# Patient Record
Sex: MALE | Race: WHITE | NOT HISPANIC OR LATINO | Employment: UNEMPLOYED | URBAN - NONMETROPOLITAN AREA
[De-identification: names, ages, dates, MRNs, and addresses within clinical notes are randomized per-mention and may not be internally consistent; named-entity substitution may affect disease eponyms.]

---

## 2017-06-22 ENCOUNTER — OFFICE VISIT (OUTPATIENT)
Dept: NEUROSURGERY | Facility: CLINIC | Age: 57
End: 2017-06-22

## 2017-06-22 VITALS
DIASTOLIC BLOOD PRESSURE: 118 MMHG | BODY MASS INDEX: 27.47 KG/M2 | WEIGHT: 175 LBS | SYSTOLIC BLOOD PRESSURE: 160 MMHG | HEIGHT: 67 IN

## 2017-06-22 DIAGNOSIS — M50.30 DEGENERATION OF CERVICAL INTERVERTEBRAL DISC: Primary | ICD-10-CM

## 2017-06-22 DIAGNOSIS — F17.200 SMOKER: ICD-10-CM

## 2017-06-22 DIAGNOSIS — M51.37 DEGENERATION OF LUMBAR OR LUMBOSACRAL INTERVERTEBRAL DISC: ICD-10-CM

## 2017-06-22 PROBLEM — S32.009A CLOSED FRACTURE OF LUMBAR VERTEBRA (HCC): Status: ACTIVE | Noted: 2017-06-22

## 2017-06-22 PROCEDURE — 99204 OFFICE O/P NEW MOD 45 MIN: CPT | Performed by: NURSE PRACTITIONER

## 2017-06-22 RX ORDER — LISINOPRIL 10 MG/1
TABLET ORAL
Status: ON HOLD | COMMUNITY
Start: 2017-05-10 | End: 2019-09-27 | Stop reason: SDDI

## 2017-06-22 RX ORDER — MELOXICAM 15 MG/1
TABLET ORAL
Status: ON HOLD | COMMUNITY
Start: 2017-05-10 | End: 2019-09-27 | Stop reason: SDDI

## 2017-06-22 RX ORDER — CYCLOBENZAPRINE HCL 10 MG
10 TABLET ORAL 3 TIMES DAILY PRN
Qty: 90 TABLET | Refills: 2 | Status: ON HOLD | OUTPATIENT
Start: 2017-06-22 | End: 2019-09-27

## 2017-06-22 RX ORDER — METHYLPREDNISOLONE 4 MG/1
TABLET ORAL
Qty: 21 EACH | Refills: 0 | Status: ON HOLD | OUTPATIENT
Start: 2017-06-22 | End: 2019-09-27

## 2017-06-22 RX ORDER — TRAMADOL HYDROCHLORIDE 50 MG/1
TABLET ORAL
Status: ON HOLD | COMMUNITY
Start: 2017-05-30 | End: 2019-09-27

## 2017-06-22 NOTE — PROGRESS NOTES
Chief complaint:   Chief Complaint   Patient presents with   • Neck Pain     Patient complains of pain between his shoulder blades that goes down into his left arm with numbness.    • Back Pain     His main pain feels like it is in his right hip and goes down his leg causing him trouble with walking.  Patient states he has not done any physical therapy for any of his problem.        Subjective     HPI: This is a 57-year-old male gentleman who is referred to us by Dr. Karimi for neck and back pain.  He is here to be evaluated today.  He states that the pain in his neck is been going on for several years.  There is no accident or injury associated with this.  The pain in his neck is constant.  He states nothing will make it better.  It is worse whenever he is working overhead.  He says that he will have some numbness and tingling in his left hand along with some sharp sensations in the left hand but no true radicular pain.  He states the pain in his back is been going on for several years without any accident or injury associated with it.  The pain in his back is constant.  Nothing makes it better.  Its worse when he is climbing.  He will have numbness and tingling in his right foot.  No radicular symptoms associated with this.  The pain in his neck is back is a dull aching pain.  Denies any bowel or bladder issues.  Rates the pain a scale 0-10 at an 8.  Says it is interfering with his activities daily living.  He is right-hand dominant.  He is single.  He works as a dempsey.  He has not done any recent physical therapy, chiropractic care, or pain management injections.    Review of Systems   Eyes:        Glasses   Respiratory: Positive for wheezing.    Musculoskeletal: Positive for back pain, joint swelling and neck pain.   All other systems reviewed and are negative.       Past Medical History:   Diagnosis Date   • Hypertension      Past Surgical History:   Procedure Laterality Date   • CATARACT EXTRACTION  "Left    • WRIST FRACTURE SURGERY Left      Family History   Problem Relation Age of Onset   • No Known Problems Mother    • Diabetes Father    • Cancer Sister    • No Known Problems Brother      Social History   Substance Use Topics   • Smoking status: Current Every Day Smoker     Types: Cigarettes   • Smokeless tobacco: None      Comment: 1.5 packs daily   • Alcohol use Yes       (Not in a hospital admission)  Allergies:  Review of patient's allergies indicates no known allergies.    Objective      Vital Signs  BP (!) 160/118 (BP Location: Right arm, Patient Position: Sitting)  Ht 67\" (170.2 cm)  Wt 175 lb (79.4 kg)  BMI 27.41 kg/m2    Physical Exam   Constitutional: He is oriented to person, place, and time. He appears well-developed and well-nourished.   HENT:   Head: Normocephalic.   Eyes: Conjunctivae, EOM and lids are normal. Pupils are equal, round, and reactive to light.   Neck: Normal range of motion.   Cardiovascular: Normal rate, regular rhythm and normal heart sounds.    Pulmonary/Chest: Effort normal and breath sounds normal.   Abdominal: Normal appearance.   Musculoskeletal: Normal range of motion.   Neurological: He is alert and oriented to person, place, and time. He has normal strength and normal reflexes. He displays normal reflexes. No cranial nerve deficit or sensory deficit. GCS eye subscore is 4. GCS verbal subscore is 5. GCS motor subscore is 6.   Skin: Skin is warm.   Psychiatric: He has a normal mood and affect. His speech is normal and behavior is normal. Thought content normal. Cognition and memory are normal.       Results Review: X-rays of cervical spine shows diffuse spondylosis throughout with loss of cervical curvature.  X-rays of lumbar spine shows the patient does have compression fractures along with degeneration in the lumbar spine most prominent at L5-S1.  X-rays of his hips were normal.          Assessment/Plan:   At this point we are going to start the patient into a " dedicated course of physical therapy consisting of 2-3 times a week for 4-6 weeks.  I will give him a prescription for Flexeril and a Medrol Dosepak.  She not have any improvement from the therapy we will consider getting an MRI of his cervical and lumbar spine.  I will follow-up again with him in 6 weeks.  BMI shows the patient's overweight.  BMI chart was given the patient.  He is a smoker.  Smoking cessation classes were given to the patient.        Jarod was seen today for neck pain and back pain.    Diagnoses and all orders for this visit:    Degeneration of cervical intervertebral disc  -     Ambulatory Referral to Physical Therapy Evaluate and treat    Degeneration of lumbar or lumbosacral intervertebral disc  -     Ambulatory Referral to Physical Therapy Evaluate and treat    Smoker    BMI 27.0-27.9,adult    Other orders  -     cyclobenzaprine (FLEXERIL) 10 MG tablet; Take 1 tablet by mouth 3 (Three) Times a Day As Needed for Muscle Spasms.  -     MethylPREDNISolone (MEDROL, ALBA,) 4 MG tablet; Take as directed on package instructions.        I discussed the patients findings and my recommendations with patient    Galo Aguilera, YARELI  06/22/17  11:41 AM

## 2019-09-26 ENCOUNTER — HOSPITAL ENCOUNTER (INPATIENT)
Facility: HOSPITAL | Age: 59
LOS: 14 days | Discharge: HOME | End: 2019-10-10
Attending: FAMILY MEDICINE | Admitting: FAMILY MEDICINE

## 2019-09-26 DIAGNOSIS — R13.10 DYSPHAGIA, UNSPECIFIED TYPE: Primary | ICD-10-CM

## 2019-09-26 DIAGNOSIS — Z78.9 IMPAIRED MOBILITY AND ADLS: ICD-10-CM

## 2019-09-26 DIAGNOSIS — R47.01 APHASIA: ICD-10-CM

## 2019-09-26 DIAGNOSIS — Z74.09 IMPAIRED MOBILITY AND ADLS: ICD-10-CM

## 2019-09-26 DIAGNOSIS — Z74.09 IMPAIRED MOBILITY: ICD-10-CM

## 2019-09-26 PROBLEM — I63.9 CVA (CEREBRAL VASCULAR ACCIDENT) (HCC): Status: ACTIVE | Noted: 2019-09-26

## 2019-09-26 PROBLEM — I63.9 ACUTE CEREBROVASCULAR ACCIDENT (CVA) OF CEREBELLUM (HCC): Status: ACTIVE | Noted: 2019-09-26

## 2019-09-26 PROBLEM — IMO0001 ALCOHOLISM /ALCOHOL ABUSE: Status: ACTIVE | Noted: 2019-09-26

## 2019-09-26 PROCEDURE — 25010000002 LORAZEPAM PER 2 MG: Performed by: FAMILY MEDICINE

## 2019-09-26 RX ORDER — LORAZEPAM 1 MG/1
2 TABLET ORAL
Status: DISCONTINUED | OUTPATIENT
Start: 2019-09-26 | End: 2019-10-04

## 2019-09-26 RX ORDER — LORAZEPAM 2 MG/ML
2 INJECTION INTRAMUSCULAR
Status: DISCONTINUED | OUTPATIENT
Start: 2019-09-26 | End: 2019-10-04

## 2019-09-26 RX ORDER — ASPIRIN 300 MG/1
300 SUPPOSITORY RECTAL DAILY
Status: DISCONTINUED | OUTPATIENT
Start: 2019-09-27 | End: 2019-10-10 | Stop reason: HOSPADM

## 2019-09-26 RX ORDER — LORAZEPAM 2 MG/ML
1 INJECTION INTRAMUSCULAR
Status: DISCONTINUED | OUTPATIENT
Start: 2019-09-26 | End: 2019-10-04

## 2019-09-26 RX ORDER — ASPIRIN 81 MG/1
81 TABLET, CHEWABLE ORAL DAILY
Status: DISCONTINUED | OUTPATIENT
Start: 2019-09-27 | End: 2019-10-10 | Stop reason: HOSPADM

## 2019-09-26 RX ORDER — LORAZEPAM 1 MG/1
1 TABLET ORAL
Status: DISCONTINUED | OUTPATIENT
Start: 2019-09-26 | End: 2019-10-04

## 2019-09-26 RX ORDER — SODIUM CHLORIDE 0.9 % (FLUSH) 0.9 %
10 SYRINGE (ML) INJECTION AS NEEDED
Status: DISCONTINUED | OUTPATIENT
Start: 2019-09-26 | End: 2019-10-10 | Stop reason: HOSPADM

## 2019-09-26 RX ORDER — SODIUM CHLORIDE, SODIUM LACTATE, POTASSIUM CHLORIDE, CALCIUM CHLORIDE 600; 310; 30; 20 MG/100ML; MG/100ML; MG/100ML; MG/100ML
50 INJECTION, SOLUTION INTRAVENOUS CONTINUOUS
Status: DISCONTINUED | OUTPATIENT
Start: 2019-09-26 | End: 2019-10-07

## 2019-09-26 RX ORDER — ATORVASTATIN CALCIUM 40 MG/1
80 TABLET, FILM COATED ORAL NIGHTLY
Status: DISCONTINUED | OUTPATIENT
Start: 2019-09-26 | End: 2019-10-10 | Stop reason: HOSPADM

## 2019-09-26 RX ORDER — SODIUM CHLORIDE 0.9 % (FLUSH) 0.9 %
10 SYRINGE (ML) INJECTION EVERY 12 HOURS SCHEDULED
Status: DISCONTINUED | OUTPATIENT
Start: 2019-09-26 | End: 2019-10-10 | Stop reason: HOSPADM

## 2019-09-26 RX ADMIN — LORAZEPAM 2 MG: 2 INJECTION, SOLUTION INTRAMUSCULAR; INTRAVENOUS at 22:43

## 2019-09-26 RX ADMIN — LORAZEPAM 2 MG: 2 INJECTION INTRAMUSCULAR; INTRAVENOUS at 21:34

## 2019-09-27 ENCOUNTER — APPOINTMENT (OUTPATIENT)
Dept: CARDIOLOGY | Facility: HOSPITAL | Age: 59
End: 2019-09-27

## 2019-09-27 ENCOUNTER — APPOINTMENT (OUTPATIENT)
Dept: GENERAL RADIOLOGY | Facility: HOSPITAL | Age: 59
End: 2019-09-27

## 2019-09-27 ENCOUNTER — APPOINTMENT (OUTPATIENT)
Dept: ULTRASOUND IMAGING | Facility: HOSPITAL | Age: 59
End: 2019-09-27

## 2019-09-27 PROBLEM — E78.5 HYPERLIPIDEMIA: Status: ACTIVE | Noted: 2019-09-27

## 2019-09-27 LAB
ALBUMIN SERPL-MCNC: 4 G/DL (ref 3.5–5.2)
ALBUMIN/GLOB SERPL: 1.3 G/DL
ALP SERPL-CCNC: 70 U/L (ref 39–117)
ALT SERPL W P-5'-P-CCNC: 21 U/L (ref 1–41)
ANION GAP SERPL CALCULATED.3IONS-SCNC: 16 MMOL/L (ref 5–15)
ARTERIAL PATENCY WRIST A: POSITIVE
AST SERPL-CCNC: 37 U/L (ref 1–40)
ATMOSPHERIC PRESS: 746 MMHG
BASE EXCESS BLDA CALC-SCNC: -0.2 MMOL/L (ref 0–2)
BDY SITE: ABNORMAL
BH CV ECHO MEAS - AO MAX PG (FULL): 2.5 MMHG
BH CV ECHO MEAS - AO MAX PG: 7.3 MMHG
BH CV ECHO MEAS - AO MEAN PG (FULL): 2 MMHG
BH CV ECHO MEAS - AO MEAN PG: 4 MMHG
BH CV ECHO MEAS - AO ROOT AREA (BSA CORRECTED): 2.2
BH CV ECHO MEAS - AO ROOT AREA: 13.2 CM^2
BH CV ECHO MEAS - AO ROOT DIAM: 4.1 CM
BH CV ECHO MEAS - AO V2 MAX: 135 CM/SEC
BH CV ECHO MEAS - AO V2 MEAN: 95.2 CM/SEC
BH CV ECHO MEAS - AO V2 VTI: 28 CM
BH CV ECHO MEAS - AVA(I,A): 2.5 CM^2
BH CV ECHO MEAS - AVA(I,D): 2.5 CM^2
BH CV ECHO MEAS - AVA(V,A): 2.6 CM^2
BH CV ECHO MEAS - AVA(V,D): 2.6 CM^2
BH CV ECHO MEAS - BSA(HAYCOCK): 1.9 M^2
BH CV ECHO MEAS - BSA: 1.9 M^2
BH CV ECHO MEAS - BZI_BMI: 30.2 KILOGRAMS/M^2
BH CV ECHO MEAS - BZI_METRIC_HEIGHT: 162.6 CM
BH CV ECHO MEAS - BZI_METRIC_WEIGHT: 79.8 KG
BH CV ECHO MEAS - EDV(CUBED): 105.2 ML
BH CV ECHO MEAS - EDV(MOD-SP4): 54 ML
BH CV ECHO MEAS - EDV(TEICH): 103.4 ML
BH CV ECHO MEAS - EF(CUBED): 70.6 %
BH CV ECHO MEAS - EF(MOD-SP4): 60.4 %
BH CV ECHO MEAS - EF(TEICH): 62.2 %
BH CV ECHO MEAS - ESV(CUBED): 31 ML
BH CV ECHO MEAS - ESV(MOD-SP4): 21.4 ML
BH CV ECHO MEAS - ESV(TEICH): 39.1 ML
BH CV ECHO MEAS - FS: 33.5 %
BH CV ECHO MEAS - IVS/LVPW: 1.1
BH CV ECHO MEAS - IVSD: 1.2 CM
BH CV ECHO MEAS - LA DIMENSION: 3.8 CM
BH CV ECHO MEAS - LA/AO: 0.93
BH CV ECHO MEAS - LAT PEAK E' VEL: 11.8 CM/SEC
BH CV ECHO MEAS - LV DIASTOLIC VOL/BSA (35-75): 29.1 ML/M^2
BH CV ECHO MEAS - LV MASS(C)D: 199.7 GRAMS
BH CV ECHO MEAS - LV MASS(C)DI: 107.8 GRAMS/M^2
BH CV ECHO MEAS - LV MAX PG: 4.8 MMHG
BH CV ECHO MEAS - LV MEAN PG: 2 MMHG
BH CV ECHO MEAS - LV SYSTOLIC VOL/BSA (12-30): 11.6 ML/M^2
BH CV ECHO MEAS - LV V1 MAX: 110 CM/SEC
BH CV ECHO MEAS - LV V1 MEAN: 63 CM/SEC
BH CV ECHO MEAS - LV V1 VTI: 22.5 CM
BH CV ECHO MEAS - LVIDD: 4.7 CM
BH CV ECHO MEAS - LVIDS: 3.1 CM
BH CV ECHO MEAS - LVLD AP4: 7.7 CM
BH CV ECHO MEAS - LVLS AP4: 6.1 CM
BH CV ECHO MEAS - LVOT AREA (M): 3.1 CM^2
BH CV ECHO MEAS - LVOT AREA: 3.1 CM^2
BH CV ECHO MEAS - LVOT DIAM: 2 CM
BH CV ECHO MEAS - LVPWD: 1.1 CM
BH CV ECHO MEAS - MED PEAK E' VEL: 6.4 CM/SEC
BH CV ECHO MEAS - MV A MAX VEL: 102 CM/SEC
BH CV ECHO MEAS - MV DEC SLOPE: 219 CM/SEC^2
BH CV ECHO MEAS - MV DEC TIME: 0.32 SEC
BH CV ECHO MEAS - MV E MAX VEL: 56.3 CM/SEC
BH CV ECHO MEAS - MV E/A: 0.55
BH CV ECHO MEAS - MV P1/2T MAX VEL: 77.4 CM/SEC
BH CV ECHO MEAS - MV P1/2T: 103.5 MSEC
BH CV ECHO MEAS - MVA P1/2T LCG: 2.8 CM^2
BH CV ECHO MEAS - MVA(P1/2T): 2.1 CM^2
BH CV ECHO MEAS - SI(AO): 199.5 ML/M^2
BH CV ECHO MEAS - SI(CUBED): 40 ML/M^2
BH CV ECHO MEAS - SI(LVOT): 38.2 ML/M^2
BH CV ECHO MEAS - SI(MOD-SP4): 17.6 ML/M^2
BH CV ECHO MEAS - SI(TEICH): 34.7 ML/M^2
BH CV ECHO MEAS - SV(AO): 369.7 ML
BH CV ECHO MEAS - SV(CUBED): 74.2 ML
BH CV ECHO MEAS - SV(LVOT): 70.7 ML
BH CV ECHO MEAS - SV(MOD-SP4): 32.6 ML
BH CV ECHO MEAS - SV(TEICH): 64.3 ML
BH CV ECHO MEASUREMENTS AVERAGE E/E' RATIO: 6.19
BILIRUB SERPL-MCNC: 0.8 MG/DL (ref 0.2–1.2)
BODY TEMPERATURE: 37 C
BUN BLD-MCNC: 7 MG/DL (ref 6–20)
BUN/CREAT SERPL: 12.1 (ref 7–25)
CALCIUM SPEC-SCNC: 8.8 MG/DL (ref 8.6–10.5)
CHLORIDE SERPL-SCNC: 96 MMOL/L (ref 98–107)
CHOLEST SERPL-MCNC: 195 MG/DL (ref 0–200)
CO2 SERPL-SCNC: 25 MMOL/L (ref 22–29)
CREAT BLD-MCNC: 0.58 MG/DL (ref 0.76–1.27)
GFR SERPL CREATININE-BSD FRML MDRD: 144 ML/MIN/1.73
GLOBULIN UR ELPH-MCNC: 3.1 GM/DL
GLUCOSE BLD-MCNC: 94 MG/DL (ref 65–99)
GLUCOSE BLDC GLUCOMTR-MCNC: 105 MG/DL (ref 70–130)
GLUCOSE BLDC GLUCOMTR-MCNC: 105 MG/DL (ref 70–130)
HBA1C MFR BLD: 4.7 % (ref 4.8–5.6)
HCO3 BLDA-SCNC: 25.2 MMOL/L (ref 20–26)
HDLC SERPL-MCNC: 51 MG/DL (ref 40–60)
HOROWITZ INDEX BLD+IHG-RTO: 40 %
LDLC SERPL CALC-MCNC: 102 MG/DL (ref 0–100)
LDLC/HDLC SERPL: 2.01 {RATIO}
LEFT ATRIUM VOLUME INDEX: 33 ML/M2
Lab: ABNORMAL
MAXIMAL PREDICTED HEART RATE: 163 BPM
MODALITY: ABNORMAL
PCO2 BLDA: 42.6 MM HG (ref 35–45)
PEEP RESPIRATORY: 5 CM[H2O]
PH BLDA: 7.38 PH UNITS (ref 7.35–7.45)
PO2 BLDA: 123 MM HG (ref 83–108)
POTASSIUM BLD-SCNC: 3.5 MMOL/L (ref 3.5–5.2)
PROT SERPL-MCNC: 7.1 G/DL (ref 6–8.5)
SAO2 % BLDCOA: 98.8 % (ref 94–99)
SET MECH RESP RATE: 10
SODIUM BLD-SCNC: 137 MMOL/L (ref 136–145)
STRESS TARGET HR: 139 BPM
TRIGL SERPL-MCNC: 208 MG/DL (ref 0–150)
TSH SERPL DL<=0.05 MIU/L-ACNC: 1.76 UIU/ML (ref 0.27–4.2)
VENTILATOR MODE: AC
VLDLC SERPL-MCNC: 41.6 MG/DL
VT ON VENT VENT: 620 ML

## 2019-09-27 PROCEDURE — 5A1945Z RESPIRATORY VENTILATION, 24-96 CONSECUTIVE HOURS: ICD-10-PCS | Performed by: FAMILY MEDICINE

## 2019-09-27 PROCEDURE — 80053 COMPREHEN METABOLIC PANEL: CPT | Performed by: FAMILY MEDICINE

## 2019-09-27 PROCEDURE — 94770: CPT

## 2019-09-27 PROCEDURE — 94002 VENT MGMT INPAT INIT DAY: CPT

## 2019-09-27 PROCEDURE — 92610 EVALUATE SWALLOWING FUNCTION: CPT | Performed by: SPEECH-LANGUAGE PATHOLOGIST

## 2019-09-27 PROCEDURE — 93306 TTE W/DOPPLER COMPLETE: CPT | Performed by: INTERNAL MEDICINE

## 2019-09-27 PROCEDURE — 94799 UNLISTED PULMONARY SVC/PX: CPT

## 2019-09-27 PROCEDURE — 99255 IP/OBS CONSLTJ NEW/EST HI 80: CPT | Performed by: PSYCHIATRY & NEUROLOGY

## 2019-09-27 PROCEDURE — 25010000002 HALOPERIDOL LACTATE PER 5 MG: Performed by: NURSE PRACTITIONER

## 2019-09-27 PROCEDURE — 82962 GLUCOSE BLOOD TEST: CPT

## 2019-09-27 PROCEDURE — 25010000002 LORAZEPAM PER 2 MG: Performed by: FAMILY MEDICINE

## 2019-09-27 PROCEDURE — 82803 BLOOD GASES ANY COMBINATION: CPT

## 2019-09-27 PROCEDURE — 25010000002 MIDAZOLAM 50 MG/10ML SOLUTION 10 ML VIAL: Performed by: FAMILY MEDICINE

## 2019-09-27 PROCEDURE — 25010000002 HALOPERIDOL LACTATE PER 5 MG: Performed by: FAMILY MEDICINE

## 2019-09-27 PROCEDURE — 25010000002 THIAMINE PER 100 MG: Performed by: FAMILY MEDICINE

## 2019-09-27 PROCEDURE — 94640 AIRWAY INHALATION TREATMENT: CPT

## 2019-09-27 PROCEDURE — 25010000002 POTASSIUM CHLORIDE PER 2 MEQ OF POTASSIUM: Performed by: FAMILY MEDICINE

## 2019-09-27 PROCEDURE — 94760 N-INVAS EAR/PLS OXIMETRY 1: CPT

## 2019-09-27 PROCEDURE — 84443 ASSAY THYROID STIM HORMONE: CPT | Performed by: FAMILY MEDICINE

## 2019-09-27 PROCEDURE — 83036 HEMOGLOBIN GLYCOSYLATED A1C: CPT | Performed by: FAMILY MEDICINE

## 2019-09-27 PROCEDURE — 87070 CULTURE OTHR SPECIMN AEROBIC: CPT | Performed by: FAMILY MEDICINE

## 2019-09-27 PROCEDURE — 80061 LIPID PANEL: CPT | Performed by: FAMILY MEDICINE

## 2019-09-27 PROCEDURE — 93306 TTE W/DOPPLER COMPLETE: CPT

## 2019-09-27 PROCEDURE — 87205 SMEAR GRAM STAIN: CPT | Performed by: FAMILY MEDICINE

## 2019-09-27 PROCEDURE — 71045 X-RAY EXAM CHEST 1 VIEW: CPT

## 2019-09-27 PROCEDURE — 25010000002 PROPOFOL 1000 MG/ML EMULSION

## 2019-09-27 PROCEDURE — 36600 WITHDRAWAL OF ARTERIAL BLOOD: CPT

## 2019-09-27 RX ORDER — ALBUTEROL SULFATE 2.5 MG/3ML
2.5 SOLUTION RESPIRATORY (INHALATION) ONCE AS NEEDED
Status: DISCONTINUED | OUTPATIENT
Start: 2019-09-27 | End: 2019-10-10 | Stop reason: HOSPADM

## 2019-09-27 RX ORDER — HALOPERIDOL 2 MG/1
2 TABLET ORAL ONCE
Status: DISCONTINUED | OUTPATIENT
Start: 2019-09-27 | End: 2019-09-27

## 2019-09-27 RX ORDER — HALOPERIDOL 5 MG/ML
2 INJECTION INTRAMUSCULAR ONCE
Status: COMPLETED | OUTPATIENT
Start: 2019-09-27 | End: 2019-09-27

## 2019-09-27 RX ORDER — CHLORHEXIDINE GLUCONATE 0.12 MG/ML
15 RINSE ORAL EVERY 12 HOURS SCHEDULED
Status: DISCONTINUED | OUTPATIENT
Start: 2019-09-27 | End: 2019-10-03

## 2019-09-27 RX ORDER — LABETALOL HYDROCHLORIDE 5 MG/ML
20 INJECTION, SOLUTION INTRAVENOUS EVERY 4 HOURS PRN
Status: DISCONTINUED | OUTPATIENT
Start: 2019-09-27 | End: 2019-10-10 | Stop reason: HOSPADM

## 2019-09-27 RX ORDER — IPRATROPIUM BROMIDE AND ALBUTEROL SULFATE 2.5; .5 MG/3ML; MG/3ML
3 SOLUTION RESPIRATORY (INHALATION)
Status: DISCONTINUED | OUTPATIENT
Start: 2019-09-27 | End: 2019-10-10 | Stop reason: HOSPADM

## 2019-09-27 RX ADMIN — LORAZEPAM 2 MG: 2 INJECTION INTRAMUSCULAR; INTRAVENOUS at 19:05

## 2019-09-27 RX ADMIN — PROPOFOL 10 MCG/KG/MIN: 10 INJECTION, EMULSION INTRAVENOUS at 20:09

## 2019-09-27 RX ADMIN — IPRATROPIUM BROMIDE AND ALBUTEROL SULFATE 3 ML: 2.5; .5 SOLUTION RESPIRATORY (INHALATION) at 21:56

## 2019-09-27 RX ADMIN — LORAZEPAM 2 MG: 2 INJECTION, SOLUTION INTRAMUSCULAR; INTRAVENOUS at 15:37

## 2019-09-27 RX ADMIN — HALOPERIDOL LACTATE 2 MG: 5 INJECTION, SOLUTION INTRAMUSCULAR at 16:20

## 2019-09-27 RX ADMIN — LORAZEPAM 2 MG: 2 INJECTION, SOLUTION INTRAMUSCULAR; INTRAVENOUS at 13:15

## 2019-09-27 RX ADMIN — LORAZEPAM 2 MG: 2 INJECTION, SOLUTION INTRAMUSCULAR; INTRAVENOUS at 03:51

## 2019-09-27 RX ADMIN — MIDAZOLAM 1 MG/HR: 5 INJECTION INTRAMUSCULAR; INTRAVENOUS at 20:08

## 2019-09-27 RX ADMIN — LORAZEPAM 2 MG: 2 INJECTION, SOLUTION INTRAMUSCULAR; INTRAVENOUS at 12:08

## 2019-09-27 RX ADMIN — LORAZEPAM 2 MG: 2 INJECTION INTRAMUSCULAR; INTRAVENOUS at 17:08

## 2019-09-27 RX ADMIN — LORAZEPAM 2 MG: 2 INJECTION INTRAMUSCULAR; INTRAVENOUS at 00:12

## 2019-09-27 RX ADMIN — LORAZEPAM 2 MG: 2 INJECTION INTRAMUSCULAR; INTRAVENOUS at 16:08

## 2019-09-27 RX ADMIN — SODIUM CHLORIDE, PRESERVATIVE FREE 10 ML: 5 INJECTION INTRAVENOUS at 09:45

## 2019-09-27 RX ADMIN — HALOPERIDOL LACTATE 2 MG: 5 INJECTION, SOLUTION INTRAMUSCULAR at 01:31

## 2019-09-27 RX ADMIN — LORAZEPAM 2 MG: 2 INJECTION INTRAMUSCULAR; INTRAVENOUS at 16:34

## 2019-09-27 RX ADMIN — IPRATROPIUM BROMIDE AND ALBUTEROL SULFATE 3 ML: 2.5; .5 SOLUTION RESPIRATORY (INHALATION) at 14:30

## 2019-09-27 RX ADMIN — THIAMINE HYDROCHLORIDE 100 ML/HR: 100 INJECTION, SOLUTION INTRAMUSCULAR; INTRAVENOUS at 09:43

## 2019-09-27 RX ADMIN — HALOPERIDOL LACTATE 2 MG: 5 INJECTION, SOLUTION INTRAMUSCULAR at 03:17

## 2019-09-27 RX ADMIN — ASPIRIN 300 MG: 300 SUPPOSITORY RECTAL at 09:43

## 2019-09-27 RX ADMIN — LORAZEPAM 2 MG: 2 INJECTION INTRAMUSCULAR; INTRAVENOUS at 20:07

## 2019-09-28 ENCOUNTER — APPOINTMENT (OUTPATIENT)
Dept: MRI IMAGING | Facility: HOSPITAL | Age: 59
End: 2019-09-28

## 2019-09-28 ENCOUNTER — APPOINTMENT (OUTPATIENT)
Dept: GENERAL RADIOLOGY | Facility: HOSPITAL | Age: 59
End: 2019-09-28

## 2019-09-28 ENCOUNTER — APPOINTMENT (OUTPATIENT)
Dept: ULTRASOUND IMAGING | Facility: HOSPITAL | Age: 59
End: 2019-09-28

## 2019-09-28 LAB
ALBUMIN SERPL-MCNC: 3.6 G/DL (ref 3.5–5.2)
ALBUMIN/GLOB SERPL: 1.3 G/DL
ALP SERPL-CCNC: 63 U/L (ref 39–117)
ALT SERPL W P-5'-P-CCNC: 17 U/L (ref 1–41)
ANION GAP SERPL CALCULATED.3IONS-SCNC: 13 MMOL/L (ref 5–15)
ARTERIAL PATENCY WRIST A: POSITIVE
AST SERPL-CCNC: 37 U/L (ref 1–40)
ATMOSPHERIC PRESS: 748 MMHG
BASE EXCESS BLDA CALC-SCNC: -0.8 MMOL/L (ref 0–2)
BASOPHILS # BLD AUTO: 0.1 10*3/MM3 (ref 0–0.2)
BASOPHILS NFR BLD AUTO: 0.8 % (ref 0–1.5)
BDY SITE: ABNORMAL
BILIRUB SERPL-MCNC: 0.9 MG/DL (ref 0.2–1.2)
BODY TEMPERATURE: 37 C
BUN BLD-MCNC: 7 MG/DL (ref 6–20)
BUN/CREAT SERPL: 9.2 (ref 7–25)
CALCIUM SPEC-SCNC: 8.5 MG/DL (ref 8.6–10.5)
CHLORIDE SERPL-SCNC: 99 MMOL/L (ref 98–107)
CO2 SERPL-SCNC: 26 MMOL/L (ref 22–29)
CREAT BLD-MCNC: 0.76 MG/DL (ref 0.76–1.27)
DEPRECATED RDW RBC AUTO: 47.7 FL (ref 37–54)
EOSINOPHIL # BLD AUTO: 0.21 10*3/MM3 (ref 0–0.4)
EOSINOPHIL NFR BLD AUTO: 1.6 % (ref 0.3–6.2)
ERYTHROCYTE [DISTWIDTH] IN BLOOD BY AUTOMATED COUNT: 13.8 % (ref 12.3–15.4)
GFR SERPL CREATININE-BSD FRML MDRD: 106 ML/MIN/1.73
GLOBULIN UR ELPH-MCNC: 2.8 GM/DL
GLUCOSE BLD-MCNC: 110 MG/DL (ref 65–99)
GLUCOSE BLDC GLUCOMTR-MCNC: 89 MG/DL (ref 70–130)
HCO3 BLDA-SCNC: 23.4 MMOL/L (ref 20–26)
HCT VFR BLD AUTO: 39.7 % (ref 37.5–51)
HGB BLD-MCNC: 13.7 G/DL (ref 13–17.7)
HOROWITZ INDEX BLD+IHG-RTO: 30 %
IMM GRANULOCYTES # BLD AUTO: 0.06 10*3/MM3 (ref 0–0.05)
IMM GRANULOCYTES NFR BLD AUTO: 0.5 % (ref 0–0.5)
LYMPHOCYTES # BLD AUTO: 2.47 10*3/MM3 (ref 0.7–3.1)
LYMPHOCYTES NFR BLD AUTO: 19.4 % (ref 19.6–45.3)
Lab: ABNORMAL
MCH RBC QN AUTO: 32.5 PG (ref 26.6–33)
MCHC RBC AUTO-ENTMCNC: 34.5 G/DL (ref 31.5–35.7)
MCV RBC AUTO: 94.1 FL (ref 79–97)
MODALITY: ABNORMAL
MONOCYTES # BLD AUTO: 0.72 10*3/MM3 (ref 0.1–0.9)
MONOCYTES NFR BLD AUTO: 5.6 % (ref 5–12)
NEUTROPHILS # BLD AUTO: 9.19 10*3/MM3 (ref 1.7–7)
NEUTROPHILS NFR BLD AUTO: 72.1 % (ref 42.7–76)
NRBC BLD AUTO-RTO: 0 /100 WBC (ref 0–0.2)
PCO2 BLDA: 36.8 MM HG (ref 35–45)
PEEP RESPIRATORY: 5 CM[H2O]
PH BLDA: 7.41 PH UNITS (ref 7.35–7.45)
PLATELET # BLD AUTO: 335 10*3/MM3 (ref 140–450)
PMV BLD AUTO: 9.2 FL (ref 6–12)
PO2 BLDA: 79.9 MM HG (ref 83–108)
POTASSIUM BLD-SCNC: 3.6 MMOL/L (ref 3.5–5.2)
PROT SERPL-MCNC: 6.4 G/DL (ref 6–8.5)
RBC # BLD AUTO: 4.22 10*6/MM3 (ref 4.14–5.8)
SAO2 % BLDCOA: 96.5 % (ref 94–99)
SET MECH RESP RATE: 10
SODIUM BLD-SCNC: 138 MMOL/L (ref 136–145)
VENTILATOR MODE: AC
VT ON VENT VENT: 620 ML
WBC NRBC COR # BLD: 12.75 10*3/MM3 (ref 3.4–10.8)

## 2019-09-28 PROCEDURE — 93880 EXTRACRANIAL BILAT STUDY: CPT | Performed by: SURGERY

## 2019-09-28 PROCEDURE — 36600 WITHDRAWAL OF ARTERIAL BLOOD: CPT

## 2019-09-28 PROCEDURE — 25010000002 THIAMINE PER 100 MG: Performed by: FAMILY MEDICINE

## 2019-09-28 PROCEDURE — 94799 UNLISTED PULMONARY SVC/PX: CPT

## 2019-09-28 PROCEDURE — 71045 X-RAY EXAM CHEST 1 VIEW: CPT

## 2019-09-28 PROCEDURE — 82803 BLOOD GASES ANY COMBINATION: CPT

## 2019-09-28 PROCEDURE — 25010000002 POTASSIUM CHLORIDE PER 2 MEQ OF POTASSIUM: Performed by: FAMILY MEDICINE

## 2019-09-28 PROCEDURE — 25010000002 SUCCINYLCHOLINE PER 20 MG

## 2019-09-28 PROCEDURE — 25010000002 PROPOFOL 10 MG/ML EMULSION: Performed by: FAMILY MEDICINE

## 2019-09-28 PROCEDURE — 93880 EXTRACRANIAL BILAT STUDY: CPT

## 2019-09-28 PROCEDURE — 85025 COMPLETE CBC W/AUTO DIFF WBC: CPT | Performed by: NURSE PRACTITIONER

## 2019-09-28 PROCEDURE — 99233 SBSQ HOSP IP/OBS HIGH 50: CPT | Performed by: PSYCHIATRY & NEUROLOGY

## 2019-09-28 PROCEDURE — 74018 RADEX ABDOMEN 1 VIEW: CPT

## 2019-09-28 PROCEDURE — 94760 N-INVAS EAR/PLS OXIMETRY 1: CPT

## 2019-09-28 PROCEDURE — 94770: CPT

## 2019-09-28 PROCEDURE — 25010000002 MIDAZOLAM 50 MG/10ML SOLUTION 10 ML VIAL: Performed by: FAMILY MEDICINE

## 2019-09-28 PROCEDURE — 80053 COMPREHEN METABOLIC PANEL: CPT | Performed by: NURSE PRACTITIONER

## 2019-09-28 PROCEDURE — 94003 VENT MGMT INPAT SUBQ DAY: CPT

## 2019-09-28 PROCEDURE — 70551 MRI BRAIN STEM W/O DYE: CPT

## 2019-09-28 RX ADMIN — CHLORHEXIDINE GLUCONATE 15 ML: 1.2 RINSE ORAL at 20:53

## 2019-09-28 RX ADMIN — PROPOFOL 20 MCG/KG/MIN: 10 INJECTION, EMULSION INTRAVENOUS at 05:49

## 2019-09-28 RX ADMIN — PROPOFOL 35 MCG/KG/MIN: 10 INJECTION, EMULSION INTRAVENOUS at 22:02

## 2019-09-28 RX ADMIN — ASPIRIN 300 MG: 300 SUPPOSITORY RECTAL at 08:25

## 2019-09-28 RX ADMIN — IPRATROPIUM BROMIDE AND ALBUTEROL SULFATE 3 ML: 2.5; .5 SOLUTION RESPIRATORY (INHALATION) at 06:43

## 2019-09-28 RX ADMIN — MIDAZOLAM 7 MG/HR: 5 INJECTION INTRAMUSCULAR; INTRAVENOUS at 21:20

## 2019-09-28 RX ADMIN — PROPOFOL 20 MCG/KG/MIN: 10 INJECTION, EMULSION INTRAVENOUS at 11:18

## 2019-09-28 RX ADMIN — PROPOFOL 25 MCG/KG/MIN: 10 INJECTION, EMULSION INTRAVENOUS at 17:27

## 2019-09-28 RX ADMIN — IPRATROPIUM BROMIDE AND ALBUTEROL SULFATE 3 ML: 2.5; .5 SOLUTION RESPIRATORY (INHALATION) at 19:20

## 2019-09-28 RX ADMIN — SODIUM CHLORIDE, POTASSIUM CHLORIDE, SODIUM LACTATE AND CALCIUM CHLORIDE 50 ML/HR: 600; 310; 30; 20 INJECTION, SOLUTION INTRAVENOUS at 08:36

## 2019-09-28 RX ADMIN — IPRATROPIUM BROMIDE AND ALBUTEROL SULFATE 3 ML: 2.5; .5 SOLUTION RESPIRATORY (INHALATION) at 15:22

## 2019-09-28 RX ADMIN — THIAMINE HYDROCHLORIDE 100 ML/HR: 100 INJECTION, SOLUTION INTRAMUSCULAR; INTRAVENOUS at 10:00

## 2019-09-28 RX ADMIN — IPRATROPIUM BROMIDE AND ALBUTEROL SULFATE 3 ML: 2.5; .5 SOLUTION RESPIRATORY (INHALATION) at 10:40

## 2019-09-28 RX ADMIN — SODIUM CHLORIDE 1000 ML: 9 INJECTION, SOLUTION INTRAVENOUS at 02:55

## 2019-09-28 RX ADMIN — SODIUM CHLORIDE, PRESERVATIVE FREE 10 ML: 5 INJECTION INTRAVENOUS at 08:19

## 2019-09-28 RX ADMIN — CHLORHEXIDINE GLUCONATE 15 ML: 1.2 RINSE ORAL at 08:19

## 2019-09-29 ENCOUNTER — APPOINTMENT (OUTPATIENT)
Dept: GENERAL RADIOLOGY | Facility: HOSPITAL | Age: 59
End: 2019-09-29

## 2019-09-29 ENCOUNTER — APPOINTMENT (OUTPATIENT)
Dept: CT IMAGING | Facility: HOSPITAL | Age: 59
End: 2019-09-29

## 2019-09-29 LAB
ARTERIAL PATENCY WRIST A: POSITIVE
ATMOSPHERIC PRESS: 752 MMHG
BACTERIA SPEC RESP CULT: NORMAL
BASE EXCESS BLDA CALC-SCNC: 0.5 MMOL/L (ref 0–2)
BDY SITE: NORMAL
BODY TEMPERATURE: 37 C
GRAM STN SPEC: NORMAL
HCO3 BLDA-SCNC: 24.8 MMOL/L (ref 20–26)
HOROWITZ INDEX BLD+IHG-RTO: 30 %
Lab: NORMAL
MODALITY: NORMAL
PCO2 BLDA: 38.2 MM HG (ref 35–45)
PEEP RESPIRATORY: 5 CM[H2O]
PH BLDA: 7.42 PH UNITS (ref 7.35–7.45)
PO2 BLDA: 86.1 MM HG (ref 83–108)
SAO2 % BLDCOA: 97.4 % (ref 94–99)
SET MECH RESP RATE: 10
VENTILATOR MODE: AC
VT ON VENT VENT: 620 ML

## 2019-09-29 PROCEDURE — 0 IOPAMIDOL PER 1 ML: Performed by: FAMILY MEDICINE

## 2019-09-29 PROCEDURE — 82803 BLOOD GASES ANY COMBINATION: CPT

## 2019-09-29 PROCEDURE — 71045 X-RAY EXAM CHEST 1 VIEW: CPT

## 2019-09-29 PROCEDURE — 25010000002 LORAZEPAM PER 2 MG: Performed by: FAMILY MEDICINE

## 2019-09-29 PROCEDURE — 94799 UNLISTED PULMONARY SVC/PX: CPT

## 2019-09-29 PROCEDURE — 25010000002 PROPOFOL 10 MG/ML EMULSION: Performed by: FAMILY MEDICINE

## 2019-09-29 PROCEDURE — 74018 RADEX ABDOMEN 1 VIEW: CPT

## 2019-09-29 PROCEDURE — 25010000002 MIDAZOLAM 50 MG/10ML SOLUTION 10 ML VIAL: Performed by: FAMILY MEDICINE

## 2019-09-29 PROCEDURE — 94770: CPT

## 2019-09-29 PROCEDURE — 70496 CT ANGIOGRAPHY HEAD: CPT

## 2019-09-29 PROCEDURE — 99233 SBSQ HOSP IP/OBS HIGH 50: CPT | Performed by: PSYCHIATRY & NEUROLOGY

## 2019-09-29 PROCEDURE — 25010000002 POTASSIUM CHLORIDE PER 2 MEQ OF POTASSIUM: Performed by: FAMILY MEDICINE

## 2019-09-29 PROCEDURE — 94003 VENT MGMT INPAT SUBQ DAY: CPT

## 2019-09-29 PROCEDURE — 36600 WITHDRAWAL OF ARTERIAL BLOOD: CPT

## 2019-09-29 PROCEDURE — 70498 CT ANGIOGRAPHY NECK: CPT

## 2019-09-29 PROCEDURE — 25010000002 THIAMINE PER 100 MG: Performed by: FAMILY MEDICINE

## 2019-09-29 RX ADMIN — PROPOFOL 50 MCG/KG/MIN: 10 INJECTION, EMULSION INTRAVENOUS at 14:49

## 2019-09-29 RX ADMIN — IPRATROPIUM BROMIDE AND ALBUTEROL SULFATE 3 ML: 2.5; .5 SOLUTION RESPIRATORY (INHALATION) at 20:05

## 2019-09-29 RX ADMIN — ASPIRIN 300 MG: 300 SUPPOSITORY RECTAL at 09:20

## 2019-09-29 RX ADMIN — IPRATROPIUM BROMIDE AND ALBUTEROL SULFATE 3 ML: 2.5; .5 SOLUTION RESPIRATORY (INHALATION) at 06:36

## 2019-09-29 RX ADMIN — LORAZEPAM 2 MG: 2 INJECTION, SOLUTION INTRAMUSCULAR; INTRAVENOUS at 13:11

## 2019-09-29 RX ADMIN — PROPOFOL 50 MCG/KG/MIN: 10 INJECTION, EMULSION INTRAVENOUS at 19:44

## 2019-09-29 RX ADMIN — MIDAZOLAM 8 MG/HR: 5 INJECTION INTRAMUSCULAR; INTRAVENOUS at 16:43

## 2019-09-29 RX ADMIN — IOPAMIDOL 125 ML: 755 INJECTION, SOLUTION INTRAVENOUS at 13:55

## 2019-09-29 RX ADMIN — SODIUM CHLORIDE, POTASSIUM CHLORIDE, SODIUM LACTATE AND CALCIUM CHLORIDE 50 ML/HR: 600; 310; 30; 20 INJECTION, SOLUTION INTRAVENOUS at 05:07

## 2019-09-29 RX ADMIN — PROPOFOL 40 MCG/KG/MIN: 10 INJECTION, EMULSION INTRAVENOUS at 09:20

## 2019-09-29 RX ADMIN — CHLORHEXIDINE GLUCONATE 15 ML: 1.2 RINSE ORAL at 09:20

## 2019-09-29 RX ADMIN — SODIUM CHLORIDE, POTASSIUM CHLORIDE, SODIUM LACTATE AND CALCIUM CHLORIDE 50 ML/HR: 600; 310; 30; 20 INJECTION, SOLUTION INTRAVENOUS at 16:41

## 2019-09-29 RX ADMIN — IPRATROPIUM BROMIDE AND ALBUTEROL SULFATE 3 ML: 2.5; .5 SOLUTION RESPIRATORY (INHALATION) at 15:11

## 2019-09-29 RX ADMIN — ATORVASTATIN CALCIUM 80 MG: 40 TABLET, FILM COATED ORAL at 22:20

## 2019-09-29 RX ADMIN — PROPOFOL 45 MCG/KG/MIN: 10 INJECTION, EMULSION INTRAVENOUS at 23:30

## 2019-09-29 RX ADMIN — IPRATROPIUM BROMIDE AND ALBUTEROL SULFATE 3 ML: 2.5; .5 SOLUTION RESPIRATORY (INHALATION) at 11:27

## 2019-09-29 RX ADMIN — PROPOFOL 25 MCG/KG/MIN: 10 INJECTION, EMULSION INTRAVENOUS at 05:07

## 2019-09-29 RX ADMIN — CHLORHEXIDINE GLUCONATE 15 ML: 1.2 RINSE ORAL at 21:39

## 2019-09-29 RX ADMIN — SODIUM CHLORIDE, PRESERVATIVE FREE 10 ML: 5 INJECTION INTRAVENOUS at 09:20

## 2019-09-29 RX ADMIN — THIAMINE HYDROCHLORIDE 100 ML/HR: 100 INJECTION, SOLUTION INTRAMUSCULAR; INTRAVENOUS at 09:55

## 2019-09-30 ENCOUNTER — APPOINTMENT (OUTPATIENT)
Dept: GENERAL RADIOLOGY | Facility: HOSPITAL | Age: 59
End: 2019-09-30

## 2019-09-30 LAB
ARTERIAL PATENCY WRIST A: POSITIVE
ATMOSPHERIC PRESS: 752 MMHG
BASE EXCESS BLDA CALC-SCNC: 2.4 MMOL/L (ref 0–2)
BDY SITE: ABNORMAL
BODY TEMPERATURE: 37 C
HCO3 BLDA-SCNC: 26.7 MMOL/L (ref 20–26)
HOROWITZ INDEX BLD+IHG-RTO: 30 %
Lab: ABNORMAL
MODALITY: ABNORMAL
PCO2 BLDA: 39.2 MM HG (ref 35–45)
PEEP RESPIRATORY: 5 CM[H2O]
PH BLDA: 7.44 PH UNITS (ref 7.35–7.45)
PO2 BLDA: 88.7 MM HG (ref 83–108)
SAO2 % BLDCOA: 97.6 % (ref 94–99)
SET MECH RESP RATE: 10
VENTILATOR MODE: AC
VT ON VENT VENT: 620 ML

## 2019-09-30 PROCEDURE — 25010000002 THIAMINE PER 100 MG: Performed by: FAMILY MEDICINE

## 2019-09-30 PROCEDURE — 94799 UNLISTED PULMONARY SVC/PX: CPT

## 2019-09-30 PROCEDURE — 99291 CRITICAL CARE FIRST HOUR: CPT | Performed by: PSYCHIATRY & NEUROLOGY

## 2019-09-30 PROCEDURE — 25010000002 POTASSIUM CHLORIDE PER 2 MEQ OF POTASSIUM: Performed by: FAMILY MEDICINE

## 2019-09-30 PROCEDURE — 94003 VENT MGMT INPAT SUBQ DAY: CPT

## 2019-09-30 PROCEDURE — 71045 X-RAY EXAM CHEST 1 VIEW: CPT

## 2019-09-30 PROCEDURE — 82803 BLOOD GASES ANY COMBINATION: CPT

## 2019-09-30 PROCEDURE — 94770: CPT

## 2019-09-30 PROCEDURE — 36600 WITHDRAWAL OF ARTERIAL BLOOD: CPT

## 2019-09-30 PROCEDURE — 92610 EVALUATE SWALLOWING FUNCTION: CPT | Performed by: SPEECH-LANGUAGE PATHOLOGIST

## 2019-09-30 PROCEDURE — 25010000002 PROPOFOL 10 MG/ML EMULSION: Performed by: FAMILY MEDICINE

## 2019-09-30 RX ORDER — FAMOTIDINE 10 MG/ML
20 INJECTION, SOLUTION INTRAVENOUS EVERY 12 HOURS SCHEDULED
Status: DISCONTINUED | OUTPATIENT
Start: 2019-09-30 | End: 2019-10-02

## 2019-09-30 RX ADMIN — FAMOTIDINE 20 MG: 10 INJECTION, SOLUTION INTRAVENOUS at 20:19

## 2019-09-30 RX ADMIN — PROPOFOL 45 MCG/KG/MIN: 10 INJECTION, EMULSION INTRAVENOUS at 04:14

## 2019-09-30 RX ADMIN — CHLORHEXIDINE GLUCONATE 15 ML: 1.2 RINSE ORAL at 09:29

## 2019-09-30 RX ADMIN — IPRATROPIUM BROMIDE AND ALBUTEROL SULFATE 3 ML: 2.5; .5 SOLUTION RESPIRATORY (INHALATION) at 11:08

## 2019-09-30 RX ADMIN — DEXMEDETOMIDINE 0.2 MCG/KG/HR: 100 INJECTION, SOLUTION, CONCENTRATE INTRAVENOUS at 11:21

## 2019-09-30 RX ADMIN — FAMOTIDINE 20 MG: 10 INJECTION, SOLUTION INTRAVENOUS at 13:55

## 2019-09-30 RX ADMIN — IPRATROPIUM BROMIDE AND ALBUTEROL SULFATE 3 ML: 2.5; .5 SOLUTION RESPIRATORY (INHALATION) at 21:11

## 2019-09-30 RX ADMIN — ASPIRIN 300 MG: 300 SUPPOSITORY RECTAL at 09:27

## 2019-09-30 RX ADMIN — CHLORHEXIDINE GLUCONATE 15 ML: 1.2 RINSE ORAL at 20:19

## 2019-09-30 RX ADMIN — IPRATROPIUM BROMIDE AND ALBUTEROL SULFATE 3 ML: 2.5; .5 SOLUTION RESPIRATORY (INHALATION) at 06:37

## 2019-09-30 RX ADMIN — PROPOFOL 35 MCG/KG/MIN: 10 INJECTION, EMULSION INTRAVENOUS at 08:45

## 2019-09-30 RX ADMIN — SODIUM CHLORIDE, PRESERVATIVE FREE 10 ML: 5 INJECTION INTRAVENOUS at 09:24

## 2019-09-30 RX ADMIN — IPRATROPIUM BROMIDE AND ALBUTEROL SULFATE 3 ML: 2.5; .5 SOLUTION RESPIRATORY (INHALATION) at 14:07

## 2019-09-30 RX ADMIN — THIAMINE HYDROCHLORIDE 75 ML/HR: 100 INJECTION, SOLUTION INTRAMUSCULAR; INTRAVENOUS at 09:41

## 2019-09-30 RX ADMIN — HYPROMELLOSE 2910 2 DROP: 5 SOLUTION OPHTHALMIC at 21:52

## 2019-09-30 RX ADMIN — SODIUM CHLORIDE, PRESERVATIVE FREE 10 ML: 5 INJECTION INTRAVENOUS at 20:19

## 2019-10-01 ENCOUNTER — APPOINTMENT (OUTPATIENT)
Dept: GENERAL RADIOLOGY | Facility: HOSPITAL | Age: 59
End: 2019-10-01

## 2019-10-01 LAB
ALBUMIN SERPL-MCNC: 3.5 G/DL (ref 3.5–5.2)
ALBUMIN/GLOB SERPL: 1.1 G/DL
ALP SERPL-CCNC: 76 U/L (ref 39–117)
ALT SERPL W P-5'-P-CCNC: 27 U/L (ref 1–41)
ANION GAP SERPL CALCULATED.3IONS-SCNC: 13 MMOL/L (ref 5–15)
AST SERPL-CCNC: 32 U/L (ref 1–40)
BASOPHILS # BLD AUTO: 0.06 10*3/MM3 (ref 0–0.2)
BASOPHILS NFR BLD AUTO: 0.5 % (ref 0–1.5)
BILIRUB SERPL-MCNC: 0.8 MG/DL (ref 0.2–1.2)
BUN BLD-MCNC: 3 MG/DL (ref 6–20)
BUN/CREAT SERPL: 6.1 (ref 7–25)
CALCIUM SPEC-SCNC: 8.8 MG/DL (ref 8.6–10.5)
CHLORIDE SERPL-SCNC: 101 MMOL/L (ref 98–107)
CO2 SERPL-SCNC: 27 MMOL/L (ref 22–29)
CREAT BLD-MCNC: 0.49 MG/DL (ref 0.76–1.27)
DEPRECATED RDW RBC AUTO: 45.4 FL (ref 37–54)
EOSINOPHIL # BLD AUTO: 0.21 10*3/MM3 (ref 0–0.4)
EOSINOPHIL NFR BLD AUTO: 1.8 % (ref 0.3–6.2)
ERYTHROCYTE [DISTWIDTH] IN BLOOD BY AUTOMATED COUNT: 13.2 % (ref 12.3–15.4)
GFR SERPL CREATININE-BSD FRML MDRD: >150 ML/MIN/1.73
GLOBULIN UR ELPH-MCNC: 3.3 GM/DL
GLUCOSE BLD-MCNC: 95 MG/DL (ref 65–99)
HCT VFR BLD AUTO: 42.3 % (ref 37.5–51)
HGB BLD-MCNC: 14.7 G/DL (ref 13–17.7)
IMM GRANULOCYTES # BLD AUTO: 0.05 10*3/MM3 (ref 0–0.05)
IMM GRANULOCYTES NFR BLD AUTO: 0.4 % (ref 0–0.5)
LYMPHOCYTES # BLD AUTO: 2.01 10*3/MM3 (ref 0.7–3.1)
LYMPHOCYTES NFR BLD AUTO: 16.8 % (ref 19.6–45.3)
MCH RBC QN AUTO: 32.3 PG (ref 26.6–33)
MCHC RBC AUTO-ENTMCNC: 34.8 G/DL (ref 31.5–35.7)
MCV RBC AUTO: 93 FL (ref 79–97)
MONOCYTES # BLD AUTO: 0.9 10*3/MM3 (ref 0.1–0.9)
MONOCYTES NFR BLD AUTO: 7.5 % (ref 5–12)
NEUTROPHILS # BLD AUTO: 8.74 10*3/MM3 (ref 1.7–7)
NEUTROPHILS NFR BLD AUTO: 73 % (ref 42.7–76)
NRBC BLD AUTO-RTO: 0 /100 WBC (ref 0–0.2)
PLATELET # BLD AUTO: 311 10*3/MM3 (ref 140–450)
PMV BLD AUTO: 9.7 FL (ref 6–12)
POTASSIUM BLD-SCNC: 3.4 MMOL/L (ref 3.5–5.2)
PROT SERPL-MCNC: 6.8 G/DL (ref 6–8.5)
RBC # BLD AUTO: 4.55 10*6/MM3 (ref 4.14–5.8)
SODIUM BLD-SCNC: 141 MMOL/L (ref 136–145)
WBC NRBC COR # BLD: 11.97 10*3/MM3 (ref 3.4–10.8)

## 2019-10-01 PROCEDURE — 92523 SPEECH SOUND LANG COMPREHEN: CPT

## 2019-10-01 PROCEDURE — 94760 N-INVAS EAR/PLS OXIMETRY 1: CPT

## 2019-10-01 PROCEDURE — 94799 UNLISTED PULMONARY SVC/PX: CPT

## 2019-10-01 PROCEDURE — 97535 SELF CARE MNGMENT TRAINING: CPT | Performed by: OCCUPATIONAL THERAPIST

## 2019-10-01 PROCEDURE — 74018 RADEX ABDOMEN 1 VIEW: CPT

## 2019-10-01 PROCEDURE — 92612 ENDOSCOPY SWALLOW (FEES) VID: CPT

## 2019-10-01 PROCEDURE — 85025 COMPLETE CBC W/AUTO DIFF WBC: CPT | Performed by: FAMILY MEDICINE

## 2019-10-01 PROCEDURE — 25010000002 THIAMINE PER 100 MG: Performed by: FAMILY MEDICINE

## 2019-10-01 PROCEDURE — 99232 SBSQ HOSP IP/OBS MODERATE 35: CPT | Performed by: PSYCHIATRY & NEUROLOGY

## 2019-10-01 PROCEDURE — 97116 GAIT TRAINING THERAPY: CPT

## 2019-10-01 PROCEDURE — 99231 SBSQ HOSP IP/OBS SF/LOW 25: CPT | Performed by: INTERNAL MEDICINE

## 2019-10-01 PROCEDURE — 25010000002 POTASSIUM CHLORIDE PER 2 MEQ OF POTASSIUM: Performed by: FAMILY MEDICINE

## 2019-10-01 PROCEDURE — 80053 COMPREHEN METABOLIC PANEL: CPT | Performed by: FAMILY MEDICINE

## 2019-10-01 PROCEDURE — 97166 OT EVAL MOD COMPLEX 45 MIN: CPT | Performed by: OCCUPATIONAL THERAPIST

## 2019-10-01 PROCEDURE — 97162 PT EVAL MOD COMPLEX 30 MIN: CPT

## 2019-10-01 PROCEDURE — 92526 ORAL FUNCTION THERAPY: CPT

## 2019-10-01 RX ORDER — POTASSIUM CHLORIDE 14.9 MG/ML
10 INJECTION INTRAVENOUS
Status: COMPLETED | OUTPATIENT
Start: 2019-10-01 | End: 2019-10-01

## 2019-10-01 RX ORDER — POTASSIUM CHLORIDE 20MEQ/15ML
40 LIQUID (ML) ORAL ONCE
Status: DISCONTINUED | OUTPATIENT
Start: 2019-10-01 | End: 2019-10-04

## 2019-10-01 RX ADMIN — ASPIRIN 300 MG: 300 SUPPOSITORY RECTAL at 10:32

## 2019-10-01 RX ADMIN — IPRATROPIUM BROMIDE AND ALBUTEROL SULFATE 3 ML: 2.5; .5 SOLUTION RESPIRATORY (INHALATION) at 06:23

## 2019-10-01 RX ADMIN — THIAMINE HYDROCHLORIDE 75 ML/HR: 100 INJECTION, SOLUTION INTRAMUSCULAR; INTRAVENOUS at 10:32

## 2019-10-01 RX ADMIN — POTASSIUM CHLORIDE 10 MEQ: 200 INJECTION, SOLUTION INTRAVENOUS at 20:46

## 2019-10-01 RX ADMIN — IPRATROPIUM BROMIDE AND ALBUTEROL SULFATE 3 ML: 2.5; .5 SOLUTION RESPIRATORY (INHALATION) at 10:46

## 2019-10-01 RX ADMIN — POTASSIUM CHLORIDE 10 MEQ: 200 INJECTION, SOLUTION INTRAVENOUS at 16:52

## 2019-10-01 RX ADMIN — FAMOTIDINE 20 MG: 10 INJECTION, SOLUTION INTRAVENOUS at 20:45

## 2019-10-01 RX ADMIN — SODIUM CHLORIDE, PRESERVATIVE FREE 10 ML: 5 INJECTION INTRAVENOUS at 20:48

## 2019-10-01 RX ADMIN — IPRATROPIUM BROMIDE AND ALBUTEROL SULFATE 3 ML: 2.5; .5 SOLUTION RESPIRATORY (INHALATION) at 15:34

## 2019-10-01 RX ADMIN — FAMOTIDINE 20 MG: 10 INJECTION, SOLUTION INTRAVENOUS at 10:34

## 2019-10-01 RX ADMIN — SODIUM CHLORIDE, PRESERVATIVE FREE 10 ML: 5 INJECTION INTRAVENOUS at 10:32

## 2019-10-01 RX ADMIN — POTASSIUM CHLORIDE 10 MEQ: 200 INJECTION, SOLUTION INTRAVENOUS at 18:39

## 2019-10-01 RX ADMIN — IPRATROPIUM BROMIDE AND ALBUTEROL SULFATE 3 ML: 2.5; .5 SOLUTION RESPIRATORY (INHALATION) at 18:31

## 2019-10-01 RX ADMIN — POTASSIUM CHLORIDE 10 MEQ: 200 INJECTION, SOLUTION INTRAVENOUS at 22:02

## 2019-10-01 NOTE — PAYOR COMM NOTE
"FROM: RUBY CARREON  PHONE: [phone]  FAX: [phone]    ID: [id number]      Morgan Sanchez Jr. (59 y.o. Male)     Date of Birth Social Security Number Address Home Phone MRN    1960  [address] [place] [address] [phone] [id number]    Druze Marital Status          Other Single       Admission Date Admission Type Admitting Provider Attending Provider Department, Room/Bed    9/26/19 Urgent Candido Alas MD Truong, Khai C, MD [facility] CARDIAC CARE, C007/1    Discharge Date Discharge Disposition Discharge Destination                       Attending Provider:  Candido Alas MD    Allergies:  No Known Allergies    Isolation:  None   Infection:  None   Code Status:  CPR    Ht:  162.6 cm (64\")   Wt:  78.2 kg (172 lb 4.8 oz)    Admission Cmt:  None   Principal Problem:  None                Active Insurance as of 9/26/2019     Primary Coverage     Payor Plan Insurance Group Employer/Plan Group    [employer] [place] [employer]      Payor Plan Address Payor Plan Phone Number Payor Plan Fax Number Effective Dates    [address] [address] [phone]  6/22/2017 - None Entered    [facility] [id number]       Subscriber Name Subscriber Birth Date Member ID       MORGAN SANCHEZ 1960 [id number]                 Emergency Contacts      (Rel.) Home Phone Work Phone Mobile Phone    Lázaro Sanchez (Brother) [phone] -- --        Inpatient Services Prior Authorization   Fax [phone]  Phone [phone] To: [employer]   CHECK ONE OF THE FOLLOWING      £Skilled Nursing Facility     £ Rehab     £ LTACH     £ Hospice      X   INPATIENT    CHOOSE THE APPROPRIATE REQUEST TYPE        Standard request         Requests for prior authorization (with supporting clinical information and documentation) should be sent to the Health Plan fourteen (14) days prior to the date the requested services will be performed. If a response has not been received within two (2) business days, call " [phone] to confirm your request has been received.   ? Expedited Request  By signing below I certify that applying the standard review time frame may seriously jeopardize the life or health of the member or the member’s ability to regain maximum function.   Physician Signature Validating Expedited Request:                                                                     Date:   MEMBER INFORMATION   Member  Last Name: DANIEL First: MORGAN :  1960   Member ID#  [id number]  Other Insurance:    REQUESTING PHYSICIAN INFORMATION   Provider Last Name DANITZA First Name  CANDIDO Provider NPI : [id number]   Type: Specialist Specialty:  HOSPITALIST Participating:     Phone Number:[phone] Fax Number: [phone] Contact:     Address: [address] [place] [address]  Zip Code:[address]   FACILITY INFORMATION   Type: £  Planned Admission       INPATIENT ADMISSION x Medical Record Number: [id number]   Facility Name: [facility] Facility ID [id number] Tax ID [id number]   Phone Number: [phone] Fax Number: [phone] Hospital Contact: RUBY  [phone]   Address: [address] [place] [address]  City: [place] State: [place] Zip: [address]   REQUESTED SERVICE(S)   Planned Date(s) of Service From 2019 To: Or Requested length of stay___days   CPT/HCPCS Code(s):  Procedure(s):    Primary ICD-10 Code(s)  I63.9 CVA    Please attach documentation to support med     If this request is for out-of-network services, please provide an explanation below.                History & Physical      Candido Alas MD at [address] [place] [address] Medicine Services  HISTORY AND PHYSICAL    Date of Admission: 2019  Primary Care Physician: Jose Karimi MD    Subjective     Chief Complaint: CVA/alcohol abuse    History of Present Illness  Patient is a 59-year-old  male transferred from [facility] for evaluation altered mental status/confusion.  Patient has  "been drinking alcohol at work.  Patient came to ER confused disoriented with decreased responsiveness.  No history of trauma.  Unable to get the answer for the patient this time.  Most information is from the chart.    Review of Systems   Unable to obtain due to altered mental status.  Otherwise complete ROS reviewed and negative except as mentioned in the HPI.      Past Medical History:   Past Medical History:   Diagnosis Date   • Hypertension        Past Surgical History:  Past Surgical History:   Procedure Laterality Date   • CATARACT EXTRACTION Left    • WRIST FRACTURE SURGERY Left        Family History: family history includes Cancer in his sister; Diabetes in his father; No Known Problems in his brother and mother.    Social History:  reports that he has been smoking cigarettes.  He has never used smokeless tobacco. He reports that he drinks alcohol. He reports that he does not use drugs.    Medications:  Prior to Admission medications    Medication Sig Start Date End Date Taking? Authorizing Provider   cyclobenzaprine (FLEXERIL) 10 MG tablet Take 1 tablet by mouth 3 (Three) Times a Day As Needed for Muscle Spasms. 6/22/17   Galo Aguilera APRN   lisinopril (PRINIVIL,ZESTRIL) 10 MG tablet  5/10/17   Dahiana Hall MD   meloxicam (MOBIC) 15 MG tablet  5/10/17   Dahiana Hall MD   MethylPREDNISolone (MEDROL, ALBA,) 4 MG tablet Take as directed on package instructions. 6/22/17   Galo Aguilera APRN   traMADol (ULTRAM) 50 MG tablet  5/30/17   Dahiana Hall MD     Allergies:  No Known Allergies    Objective     Vital Signs: /86 (BP Location: Right arm, Patient Position: Sitting)   Pulse 74   Temp 98 °F (36.7 °C) (Oral)   Resp 16   Ht 163.8 cm (64.5\")   Wt 79.8 kg (176 lb)   SpO2 99%   BMI 29.74 kg/m²    Physical Exam   Constitutional: He appears well-developed.   HENT:   Head: Normocephalic and atraumatic.   Eyes: Conjunctivae are normal. Pupils are equal, round, and " reactive to light.   Neck: Neck supple. No JVD present. No thyromegaly present.   Cardiovascular: Normal rate, regular rhythm, normal heart sounds and intact distal pulses. Exam reveals no gallop and no friction rub.   No murmur heard.  Pulmonary/Chest: Effort normal and breath sounds normal. No respiratory distress. He has no wheezes. He has no rales. He exhibits no tenderness.   Decrease in breath sound bilateral, clear.   Abdominal: Soft. Bowel sounds are normal. He exhibits no distension. There is no tenderness. There is no rebound and no guarding.   Musculoskeletal: He exhibits no edema, tenderness or deformity.   Lymphadenopathy:     He has no cervical adenopathy.   Neurological: He is alert. He displays normal reflexes. No cranial nerve deficit. He exhibits abnormal muscle tone. Coordination abnormal.   Unable exam patient is this patient's unable to answer any question follow commands.  Patient is awake alert though appears   Skin: Skin is warm and dry. Capillary refill takes 2 to 3 seconds. No rash noted.   Nursing note and vitals reviewed.          Results Reviewed:  Laboratory- white blood cells 10, hemoglobin 15, hematocrit 46, platelets 400, ammonia level less than 10, sodium 137, potassium 3.6, chloride is 97, CO2 is 26, BUN 11, creatinine 0.82, total protein 8.1, albumin 3.8, bilirubin 0.69, alkaline phosphatase 80, AST 35, calcium 8.8, glucose 87, ALT is 29, GFR is greater than 60, alcohol level less than 3, acetaminophen level is 0, salicylate level is 5.3, urine drug screen is negative.    urinalysis shows moderate bilirubin, negative blood, nitrite negative, leukocyte neck negative.    Radiology Data:    Imaging Results (last 24 hours)     ** No results found for the last 24 hours. **          I have personally reviewed and interpreted the radiology studies and ECG obtained at time of admission.     Assessment / Plan      Assessment & Plan  Active Hospital Problems    Diagnosis   • Acute  cerebrovascular accident (CVA) of cerebellum (CMS/HCC)   • Alcoholism /alcohol abuse (CMS/HCC)   • Smoker     Plans    CVA.  Stroke protocol.  Consult neurology.  MRI of the head.  Carotid duplex.  Echocardiogram.  CT scan of the head at [facility]- low attenuation within the left posterior temporal and right lower lobe concerning for evolving infarct, chronic ischemic deep white matter changes.    Alcohol abuse.  Ciwa.  Banana bag.    Smoker.  Consult patient once patient more alert.    Code Status: full code     I discussed the patient's findings and my recommendations with: patient    Estimated length of stay:2-5 days.     Candido Alas MD   09/26/19   9:58 PM              Electronically signed by Candido Alas MD at 09/26/19 1153          Physician Progress Notes (last 7 days) (Notes from 09/24/19 1208 through 10/01/19 1208)      Gab White MD at 10/01/19 1048            Neurology Progress Note      Chief Complaint:  stroke    Subjective     Subjective:    The patient was extubated.  He is sitting at the side of the bed currently.  There seems to be some difficulties from a social standpoint and that his wife has came to visit the patient.  She is exhibiting symptoms of annalee and our patient care relations staff is currently assisting her.  She does not seem competent to make decisions for the patient.  His brother remains at the bedside as well.  He does seem to be a good historian and can make reasonable decisions.  Otherwise the patient is doing well.  He is showing no evidence of withdrawal symptoms.  He continues to have difficulties with spatial dysfunction along with aphasia.  The therapy staff have walked him approximately 50 feet and he does require assistance.  He has somewhat of a foot drop in addition of this has spatial dysfunction of his leg causing difficulties with ambulation.    Medications:  Current Facility-Administered Medications   Medication Dose Route Frequency Provider  Last Rate Last Dose   • albuterol (PROVENTIL) nebulizer solution 0.083% 2.5 mg/3mL  2.5 mg Nebulization Once PRN Galo Bethea MD       • aspirin chewable tablet 81 mg  81 mg Oral Daily Candido Alas MD        Or   • aspirin suppository 300 mg  300 mg Rectal Daily Candido Alas MD   300 mg at 10/01/19 1032   • atorvastatin (LIPITOR) tablet 80 mg  80 mg Oral Nightly Candido Alas MD   80 mg at 09/29/19 2220   • chlorhexidine (PERIDEX) 0.12 % solution 15 mL  15 mL Mouth/Throat Q12H Galo Bethea MD   15 mL at 09/30/19 2019   • famotidine (PEPCID) injection 20 mg  20 mg Intravenous Q12H Candido Alas MD   20 mg at 10/01/19 1034   • hypromellose (ISOPTO TEARS) 0.5 % ophthalmic solution 2 drop  2 drop Both Eyes TID PRN Candido Alas MD   2 drop at 09/30/19 2152   • ipratropium-albuterol (DUO-NEB) nebulizer solution 3 mL  3 mL Nebulization 4x Daily - RT Christina Sharif APRN   3 mL at 10/01/19 1046   • labetalol (NORMODYNE,TRANDATE) injection 20 mg  20 mg Intravenous Q4H PRN Christina Sharif APRN       • lactated ringers infusion  50 mL/hr Intravenous Continuous Christina Sharif APRN 50 mL/hr at 10/01/19 0045 50 mL/hr at 10/01/19 0045   • LORazepam (ATIVAN) tablet 1 mg  1 mg Oral Q2H PRN Candido Alas MD        Or   • LORazepam (ATIVAN) injection 1 mg  1 mg Intravenous Q2H PRN Candido Alas MD        Or   • LORazepam (ATIVAN) tablet 2 mg  2 mg Oral Q1H PRN Candido Alas MD        Or   • LORazepam (ATIVAN) injection 2 mg  2 mg Intravenous Q1H PRN Candido Alas MD   2 mg at 09/29/19 1311    Or   • LORazepam (ATIVAN) injection 2 mg  2 mg Intravenous Q15 Min PRN Candido Alas MD   2 mg at 09/27/19 2007    Or   • LORazepam (ATIVAN) injection 2 mg  2 mg Intramuscular Q15 Min PRN Candido Alas MD       • multiple vitamin (M.V.I. Adult) 10 mL, thiamine (B-1) 100 mg, folic acid 1 mg, potassium chloride 20 mEq in dextrose 5 % and sodium chloride 0.9 % 1,000 mL infusion   75 mL/hr Intravenous Daily Candido Alas MD 75 mL/hr at 10/01/19 1032 75 mL/hr at 10/01/19 1032   • potassium chloride (KAYCIEL) 20 MEQ/15ML (10%) solution 40 mEq  40 mEq Oral Once aCndido Alas MD       • sodium chloride 0.9 % flush 10 mL  10 mL Intravenous Q12H Candido Alas MD   10 mL at 10/01/19 1032   • sodium chloride 0.9 % flush 10 mL  10 mL Intravenous PRN Candido Alas MD           Review of Systems:   -A 14 point review of systems is completed and is negative except for agitation      Objective      Vital Signs  Temp:  [98.3 °F (36.8 °C)-99 °F (37.2 °C)] 98.7 °F (37.1 °C)  Heart Rate:  [47-95] 81  Resp:  [10-27] 21  BP: (117-205)/() 156/87  FiO2 (%):  [30 %] 30 %    Physical Exam:    CVS:  RR  Lungs: CTA  Abd:  NT/ND    MS: He is awake.  Sitting at the side of the bed.  He can tell me his name.  Otherwise he has perseveration.  He has difficulties with naming.  He does not know the year.  He follows commands intermittently.  CN:  II - XII intact  MTR:  Moving all extremities -right arm and leg have drift down to the bed.  DTR:  1+ throughout  Coord/Gait: Spatial dysfunction.  Difficulties drawing a clock.  When you ask him to set the time on a clock he gives the digital interpretation.     Results Review:    I reviewed the patient's new clinical results.    Results from last 7 days   Lab Units 10/01/19  0300 09/28/19  0232   WBC 10*3/mm3 11.97* 12.75*   HEMOGLOBIN g/dL 14.7 13.7   HEMATOCRIT % 42.3 39.7   PLATELETS 10*3/mm3 311 335        Results from last 7 days   Lab Units 10/01/19  0300 09/28/19  0232 09/27/19  0433   SODIUM mmol/L 141 138 137   POTASSIUM mmol/L 3.4* 3.6 3.5   CHLORIDE mmol/L 101 99 96*   CO2 mmol/L 27.0 26.0 25.0   BUN mg/dL 3* 7 7   CREATININE mg/dL 0.49* 0.76 0.58*   CALCIUM mg/dL 8.8 8.5* 8.8   BILIRUBIN mg/dL 0.8 0.9 0.8   ALK PHOS U/L 76 63 70   ALT (SGPT) U/L 27 17 21   AST (SGOT) U/L 32 37 37   GLUCOSE mg/dL 95 110* 94        No results found for: PHOS, MG,  PROTIME, INR, APTT  No components found for: POCGLUC  No components found for: A1C  Lab Results   Component Value Date    HDL 51 09/27/2019     (H) 09/27/2019     No components found for: B12  Lab Results   Component Value Date    TSH 1.760 09/27/2019     Labs reviewed:  Liver function normal  CT Head reviewed:        MRI brain reviewed by me.  Left MCA acute infarction mainly in the posterior distribution.  No evidence of hemorrhagic conversion nor edema.    Cardiac echo:  Preserved EF  Carotid ultrasound shows evidence of a left ICA occlusion  Labs reviewed:  WBC is 12K  CT angiography of the head and neck reviewed by me.  On the left the internal carotid arteries completely occluded.  The right has 30% stenosis.  Assessment/Plan     Hospital Problem List      Degeneration of lumbar or lumbosacral intervertebral disc    Smoker    Acute cerebrovascular accident (CVA) of cerebellum (CMS/HCC)    Alcoholism /alcohol abuse (CMS/HCC)    CVA (cerebral vascular accident) (CMS/HCC)    Hyperlipidemia    Impression:     1.  Alcohol withdrawal - drinks a pint of hard liquor per day per brother who is in the room  2.  Left MCA stroke  3.  Hyperlipidemia with an LDL above goal of 70  4.  History of hypertension  5.  Left ICA occlusion -confirmed with CT angiography.    Plan:  · ETOH withdrawal protocols - appears to be improved  · Lipitor 80  · ASA 81mg  · SCD's  · Systolic blood pressure goal less than 160  · OK to floor per neuro  · Will consult acute rehab        Gab White MD  10/01/19  10:48 AM    35 minutes of critical care time was performed with titration of sedating agents in addition to neurologic examination and interpretation of imaging including CT angiography of head and neck.    Electronically signed by Gab White MD at 10/01/19 0780     Eleuterio Cervantes MD at 10/01/19 3735              PULMONARY AND CRITICAL CARE PROGRESS NOTE - [facility]    Patient: Jarod Ramírez    1960   MR# [id number]   [facility]# [id number]  10/01/19   8:39 AM  Referring Provider: Candido Alas MD    Chief Complaint: altered mental status   Interval history: The patient extubated yesterday.  He is currently resting in bed on room air.  O2 sat 92%, heart rate 90.  He has no new complaints.  No other aggravating or alleviating factors.     Meds:    aspirin 81 mg Oral Daily   Or      aspirin 300 mg Rectal Daily   atorvastatin 80 mg Oral Nightly   chlorhexidine 15 mL Mouth/Throat Q12H   famotidine 20 mg Intravenous Q12H   ipratropium-albuterol 3 mL Nebulization 4x Daily - RT   IV Fluids 1000 mL + additives 75 mL/hr Intravenous Daily   potassium chloride 40 mEq Oral Once   sodium chloride 10 mL Intravenous Q12H       lactated ringers 50 mL/hr Last Rate: 50 mL/hr (10/01/19 0045)     Review of Systems:   Review of Systems   Constitutional: Negative for chills and fever.   Respiratory: Negative for cough and shortness of breath.    Cardiovascular: Negative for chest pain.   Gastrointestinal: Negative for diarrhea, nausea and vomiting.     Physical Exam:  SpO2 Percentage    10/01/19 0500 10/01/19 0623 10/01/19 0629   SpO2: 92% 91% 92%     Temp:  [98.3 °F (36.8 °C)-99 °F (37.2 °C)] 98.3 °F (36.8 °C)  Heart Rate:  [47-87] 87  Resp:  [10-27] 21  BP: (112-205)/() 148/73  FiO2 (%):  [30 %] 30 %    Intake/Output Summary (Last 24 hours) at 10/1/2019 0839  Last data filed at 10/1/2019 0600  Gross per 24 hour   Intake 1524.49 ml   Output 2450 ml   Net -925.51 ml     Physical Exam   Constitutional: He is oriented to person, place, and time. He appears well-developed and well-nourished. No distress.   HENT:   Head: Normocephalic and atraumatic.   Eyes: Conjunctivae and EOM are normal. Pupils are equal, round, and reactive to light. No scleral icterus.   Neck: Normal range of motion. Neck supple.   Cardiovascular: Normal rate, regular rhythm and normal heart sounds. Exam reveals no friction rub.   No murmur  heard.  Pulmonary/Chest: Effort normal and breath sounds normal. No respiratory distress. He has no wheezes. He has no rales.   Abdominal: Soft. Bowel sounds are normal. He exhibits no distension. There is no tenderness.   Musculoskeletal: Normal range of motion. He exhibits no edema.   Neurological: He is alert and oriented to person, place, and time.   Skin: Skin is warm and dry.   Psychiatric: He has a normal mood and affect. His behavior is normal. Judgment and thought content normal.   Nursing note and vitals reviewed.    Laboratory Data:  Results from last 7 days   Lab Units 10/01/19  0300 09/28/19  0232   WBC 10*3/mm3 11.97* 12.75*   HEMOGLOBIN g/dL 14.7 13.7   PLATELETS 10*3/mm3 311 335     Results from last 7 days   Lab Units 10/01/19  0300 09/28/19  0232 09/27/19  0433   SODIUM mmol/L 141 138 137   POTASSIUM mmol/L 3.4* 3.6 3.5   BUN mg/dL 3* 7 7   CREATININE mg/dL 0.49* 0.76 0.58*     Results from last 7 days   Lab Units 09/30/19  0220 09/29/19  0220 09/28/19  0405   PH, ARTERIAL pH units 7.441 7.421 7.412   PCO2, ARTERIAL mm Hg 39.2 38.2 36.8   PO2 ART mm Hg 88.7 86.1 79.9*   FIO2 % 30 30 30     Respiratory Culture   Date Value Ref Range Status   09/27/2019 Light growth (2+) Normal Respiratory Esther  Final     Recent films:  Ct Angiogram Head With & Without Contrast    Result Date: 9/29/2019  CT Angiography Of The Neck With Intravenous Contrast 1. 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  2. Approximately 30% stenosis in the right carotid bulb. 3. Mild to moderate stenosis at the ostium of the right vertebral artery and mild stenosis at the ostium of the left vertebral artery.  CT Angiography Of The Head With Intravenous Contrast 1.   Continued occlusion of the left intracranial ICA with retrograde flow into the petrous segment from the contralateral ICA and the left P-comm. This report was finalized on 09/29/2019 14:28 by Dr. Jo Garcia MD.    Ct Angiogram Neck With & Without  Contrast    Result Date: 9/29/2019  CT Angiography Of The Neck With Intravenous Contrast 1. 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  2. Approximately 30% stenosis in the right carotid bulb. 3. Mild to moderate stenosis at the ostium of the right vertebral artery and mild stenosis at the ostium of the left vertebral artery.  CT Angiography Of The Head With Intravenous Contrast 1.   Continued occlusion of the left intracranial ICA with retrograde flow into the petrous segment from the contralateral ICA and the left P-comm. This report was finalized on 09/29/2019 14:28 by Dr. Jo Garcia MD.    Xr Chest 1 View    Result Date: 9/30/2019  Impression: 1.   No significant interval change since previous exam.   This report was finalized on 09/30/2019 07:19 by Dr. Jo Garcia MD.    Xr Abdomen Kub    Result Date: 9/29/2019  FINDINGS AND IMPRESSION: Enteric tube is in place with tip and side-port projecting over the left upper quadrant of the abdomen, presumably in the stomach. Visualized bowel gas pattern is nonobstructive. This report was finalized on 09/29/2019 22:01 by Dr. Mario Feldman MD.    Films reviewed personally by me.  My interpretation: None to review today  Pulmonary Assessment:  1. Agitation, suspect alcohol withdrawal  2. Left MCA stroke  3. Management of mechanical ventilation for airway protection  4. Tobacco abuse    Recommend:   · The patient extubated yesterday and is currently doing well on room air.  · Continue pulmonary hygiene measures.  · Pulmonary will sign off, call as needed    Electronically signed by YARELI Dubose, 10/01/19, 8:39 AM     Physician substantive contribution:  Pertinent symptoms/interval history include: off vent, not short of breath  Respiratory exam shows pertinent findings of:diminished breath sounds.  Plan includes: appears stable from pulm standpoint.  Mobilize. Signing off.  Follow up prn.  I have seen and examined patient personally,  performing a face-to-face diagnostic evaluation with plan of care reviewed and developed with APRN and nursing staff. I have addended and/or modified the above history of present illness, physical examination, and assessment and plan to reflect my findings and impressions. Essential elements of the care plan were discussed with APRN above.  Agree with findings and assessment/plan as documented above.    Electronically signed by Eleuterio Cervantes MD, on 10/1/2019, 9:16 AM           Electronically signed by Eleuterio Cervantes MD at 10/01/19 0916     Candido Alas MD at 10/01/19 0756              [facility] Medicine Services  INPATIENT PROGRESS NOTE    Length of Stay: 5  Date of Admission: 9/26/2019  Primary Care Physician: Jose Karimi MD    Subjective   Chief Complaint: Respiratory failure/CVA/alcohol withdrawal?    HPI   Patient is extubated yesterday.  Patient still very aphasic.  Patient denies any chest pain.  Patient is currently not requiring oxygen.    Review of Systems   Constitutional: Positive for activity change, appetite change and fatigue. Negative for chills and fever.   HENT: Negative for hearing loss, nosebleeds, tinnitus and trouble swallowing.    Eyes: Negative for visual disturbance.   Respiratory: Positive for shortness of breath. Negative for cough, chest tightness and wheezing.    Cardiovascular: Negative for chest pain, palpitations and leg swelling.   Gastrointestinal: Negative for abdominal distention, abdominal pain, blood in stool, constipation, diarrhea, nausea and vomiting.   Endocrine: Negative for cold intolerance, heat intolerance, polydipsia, polyphagia and polyuria.   Genitourinary: Negative for decreased urine volume, difficulty urinating, dysuria, flank pain, frequency and hematuria.   Musculoskeletal: Positive for arthralgias, gait problem and myalgias. Negative for joint swelling.   Skin: Negative for rash.   Allergic/Immunologic:  Negative for immunocompromised state.   Neurological: Positive for weakness. Negative for dizziness, syncope, light-headedness and headaches.   Hematological: Negative for adenopathy. Does not bruise/bleed easily.   Psychiatric/Behavioral: Positive for confusion. Negative for sleep disturbance. The patient is not nervous/anxious.           All pertinent negatives and positives are as above. All other systems have been reviewed and are negative unless otherwise stated.     Objective    Temp:  [98.3 °F (36.8 °C)-99 °F (37.2 °C)] 98.3 °F (36.8 °C)  Heart Rate:  [47-87] 87  Resp:  [10-27] 21  BP: (112-205)/() 148/73  FiO2 (%):  [30 %] 30 %    Intake/Output Summary (Last 24 hours) at 10/1/2019 0756  Last data filed at 10/1/2019 0600  Gross per 24 hour   Intake 1524.49 ml   Output 2850 ml   Net -1325.51 ml     Physical Exam   Constitutional: He appears well-developed.   HENT:   Head: Normocephalic.   Eyes: Conjunctivae are normal. Pupils are equal, round, and reactive to light.   Neck: Neck supple. No JVD present. No thyromegaly present.   Cardiovascular: Normal rate, regular rhythm, normal heart sounds and intact distal pulses. Exam reveals no gallop and no friction rub.   No murmur heard.  Pulmonary/Chest: Effort normal. No respiratory distress. He has wheezes. He has no rales. He exhibits no tenderness.   Rhonchi bilateral.  Good air movement.   Abdominal: Soft. Bowel sounds are normal. He exhibits no distension. There is no tenderness. There is no rebound and no guarding.   Musculoskeletal: He exhibits no edema, tenderness or deformity.   Lymphadenopathy:     He has no cervical adenopathy.   Neurological: He is alert. He displays normal reflexes. No cranial nerve deficit. He exhibits abnormal muscle tone. Coordination abnormal.   Skin: Skin is warm and dry. Capillary refill takes 2 to 3 seconds. No rash noted.   Psychiatric: He has a normal mood and affect. His behavior is normal.   Nursing note and vitals  reviewed.      Results Review:  Lab Results (last 24 hours)     Procedure Component Value Units Date/Time    Comprehensive Metabolic Panel [[id number]]  (Abnormal) Collected:  10/01/19 0300    Specimen:  Blood Updated:  10/01/19 0341     Glucose 95 mg/dL      BUN 3 mg/dL      Creatinine 0.49 mg/dL      Sodium 141 mmol/L      Potassium 3.4 mmol/L      Chloride 101 mmol/L      CO2 27.0 mmol/L      Calcium 8.8 mg/dL      Total Protein 6.8 g/dL      Albumin 3.50 g/dL      ALT (SGPT) 27 U/L      AST (SGOT) 32 U/L      Alkaline Phosphatase 76 U/L      Total Bilirubin 0.8 mg/dL      eGFR Non African Amer >150 mL/min/1.73      Globulin 3.3 gm/dL      A/G Ratio 1.1 g/dL      BUN/Creatinine Ratio 6.1     Anion Gap 13.0 mmol/L     Narrative:       GFR Normal >60  Chronic Kidney Disease <60  Kidney Failure <15    CBC & Differential [[id number]] Collected:  10/01/19 0300    Specimen:  Blood Updated:  10/01/19 0322    Narrative:       The following orders were created for panel order CBC & Differential.  Procedure                               Abnormality         Status                     ---------                               -----------         ------                     CBC Auto Differential[[id number]]        Abnormal            Final result                 Please view results for these tests on the individual orders.    CBC Auto Differential [[id number]]  (Abnormal) Collected:  10/01/19 0300    Specimen:  Blood Updated:  10/01/19 0322     WBC 11.97 10*3/mm3      RBC 4.55 10*6/mm3      Hemoglobin 14.7 g/dL      Hematocrit 42.3 %      MCV 93.0 fL      MCH 32.3 pg      MCHC 34.8 g/dL      RDW 13.2 %      RDW-SD 45.4 fl      MPV 9.7 fL      Platelets 311 10*3/mm3      Neutrophil % 73.0 %      Lymphocyte % 16.8 %      Monocyte % 7.5 %      Eosinophil % 1.8 %      Basophil % 0.5 %      Immature Grans % 0.4 %      Neutrophils, Absolute 8.74 10*3/mm3      Lymphocytes, Absolute 2.01 10*3/mm3      Monocytes, Absolute 0.90 10*3/mm3       Eosinophils, Absolute 0.21 10*3/mm3      Basophils, Absolute 0.06 10*3/mm3      Immature Grans, Absolute 0.05 10*3/mm3      nRBC 0.0 /100 WBC            Cultures:  Respiratory Culture   Date Value Ref Range Status   09/27/2019 Light growth (2+) Normal Respiratory Esther  Final       Radiology Data:    Imaging Results (last 24 hours)     ** No results found for the last 24 hours. **          No Known Allergies    Scheduled meds:     aspirin 81 mg Oral Daily   Or      aspirin 300 mg Rectal Daily   atorvastatin 80 mg Oral Nightly   chlorhexidine 15 mL Mouth/Throat Q12H   famotidine 20 mg Intravenous Q12H   ipratropium-albuterol 3 mL Nebulization 4x Daily - RT   IV Fluids 1000 mL + additives 75 mL/hr Intravenous Daily   sodium chloride 10 mL Intravenous Q12H       PRN meds:  •  albuterol  •  hypromellose  •  labetalol  •  LORazepam **OR** LORazepam **OR** LORazepam **OR** LORazepam **OR** LORazepam **OR** LORazepam  •  sodium chloride    Assessment/Plan       Degeneration of lumbar or lumbosacral intervertebral disc    Smoker    Acute cerebrovascular accident (CVA) of cerebellum (CMS/[facility])    Alcoholism /alcohol abuse (CMS/[facility])    CVA (cerebral vascular accident) (CMS/[facility])    Hyperlipidemia      Plan:  Respiratory failure.  Resolved.  Extubated 9/30/19.     CVA. Left MCA stroke. Continue aspirin.  Continue Lipitor. Neurology consult.   CTA of the head and neck- complete occlusion of the entire cervical left ICA, 30% stenosis of the right carotid bulb, mild stenosis at the ostium of the left vertebral artery.  MRI of the head- acute infarct in the left MCA and watershed territory- no hemorrhagic conversion, abnormal left ICA flow void.  Echocardiogram- ejection fraction 61%, diastolic dysfunction grade 1.    Hypokalemia- Po potassium once speech clear.     Reflux.  Pepcid and Zofran as needed     COPD.  Continue duo nebs.     Alcohol withdrawal?.  Ciwa protocol.  Patient stated last has drink about 2 weeks  ago.     Hyperlipidemia.  Continue Lipitor.     Tobacco abuse.  Tobacco counseling.     Urinary incontinence.     DC Villalobos cath 2019.     SCD.     Nutrition.  Consult nutritionist for tube feeding.  Continue banana bag.     Deconditioning.  PT and OT consult.     Discharge [facility] to 3 days. Transfer from [facility].  Plan to transfer the medical floor today.  Plan for  to evaluate for power of .    Candido Alas MD   10/01/19   7:56 AM                    Electronically signed by Candido Alas MD at 10/01/19 0826     Gab White MD at 19 1057            Neurology Progress Note      Chief Complaint:  stroke    Subjective     Subjective:    Agitation continued.  Pulmonary consulted and suggested Precedex and wean off Versed.  With pause of sedation, wakes up quickly and will intermittently follow commands.      Medications:  Current Facility-Administered Medications   Medication Dose Route Frequency Provider Last Rate Last Dose   • albuterol (PROVENTIL) nebulizer solution 0.083% 2.5 mg/3mL  2.5 mg Nebulization Once PRN Galo Bethea MD       • aspirin chewable tablet 81 mg  81 mg Oral Daily Candido Alas MD        Or   • aspirin suppository 300 mg  300 mg Rectal Daily Candido Alas MD   300 mg at 19 0927   • atorvastatin (LIPITOR) tablet 80 mg  80 mg Oral Nightly Candido Alas MD   80 mg at 19 2220   • chlorhexidine (PERIDEX) 0.12 % solution 15 mL  15 mL Mouth/Throat Q12H Galo Bethea MD   15 mL at 19 0929   • dexmedetomidine HCl (PRECEDEX) 400 mcg in sodium chloride 0.9 % 100 mL (4 mcg/mL) infusion  0.2-1.5 mcg/kg/hr Intravenous Titrated Demario Peña MD       • ipratropium-albuterol (DUO-NEB) nebulizer solution 3 mL  3 mL Nebulization 4x Daily - RT Christina Sharif APRN   3 mL at 19 0637   • labetalol (NORMODYNE,TRANDATE) injection 20 mg  20 mg Intravenous Q4H PRN Christina Sharif APRN       •  lactated ringers infusion  50 mL/hr Intravenous Continuous Sharif, Christina OrtizYARELI   Stopped at 09/30/19 0940   • LORazepam (ATIVAN) tablet 1 mg  1 mg Oral Q2H PRN Candido Alas MD        Or   • LORazepam (ATIVAN) injection 1 mg  1 mg Intravenous Q2H PRN Candido Alas MD        Or   • LORazepam (ATIVAN) tablet 2 mg  2 mg Oral Q1H PRN Candido Alas MD        Or   • LORazepam (ATIVAN) injection 2 mg  2 mg Intravenous Q1H PRN Candido Alas MD   2 mg at 09/29/19 1311    Or   • LORazepam (ATIVAN) injection 2 mg  2 mg Intravenous Q15 Min PRN Candido Alas MD   2 mg at 09/27/19 2007    Or   • LORazepam (ATIVAN) injection 2 mg  2 mg Intramuscular Q15 Min PRN Candido Alas MD       • midazolam (VERSED) 250 mg in sodium chloride 0.9 % 250 mL (1 mg/mL) infusion  1 mg/hr Intravenous Titrated Galo Bethea MD 4 mL/hr at 09/30/19 0818 4 mg/hr at 09/30/19 0818   • multiple vitamin (M.V.I. Adult) 10 mL, thiamine (B-1) 100 mg, folic acid 1 mg, potassium chloride 10 mEq in dextrose 5 % and sodium chloride 0.9 % 1,000 mL infusion  75 mL/hr Intravenous Daily Candido Alas MD 75 mL/hr at 09/30/19 0941 75 mL/hr at 09/30/19 0941   • propofol (DIPRIVAN) infusion 10 mg/mL 100 mL  5-75 mcg/kg/min Intravenous Titrated Galo Bethea MD 16.76 mL/hr at 09/30/19 0845 35 mcg/kg/min at 09/30/19 0845   • sodium chloride 0.9 % flush 10 mL  10 mL Intravenous Q12H Candido Alas MD   10 mL at 09/30/19 0924   • sodium chloride 0.9 % flush 10 mL  10 mL Intravenous PRN Candido Alas MD           Review of Systems:   -A 14 point review of systems is completed and is negative except for agitation      Objective      Vital Signs  Temp:  [97.8 °F (36.6 °C)-98.8 °F (37.1 °C)] 98.8 °F (37.1 °C)  Heart Rate:  [48-85] 69  Resp:  [10-14] 11  BP: ()/(56-97) 171/95  FiO2 (%):  [30 %] 30 %    Physical Exam:    CVS:  RR  Lungs: CTA  Abd:  NT/ND    MS:  Sedated and agitated  CN:  II - XII intact  MTR:  Moving all  extremities -right arm and leg have drift down to the bed.  DTR:  1+ throughout  Coord/Gait:  No tremors     Results Review:    I reviewed the patient's new clinical results.    Results from last 7 days   Lab Units 09/28/19  0232   WBC 10*3/mm3 12.75*   HEMOGLOBIN g/dL 13.7   HEMATOCRIT % 39.7   PLATELETS 10*3/mm3 335        Results from last 7 days   Lab Units 09/28/19  0232 09/27/19  0433   SODIUM mmol/L 138 137   POTASSIUM mmol/L 3.6 3.5   CHLORIDE mmol/L 99 96*   CO2 mmol/L 26.0 25.0   BUN mg/dL 7 7   CREATININE mg/dL 0.76 0.58*   CALCIUM mg/dL 8.5* 8.8   BILIRUBIN mg/dL 0.9 0.8   ALK PHOS U/L 63 70   ALT (SGPT) U/L 17 21   AST (SGOT) U/L 37 37   GLUCOSE mg/dL 110* 94        No results found for: PHOS, MG, PROTIME, INR, APTT  No components found for: POCGLUC  No components found for: A1C  Lab Results   Component Value Date    HDL 51 09/27/2019     (H) 09/27/2019     No components found for: B12  Lab Results   Component Value Date    TSH 1.760 09/27/2019     Labs reviewed:  Liver function normal  CT Head reviewed:        MRI brain reviewed by me.  Left MCA acute infarction mainly in the posterior distribution.  No evidence of hemorrhagic conversion nor edema.    Cardiac echo:  Preserved EF  Carotid ultrasound shows evidence of a left ICA occlusion  Labs reviewed:  WBC is 12K  CT angiography of the head and neck reviewed by me.  On the left the internal carotid arteries completely occluded.  The right has 30% stenosis.  Assessment/Plan     Hospital Problem List      Degeneration of lumbar or lumbosacral intervertebral disc    Smoker    Acute cerebrovascular accident (CVA) of cerebellum (CMS/HCC)    Alcoholism /alcohol abuse (CMS/HCC)    CVA (cerebral vascular accident) (CMS/HCC)    Hyperlipidemia    Impression:     1.  Alcohol withdrawal - drinks a pint of hard liquor per day per brother who is in the room  2.  Left MCA stroke  3.  Hyperlipidemia with an LDL above goal of 70  4.  History of hypertension  5.   Left ICA occlusion -confirmed with CT angiography.    Plan:  · ETOH withdrawal protocols  · Lipitor 80  · ASA 81mg  · SCD's  · Systolic blood pressure goal less than 160  · Agree with attempting to wean.        Gab White MD  09/30/19  10:57 AM    35 minutes of critical care time was performed with titration of sedating agents in addition to neurologic examination and interpretation of imaging including CT angiography of head and neck.    Electronically signed by Gab White MD at 09/30/19 1104     Candido Alas MD at 09/30/19 8383              [facility] Medicine Services  INPATIENT PROGRESS NOTE    Length of Stay: 4  Date of Admission: 9/26/2019  Primary Care Physician: Jose Karimi MD    Subjective   Chief Complaint: Respiratory failure/CVA/alcohol withdrawal    HPI   Patient is intubated. Assist control 10/ PEEP 5/oxygen 30%/tidal volume 620.     Review of Systems   Unable to assess secondary to the patient's intubated and sedated state.     All pertinent negatives and positives are as above. All other systems have been reviewed and are negative unless otherwise stated.     Objective    Temp:  [97.6 °F (36.4 °C)-98.6 °F (37 °C)] 98.5 °F (36.9 °C)  Heart Rate:  [48-79] 74  Resp:  [10-14] 11  BP: ()/(52-97) 157/96  FiO2 (%):  [30 %] 30 %    Intake/Output Summary (Last 24 hours) at 9/30/2019 0754  Last data filed at 9/30/2019 0400  Gross per 24 hour   Intake 1663.08 ml   Output 1650 ml   Net 13.08 ml     Physical Exam   Constitutional: He appears well-developed.   HENT:   Head: Normocephalic.   Eyes: Conjunctivae are normal. Pupils are equal, round, and reactive to light.   Neck: Neck supple. No JVD present. No thyromegaly present.   Cardiovascular: Normal rate, regular rhythm, normal heart sounds and intact distal pulses. Exam reveals no gallop and no friction rub.   No murmur heard.  Pulmonary/Chest: No respiratory distress. He has no wheezes. He  has no rales. He exhibits no tenderness.   Light rhonchi bilateral.  Patient is intubated.   Abdominal: Soft. Bowel sounds are normal. He exhibits no distension. There is no tenderness. There is no rebound and no guarding.   Musculoskeletal: He exhibits no edema, tenderness or deformity.   Lymphadenopathy:     He has no cervical adenopathy.   Neurological: He displays normal reflexes. No cranial nerve deficit. He exhibits abnormal muscle tone. Coordination abnormal.   Skin: Skin is warm and dry. Capillary refill takes 2 to 3 seconds. No rash noted.   Nursing note and vitals reviewed.      Results Review:  Lab Results (last 24 hours)     Procedure Component Value Units Date/Time    Blood Gas, Arterial [[id number]]  (Abnormal) Collected:  09/30/19 0220    Specimen:  Arterial Blood Updated:  09/30/19 0228     Site Right Radial     Lexx's Test Positive     pH, Arterial 7.441 pH units      pCO2, Arterial 39.2 mm Hg      pO2, Arterial 88.7 mm Hg      HCO3, Arterial 26.7 mmol/L      Comment: 83 Value above reference range        Base Excess, Arterial 2.4 mmol/L      Comment: 83 Value above reference range        O2 Saturation, Arterial 97.6 %      Temperature 37.0 C      Barometric Pressure for Blood Gas 752 mmHg      Modality Ventilator     FIO2 30 %      Ventilator Mode AC     Set Tidal Volume 620     Set Mech Resp Rate 10.0     PEEP 5.0     Collected by [id number]     Comment: Meter: [id number]     :  [id number]       Respiratory Culture - Sputum, ET Suction [[id number]] Collected:  09/27/19 2029    Specimen:  Sputum from ET Suction Updated:  09/29/19 0929     Respiratory Culture Light growth (2+) Normal Respiratory Artemio     Gram Stain Greater than 25 WBCs per low power field      Moderate (3+) Mixed gram positive artemio      Few (2+) Epithelial cells seen           Cultures:  Respiratory Culture   Date Value Ref Range Status   09/27/2019 Light growth (2+) Normal Respiratory Artemio  Final       Radiology Data:     Imaging Results (last 24 hours)     Procedure Component Value Units Date/Time    XR Chest 1 View [[id number]] Collected:  09/30/19 0718     Updated:  09/30/19 0722    Narrative:       Frontal supine radiograph of the chest 9/30/2019 3:30 AM CDT     History: Intubated Patient; R13.10-Dysphagia, unspecified     Comparison: Chest x-ray dated 09/29/2019      Findings:   Lines and tubes are stable in position. No new opacities or  pneumothoraces are visualized in the chest. The cardiomediastinal  silhouette and pulmonary vascularity are unchanged.       No acute osseous or soft tissue abnormality is noted.        Impression:       Impression:   1.   No significant interval change since previous exam.        This report was finalized on 09/30/2019 07:19 by Dr. Jo Garcia MD.    XR Abdomen KUB [[id number]] Collected:  09/29/19 2201     Updated:  09/29/19 2204    Narrative:       EXAMINATION: XR ABDOMEN KUB- 9/29/2019 10:01 PM CDT     HISTORY: OG tube placement     COMPARISON: 09/28/2019 3:30 AM       Impression:       FINDINGS AND IMPRESSION:  Enteric tube is in place with tip and side-port projecting over the left  upper quadrant of the abdomen, presumably in the stomach. Visualized  bowel gas pattern is nonobstructive.  This report was finalized on 09/29/2019 22:01 by Dr. Mario Feldman MD.    CT Angiogram Neck With & Without Contrast [[id number]] Collected:  09/29/19 1419     Updated:  09/29/19 1432    Narrative:       EXAM:   CT NECK ANGIOGRAM  CT HEAD ANGIOGRAM 09/29/2019     COMPARISON:   None.      HISTORY:  59 years-old Male[facility]      TECHNIQUE:      Multiple CT images were obtained of the of the head and neck after the  administration of IV contrast.  3D MIP reformats were generated in the  axial, sagittal and coronal planes were sent to PACS for interpretation.        Grading of carotid artery stenosis is performed by NASCET criteria.     FINDINGS:      CT Neck Angiogram:     There is conventional anatomy with  3 vessel aortic arch. The origins of  the great vessels are patent. The bilateral subclavian arteries are  patent.     The right common carotid artery demonstrates atherosclerotic disease at  the bifurcation, extending into the right internal carotid artery with  approximately 30% stenosis. The remaining right ICA is without  flow-limiting stenosis.     The left common carotid artery demonstrates atherosclerotic disease  without focal flow limiting stenosis or occlusion. There is complete  occlusion of the left ICA from the bifurcation up to the visualized  intracranial segment.     Mild to moderate stenosis at the ostium of the right vertebral artery  origin atherosclerotic disease. The remaining right vertebral artery is  without flow-limiting stenosis or occlusion.     Origin of the left vertebral artery demonstrated mild stenosis related  to atherosclerotic disease. The remaining left vertebral arteries  without flow-limiting stenosis focally.        Additional findings: The patient is intubated. There is an enteric tube  in place. Prevertebral soft tissue swelling, favored to reflect retained  secretions.     CT Head Angiogram:     There is mild stenosis in the right carotid siphon related to  atherosclerotic disease. The right carotid terminus is well visualized.  The right A1, A2, M1 and M2 are well visualized. There is an anterior  communicating artery which supplies the left A1 and A2 segments. The  left A1 is diminutive. The left M1 visualized proximal M2 are  well-opacified. There is some retrograde opacification of the left  petrous ICA with a present posterior communicating artery on the left as  well.     The basilar artery, bilateral PCAs and SCA's are well opacified.     .          Impression:       CT Angiography Of The Neck With Intravenous Contrast   1. 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     2. Approximately 30% stenosis in the right carotid bulb.  3. Mild to moderate stenosis at  the ostium of the right vertebral artery  and mild stenosis at the ostium of the left vertebral artery.     CT Angiography Of The Head With Intravenous Contrast  1.   Continued occlusion of the left intracranial ICA with retrograde  flow into the petrous segment from the contralateral ICA and the left  P-comm.  This report was finalized on 09/29/2019 14:28 by Dr. Jo Garcia MD.    CT Angiogram Head With & Without Contrast [[id number]] Collected:  09/29/19 1419     Updated:  09/29/19 1432    Narrative:       EXAM:   CT NECK ANGIOGRAM  CT HEAD ANGIOGRAM 09/29/2019     COMPARISON:   None.      HISTORY:  59 years-old Male[facility]      TECHNIQUE:      Multiple CT images were obtained of the of the head and neck after the  administration of IV contrast.  3D MIP reformats were generated in the  axial, sagittal and coronal planes were sent to PACS for interpretation.        Grading of carotid artery stenosis is performed by NASCET criteria.     FINDINGS:      CT Neck Angiogram:     There is conventional anatomy with 3 vessel aortic arch. The origins of  the great vessels are patent. The bilateral subclavian arteries are  patent.     The right common carotid artery demonstrates atherosclerotic disease at  the bifurcation, extending into the right internal carotid artery with  approximately 30% stenosis. The remaining right ICA is without  flow-limiting stenosis.     The left common carotid artery demonstrates atherosclerotic disease  without focal flow limiting stenosis or occlusion. There is complete  occlusion of the left ICA from the bifurcation up to the visualized  intracranial segment.     Mild to moderate stenosis at the ostium of the right vertebral artery  origin atherosclerotic disease. The remaining right vertebral artery is  without flow-limiting stenosis or occlusion.     Origin of the left vertebral artery demonstrated mild stenosis related  to atherosclerotic disease. The remaining left vertebral  arteries  without flow-limiting stenosis focally.        Additional findings: The patient is intubated. There is an enteric tube  in place. Prevertebral soft tissue swelling, favored to reflect retained  secretions.     CT Head Angiogram:     There is mild stenosis in the right carotid siphon related to  atherosclerotic disease. The right carotid terminus is well visualized.  The right A1, A2, M1 and M2 are well visualized. There is an anterior  communicating artery which supplies the left A1 and A2 segments. The  left A1 is diminutive. The left M1 visualized proximal M2 are  well-opacified. There is some retrograde opacification of the left  petrous ICA with a present posterior communicating artery on the left as  well.     The basilar artery, bilateral PCAs and SCA's are well opacified.     .          Impression:       CT Angiography Of The Neck With Intravenous Contrast   1. 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     2. Approximately 30% stenosis in the right carotid bulb.  3. Mild to moderate stenosis at the ostium of the right vertebral artery  and mild stenosis at the ostium of the left vertebral artery.     CT Angiography Of The Head With Intravenous Contrast  1.   Continued occlusion of the left intracranial ICA with retrograde  flow into the petrous segment from the contralateral ICA and the left  P-comm.  This report was finalized on 09/29/2019 14:28 by Dr. Jo Garcia MD.    US Carotid Bilateral [[id number]] Collected:  09/29/19 1130     Updated:  09/29/19 1134    Narrative:       History: CVA       Impression:       Impression:  1. There is less than 50% stenosis of the right internal carotid artery.  2. There is complete occlusion of the left internal carotid artery.  3. Antegrade flow is demonstrated in the left vertebral. The right  vertebral was not visualized.  4. Further imaging/valuation may be warranted with a CTA of the head and  neck.     Comments: Bilateral carotid vertebral  arterial duplex scan was  performed.     Grayscale imaging shows intimal thickening and calcified elements at the  carotid bifurcation. The right internal carotid artery peak systolic  velocity is 82.1 cm/sec. The end-diastolic velocity is 35.8 cm/sec. The  right ICA/CCA ratio is approximately 1.15 . These findings correlate  with less than 50% stenosis of the right internal carotid artery.     There is complete occlusion of the left internal carotid artery.     There is greater than 50% stenosis of the right external carotid artery.  This report was finalized on 09/29/2019 11:31 by Dr. Gaetano Teran MD.          No Known Allergies    Scheduled meds:     aspirin 81 mg Oral Daily   Or      aspirin 300 mg Rectal Daily   atorvastatin 80 mg Oral Nightly   chlorhexidine 15 mL Mouth/Throat Q12H   ipratropium-albuterol 3 mL Nebulization 4x Daily - RT   sodium chloride 10 mL Intravenous Q12H       PRN meds:  •  albuterol  •  labetalol  •  LORazepam **OR** LORazepam **OR** LORazepam **OR** LORazepam **OR** LORazepam **OR** LORazepam  •  sodium chloride    Assessment/Plan       Degeneration of lumbar or lumbosacral intervertebral disc    Smoker    Acute cerebrovascular accident (CVA) of cerebellum (CMS/HCC)    Alcoholism /alcohol abuse (CMS/[facility])    CVA (cerebral vascular accident) (CMS/[facility])    Hyperlipidemia      Plan:  Respiratory failure.  Intubated to protect airway.  Continue propofol drip.  Continue Versed drip.  Assist control 10/ PEEP 5/oxygen 30%/tidal volume 620.     CVA. Left MCA stroke. Continue aspirin.  Continue Lipitor. Neurology consult.   CTA of the head and neck- complete occlusion of the entire cervical left ICA, 30% stenosis of the right carotid bulb, mild stenosis at the ostium of the left vertebral artery.  MRI of the head- acute infarct in the left MCA and watershed territory- no hemorrhagic conversion, abnormal left ICA flow void.  Echocardiogram- ejection fraction 61%, diastolic dysfunction grade  1.    COPD.  Continue duo nebs.    Alcohol withdrawal.  Ciwa protocol.    Hyperlipidemia.  Continue Lipitor.    Tobacco abuse.  Tobacco counseling.    Urinary incontinence.  Continue urinary cath.    SCD.    Nutrition.  Consult nutritionist for tube feeding.  Continue banana bag.    Deconditioning.  PT and OT consult.    Discharge Planning: Transfer from [facility].    Candido Alas MD   09/30/19   7:54 AM                    Electronically signed by Candido Alas MD at 09/30/19 0850     Galo Bethea MD at 09/29/19 1039              [facility] Medicine Services  INPATIENT PROGRESS NOTE    Patient Name: Jarod Ramírez Jr.  Date of Admission: 9/26/2019  Today's Date: 09/29/19  Length of Stay: 3  Primary Care Physician: Jose Karimi MD    Subjective   Chief Complaint: Intubated  HPI   Doing ok.  Currently sedated.  Got agitated off sedation  No seizure like activity        Review of Systems   Unable to perform ROS: Intubated        All pertinent negatives and positives are as above. All other systems have been reviewed and are negative unless otherwise stated.     Objective    Temp:  [97.5 °F (36.4 °C)-98.4 °F (36.9 °C)] 97.9 °F (36.6 °C)  Heart Rate:  [53-83] 55  Resp:  [10-20] 11  BP: ()/(46-99) 108/63  FiO2 (%):  [30 %] 30 %  Physical Exam   Constitutional: He appears well-developed and well-nourished. He is sedated and intubated.   HENT:   Head: Normocephalic and atraumatic.   Right Ear: External ear normal.   Left Ear: External ear normal.   Nose: Nose normal.   Mouth/Throat: Oropharynx is clear and moist.   Eyes: Conjunctivae and EOM are normal. Pupils are equal, round, and reactive to light. Right eye exhibits no discharge. Left eye exhibits no discharge. No scleral icterus.   Neck: Normal range of motion. Neck supple. No tracheal deviation present. No thyromegaly present.   Cardiovascular: Normal rate, regular rhythm, normal heart  sounds and intact distal pulses. Exam reveals no gallop and no friction rub.   No murmur heard.  Pulmonary/Chest: Effort normal and breath sounds normal. No stridor. He is intubated. No respiratory distress. He has no wheezes. He has no rales. He exhibits no tenderness.   Abdominal: Soft. Bowel sounds are normal. He exhibits no distension and no mass. There is no tenderness. There is no rebound and no guarding. No hernia.   Musculoskeletal: Normal range of motion. He exhibits no edema or deformity.   Lymphadenopathy:     He has no cervical adenopathy.   Neurological: He has normal reflexes. He is unresponsive. He displays normal reflexes. No cranial nerve deficit. He exhibits normal muscle tone. Coordination normal.   Skin: Skin is warm and dry. No rash noted. No erythema. No pallor.   Vitals reviewed.        Results Review:  I have reviewed the labs, radiology results, and diagnostic studies.    Laboratory Data:   Results from last 7 days   Lab Units 09/28/19  0232   WBC 10*3/mm3 12.75*   HEMOGLOBIN g/dL 13.7   HEMATOCRIT % 39.7   PLATELETS 10*3/mm3 335        Results from last 7 days   Lab Units 09/28/19  0232 09/27/19  0433   SODIUM mmol/L 138 137   POTASSIUM mmol/L 3.6 3.5   CHLORIDE mmol/L 99 96*   CO2 mmol/L 26.0 25.0   BUN mg/dL 7 7   CREATININE mg/dL 0.76 0.58*   CALCIUM mg/dL 8.5* 8.8   BILIRUBIN mg/dL 0.9 0.8   ALK PHOS U/L 63 70   ALT (SGPT) U/L 17 21   AST (SGOT) U/L 37 37   GLUCOSE mg/dL 110* 94       Culture Data:   Respiratory Culture   Date Value Ref Range Status   09/27/2019 Light growth (2+) Normal Respiratory Esther  Final       Radiology Data:   Imaging Results (last 24 hours)     Procedure Component Value Units Date/Time    XR Chest 1 View [[id number]] Collected:  09/29/19 0743     Updated:  09/29/19 0748    Narrative:       Frontal supine radiograph of the chest 9/29/2019 3:38 AM CDT     History: Intubated Patient; R13.10-Dysphagia, unspecified     Comparison: Chest x-ray dated 09/28/2019       Findings:   Enteric tube has been advanced.  Endotracheal tube has been advanced and approximate the alex.  Recommend retraction by 2 cm.. No new opacities or pneumothoraces are  visualized in the chest. The cardiomediastinal silhouette and pulmonary  vascularity are unchanged.       No acute osseous or soft tissue abnormality is noted.        Impression:       Impression:   1.   Endotracheal tube has been advanced. Recommend retraction by 2 cm..        This report was finalized on 09/29/2019 07:44 by Dr. Jo Garcia MD.    US Carotid Bilateral [[id number]] Updated:  09/28/19 1543    MRI Brain Without Contrast [[id number]] Collected:  09/28/19 1315     Updated:  09/28/19 1320    Narrative:       EXAM: MR BRAIN WITHOUT IV CONTRAST 09/28/2019     COMPARISON: None      HISTORY: 59 years-old Male. Stroke.      TECHNIQUE:   Routine pulse sequences of the brain were obtained without IV contrast.      REPORT:     There is extensive diffusion restriction within the left MCA territory  involving the frontal, temporal and parietal lobes, consistent with  acute left MCA territory infarct. There is also involvement of the  watershed territory. There is no evidence of hemorrhagic conversion.     No midline shift. No acute hydrocephalus. The basal cisterns are  preserved. The sella, parasellar region, brainstem and cerebellum  demonstrate no acute finding.     There is loss of the normal flow void of the left ICA, concerning for  proximal ICA flow limiting stenosis/occlusion.     The patient is intubated. There is fluid in the nasopharynx.          Impression:       1.  Acute infarct in the left MCA and watershed territory. No  hemorrhagic conversion.  2.  Abnormal left ICA flow void, consistent with a more proximal flow  limiting stenosis or occlusion in the left ICA.  This report was finalized on 09/28/2019 13:17 by Dr. Jo Garcia MD.          I have reviewed the patient's current medications.     Assessment/Plan      Active Hospital Problems    Diagnosis   • Hyperlipidemia   • Acute cerebrovascular accident (CVA) of cerebellum (CMS/HCC)   • Alcoholism /alcohol abuse (CMS/HCC)   • CVA (cerebral vascular accident) (CMS/[facility])   • Smoker   • Degeneration of lumbar or lumbosacral intervertebral disc       1.  stroke  -CTA head/neck  -Neuro following  -ASA  -Lipitor  -Therapy once out of DT window     2.  Severe alcohol withdrawal   -Intubated  -Propofol  -versed     3.  HLD  -statin     4.  Tobacco abuse  -cessation counseling when better             Discharge Planning: I expect the patient to be discharged to home in ? days    Galo Bethea MD   09/29/19   10:39 AM      Electronically signed by Galo Bethea MD at 09/29/19 1041     Gab White MD at 09/29/19 0921            Neurology Progress Note      Chief Complaint:  stroke    Subjective     Subjective:    He did well overnight.  His sedation was paused this morning.  He was able to move all 4 extremities.  He did have some weakness of his right arm and leg with drift down the bed.  He was also able to mouth around the ventilator.  Not long after this he became extremely anxious and was bucking the ventilator along with attempting to dislodge lines and tubing.  His sedation was then increased again.    Medications:  Current Facility-Administered Medications   Medication Dose Route Frequency Provider Last Rate Last Dose   • albuterol (PROVENTIL) nebulizer solution 0.083% 2.5 mg/3mL  2.5 mg Nebulization Once PRN Galo Bethea MD       • aspirin chewable tablet 81 mg  81 mg Oral Daily Candido Alas MD        Or   • aspirin suppository 300 mg  300 mg Rectal Daily Candido Alas MD   300 mg at 09/28/19 0825   • atorvastatin (LIPITOR) tablet 80 mg  80 mg Oral Nightly Candido Alas MD       • chlorhexidine (PERIDEX) 0.12 % solution 15 mL  15 mL Mouth/Throat Q12H Galo Bethea MD   15 mL at 09/28/19 2053   • ipratropium-albuterol (DUO-NEB)  nebulizer solution 3 mL  3 mL Nebulization 4x Daily - RT Christina Sharif APRN   3 mL at 09/29/19 0636   • labetalol (NORMODYNE,TRANDATE) injection 20 mg  20 mg Intravenous Q4H PRN Christina Sharif APRN       • lactated ringers infusion  50 mL/hr Intravenous Continuous Christina Sharif APRN 50 mL/hr at 09/29/19 0507 50 mL/hr at 09/29/19 0507   • LORazepam (ATIVAN) tablet 1 mg  1 mg Oral Q2H PRN Candido Alas MD        Or   • LORazepam (ATIVAN) injection 1 mg  1 mg Intravenous Q2H PRN Candido Alas MD        Or   • LORazepam (ATIVAN) tablet 2 mg  2 mg Oral Q1H PRN Candido Alas MD        Or   • LORazepam (ATIVAN) injection 2 mg  2 mg Intravenous Q1H PRN Candido Alas MD   2 mg at 09/27/19 1537    Or   • LORazepam (ATIVAN) injection 2 mg  2 mg Intravenous Q15 Min PRN Candido Alas MD   2 mg at 09/27/19 2007    Or   • LORazepam (ATIVAN) injection 2 mg  2 mg Intramuscular Q15 Min PRN Candido Alas MD       • midazolam (VERSED) 250 mg in sodium chloride 0.9 % 250 mL (1 mg/mL) infusion  1 mg/hr Intravenous Titrated Galo Bethea MD 6 mL/hr at 09/29/19 0739 6 mg/hr at 09/29/19 0739   • multiple vitamin (M.V.I. Adult) 10 mL, thiamine (B-1) 100 mg, folic acid 1 mg, potassium chloride 10 mEq in dextrose 5 % and sodium chloride 0.9 % 1,000 mL infusion  100 mL/hr Intravenous Daily Candido Alas  mL/hr at 09/28/19 1000 100 mL/hr at 09/28/19 1000   • propofol (DIPRIVAN) infusion 10 mg/mL 100 mL  5-75 mcg/kg/min Intravenous Titrated Galo Bethea MD 14.36 mL/hr at 09/29/19 0735 30 mcg/kg/min at 09/29/19 0735   • sodium chloride 0.9 % flush 10 mL  10 mL Intravenous Q12H Candido Alas MD   10 mL at 09/28/19 0819   • sodium chloride 0.9 % flush 10 mL  10 mL Intravenous PRN Candido Alas MD           Review of Systems:   -A 14 point review of systems is completed and is negative except for agitation      Objective      Vital Signs  Temp:  [97.5 °F (36.4 °C)-98.4 °F  (36.9 °C)] 97.6 °F (36.4 °C)  Heart Rate:  [55-85] 62  Resp:  [10-20] 11  BP: ()/(46-99) 156/92  FiO2 (%):  [30 %] 30 %    Physical Exam:    CVS:  RR  Lungs: CTA  Abd:  NT/ND    MS:  Sedated and agitated  CN:  II - XII intact  MTR:  Moving all extremities -right arm and leg have drift down to the bed.  DTR:  1+ throughout  Coord/Gait:  No tremors     Results Review:    I reviewed the patient's new clinical results.    Results from last 7 days   Lab Units 09/28/19  0232   WBC 10*3/mm3 12.75*   HEMOGLOBIN g/dL 13.7   HEMATOCRIT % 39.7   PLATELETS 10*3/mm3 335        Results from last 7 days   Lab Units 09/28/19  0232 09/27/19  0433   SODIUM mmol/L 138 137   POTASSIUM mmol/L 3.6 3.5   CHLORIDE mmol/L 99 96*   CO2 mmol/L 26.0 25.0   BUN mg/dL 7 7   CREATININE mg/dL 0.76 0.58*   CALCIUM mg/dL 8.5* 8.8   BILIRUBIN mg/dL 0.9 0.8   ALK PHOS U/L 63 70   ALT (SGPT) U/L 17 21   AST (SGOT) U/L 37 37   GLUCOSE mg/dL 110* 94        No results found for: PHOS, MG, PROTIME, INR, APTT  No components found for: POCGLUC  No components found for: A1C  Lab Results   Component Value Date    HDL 51 09/27/2019     (H) 09/27/2019     No components found for: B12  Lab Results   Component Value Date    TSH 1.760 09/27/2019     Labs reviewed:  Liver function normal  CT Head reviewed:        MRI brain reviewed by me.  Left MCA acute infarction mainly in the posterior distribution.  No evidence of hemorrhagic conversion nor edema.    Cardiac echo:  Preserved EF  Carotid ultrasound shows evidence of a left ICA occlusion  Labs reviewed:  WBC is 12K  Assessment/Plan     Hospital Problem List      Degeneration of lumbar or lumbosacral intervertebral disc    Smoker    Acute cerebrovascular accident (CVA) of cerebellum (CMS/HCC)    Alcoholism /alcohol abuse (CMS/HCC)    CVA (cerebral vascular accident) (CMS/HCC)    Hyperlipidemia    Impression:     1.  Alcohol withdrawal - drinks a pint of hard liquor per day per brother who is in the  room  2.  Left MCA stroke  3.  Hyperlipidemia with an LDL above goal of 70  4.  History of hypertension  5.  Left ICA occlusion    Plan:  · ETOH withdrawal protocols  · CTA Head and Neck to rule out string sign in left ICA  · Lipitor 80  · ASA 81mg  · SCD's  · May be able to wean sedation tomorrow enough to extubate based on exam this morning.  Patient becoming more cooperative.        Gab White MD  09/29/19  9:21 AM      Electronically signed by Gab White MD at 09/29/19 0936     Galo Bethea MD at 09/28/19 1217              [facility] Medicine Services  INPATIENT PROGRESS NOTE    Patient Name: Jarod Ramírez Jr.  Date of Admission: 9/26/2019  Today's Date: 09/28/19  Length of Stay: 2  Primary Care Physician: Jose Karimi MD    Subjective   Chief Complaint: intubated  HPI   Doing ok, still agitated this AM when sedation paused  No events overnight  Afebrile        Review of Systems   Unable to perform ROS: Intubated        All pertinent negatives and positives are as above. All other systems have been reviewed and are negative unless otherwise stated.     Objective    Temp:  [97.4 °F (36.3 °C)-98.5 °F (36.9 °C)] 98.5 °F (36.9 °C)  Heart Rate:  [63-90] 65  Resp:  [10-20] 10  BP: ()/() 138/77  FiO2 (%):  [30 %-80 %] 30 %  Physical Exam   Constitutional: He appears well-developed and well-nourished. He is sedated and intubated.   HENT:   Head: Normocephalic and atraumatic.   Right Ear: External ear normal.   Left Ear: External ear normal.   Nose: Nose normal.   Mouth/Throat: Oropharynx is clear and moist.   Eyes: Conjunctivae and EOM are normal. Pupils are equal, round, and reactive to light. Right eye exhibits no discharge. Left eye exhibits no discharge. No scleral icterus.   Neck: Normal range of motion. Neck supple. No tracheal deviation present. No thyromegaly present.   Cardiovascular: Normal rate, regular rhythm, normal heart  sounds and intact distal pulses. Exam reveals no gallop and no friction rub.   No murmur heard.  Pulmonary/Chest: Effort normal and breath sounds normal. No stridor. He is intubated. No respiratory distress. He has no wheezes. He has no rales. He exhibits no tenderness.   Abdominal: Soft. Bowel sounds are normal. He exhibits no distension and no mass. There is no tenderness. There is no rebound and no guarding. No hernia.   Musculoskeletal: Normal range of motion. He exhibits no edema or deformity.   Lymphadenopathy:     He has no cervical adenopathy.   Neurological: He has normal reflexes. He is unresponsive. He displays normal reflexes. No cranial nerve deficit. He exhibits normal muscle tone. Coordination normal.   Skin: Skin is warm and dry. No rash noted. No erythema. No pallor.   Vitals reviewed.        Results Review:  I have reviewed the labs, radiology results, and diagnostic studies.    Laboratory Data:   Results from last 7 days   Lab Units 09/28/19  0232   WBC 10*3/mm3 12.75*   HEMOGLOBIN g/dL 13.7   HEMATOCRIT % 39.7   PLATELETS 10*3/mm3 335        Results from last 7 days   Lab Units 09/28/19  0232 09/27/19  0433   SODIUM mmol/L 138 137   POTASSIUM mmol/L 3.6 3.5   CHLORIDE mmol/L 99 96*   CO2 mmol/L 26.0 25.0   BUN mg/dL 7 7   CREATININE mg/dL 0.76 0.58*   CALCIUM mg/dL 8.5* 8.8   BILIRUBIN mg/dL 0.9 0.8   ALK PHOS U/L 63 70   ALT (SGPT) U/L 17 21   AST (SGOT) U/L 37 37   GLUCOSE mg/dL 110* 94       Culture Data:        Radiology Data:   Imaging Results (last 24 hours)     Procedure Component Value Units Date/Time    XR Chest 1 View [[id number]] Collected:  09/28/19 0710     Updated:  09/28/19 0714    Narrative:       Exam:   XR CHEST 1 VW-       Date:  09/28/2019      History:  Male, age  59 years;Intubated Patient; R13.10-Dysphagia,  unspecified     COMPARISON:  Chest x-ray dated 09/27/2019     Findings :  Endotracheal tube is unchanged in position. New enteric tube.  The heart and mediastinum are  normal in size. Lungs are without focal  infiltrate, mass or effusions.  The bones show no acute pathology.         Impression:       Impression:     1.  New enteric tube.  2.  Otherwise, no interval changes.     This report was finalized on 09/28/2019 07:11 by Dr. Jo Garcia MD.    XR Abdomen KUB [[id number]] Collected:  09/28/19 0709     Updated:  09/28/19 0713    Narrative:       Exam:   XR ABDOMEN KUB-       Date:  9/28/2019 7:10 AM CDT     History:  Male, age  59 years; og placement; R13.10-Dysphagia,  unspecified     COMPARISON:  None.       Impression:       Findings and impression:  Enteric tube in place with tip projecting over the left upper quadrant.  However, the side port approximates the GE junction. Recommend  advancement by 10 cm.        This report was finalized on 09/28/2019 07:10 by Dr. Jo Garcia MD.    XR Chest 1 View [[id number]] Collected:  09/27/19 2003     Updated:  09/27/19 2007    Narrative:       HISTORY: Intubated     CXR: Frontal view the chest obtained.     COMPARISON: None     FINDINGS: Endotracheal tube slightly low lying with the tip at the T4  level. Maria R difficult to localize. There is a diminished level of  inspiration with vascular crowding basilar atelectasis. There is no  dense consolidation or convincing edema. No pleural effusion or  pneumothorax. There is no acute bony pathology.       Impression:       1. Endotracheal tube tip at the T4 level.  2. Diminished level of inspiration with mild vascular crowding and  basilar atelectasis.  This report was finalized on 09/27/2019 20:04 by Dr. Christina Kim MD.          I have reviewed the patient's current medications.     Assessment/Plan     Active Hospital Problems    Diagnosis   • Hyperlipidemia   • Acute cerebrovascular accident (CVA) of cerebellum (CMS/HCC)   • Alcoholism /alcohol abuse (CMS/HCC)   • CVA (cerebral vascular accident) (CMS/HCC)   • Smoker   • Degeneration of lumbar or lumbosacral intervertebral disc        1.  ?stroke  -MRI  -Echo/Carotids  -Neuro following  -ASA  -Lipitor  -Therapy once out of DT window    2.  Severe alcohol withdrawal   -Intubated  -Propofol  -versed    3.  HLD  -statin    4.  Tobacco abuse  -cessation counseling when better      Discharge Planning: continue in [facility]    Galo Bethea MD   09/28/19   12:17 PM      Electronically signed by Galo Bethea MD at 09/28/19 1219     Gab White MD at 09/28/19 1029            Neurology Progress Note      Chief Complaint:  stroke    Subjective     Subjective:    Had to be moved to unit overnight and intubated secondary to extreme agitation with dislodging of tubing and pulling at lines.   Medications:  Current Facility-Administered Medications   Medication Dose Route Frequency Provider Last Rate Last Dose   • albuterol (PROVENTIL) nebulizer solution 0.083% 2.5 mg/3mL  2.5 mg Nebulization Once PRN Galo Bethea MD       • aspirin chewable tablet 81 mg  81 mg Oral Daily Candido Alas MD        Or   • aspirin suppository 300 mg  300 mg Rectal Daily Candido Alas MD   300 mg at 09/28/19 0825   • atorvastatin (LIPITOR) tablet 80 mg  80 mg Oral Nightly Candido Alas MD       • chlorhexidine (PERIDEX) 0.12 % solution 15 mL  15 mL Mouth/Throat Q12H Galo Bethea MD   15 mL at 09/28/19 0819   • ipratropium-albuterol (DUO-NEB) nebulizer solution 3 mL  3 mL Nebulization 4x Daily - RT Christina Sharif APRN   3 mL at 09/28/19 0643   • labetalol (NORMODYNE,TRANDATE) injection 20 mg  20 mg Intravenous Q4H PRN Christina Sharif APRN       • lactated ringers infusion  50 mL/hr Intravenous Continuous Christina Sharif APRN 50 mL/hr at 09/28/19 0836 50 mL/hr at 09/28/19 0836   • LORazepam (ATIVAN) tablet 1 mg  1 mg Oral Q2H PRN Candido Alas MD        Or   • LORazepam (ATIVAN) injection 1 mg  1 mg Intravenous Q2H PRN Candido Alas MD        Or   • LORazepam (ATIVAN) tablet 2 mg  2 mg Oral Q1H PRN Bishop  Candido BARCENAS MD        Or   • LORazepam (ATIVAN) injection 2 mg  2 mg Intravenous Q1H PRN Candido Alas MD   2 mg at 09/27/19 1537    Or   • LORazepam (ATIVAN) injection 2 mg  2 mg Intravenous Q15 Min PRN Candido Alas MD   2 mg at 09/27/19 2007    Or   • LORazepam (ATIVAN) injection 2 mg  2 mg Intramuscular Q15 Min PRN Candido Alas MD       • midazolam (VERSED) 250 mg in sodium chloride 0.9 % 250 mL (1 mg/mL) infusion  1 mg/hr Intravenous Titrated Galo Bethea MD 7 mL/hr at 09/28/19 0935 7 mg/hr at 09/28/19 0935   • multiple vitamin (M.V.I. Adult) 10 mL, thiamine (B-1) 100 mg, folic acid 1 mg, potassium chloride 10 mEq in dextrose 5 % and sodium chloride 0.9 % 1,000 mL infusion  100 mL/hr Intravenous Daily Candido Alas  mL/hr at 09/28/19 1000 100 mL/hr at 09/28/19 1000   • propofol (DIPRIVAN) infusion 10 mg/mL 100 mL  5-75 mcg/kg/min Intravenous Titrated Galo Bethea MD 9.58 mL/hr at 09/28/19 1029 20 mcg/kg/min at 09/28/19 1029   • sodium chloride 0.9 % flush 10 mL  10 mL Intravenous Q12H Candido Alas MD   10 mL at 09/28/19 0819   • sodium chloride 0.9 % flush 10 mL  10 mL Intravenous PRN Candido Alas MD           Review of Systems:   -A 14 point review of systems is completed and is negative except for agitation      Objective      Vital Signs  Temp:  [97.4 °F (36.3 °C)-98.5 °F (36.9 °C)] 98.5 °F (36.9 °C)  Heart Rate:  [63-90] 70  Resp:  [10-20] 10  BP: ()/() 147/85  FiO2 (%):  [30 %-80 %] 30 %    Physical Exam:    CVS:  RR  Lungs: CTA  Abd:  NT/ND    MS:  Sedated and agitated  CN:  II - XII intact  MTR:  Moving all extremities  DTR:  1+ throughout  Coord/Gait:  No tremors     Results Review:    I reviewed the patient's new clinical results.    Results from last 7 days   Lab Units 09/28/19  0232   WBC 10*3/mm3 12.75*   HEMOGLOBIN g/dL 13.7   HEMATOCRIT % 39.7   PLATELETS 10*3/mm3 335        Results from last 7 days   Lab Units 09/28/19  0232 09/27/19  0433   SODIUM  mmol/L 138 137   POTASSIUM mmol/L 3.6 3.5   CHLORIDE mmol/L 99 96*   CO2 mmol/L 26.0 25.0   BUN mg/dL 7 7   CREATININE mg/dL 0.76 0.58*   CALCIUM mg/dL 8.5* 8.8   BILIRUBIN mg/dL 0.9 0.8   ALK PHOS U/L 63 70   ALT (SGPT) U/L 17 21   AST (SGOT) U/L 37 37   GLUCOSE mg/dL 110* 94        No results found for: PHOS, MG, PROTIME, INR, APTT  No components found for: POCGLUC  No components found for: A1C  Lab Results   Component Value Date    HDL 51 09/27/2019     (H) 09/27/2019     No components found for: B12  Lab Results   Component Value Date    TSH 1.760 09/27/2019     Labs reviewed:  Liver function normal  CT Head reviewed:      CT of head without contrast reviewed by me.  There is some hypoattenuation left parietal occipital region; however, this is not as discrete as I thought it could be.  This could represent an evolving infarct.  This also could be artifact.    Cardiac echo:  Preserved EF  Carotid ultrasound pending  MRI Brain pending  Assessment/Plan     Hospital Problem List      Degeneration of lumbar or lumbosacral intervertebral disc    Smoker    Acute cerebrovascular accident (CVA) of cerebellum (CMS/HCC)    Alcoholism /alcohol abuse (CMS/HCC)    CVA (cerebral vascular accident) (CMS/HCC)    Hyperlipidemia    Impression:     1.  Alcohol withdrawal - drinks a pint of hard liquor per day per brother who is in the room  2.  Concern for expressive aphasia with abnormal head CT from outside hospital concerning for left temporal stroke.  3.  Hyperlipidemia with an LDL above goal of 70  4.  History of hypertension    Plan:  · ETOH withdrawal protocols  · MRI Brain pending - ordered  · Lipitor 80  · ASA 81mg  · SCD's        Gab White MD  09/28/19  10:29 AM      Electronically signed by Gab White MD at 09/28/19 1034     Christina Sharif APRN at 09/27/19 1314     Attestation signed by Galo Bethea MD at 09/28/19 3983    I personally evaluated and examined the patient in  conjunction with YARELI Gaines and agree with the assessment, treatment plan, and disposition of the patient as recorded by her. My history, exam, and further recommendations are:     S:  Doing poorly.  Very agitated.  Difficult to sedate.  Still agitated despite 14 mg ativan    O:  CVS:  Tachycardic    A:  Alcohol withdrawal, questionable CVA    P:  Moved to ICU and intubated to allow for appropriate sedation        Galo Bethea MD  9/27/19  3:36 PM                        [facility] Medicine Services  INPATIENT PROGRESS NOTE    Patient Name: Jarod Ramírez Jr.  Date of Admission: 9/26/2019  Today's Date: 09/27/19  Length of Stay: 1  Primary Care Physician: Jose Karimi MD    Subjective   Chief Complaint: follow up   HPI   Pt is in 4 point restraints. He is pulling at his lines and tubes. He is aphasic, but moves all 4 extremities. No family at bedside. Discussion with nurse states his brother states if the brother is out of town, then he will binge drink almost continuously. He is incontinent.    Review of Systems   All pertinent negatives and positives are as above. All other systems have been reviewed and are negative unless otherwise stated.     Objective    Temp:  [97.5 °F (36.4 °C)-98.3 °F (36.8 °C)] 98.3 °F (36.8 °C)  Heart Rate:  [71-84] 71  Resp:  [16-20] 18  BP: (140-171)/(86-92) 171/92  Physical Exam   Constitutional: He appears well-developed and well-nourished.   HENT:   Head: Normocephalic and atraumatic.   Eyes: EOM are normal. Pupils are equal, round, and reactive to light. No scleral icterus.   Neck: Normal range of motion. Neck supple. No JVD present. Carotid bruit is not present. No tracheal deviation present. No thyromegaly present.   Cardiovascular: Normal rate and regular rhythm. Exam reveals no gallop and no friction rub.   No murmur heard.  One reading only sinus 78. He will not leave his telemetry pads in place.    Pulmonary/Chest:  Effort normal. No respiratory distress. He has wheezes. He has no rales. He exhibits no tenderness.   Coarse and wheezy bilaterally.    Abdominal: Soft. Bowel sounds are normal. He exhibits no distension. There is no tenderness.   Musculoskeletal: Normal range of motion. He exhibits no edema.   Neurological: No cranial nerve deficit.   He opens his eyes but does not verbalize.    Skin: Skin is warm and dry. No rash noted.   Psychiatric: He has a normal mood and affect.     Results Review:  I have reviewed the labs, radiology results, and diagnostic studies.    Laboratory Data:          Results from last 7 days   Lab Units 09/27/19  0433   SODIUM mmol/L 137   POTASSIUM mmol/L 3.5   CHLORIDE mmol/L 96*   CO2 mmol/L 25.0   BUN mg/dL 7   CREATININE mg/dL 0.58*   CALCIUM mg/dL 8.8   BILIRUBIN mg/dL 0.8   ALK PHOS U/L 70   ALT (SGPT) U/L 21   AST (SGOT) U/L 37   GLUCOSE mg/dL 94     Radiology Data:   Imaging Results (last 24 hours)     ** No results found for the last 24 hours. **        I have reviewed the patient's current medications.     Assessment/Plan     Active Hospital Problems    Diagnosis   • Hyperlipidemia   • Acute cerebrovascular accident (CVA) of cerebellum (CMS/HCC)   • Alcoholism /alcohol abuse (CMS/HCC)   • CVA (cerebral vascular accident) (CMS/HCC)   • Smoker   • Degeneration of lumbar or lumbosacral intervertebral disc     Plan:  1. Suspect he is in ETOH withdrawal. Continue CIWA protocol.  2. He has received ASA suppository.   3. Speech will have to clear him to swallow before he can take lipitor.   4. Await MRI/echo/carotid dopplers.   5. Continue restraints as long as he keeps pulling at lines and tubes.   6. Add duonebs    Discharge Planning: I expect the patient to be discharged to home in ? days.    Christina Sharif, YARELI   09/27/19   1:29 PM    Electronically signed by Galo Bethea MD at 09/28/19 9307          Consult Notes (last 7 days) (Notes from 09/24/19 through 10/01/19)       Gab White MD at 09/27/19 1048          Neurology Consult Note    Referring Provider: Dr. EVA Bethea  Reason for Consultation: stroke      History of present illness:      This is a 59-year-old male with a known history of chronic alcohol use.  I received a phone call overnight from the emergency room physician at [facility].  They reported that the patient is a .  His wife; who he is  from currently, had difficulties yesterday getting a hold of him.  He was found initially thought to be confused although later there was a concern that it instead represented aphasia.  They denied seeing any focal weakness.  He was taken to the ER there.  Head CT did show a left MCA distribution abnormality.  He also was extremely combative.  He was transferred to the hospitalist service at our hospital.  Overnight there were reports that he required multiple sedating medications secondary to agitation.      Past Medical History:   Diagnosis Date   • Hypertension        No Known Allergies  No current facility-administered medications on file prior to encounter.      Current Outpatient Medications on File Prior to Encounter   Medication Sig   • cyclobenzaprine (FLEXERIL) 10 MG tablet Take 1 tablet by mouth 3 (Three) Times a Day As Needed for Muscle Spasms.   • lisinopril (PRINIVIL,ZESTRIL) 10 MG tablet    • meloxicam (MOBIC) 15 MG tablet    • MethylPREDNISolone (MEDROL, ALBA,) 4 MG tablet Take as directed on package instructions.   • traMADol (ULTRAM) 50 MG tablet        Social History     Socioeconomic History   • Marital status: Single     Spouse name: Not on file   • Number of children: Not on file   • Years of education: Not on file   • Highest education level: Not on file   Tobacco Use   • Smoking status: Current Every Day Smoker     Types: Cigarettes   • Smokeless tobacco: Never Used   • Tobacco comment: 1.5 packs daily   Substance and Sexual Activity   • Alcohol use: Yes   •  Drug use: No   • Sexual activity: Defer     Family History   Problem Relation Age of Onset   • No Known Problems Mother    • Diabetes Father    • Cancer Sister    • No Known Problems Brother        Review of Systems  -A 14 point review of system was attempted but not able to be performed secondary to mental status      Vital Signs   Temp:  [97.5 °F (36.4 °C)-98.3 °F (36.8 °C)] 98.3 °F (36.8 °C)  Heart Rate:  [71-84] 71  Resp:  [16-20] 18  BP: (140-171)/(86-92) 171/92    General Exam:  Head:  Normal cephalic, atraumatic  HEENT:  Neck supple  Fundoscopic Exam:  No signs of disc edema  CVS:  Regular rate and rhythm.  No murmurs  Carotid Examination:  No bruits  Lungs: Wheezing  Abdomen:  Non-tender, Non-distended  Extremities:  No signs of peripheral edema  Skin: Flushed skin.    Neurologic Exam:    Mental Status:    -He is status post sedating medication being given overnight and is currently undergoing a banana bag.  He is restless.  He is nonverbal.  He does awaken to stimulation and is somewhat combative.    CN II:  Visual fields full.  Pupils equally reactive to light  CN III, IV, VI:  Extraocular Muscles full with no signs of nystagmus  CN V:  Facial sensory is symmetric with no asymmetries.  CN VII:  Facial motor symmetric  CN VIII:  Gross hearing intact bilaterally  CN IX:  Palate elevates symmetrically  CN X:  Palate elevates symmetrically  CN XI:  Shoulder shrug symmetric  CN XII:  Tongue is midline on protrusion    Motor:     -He moves all extremities symmetrically.    DTR:  -Right   Bicep: 2+ Tricep: 2+ Brachoradialis: 2+   Patella: 2+ Ankle: 2+ Neg Babinski  -Left   Bicep: 2+ Tricep: 2+ Brachoradialis: 2+   Patella: 2+ Ankle: 2+ Neg Babinski    Sensory:  -withdraws throughout    Coordination:  -no active tremors    Gait  -restrained      Results Review:  Lab Results (last 24 hours)     Procedure Component Value Units Date/Time    Lipid Panel [[id number]]  (Abnormal) Collected:  09/27/19 1282    Specimen:   Blood Updated:  09/27/19 0540     Total Cholesterol 195 mg/dL      Triglycerides 208 mg/dL      HDL Cholesterol 51 mg/dL      LDL Cholesterol  102 mg/dL      VLDL Cholesterol 41.6 mg/dL      LDL/HDL Ratio 2.01    Narrative:       Cholesterol Reference Ranges  (U.S. Department of Health and Human Services ATP III Classifications)    Desirable          <200 mg/dL  Borderline High    200-239 mg/dL  High Risk          >240 mg/dL      Triglyceride Reference Ranges  (U.S. Department of Health and Human Services ATP III Classifications)    Normal           <150 mg/dL  Borderline High  150-199 mg/dL  High             200-499 mg/dL  Very High        >500 mg/dL    HDL Reference Ranges  (U.S. Department of Health and Human Services ATP III Classifcations)    Low     <40 mg/dl (major risk factor for CHD)  High    >60 mg/dl ('negative' risk factor for CHD)        LDL Reference Ranges  (U.S. Department of Health and Human Services ATP III Classifcations)    Optimal          <100 mg/dL  Near Optimal     100-129 mg/dL  Borderline High  130-159 mg/dL  High             160-189 mg/dL  Very High        >189 mg/dL    Comprehensive Metabolic Panel [[id number]]  (Abnormal) Collected:  09/27/19 0433    Specimen:  Blood Updated:  09/27/19 0540     Glucose 94 mg/dL      BUN 7 mg/dL      Creatinine 0.58 mg/dL      Sodium 137 mmol/L      Potassium 3.5 mmol/L      Chloride 96 mmol/L      CO2 25.0 mmol/L      Calcium 8.8 mg/dL      Total Protein 7.1 g/dL      Albumin 4.00 g/dL      ALT (SGPT) 21 U/L      AST (SGOT) 37 U/L      Alkaline Phosphatase 70 U/L      Total Bilirubin 0.8 mg/dL      eGFR Non African Amer 144 mL/min/1.73      Globulin 3.1 gm/dL      A/G Ratio 1.3 g/dL      BUN/Creatinine Ratio 12.1     Anion Gap 16.0 mmol/L     Narrative:       GFR Normal >60  Chronic Kidney Disease <60  Kidney Failure <15    TSH [[id number]]  (Normal) Collected:  09/27/19 0433    Specimen:  Blood Updated:  09/27/19 0540     TSH 1.760 uIU/mL     Hemoglobin  A1c [[id number]]  (Abnormal) Collected:  09/27/19 0433    Specimen:  Blood Updated:  09/27/19 0526     Hemoglobin A1C 4.70 %     Narrative:       Hemoglobin A1C Ranges:    Increased Risk for Diabetes  5.7% to 6.4%  Diabetes                     >= 6.5%  Diabetic Goal                < 7.0%        CT of head without contrast reviewed by me.  There is some hypoattenuation left parietal occipital region; however, this is not as discrete as I thought it could be.  This could represent an evolving infarct.  This also could be artifact.    .  Imaging Results (last 24 hours)     ** No results found for the last 24 hours. **        Lab work reviewed from outside ER:  Telemetry strip shows normal sinus rhythm  CT of head without contrast reviewed by me shows low attenuation left posterior temporal and parietal regions  CBC shows a white count of 10 with hemoglobin of 15 and a platelet count of 400  Ammonia level less than 10  Urine analysis negative  Urine drug screen negative  Metabolic panel shows no significant electrolyte abnormalities.  Liver functions unremarkable  Alcohol level negligible  Acetaminophen level negligible  Salicylate level normal    Impression    1.  Alcohol withdrawal  2.  Concern for expressive aphasia with abnormal head CT from outside hospital concerning for left temporal stroke.  3.  Hyperlipidemia with an LDL above goal of 70  4.  History of hypertension    Plan    1.  MRI brain  2.  Alcohol withdrawal protocols  3.  Lipitor 80 mg daily  4.  Aspirin 81 mg daily  5.  Carotid ultrasound  6.  Cardiac echo  7.  Sequential compression devices    I discussed the patients findings and my recommendations with nursing staff    Gab White MD  09/27/19  10:48 AM        Electronically signed by Gab White MD at 09/27/19 1111     Demario Peña MD at 09/30/19 0926      Consult Orders    1. Inpatient Pulmonology Consult [[id number]] ordered by Candido Alas MD at 09/30/19 0818                                Referring Provider: Dr. Alas  Reason for Consultation: [facility]    Patient Care Team:  Jose Karimi MD as PCP - General (Family Medicine)    Chief complaint:   Altered mental status    History of present illness:  Subjective   This is a 59-year-old male patient, smoker and ? alcoholic.  He presented on 9/26 for suspected stroke.  Apparently, he was found confused at work (he's a ), staggering around and had trouble speaking.  He was transferred to [facility] ER.   He had a CT of the brain which showed possible left MCA abnormalities which prompted transfer to our facility for further care.     Patient is estranged from his wife for the last 6 weeks and was living with his brother.  There was a question about him being drinking alcohol excessively but his brother has denied that.  Alcohol level on presentation was negative.    On arrival here, patient became more combative and required several sedatives and he was ultimately intubated on 9/27.  He has been sedated with propofol and Versed.  He is currently on Versed 4 mg/h and propofol 35 mics per KG per minute.  Per staff, when sedation is held, patient moves all his extremities and follow commands sometimes but becomes very agitated.  On my assessment, he is moving in bed despite being sedated with the above medications but is not really opening his eyes or following any commands for me.    Most of the history was obtained from the chart and staff.    Labs reviewed:  ABG 7.4/39/88 on 30% FiO2.  WBC on 9/28 12; bicarb 26    Review of Systems  Cannot obtain.  Patient is on the ventilator.    History  Past Medical History:   Diagnosis Date   • Hypertension    ,   Past Surgical History:   Procedure Laterality Date   • CATARACT EXTRACTION Left    • WRIST FRACTURE SURGERY Left    ,   Family History   Problem Relation Age of Onset   • No Known Problems Mother    • Diabetes Father    • Cancer Sister    • No Known  Problems Brother    ,   Social History     Tobacco Use   • Smoking status: Current Every Day Smoker     Types: Cigarettes   • Smokeless tobacco: Never Used   • Tobacco comment: 1.5 packs daily   Substance Use Topics   • Alcohol use: Yes   • Drug use: No   ,   No medications prior to admission.   , Scheduled Meds:    aspirin 81 mg Oral Daily   Or      aspirin 300 mg Rectal Daily   atorvastatin 80 mg Oral Nightly   chlorhexidine 15 mL Mouth/Throat Q12H   ipratropium-albuterol 3 mL Nebulization 4x Daily - RT   IV Fluids 1000 mL + additives 75 mL/hr Intravenous Daily   sodium chloride 10 mL Intravenous Q12H   , Continuous Infusions:    lactated ringers 50 mL/hr Last Rate: 50 mL/hr (09/29/19 1641)   midazolam 1 mg/hr Last Rate: 4 mg/hr (09/30/19 0818)   propofol 5-75 mcg/kg/min Last Rate: 35 mcg/kg/min (09/30/19 0815)    and Allergies:  Patient has no known allergies.    Objective     Vital Signs   Temp:  [97.8 °F (36.6 °C)-98.6 °F (37 °C)] 98.5 °F (36.9 °C)  Heart Rate:  [48-74] 74  Resp:  [10-14] 11  BP: ()/(52-97) 157/96  FiO2 (%):  [30 %] 30 %    Physical Exam:  Constitutional: Not in acute distress.  On mechanical ventilation  Eyes: Injected conjunctiva, pupils equal reactive to light.  ENMT: ET tube size 8.  Neck: Large. Trachea midline. No thyromegaly  Heart: RRR, no murmur  Lungs: Good and equal air entry bilaterally.  Non labored breathing on the ventilator.  No crackles or wheezing  Abdomen: Soft. No tenderness or dullness. No HSM.  Positive bowel sounds  Extremities: No cyanosis, clubbing or pitting edema.  Warm extremities and well-perfused.  Neuro: Sedated.  Withdraw all extremities to painful stimuli, otherwise cannot assess due to sedation.  Psych: Cannot assess   Integumentary: No rash.  Normal skin turgor  Lymphatic: No palpable cervical or supraclavicular lymph nodes.      Diagnostic imaging:  I personally and independently reviewed the following images:   CXR 9/30/2019: ET in good position.   Clear lung fields.        Assessment   25. Agitation, suspect alcohol withdrawal  26. Left MCA stroke  27. Management of mechanical ventilation for airway protection  28. Tobacco abuse    Recommendations:  · Start Precedex.  Titrate to wean off Versed first and then propofol second.  · Ventilator management: Reduce tidal volume from 650 to 550 for lung protective ventilation.  Will place him on spontaneous trial once his sedative dose is decreased.  · DuoNeb for possible COPD.  He is currently not in exacerbation.  · Aspirin and statin per neurology for stroke.    I discussed the patients findings and my recommendations with nursing staff    Demario Peña MD  09/30/19  9:27 AM              Electronically signed by Demario Peña MD at 09/30/19 7682

## 2019-10-01 NOTE — PLAN OF CARE
"Problem: Patient Care Overview  Goal: Plan of Care Review  Outcome: Ongoing (interventions implemented as appropriate)   10/01/19 1103   Coping/Psychosocial   Plan of Care Reviewed With patient;sibling   OTHER   Outcome Summary Completed trials of honey, nectar, and thin with pt today. Pt exhibited throat clearing with honey thick and an immediate cough with thin. He had congested coughing prior to PO trials as well. Recommend modified barium swallow study to be completed today to further assess swallow function. Also completed informal speech/language evaluation. Pt was able to count to ten without cues, but required a phonemic cue to state days of the week and semantic cues to state months of the year. He was unable to independently name any objects. He exhibited semantic paraphasias and neologisms when attempting to name items. For example he said \"shannon\" for comb initially and \"knife\" for fork initially. He was able to name items with mod to max cues including phonemic, semantic, and written cues. He had mild to moderate difficulty with phrase completion and moderate difficulty with sentence completion tasks. He was able to accurately answer simple yes/no questions when shown the written words \"yes\" and \"no.\" He was unable to accurately answer more complex yes/no questions, despite cues. He was unable to follow an instruction to point to his nose, even with modeling and hand-over-hand instruction. He was inconsistently able to follow some single step commands for oral motor tasks. SLP will continue to follow and treat for the above concerns.          "

## 2019-10-01 NOTE — DISCHARGE PLACEMENT REQUEST
"To: [facility]    From: Bettina Brooke [phone]        Jarod Sanchez Jr. (59 y.o. Male)     Date of Birth Social Security Number Address Home Phone MRN    1960  [address] [place] [address] [phone] [id number]    Muslim Marital Status          Other Single       Admission Date Admission Type Admitting Provider Attending Provider Department, Room/Bed    9/26/19 Urgent Candido Alas MD Truong, Khai C, MD [facility] CARDIAC CARE, C007/1    Discharge Date Discharge Disposition Discharge Destination                       Attending Provider:  Candido Alas MD    Allergies:  No Known Allergies    Isolation:  None   Infection:  None   Code Status:  CPR    Ht:  162.6 cm (64\")   Wt:  78.2 kg (172 lb 4.8 oz)    Admission Cmt:  None   Principal Problem:  None                Active Insurance as of 9/26/2019     Primary Coverage     Payor Plan Insurance Group Employer/Plan Group    [employer] [place] [employer] [place]ICA[place]      Payor Plan Address Payor Plan Phone Number Payor Plan Fax Number Effective Dates    [address] [address] [phone]  6/22/2017 - None Entered    [facility] [address]       Subscriber Name Subscriber Birth Date Member ID       JAROD SANCHEZ 1960 [id number]                 Emergency Contacts      (Rel.) Home Phone Work Phone Mobile Phone    Lázaro Sanchez (Brother) [phone] -- --               History & Physical      Candido Alas MD at 09/26/19 [address] [place] [address] Medicine Services  HISTORY AND PHYSICAL    Date of Admission: 9/26/2019  Primary Care Physician: Jose Karimi MD    Subjective     Chief Complaint: CVA/alcohol abuse    History of Present Illness  Patient is a 59-year-old  male transferred from [facility] for evaluation altered mental status/confusion.  Patient has been drinking alcohol at work.  Patient came to ER confused disoriented with decreased " "responsiveness.  No history of trauma.  Unable to get the answer for the patient this time.  Most information is from the chart.    Review of Systems   Unable to obtain due to altered mental status.  Otherwise complete ROS reviewed and negative except as mentioned in the HPI.      Past Medical History:   Past Medical History:   Diagnosis Date   • Hypertension        Past Surgical History:  Past Surgical History:   Procedure Laterality Date   • CATARACT EXTRACTION Left    • WRIST FRACTURE SURGERY Left        Family History: family history includes Cancer in his sister; Diabetes in his father; No Known Problems in his brother and mother.    Social History:  reports that he has been smoking cigarettes.  He has never used smokeless tobacco. He reports that he drinks alcohol. He reports that he does not use drugs.    Medications:  Prior to Admission medications    Medication Sig Start Date End Date Taking? Authorizing Provider   cyclobenzaprine (FLEXERIL) 10 MG tablet Take 1 tablet by mouth 3 (Three) Times a Day As Needed for Muscle Spasms. 6/22/17   Galo Aguilera APRN   lisinopril (PRINIVIL,ZESTRIL) 10 MG tablet  5/10/17   Dahiana Hall MD   meloxicam (MOBIC) 15 MG tablet  5/10/17   Dahiana Hall MD   MethylPREDNISolone (MEDROL, ALBA,) 4 MG tablet Take as directed on package instructions. 6/22/17   Galo Aguilera APRN   traMADol (ULTRAM) 50 MG tablet  5/30/17   Dahiana Hall MD     Allergies:  No Known Allergies    Objective     Vital Signs: /86 (BP Location: Right arm, Patient Position: Sitting)   Pulse 74   Temp 98 °F (36.7 °C) (Oral)   Resp 16   Ht 163.8 cm (64.5\")   Wt 79.8 kg (176 lb)   SpO2 99%   BMI 29.74 kg/m²    Physical Exam   Constitutional: He appears well-developed.   HENT:   Head: Normocephalic and atraumatic.   Eyes: Conjunctivae are normal. Pupils are equal, round, and reactive to light.   Neck: Neck supple. No JVD present. No thyromegaly present. "   Cardiovascular: Normal rate, regular rhythm, normal heart sounds and intact distal pulses. Exam reveals no gallop and no friction rub.   No murmur heard.  Pulmonary/Chest: Effort normal and breath sounds normal. No respiratory distress. He has no wheezes. He has no rales. He exhibits no tenderness.   Decrease in breath sound bilateral, clear.   Abdominal: Soft. Bowel sounds are normal. He exhibits no distension. There is no tenderness. There is no rebound and no guarding.   Musculoskeletal: He exhibits no edema, tenderness or deformity.   Lymphadenopathy:     He has no cervical adenopathy.   Neurological: He is alert. He displays normal reflexes. No cranial nerve deficit. He exhibits abnormal muscle tone. Coordination abnormal.   Unable exam patient is this patient's unable to answer any question follow commands.  Patient is awake alert though appears   Skin: Skin is warm and dry. Capillary refill takes 2 to 3 seconds. No rash noted.   Nursing note and vitals reviewed.          Results Reviewed:  Laboratory- white blood cells 10, hemoglobin 15, hematocrit 46, platelets 400, ammonia level less than 10, sodium 137, potassium 3.6, chloride is 97, CO2 is 26, BUN 11, creatinine 0.82, total protein 8.1, albumin 3.8, bilirubin 0.69, alkaline phosphatase 80, AST 35, calcium 8.8, glucose 87, ALT is 29, GFR is greater than 60, alcohol level less than 3, acetaminophen level is 0, salicylate level is 5.3, urine drug screen is negative.    urinalysis shows moderate bilirubin, negative blood, nitrite negative, leukocyte neck negative.    Radiology Data:    Imaging Results (last 24 hours)     ** No results found for the last 24 hours. **          I have personally reviewed and interpreted the radiology studies and ECG obtained at time of admission.     Assessment / Plan      Assessment & Plan  Active Hospital Problems    Diagnosis   • Acute cerebrovascular accident (CVA) of cerebellum (CMS/[facility])   • Alcoholism /alcohol abuse  "(CMS/[facility])   • Smoker     Plans    CVA.  Stroke protocol.  Consult neurology.  MRI of the head.  Carotid duplex.  Echocardiogram.  CT scan of the head at [facility]- low attenuation within the left posterior temporal and right lower lobe concerning for evolving infarct, chronic ischemic deep white matter changes.    Alcohol abuse.  Ciwa.  Banana bag.    Smoker.  Consult patient once patient more alert.    Code Status: full code     I discussed the patient's findings and my recommendations with: patient    Estimated length of stay:2-5 days.     Candido Alas MD   09/26/19   9:58 PM              Electronically signed by Candido Alas MD at 09/26/19 1467       Hospital Medications (active)       Dose Frequency Start End    albuterol (PROVENTIL) nebulizer solution 0.083% 2.5 mg/3mL 2.5 mg Once As Needed 9/27/2019     Sig - Route: Take 2.5 mg by nebulization 1 (One) Time As Needed for Shortness of Air (Sputum Induction). - Nebulization    Cosign for Ordering: Accepted by Galo Bethea MD on 9/28/2019  8:22 AM    aspirin chewable tablet 81 mg 81 mg Daily 9/27/2019     Sig - Route: Chew 1 tablet Daily. - Oral    Linked Group 1:  \"Or\" Linked Group Details        aspirin suppository 300 mg 300 mg Daily 9/27/2019     Sig - Route: Insert 1 suppository into the rectum Daily. - Rectal    Linked Group 1:  \"Or\" Linked Group Details        atorvastatin (LIPITOR) tablet 80 mg 80 mg Nightly 9/26/2019     Sig - Route: Take 2 tablets by mouth Every Night. - Oral    chlorhexidine (PERIDEX) 0.12 % solution 15 mL 15 mL Every 12 Hours Scheduled 9/27/2019     Sig - Route: Apply 15 mL to the mouth or throat Every 12 (Twelve) Hours. - Mouth/Throat    famotidine (PEPCID) injection 20 mg 20 mg Every 12 Hours Scheduled 9/30/2019     Sig - Route: Infuse 2 mL into a venous catheter Every 12 (Twelve) Hours. - Intravenous    hypromellose (ISOPTO TEARS) 0.5 % ophthalmic solution 2 drop 2 drop 3 Times Daily PRN 9/30/2019     Sig - " "Route: Administer 2 drops to both eyes 3 (Three) Times a Day As Needed for Dry Eyes. - Both Eyes    ipratropium-albuterol (DUO-NEB) nebulizer solution 3 mL 3 mL 4 Times Daily - RT 9/27/2019     Sig - Route: Take 3 mL by nebulization 4 (Four) Times a Day. - Nebulization    labetalol (NORMODYNE,TRANDATE) injection 20 mg 20 mg Every 4 Hours PRN 9/27/2019     Sig - Route: Infuse 4 mL into a venous catheter Every 4 (Four) Hours As Needed for High Blood Pressure. - Intravenous    lactated ringers infusion 50 mL/hr Continuous 9/26/2019     Sig - Route: Infuse 50 mL/hr into a venous catheter Continuous. - Intravenous    LORazepam (ATIVAN) injection 1 mg 1 mg Every 2 Hours PRN 9/26/2019 10/6/2019    Sig - Route: Infuse 0.5 mL into a venous catheter Every 2 (Two) Hours As Needed for Withdrawal (For CIWA-Ar 8-10). - Intravenous    Linked Group 2:  \"Or\" Linked Group Details        LORazepam (ATIVAN) injection 2 mg 2 mg Every 1 Hour PRN 9/26/2019 10/6/2019    Sig - Route: Infuse 1 mL into a venous catheter Every 1 (One) Hour As Needed for Withdrawal (For CIWA-Ar 11-15). - Intravenous    Linked Group 2:  \"Or\" Linked Group Details        LORazepam (ATIVAN) injection 2 mg 2 mg Every 15 Minutes PRN 9/26/2019 10/6/2019    Sig - Route: Infuse 1 mL into a venous catheter Every 15 (Fifteen) Minutes As Needed for Anxiety (For CIWA-Ar Greater Than 15.  Repeat Dose in 15 Minutes if CIWA-Ar Does Not Decrease). - Intravenous    Linked Group 2:  \"Or\" Linked Group Details        LORazepam (ATIVAN) injection 2 mg 2 mg Every 15 Minutes PRN 9/26/2019 10/6/2019    Sig - Route: Inject 1 mL into the appropriate muscle as directed by prescriber Every 15 (Fifteen) Minutes As Needed for Withdrawal (If Unable to Administer IV - For CIWA-Ar Greater Than 15.  Repeat Dose in 15 Minutes if CIWA-Ar Does Not Decrease). - Intramuscular    Linked Group 2:  \"Or\" Linked Group Details        LORazepam (ATIVAN) tablet 1 mg 1 mg Every 2 Hours PRN 9/26/2019 10/6/2019 " "   Sig - Route: Take 1 tablet by mouth Every 2 (Two) Hours As Needed for Withdrawal (For CIWA-Ar 8-10). - Oral    Linked Group 2:  \"Or\" Linked Group Details        LORazepam (ATIVAN) tablet 2 mg 2 mg Every 1 Hour PRN 9/26/2019 10/6/2019    Sig - Route: Take 2 tablets by mouth Every 1 (One) Hour As Needed for Withdrawal (For CIWA-Ar 11-15). - Oral    Linked Group 2:  \"Or\" Linked Group Details        multiple vitamin (M.V.I. Adult) 10 mL, thiamine (B-1) 100 mg, folic acid 1 mg, potassium chloride 20 mEq in dextrose 5 % and sodium chloride 0.9 % 1,000 mL infusion 75 mL/hr Daily 10/1/2019 10/3/2019    Sig - Route: Infuse 75 mL/hr into a venous catheter Daily. - Intravenous    potassium chloride (KAYCIEL) 20 MEQ/15ML (10%) solution 40 mEq 40 mEq Once 10/1/2019     Sig - Route: Take 30 mL by mouth 1 (One) Time. - Oral    sodium chloride 0.9 % flush 10 mL 10 mL Every 12 Hours Scheduled 9/26/2019     Sig - Route: Infuse 10 mL into a venous catheter Every 12 (Twelve) Hours. - Intravenous    sodium chloride 0.9 % flush 10 mL 10 mL As Needed 9/26/2019     Sig - Route: Infuse 10 mL into a venous catheter As Needed for Line Care. - Intravenous    dexmedetomidine HCl (PRECEDEX) 400 mcg in sodium chloride 0.9 % 100 mL (4 mcg/mL) infusion (Discontinued) 0.2-1.5 mcg/kg/hr × 79.6 kg Titrated 9/30/2019 10/1/2019    Sig - Route: Infuse 15..4 mcg/hr into a venous catheter Dose Adjusted By Provider As Needed. - Intravenous    midazolam (VERSED) 250 mg in sodium chloride 0.9 % 250 mL (1 mg/mL) infusion (Discontinued) 1 mg/hr Titrated 9/27/2019 10/1/2019    Sig - Route: Infuse 1 mg/hr into a venous catheter Dose Adjusted By Provider As Needed. - Intravenous    multiple vitamin (M.V.I. Adult) 10 mL, thiamine (B-1) 100 mg, folic acid 1 mg, potassium chloride 10 mEq in dextrose 5 % and sodium chloride 0.9 % 1,000 mL infusion (Discontinued) 75 mL/hr Daily 9/30/2019 10/1/2019    Sig - Route: Infuse 75 mL/hr into a venous catheter Daily. - " Intravenous    propofol (DIPRIVAN) infusion 10 mg/mL 100 mL (Discontinued) 5-75 mcg/kg/min × 79.8 kg Titrated 9/27/2019 10/1/2019    Sig - Route: Infuse 399-5,985 mcg/min into a venous catheter Dose Adjusted By Provider As Needed. - Intravenous          Operative/Procedure Notes (all)     No notes of this type exist for this encounter.           Physician Progress Notes (most recent note)      Gab White MD at 10/01/19 1048            Neurology Progress Note      Chief Complaint:  stroke    Subjective     Subjective:    The patient was extubated.  He is sitting at the side of the bed currently.  There seems to be some difficulties from a social standpoint and that his wife has came to visit the patient.  She is exhibiting symptoms of annalee and our patient care relations staff is currently assisting her.  She does not seem competent to make decisions for the patient.  His brother remains at the bedside as well.  He does seem to be a good historian and can make reasonable decisions.  Otherwise the patient is doing well.  He is showing no evidence of withdrawal symptoms.  He continues to have difficulties with spatial dysfunction along with aphasia.  The therapy staff have walked him approximately 50 feet and he does require assistance.  He has somewhat of a foot drop in addition of this has spatial dysfunction of his leg causing difficulties with ambulation.    Medications:  Current Facility-Administered Medications   Medication Dose Route Frequency Provider Last Rate Last Dose   • albuterol (PROVENTIL) nebulizer solution 0.083% 2.5 mg/3mL  2.5 mg Nebulization Once PRN Galo Bethea MD       • aspirin chewable tablet 81 mg  81 mg Oral Daily Candido Alas MD        Or   • aspirin suppository 300 mg  300 mg Rectal Daily Candido Alas MD   300 mg at 10/01/19 1032   • atorvastatin (LIPITOR) tablet 80 mg  80 mg Oral Nightly Candido Alas MD   80 mg at 09/29/19 2220   • chlorhexidine (PERIDEX) 0.12 %  solution 15 mL  15 mL Mouth/Throat Q12H Galo Bethea MD   15 mL at 09/30/19 2019   • famotidine (PEPCID) injection 20 mg  20 mg Intravenous Q12H Candido Alas MD   20 mg at 10/01/19 1034   • hypromellose (ISOPTO TEARS) 0.5 % ophthalmic solution 2 drop  2 drop Both Eyes TID PRN Candido Alas MD   2 drop at 09/30/19 2152   • ipratropium-albuterol (DUO-NEB) nebulizer solution 3 mL  3 mL Nebulization 4x Daily - RT Christina Sharif APRN   3 mL at 10/01/19 1046   • labetalol (NORMODYNE,TRANDATE) injection 20 mg  20 mg Intravenous Q4H PRN Christina Sharif APRN       • lactated ringers infusion  50 mL/hr Intravenous Continuous Christina Sharif APRN 50 mL/hr at 10/01/19 0045 50 mL/hr at 10/01/19 0045   • LORazepam (ATIVAN) tablet 1 mg  1 mg Oral Q2H PRN Candido Alas MD        Or   • LORazepam (ATIVAN) injection 1 mg  1 mg Intravenous Q2H PRN Candido Alas MD        Or   • LORazepam (ATIVAN) tablet 2 mg  2 mg Oral Q1H PRN Candido Alas MD        Or   • LORazepam (ATIVAN) injection 2 mg  2 mg Intravenous Q1H PRN Candido Alas MD   2 mg at 09/29/19 1311    Or   • LORazepam (ATIVAN) injection 2 mg  2 mg Intravenous Q15 Min PRN Candido Alas MD   2 mg at 09/27/19 2007    Or   • LORazepam (ATIVAN) injection 2 mg  2 mg Intramuscular Q15 Min PRN Candido Alas MD       • multiple vitamin (M.V.I. Adult) 10 mL, thiamine (B-1) 100 mg, folic acid 1 mg, potassium chloride 20 mEq in dextrose 5 % and sodium chloride 0.9 % 1,000 mL infusion  75 mL/hr Intravenous Daily Candido Alas MD 75 mL/hr at 10/01/19 1032 75 mL/hr at 10/01/19 1032   • potassium chloride (KAYCIEL) 20 MEQ/15ML (10%) solution 40 mEq  40 mEq Oral Once Candido Alas MD       • sodium chloride 0.9 % flush 10 mL  10 mL Intravenous Q12H Candido Alas MD   10 mL at 10/01/19 1032   • sodium chloride 0.9 % flush 10 mL  10 mL Intravenous PRN Candido Alas MD           Review of Systems:   -A 14 point review of systems is  completed and is negative except for agitation      Objective      Vital Signs  Temp:  [98.3 °F (36.8 °C)-99 °F (37.2 °C)] 98.7 °F (37.1 °C)  Heart Rate:  [47-95] 81  Resp:  [10-27] 21  BP: (117-205)/() 156/87  FiO2 (%):  [30 %] 30 %    Physical Exam:    CVS:  RR  Lungs: CTA  Abd:  NT/ND    MS: He is awake.  Sitting at the side of the bed.  He can tell me his name.  Otherwise he has perseveration.  He has difficulties with naming.  He does not know the year.  He follows commands intermittently.  CN:  II - XII intact  MTR:  Moving all extremities -right arm and leg have drift down to the bed.  DTR:  1+ throughout  Coord/Gait: Spatial dysfunction.  Difficulties drawing a clock.  When you ask him to set the time on a clock he gives the digital interpretation.     Results Review:    I reviewed the patient's new clinical results.    Results from last 7 days   Lab Units 10/01/19  0300 09/28/19  0232   WBC 10*3/mm3 11.97* 12.75*   HEMOGLOBIN g/dL 14.7 13.7   HEMATOCRIT % 42.3 39.7   PLATELETS 10*3/mm3 311 335        Results from last 7 days   Lab Units 10/01/19  0300 09/28/19  0232 09/27/19  0433   SODIUM mmol/L 141 138 137   POTASSIUM mmol/L 3.4* 3.6 3.5   CHLORIDE mmol/L 101 99 96*   CO2 mmol/L 27.0 26.0 25.0   BUN mg/dL 3* 7 7   CREATININE mg/dL 0.49* 0.76 0.58*   CALCIUM mg/dL 8.8 8.5* 8.8   BILIRUBIN mg/dL 0.8 0.9 0.8   ALK PHOS U/L 76 63 70   ALT (SGPT) U/L 27 17 21   AST (SGOT) U/L 32 37 37   GLUCOSE mg/dL 95 110* 94        No results found for: PHOS, MG, PROTIME, INR, APTT  No components found for: POCGLUC  No components found for: A1C  Lab Results   Component Value Date    HDL 51 09/27/2019     (H) 09/27/2019     No components found for: B12  Lab Results   Component Value Date    TSH 1.760 09/27/2019     Labs reviewed:  Liver function normal  CT Head reviewed:        MRI brain reviewed by me.  Left MCA acute infarction mainly in the posterior distribution.  No evidence of hemorrhagic conversion nor  edema.    Cardiac echo:  Preserved EF  Carotid ultrasound shows evidence of a left ICA occlusion  Labs reviewed:  WBC is 12K  CT angiography of the head and neck reviewed by me.  On the left the internal carotid arteries completely occluded.  The right has 30% stenosis.  Assessment/Plan     Hospital Problem List      Degeneration of lumbar or lumbosacral intervertebral disc    Smoker    Acute cerebrovascular accident (CVA) of cerebellum (CMS/HCC)    Alcoholism /alcohol abuse (CMS/HCC)    CVA (cerebral vascular accident) (CMS/[facility])    Hyperlipidemia    Impression:     1.  Alcohol withdrawal - drinks a pint of hard liquor per day per brother who is in the room  2.  Left MCA stroke  3.  Hyperlipidemia with an LDL above goal of 70  4.  History of hypertension  5.  Left ICA occlusion -confirmed with CT angiography.    Plan:  · ETOH withdrawal protocols - appears to be improved  · Lipitor 80  · ASA 81mg  · SCD's  · Systolic blood pressure goal less than 160  · OK to floor per neuro  · Will consult acute rehab        Gab White MD  10/01/19  10:48 AM    35 minutes of critical care time was performed with titration of sedating agents in addition to neurologic examination and interpretation of imaging including CT angiography of head and neck.    Electronically signed by Gab White MD at 10/01/19 1052          Consult Notes (most recent note)      Demario Peña MD at 09/30/19 0926      Consult Orders    1. Inpatient Pulmonology Consult [[id number]] ordered by Candido Alas MD at 09/30/19 0818                               Referring Provider: Dr. Alas  Reason for Consultation: Vent management    Patient Care Team:  Jose Karimi MD as PCP - General (Family Medicine)    Chief complaint:   Altered mental status    History of present illness:  Subjective   This is a 59-year-old male patient, smoker and ? alcoholic.  He presented on 9/26 for suspected stroke.  Apparently, he was found confused at work  (he's a ), staggering around and had trouble speaking.  He was transferred to [facility] ER.   He had a CT of the brain which showed possible left MCA abnormalities which prompted transfer to our facility for further care.     Patient is estranged from his wife for the last 6 weeks and was living with his brother.  There was a question about him being drinking alcohol excessively but his brother has denied that.  Alcohol level on presentation was negative.    On arrival here, patient became more combative and required several sedatives and he was ultimately intubated on 9/27.  He has been sedated with propofol and Versed.  He is currently on Versed 4 mg/h and propofol 35 mics per KG per minute.  Per staff, when sedation is held, patient moves all his extremities and follow commands sometimes but becomes very agitated.  On my assessment, he is moving in bed despite being sedated with the above medications but is not really opening his eyes or following any commands for me.    Most of the history was obtained from the chart and staff.    Labs reviewed:  ABG 7.4/39/88 on 30% FiO2.  WBC on 9/28 12; bicarb 26    Review of Systems  Cannot obtain.  Patient is on the ventilator.    History  Past Medical History:   Diagnosis Date   • Hypertension    ,   Past Surgical History:   Procedure Laterality Date   • CATARACT EXTRACTION Left    • WRIST FRACTURE SURGERY Left    ,   Family History   Problem Relation Age of Onset   • No Known Problems Mother    • Diabetes Father    • Cancer Sister    • No Known Problems Brother    ,   Social History     Tobacco Use   • Smoking status: Current Every Day Smoker     Types: Cigarettes   • Smokeless tobacco: Never Used   • Tobacco comment: 1.5 packs daily   Substance Use Topics   • Alcohol use: Yes   • Drug use: No   ,   No medications prior to admission.   , Scheduled Meds:    aspirin 81 mg Oral Daily   Or      aspirin 300 mg Rectal Daily   atorvastatin 80 mg  Oral Nightly   chlorhexidine 15 mL Mouth/Throat Q12H   ipratropium-albuterol 3 mL Nebulization 4x Daily - RT   IV Fluids 1000 mL + additives 75 mL/hr Intravenous Daily   sodium chloride 10 mL Intravenous Q12H   , Continuous Infusions:    lactated ringers 50 mL/hr Last Rate: 50 mL/hr (09/29/19 1641)   midazolam 1 mg/hr Last Rate: 4 mg/hr (09/30/19 0818)   propofol 5-75 mcg/kg/min Last Rate: 35 mcg/kg/min (09/30/19 0815)    and Allergies:  Patient has no known allergies.    Objective     Vital Signs   Temp:  [97.8 °F (36.6 °C)-98.6 °F (37 °C)] 98.5 °F (36.9 °C)  Heart Rate:  [48-74] 74  Resp:  [10-14] 11  BP: ()/(52-97) 157/96  FiO2 (%):  [30 %] 30 %    Physical Exam:  Constitutional: Not in acute distress.  On mechanical ventilation  Eyes: Injected conjunctiva, pupils equal reactive to light.  ENMT: ET tube size 8.  Neck: Large. Trachea midline. No thyromegaly  Heart: RRR, no murmur  Lungs: Good and equal air entry bilaterally.  Non labored breathing on the ventilator.  No crackles or wheezing  Abdomen: Soft. No tenderness or dullness. No HSM.  Positive bowel sounds  Extremities: No cyanosis, clubbing or pitting edema.  Warm extremities and well-perfused.  Neuro: Sedated.  Withdraw all extremities to painful stimuli, otherwise cannot assess due to sedation.  Psych: Cannot assess   Integumentary: No rash.  Normal skin turgor  Lymphatic: No palpable cervical or supraclavicular lymph nodes.      Diagnostic imaging:  I personally and independently reviewed the following images:   CXR 9/30/2019: ET in good position.  Clear lung fields.        Assessment   8. Agitation, suspect alcohol withdrawal  9. Left MCA stroke  10. Management of mechanical ventilation for airway protection  11. Tobacco abuse    Recommendations:  · Start Precedex.  Titrate to wean off Versed first and then propofol second.  · Ventilator management: Reduce tidal volume from 650 to 550 for lung protective ventilation.  Will place him on  spontaneous trial once his sedative dose is decreased.  · DuoNeb for possible COPD.  He is currently not in exacerbation.  · Aspirin and statin per neurology for stroke.    I discussed the patients findings and my recommendations with nursing staff    Demario Peña MD  19  9:27 AM              Electronically signed by Demario Peña MD at 19 0930          Physical Therapy Notes (most recent note)      Arti Haile, PT Student at 10/01/19 2504  Version 1 of 1         Patient Name: Jarod Ramírez Jr.  : 1960    MRN: [id number]                              Today's Date: 10/1/2019       Admit Date: 2019    Visit Dx:     ICD-10-CM ICD-9-CM   1. Dysphagia, unspecified type R13.10 787.20   2. Impaired mobility Z74.09 799.89   3. Impaired mobility and ADLs Z74.09 799.89   4. Aphasia R47.01 784.3     Patient Active Problem List   Diagnosis   • Degeneration of cervical intervertebral disc   • Closed fracture of lumbar vertebra (CMS/HCC)   • Degeneration of lumbar or lumbosacral intervertebral disc   • Smoker   • BMI 27.0-27.9,adult   • Acute cerebrovascular accident (CVA) of cerebellum (CMS/HCC)   • Alcoholism /alcohol abuse (CMS/HCC)   • CVA (cerebral vascular accident) (CMS/HCC)   • Hyperlipidemia     Past Medical History:   Diagnosis Date   • Hypertension      Past Surgical History:   Procedure Laterality Date   • CATARACT EXTRACTION Left    • WRIST FRACTURE SURGERY Left      General Information     Row Name 10/01/19 08          PT Evaluation Time/Intention    Document Type  evaluation CC: CVA, alcohol abuse. Dx: hyperlipidemia, CVA of cerebellum, degeneration of lumbar and lumbosacral intervertebral disc. MRI- acute infarct L MCA and watershed. US complete occlusion of L ICA. Pt had hx of becoming agitated when off sedation.  -LAURA (r) AF (t) LAURA (c)     Mode of Treatment  physical therapy  -LAURA (r) AF (t) LAURA (c)     Row Name 10/01/19 0437          General Information    Patient Profile Reviewed?   yes  -LAURA (r) AF (t) ALURA (c)     Prior Level of Function  independent:;driving;all household mobility;gait;ADL's  -LAURA (r) AF (t) LAURA (c)     Existing Precautions/Restrictions  fall  -LAURA (r) AF (t) LAURA (c)     Barriers to Rehab  cognitive status difficulty following commands  -LAURA (r) AF (t) LAURA (c)     Row Name 10/01/19 0819          Relationship/Environment    Lives With  sibling(s)  -LAURA (r) AF (t) LAURA (c)     Name(s) of Who Lives With Patient  Brother present  -LAURA (r) AF (t) LAURA (c)     Row Name 10/01/19 0819          Resource/Environmental Concerns    Current Living Arrangements  home/apartment/condo  -LAURA (r) AF (t) LAURA (c)     Row Name 10/01/19 0819          Home Main Entrance    Number of Stairs, Main Entrance  none  -LAURA (r) AF (t) LAURA (c)     Row Name 10/01/19 0819          Stairs Within Home, Primary    Number of Stairs, Within Home, Primary  none  -LAURA (r) AF (t) LAURA (c)     Row Name 10/01/19 0819          Cognitive Assessment/Intervention- PT/OT    Orientation Status (Cognition)  person;place;oriented to  -LAURA (r) AF (t) LAURA (c)     Personal Safety Interventions  fall prevention program maintained;gait belt;muscle strengthening facilitated;nonskid shoes/slippers when out of bed  -LAURA     Row Name 10/01/19 0819          Safety Issues, Functional Mobility    Safety Issues Affecting Function (Mobility)  impulsivity may be secondary to difficulty following commands, Pt would stand up before it was safe or before therapy was ready  -LAURA (r) AF (t) LAURA (c)     Impairments Affecting Function (Mobility)  balance;endurance/activity tolerance;strength;postural/trunk control;range of motion (ROM);sensation/sensory awareness;coordination  -LAURA (r) AF (t) LAURA (c)       User Key  (r) = Recorded By, (t) = Taken By, (c) = Cosigned By    Initials Name Provider Type    Gonzalo Tafoya, PT DPT Physical Therapist    Arti Meraz, PT Student PT Student        Mobility     Row Name 10/01/19 0819          Bed Mobility Assessment/Treatment     Bed Mobility Assessment/Treatment  supine-sit  -LAURA (r) AF (t) LAURA (c)     Supine-Sit [place] (Bed Mobility)  supervision  -LAURA (r) AF (t) LAURA (c)     Assistive Device (Bed Mobility)  head of bed elevated  -LAURA (r) AF (t) LAURA (c)     Row Name 10/01/19 0819          Bed-Chair Transfer    Bed-Chair [place] (Transfers)  contact guard;minimum assist (75% patient effort)  -LAURA (r) AF (t) LAURA (c)     Row Name 10/01/19 0819          Sit-Stand Transfer    Sit-Stand [place] (Transfers)  contact guard  -LAURA (r) AF (t) LAURA (c)     Row Name 10/01/19 0819          Gait/Stairs Assessment/Training    [place] Level (Gait)  contact guard;minimum assist (75% patient effort)  -LAURA (r) AF (t) LAURA (c)     Distance in Feet (Gait)  ~40  -LAURA (r) AF (t) LAURA (c)     Deviations/Abnormal Patterns (Gait)  steppage  -LAURA (r) AF (t) LAURA (c)     Right Sided Gait Deviations  foot drop/toe drag;lateral trunk flexion  -LAURA (r) AF (t) LAURA (c)     Comment (Gait/Stairs)  During ambulation Pt seemed to have visual neglect to R side, Pt almost bumped into objects in room on R side and needed cueing   (Significant)   -LAURA       User Key  (r) = Recorded By, (t) = Taken By, (c) = Cosigned By    Initials Name Provider Type    Gonzalo Tafoya, PT DPT Physical Therapist    Arti Meraz, PT Student PT Student        Obj/Interventions     Row Name 10/01/19 0819          General ROM    GENERAL ROM COMMENTS  B LE: R DF limited from neutral ~25%, otherwise WFL  -LAURA (r) AF (t) LAURA (c)     Row Name 10/01/19 0819          MMT (Manual Muscle Testing)    General MMT Comments  B LE: R DF 3-/5, B Knee flex 4+/5, otherwise 5/5  -LAURA (r) AF (t) LAURA (c)     Row Name 10/01/19 0819          Static Sitting Balance    Level of [place] (Unsupported Sitting, Static Balance)  supervision  -LAURA (r) AF (t) LAURA (c)     Sitting Position (Unsupported Sitting, Static Balance)  sitting on edge of bed;sitting in chair  -LAURA (r) AF (t) LAURA (c)     Row Name 10/01/19 0866           Static Standing Balance    Level of [place] (Supported Standing, Static Balance)  contact guard assist  -LAURA (r) AF (t) LAURA (c)     Comment (Supported Standing, Static Balance)  would attempt to take steps before therapy was ready  -LAURA (r) AF (t) LAURA (c)     Row Name 10/01/19 0819          Sensory Assessment/Intervention    Sensory General Assessment  -- R foot decrease sensation to light touch  -LAURA (r) AF (t) LAURA (c)       User Key  (r) = Recorded By, (t) = Taken By, (c) = Cosigned By    Initials Name Provider Type    Gonzalo Tafoya, PT DPT Physical Therapist    Arti Meraz, PT Student PT Student        Goals/Plan     Row Name 10/01/19 0819          Bed Mobility Goal 1 (PT)    Activity/Assistive Device (Bed Mobility Goal 1, PT)  sit to supine/supine to sit  -LAURA (r) AF (t) LAURA (c)     [place] Level/Cues Needed (Bed Mobility Goal 1, PT)  supervision required  -LAURA     Time Frame (Bed Mobility Goal 1, PT)  long term goal (LTG);10 days  -LAURA (r) AF (t) LAURA (c)     Progress/Outcomes (Bed Mobility Goal 1, PT)  goal ongoing  -LAURA (r) AF (t) LAURA (c)     Row Name 10/01/19 0819          Transfer Goal 1 (PT)    Activity/Assistive Device (Transfer Goal 1, PT)  sit-to-stand/stand-to-sit;bed-to-chair/chair-to-bed;toilet  -LAURA (r) AF (t) LAURA (c)     [place] Level/Cues Needed (Transfer Goal 1, PT)  contact guard assist;supervision required  -LAURA (r) AF (t) LAURA (c)     Time Frame (Transfer Goal 1, PT)  long term goal (LTG);10 days  -LAURA (r) AF (t) LAURA (c)     Progress/Outcome (Transfer Goal 1, PT)  goal ongoing  -LAURA (r) AF (t) LAURA (c)     Row Name 10/01/19 0819          Gait Training Goal 1 (PT)    Activity/Assistive Device (Gait Training Goal 1, PT)  gait (walking locomotion);assistive device use  -LAURA (r) AF (t) LAURA (c)     [place] Level (Gait Training Goal 1, PT)  contact guard assist;supervision required  -LAURA (r) AF (t) LAURA (c)     Distance (Gait Goal 1, PT)  100  -LAURA (r) AF (t) LAURA (c)     Time Frame (Gait Training  Goal 1, PT)  long term goal (LTG);10 days  -LAURA (r) AF (t) LAURA (c)     Barriers (Gait Training Goal 1, PT)  R side visual neglect  -LAURA (r) AF (t) LAURA (c)     Progress/Outcome (Gait Training Goal 1, PT)  goal ongoing  -LAURA (r) AF (t) LAURA (c)     Row Name 10/01/19 0819          ROM Goal 1 (PT)    ROM Goal 1 (PT)  R DF- neutral  -LAURA (r) AF (t) LAURA (c)     Time Frame (ROM Goal 1, PT)  long term goal (LTG)  -LAURA     Progress/Outcome (ROM Goal 1, PT)  goal ongoing  -LAURA (r) AF (t) LAURA (c)       User Key  (r) = Recorded By, (t) = Taken By, (c) = Cosigned By    Initials Name Provider Type    Gonzalo Tafoya, PT DPT Physical Therapist    Arti Meraz, PT Student PT Student        Clinical Impression     Row Name 10/01/19 0819          Pain Assessment    Additional Documentation  Pain Scale: FACES Pre/Post-Treatment (Group)  -LAURA (r) AF (t) LAURA (c)     Row Name 10/01/19 0819          Pain Scale: FACES Pre/Post-Treatment    Pain: FACES Scale, Pretreatment  0-->no hurt  -LAURA (r) AF (t) LAURA (c)     Row Name 10/01/19 0819          Plan of Care Review    Plan of Care Reviewed With  patient;sibling  -LAURA (r) AF (t) LAURA (c)     Row Name 10/01/19 0819          Physical Therapy Clinical Impression    Patient/Family Goals Statement (PT Clinical Impression)  go home with sibling, but may benefit from rehab for further therapy  -LAURA (r) AF (t) LAURA (c)     Criteria for Skilled Interventions Met (PT Clinical Impression)  yes;treatment indicated  -LAURA (r) AF (t) LAURA (c)     Rehab Potential (PT Clinical Summary)  good, to achieve stated therapy goals  -LAURA (r) AF (t) LAURA (c)     Predicted Duration of Therapy (PT)  until d/c  -ALURA (r) AF (t) LAURA (c)     Row Name 10/01/19 0819          Vital Signs    Pre Systolic BP Rehab  156  -LAURA (r) AF (t) LAURA (c)     Pre Treatment Diastolic BP  87  -LAURA (r) AF (t) LAURA (c)     Post Systolic BP Rehab  142  -LAURA (r) AF (t) LAURA (c)     Post Treatment Diastolic BP  80  -LAURA (r) AF (t) LAURA (c)     Pretreatment Heart Rate (beats/min)   78  -LAURA (r) AF (t) LAURA (c)     Posttreatment Heart Rate (beats/min)  82  -LAURA (r) AF (t) LAURA (c)     Pre SpO2 (%)  91  -LAURA (r) AF (t) LAURA (c)     O2 Delivery Pre Treatment  room air  -LAURA (r) AF (t) LAURA (c)     O2 Delivery Intra Treatment  room air  -LAURA (r) AF (t) LAURA (c)     Post SpO2 (%)  94  -LAURA (r) AF (t) LAURA (c)     O2 Delivery Post Treatment  room air  -LAURA (r) AF (t) LAURA (c)     Pre Patient Position  Supine  -LAURA (r) AF (t) LAURA (c)     Intra Patient Position  Standing  -LAURA (r) AF (t) LAURA (c)     Post Patient Position  Sitting  -LAURA (r) AF (t) LAURA (c)     Row Name 10/01/19 0819          Positioning and Restraints    Pre-Treatment Position  in bed  -LAURA (r) AF (t) LAURA (c)     Post Treatment Position  chair  -LAURA (r) AF (t) LAURA (c)     In Chair  notified nsg;with SLP;sitting;call light within reach;encouraged to call for assist;with family/caregiver  -LAURA (r) AF (t) LAURA (c)       User Key  (r) = Recorded By, (t) = Taken By, (c) = Cosigned By    Initials Name Provider Type    Gonzalo Tafoya, PT DPT Physical Therapist    Arti Meraz, PT Student PT Student        Outcome Measures     Row Name 10/01/19 0819          How much help from another person do you currently need...    Turning from your back to your side while in flat bed without using bedrails?  3  -LAURA (r) AF (t) LAURA (c)     Moving from lying on back to sitting on the side of a flat bed without bedrails?  3  -LAURA (r) AF (t) LAURA (c)     Moving to and from a bed to a chair (including a wheelchair)?  3  -LAURA (r) AF (t) LAURA (c)     Standing up from a chair using your arms (e.g., wheelchair, bedside chair)?  3  -LAURA (r) AF (t) LAURA (c)     Climbing 3-5 steps with a railing?  3  -LAURA (r) AF (t) LAURA (c)     To walk in hospital room?  3  -LAURA (r) AF (t) LAURA (c)     AM-PAC 6 Clicks Score (PT)  18  -LAURA (r) AF (t)     Row Name 10/01/19 0819          Functional Assessment    Outcome Measure Options  AM-PAC 6 Clicks Basic Mobility (PT)  -LAURA (r) AF (t) LAURA (c)       User Key  (r) = Recorded  By, (t) = Taken By, (c) = Cosigned By    Initials Name Provider Type    Gonzalo Tafoya, PT DPT Physical Therapist    Arti Meraz, PT Student PT Student        Physical Therapy Education     Title: PT OT SLP Therapies (In Progress)     Topic: Physical Therapy (In Progress)     Point: Mobility training (Done)     Learning Progress Summary           Patient Acceptance, E, VU,NR by  at 10/1/2019  8:19 AM    Comment:  Educated on benefits of activity and ongoing PT POC.   Family Acceptance, E, VU,NR by AF at 10/1/2019  8:19 AM    Comment:  Educated on benefits of activity and ongoing PT POC.                               User Key     Initials Effective Dates Name Provider Type Discipline     08/27/19 -  Arti Haile, PT Student PT Student PT              PT Recommendation and Plan  Planned Therapy Interventions (PT Eval): balance training, bed mobility training, gait training, home exercise program, patient/family education, transfer training, postural re-education, ROM (range of motion), strengthening  Outcome Summary/Treatment Plan (PT)  Anticipated Discharge Disposition (PT): home with assist, home with home health, inpatient rehabilitation facility  Plan of Care Reviewed With: patient, sibling  Outcome Summary: PT eval completed. A&O to person and place. Brother present at eval. Pt had difficulty following most commands and two-steps commands. Pt also had some difficulty answering questions, almost like having trouble finding the right words and spoke in low, muffled tone. Performed supine to sit at supervision. B LE ROM assessed: R DF limited ~25%, otherwise WFL. B LE strength assessed: R DF 2+/5, B knee flex 4+/5, otherwise 5/5. Pt had difficulty understanding commands during strength testing, needed physical demonstration and use of different wording. Coordination of LE assessed: Pt unable to place heel on shin for both sides; can only tap L foot. Performed sit <> stand at CGA; Pt attempted to take  steps before therapy was ready, slight impulsivity. During ambulation Pt had drop foot on R, leaned to R side and seemed to have R side visual neglect. Pt ambulated ~40 ft at CGA/Min A x2, needed cueing to avoid bumping into object on R and when Pt was asked about lean Pt acknowledged he was leaning to R. Once in recliner Pt attempted to pick at IV once. Pt seems unsafe to be up without assistance. Pt would benefit from skilled PT to address deficits. PT will continue seeing Pt. Anticipated d/c to inpatient rehab for further therapy services.      Time Calculation:   PT Charges     Row Name 10/01/19 0819             Time Calculation    Start Time  0855 Chart review [id number]. PT with 78 min  -LAURA (r) AF (t) LAURA (c)      Stop Time  0951  -LAURA (r) AF (t) LAURA (c)      Time Calculation (min)  56 min  -LAURA (r) AF (t)      PT Received On  10/01/19  -LAURA (r) AF (t) LAURA (c)      PT Goal Re-Cert Due Date  10/11/19  -LAURA (r) AF (t) LAURA (c)         Time Calculation- PT    Total Timed Code Minutes- PT  18 minute(s)  -LAURA (r) AF (t) LAURA (c)         Timed Charges    23263 - Gait Training Minutes   18  -LAURA        User Key  (r) = Recorded By, (t) = Taken By, (c) = Cosigned By    Initials Name Provider Type    Gonzalo Tafoya, PT DPT Physical Therapist    Arti Meraz, PT Student PT Student        Therapy Charges for Today     Code Description Service Date Service Provider Modifiers Qty    55475353677 HC PT EVAL MOD COMPLEXITY 4 10/1/2019 Arti Haile, PT Student GP 1    79359036990 HC GAIT TRAINING EA 15 MIN 10/1/2019 Arti Haile, PT Student GP 1          PT G-Codes  Outcome Measure Options: AM-PAC 6 Clicks Daily Activity (OT)  AM-PAC 6 Clicks Score (PT): 18  AM-PAC 6 Clicks Score (OT): 18    Arti Haile PT Student  10/1/2019         Electronically signed by Gonzalo Singleton, PT DPT at 10/01/19 1156          Occupational Therapy Notes (most recent note)      Christina Dahl OTR/L at 10/01/19 1053          Acute Care -  Occupational Therapy Initial Evaluation  [facility]     Patient Name: Jarod Ramírez Jr.  : 1960  MRN: [id number]  Today's Date: 10/1/2019  Onset of Illness/Injury or Date of Surgery: 19  Date of Referral to OT: 19  Referring Physician: Dr. Alas    Admit Date: 2019       ICD-10-CM ICD-9-CM   1. Dysphagia, unspecified type R13.10 787.20   2. Impaired mobility Z74.09 799.89   3. Impaired mobility and ADLs Z74.09 799.89   4. Aphasia R47.01 784.3     Patient Active Problem List   Diagnosis   • Degeneration of cervical intervertebral disc   • Closed fracture of lumbar vertebra (CMS/HCC)   • Degeneration of lumbar or lumbosacral intervertebral disc   • Smoker   • BMI 27.0-27.9,adult   • Acute cerebrovascular accident (CVA) of cerebellum (CMS/HCC)   • Alcoholism /alcohol abuse (CMS/HCC)   • CVA (cerebral vascular accident) (CMS/HCC)   • Hyperlipidemia     Past Medical History:   Diagnosis Date   • Hypertension      Past Surgical History:   Procedure Laterality Date   • CATARACT EXTRACTION Left    • WRIST FRACTURE SURGERY Left           OT ASSESSMENT FLOWSHEET (last 12 hours)      Occupational Therapy Evaluation     Row Name 10/01/19 0850                   OT Evaluation Time/Intention    Subjective Information  complains of  -JJ        Document Type  evaluation see MAR  -JJ        Mode of Treatment  occupational therapy;concurrent therapy  -JJ        Patient Effort  good  -JJ           General Information    Patient Profile Reviewed?  yes  -JJ        Onset of Illness/Injury or Date of Surgery  19  -JJ        Referring Physician  Dr. Alas  -JJ        Patient Observations  alert;cooperative;agree to therapy  -JJ        Patient/Family Observations  Brother present   -JJ        General Observations of Patient  fowlers in bed, IV infusing, alert.   -JJ        Prior Level of Function  independent:;all household mobility;community mobility;transfer;bed mobility;ADL's  -JJ        Equipment  Currently Used at Home  none  -JJ        Pertinent History of Current Functional Problem  Pt presented with slurred speech and concerns for stroke. Pt MRI Brain: L MCA acute infarction mainly in the posterior distribution. Pt intubated on 9/28 and Extubated on 9/30. Dx: L MCA stroke, L ICA occlusion, alcohol withdrawls.   -JJ        Existing Precautions/Restrictions  fall  -JJ        Risks Reviewed  patient:;LOB;nausea/vomiting;increased discomfort;dizziness;lines disloged;increased drainage;change in vital signs  -JJ        Benefits Reviewed  patient:;improve function;increase independence;increase strength;increase balance;decrease pain;decrease risk of DVT;improve skin integrity;increase knowledge  -JJ           Resource/Environmental Concerns    Current Living Arrangements  home/apartment/condo  -JJ        Resource/Environmental Concerns  none  -JJ           Cognitive Assessment/Interventions    Additional Documentation  Cognitive Assessment/Intervention (Group)  -JJ           Cognitive Assessment/Intervention- PT/OT    Affect/Mental Status (Cognitive)  confused  -JJ        Orientation Status (Cognition)  oriented to;person;place  -JJ        Follows Commands (Cognition)  WFL;follows one step commands;50-74% accuracy;increased processing time needed;physical/tactile prompts required;repetition of directions required;verbal cues/prompting required;delayed response/completion  -JJ        Cognitive Function (Cognitive)  memory deficit;safety deficit  -JJ        Safety Deficit (Cognitive)  ability to follow commands;at risk behavior observed;awareness of need for assistance;insight into deficits/self awareness;safety precautions awareness;safety precautions follow-through/compliance;mild deficit;moderate deficit  -JJ        Personal Safety Interventions  fall prevention program maintained;gait belt;muscle strengthening facilitated;nonskid shoes/slippers when out of bed;supervised activity;elopement precautions  initiated  -           Safety Issues, Functional Mobility    Safety Issues Affecting Function (Mobility)  at risk behavior observed;awareness of need for assistance;ability to follow commands;insight into deficits/self awareness;safety precaution awareness;safety precautions follow-through/compliance;problem solving;impulsivity  -        Impairments Affecting Function (Mobility)  balance;endurance/activity tolerance;strength;postural/trunk control;sensation/sensory awareness;coordination;cognition  -           Bed Mobility Assessment/Treatment    Bed Mobility Assessment/Treatment  supine-sit  -        Supine-Sit [place] (Bed Mobility)  supervision  -        Bed Mobility, Safety Issues  cognitive deficits limit understanding  -        Assistive Device (Bed Mobility)  head of bed elevated  -           Functional Mobility    Functional Mobility- Ind. Level  contact guard assist;2 person assist required  -        Functional Mobility- Safety Issues  balance decreased during turns;step length decreased  -        Functional Mobility- Comment  bed into hallway and back to chair  -           Transfer Assessment/Treatment    Transfer Assessment/Treatment  sit-stand transfer;stand-sit transfer  -           Sit-Stand Transfer    Sit-Stand [place] (Transfers)  contact guard  -           Stand-Sit Transfer    Stand-Sit [place] (Transfers)  contact guard  -           ADL Assessment/Intervention    BADL Assessment/Intervention  lower body dressing;toileting  -           Lower Body Dressing Assessment/Training    Lower Body Dressing [place] Level  don;socks;independent  -        Lower Body Dressing Position  edge of bed sitting  -        Comment (Lower Body Dressing)  Pt required multiple verbal and tacile cues and increased processing time to complete task. Multiple verbal cues for attention to task, very easily distracted.   -           Toileting Assessment/Training     [place] Level (Toileting)  perform perineal hygiene;maximum assist (25% patient effort)  -        Toileting Position  unsupported standing  -J           BADL Safety/Performance    Impairments, BADL Safety/Performance  balance;cognition;endurance/activity tolerance;coordination;strength;shortness of breath;trunk/postural control  -        Cognitive Impairments, BADL Safety/Performance  awareness, need for assistance;insight into deficits/self awareness;safety precaution awareness;safety precaution follow-through  -        Skilled BADL Treatment/Intervention  BADL process/adaptation training  -           General ROM    GENERAL ROM COMMENTS  BUE AROM WFL  -J           MMT (Manual Muscle Testing)    General MMT Comments  BUE strength 4+/5  -           Motor Assessment/Interventions    Additional Documentation  Balance (Group);Fine Motor Testing & Training (Group);Gross Motor Coordination (Group);Writing Assessment/Intervention (Group)  -           Gross Motor Coordination    Gross Motor Impairments  rapid alternating;finger to nose  -        Gross Motor Skill, Impairments Detail  AISSATOU: B intact, Finger to Nose: Pt unable to complete task with demo and [place] assist.   -           Balance    Balance  static sitting balance;static standing balance  -           Static Sitting Balance    Level of [place] (Unsupported Sitting, Static Balance)  contact guard assist  -        Sitting Position (Unsupported Sitting, Static Balance)  sitting on edge of bed  -        Time Able to Maintain Position (Unsupported Sitting, Static Balance)  more than 5 minutes  -           Static Standing Balance    Level of [place] (Supported Standing, Static Balance)  contact guard assist;2 person assist  -        Time Able to Maintain Position (Supported Standing, Static Balance)  2 to 3 minutes  -        Assistive Device Utilized (Supported Standing, Static Balance)  -- [id number]  -           Fine Motor  Testing & Training    Fine Motor Tests  -- Finger Opposition: B intact  -JJ           Writing Assessment/Intervention    Comment (Writing)  Able to write name on second attempt, on first attempt, pt unable to follow commands to write name and monique circles on line provided for name.   -JJ           Sensory Assessment/Intervention    Sensory General Assessment  light touch sensation deficits identified;pain sensation deficits identified  -JJ           Light Touch Sensation Assessment    Left Upper Extremity: Light Touch Sensation Assessment  intact  -JJ        Right Upper Extremity: Light Touch Sensation Assessment  intact  -JJ           Pain Sensation Assessment    Left Upper Extremity: Pain Sensation Assessment  intact  -JJ        Right Upper Extremity: Pain Sensation Assessment  intact  -JJ           Positioning and Restraints    Pre-Treatment Position  in bed  -JJ        Post Treatment Position  chair  -JJ        In Chair  sitting;notified nsg;call light within reach;encouraged to call for assist;exit alarm on;with family/caregiver  -J           Pain Assessment    Additional Documentation  Pain Scale: Numbers Pre/Post-Treatment (Group)  -JJ           Pain Scale: Numbers Pre/Post-Treatment    Pain Scale: Numbers, Pretreatment  0/10 - no pain  -JJ        Pain Scale: Numbers, Post-Treatment  0/10 - no pain  -JJ        Pain Intervention(s)  Repositioned;Ambulation/increased activity;Medication (See MAR)  -JJ           Wound 09/27/19 0800 Right posterior finger    Wound - Properties Group Date first assessed: 09/27/19  -NW Time first assessed: 0800  -NW Present on Hospital Admission: Y  -NW Side: Right  -NW Orientation: posterior  -NW Location: finger  -NW       Plan of Care Review    Plan of Care Reviewed With  patient  -JJ           Clinical Impression (OT)    Date of Referral to OT  09/26/19  -JJ        OT Diagnosis  impaired adls and mobility  -JJ        Prognosis (OT Eval)  good  -JJ        Patient/Family Goals  Statement (OT Eval)  return to PLOF  -JJ        Criteria for Skilled Therapeutic Interventions Met (OT Eval)  yes;treatment indicated  -JJ        Rehab Potential (OT Eval)  good, to achieve stated therapy goals  -JJ        Therapy Frequency (OT Eval)  daily  -JJ        Predicted Duration of Therapy Intervention (Therapy Eval)  10 dasy  -JJ        Care Plan Review (OT)  evaluation/treatment results reviewed;care plan/treatment goals reviewed;risks/benefits reviewed;current/potential barriers reviewed;patient/other agree to care plan  -JJ        Anticipated Discharge Disposition (OT)  inpatient rehabilitation facility  -JJ           Vital Signs    Pre Systolic BP Rehab  156  -JJ        Pre Treatment Diastolic BP  87  -JJ        Post Systolic BP Rehab  142  -JJ        Post Treatment Diastolic BP  80  -JJ        Pretreatment Heart Rate (beats/min)  78  -JJ        Posttreatment Heart Rate (beats/min)  83  -JJ        Pre SpO2 (%)  91  -JJ        O2 Delivery Pre Treatment  room air  -JJ        Post SpO2 (%)  92  -JJ        O2 Delivery Post Treatment  room air  -JJ        Pre Patient Position  Supine  -JJ        Intra Patient Position  Standing  -JJ        Post Patient Position  Sitting  -JJ           Planned OT Interventions    Planned Therapy Interventions (OT Eval)  activity tolerance training;BADL retraining;functional balance retraining;occupation/activity based interventions;neuromuscular control/coordination retraining;patient/caregiver education/training;strengthening exercise;transfer/mobility retraining  -JJ           OT Goals    Dressing Goal Selection (OT)  dressing, OT goal 1  -JJ        Toileting Goal Selection (OT)  toileting, OT goal 1  -JJ        Balance Goal Selection (OT)  balance, OT goal 1  -JJ        Additional Documentation  Balance Goal Selection (OT) (Row)  -JJ           Dressing Goal 1 (OT)    Activity/Assistive Device (Dressing Goal 1, OT)  dressing skills, all  -JJ        Time Frame (Dressing  Goal 1, OT)  long term goal (LTG);by discharge  -JJ        Progress/Outcome (Dressing Goal 1, OT)  goal ongoing  -JJ           Toileting Goal 1 (OT)    Activity/Device (Toileting Goal 1, OT)  toileting skills, all;commode  -JJ        [place] Level/Cues Needed (Toileting Goal 1, OT)  independent  -JJ        Time Frame (Toileting Goal 1, OT)  long term goal (LTG);by discharge  -JJ        Progress/Outcome (Toileting Goal 1, OT)  goal ongoing  -JJ           Balance Goal 1 (OT)    Activity/Assistive Device (Balance Goal 1, OT)  sitting, dynamic;standing, dynamic;standing, static  -JJ        [place] Level/Cues Needed (Balance Goal 1, OT)  standby assist  -JJ        Time Frame (Balance Goal 1, OT)  long term goal (LTG);by discharge  -JJ        Progress/Outcomes (Balance Goal 1, OT)  goal ongoing  -J          User Key  (r) = Recorded By, (t) = Taken By, (c) = Cosigned By    Initials Name Effective Dates    NW Dunia Multani RN 07/12/18 -     JChristina Marie OTR/L 10/12/18 -          Occupational Therapy Education     Title: PT OT SLP Therapies (In Progress)     Topic: Occupational Therapy (In Progress)     Point: ADL training (Done)     Description: Instruct learner(s) on proper safety adaptation and remediation techniques during self care or transfers.   Instruct in proper use of assistive devices.    Learning Progress Summary           Patient Acceptance, E, VU by YUDI at 10/1/2019 10:40 AM    Comment:  OT POC, adls, t/fs, bed mobility, safety/environmental modifications, processing, d/c planning.                   Point: Body mechanics (Done)     Description: Instruct learner(s) on proper positioning and spine alignment during self-care, functional mobility activities and/or exercises.    Learning Progress Summary           Patient Acceptance, E, VU by YUDI at 10/1/2019 10:40 AM    Comment:  OT POC, adls, t/fs, bed mobility, safety/environmental modifications, processing, d/c planning.                                User Key     Initials Effective Dates Name Provider Type Discipline     10/12/18 -  Christina Dahl OTR/L Occupational Therapist OT                  OT Recommendation and Plan  Outcome Summary/Treatment Plan (OT)  Anticipated Discharge Disposition (OT): inpatient rehabilitation facility  Planned Therapy Interventions (OT Eval): activity tolerance training, BADL retraining, functional balance retraining, occupation/activity based interventions, neuromuscular control/coordination retraining, patient/caregiver education/training, strengthening exercise, transfer/mobility retraining  Therapy Frequency (OT Eval): daily  Plan of Care Review  Plan of Care Reviewed With: patient, sibling  Plan of Care Reviewed With: patient, sibling  Outcome Summary: OT eval completed. Pt is A&O to person and place only. He is able to follow simple one-step commands approx 50% of the time with increased processing time, repetition of directions, verbal/tactile cues. Supervision for supine to sit at EOB. Multiple verbal cues for donning socks while seated at EOB. Pt is easily distractible and requires cues for attention to task, especially in loud and and visually busy environments. BUE strength 4+/5. Pt was able to complete finger oppposition B intact. Unable to follow commands to complete finger to nose GM testing, even with demo and Knik assist, but able to complete AISSATOU GM testing, B intact. CGA for sit <> stand t/f from EOB and all functional mobility. Demo's a slight R lateral lean during mobility that pt is aware of but unable to correct. Pt would benefit from skilled OT services to address decreased balance, coordination, postural control, and cognition. Pt would benefit from skilled OT services to address these deficits. Pt would benefit from acute IP rehab for continued OT, PT and SLP services.     Outcome Measures     Row Name 10/01/19 1000             How much help from another is currently needed...    Putting  on and taking off regular lower body clothing?  3  -JJ      Bathing (including washing, rinsing, and drying)  3  -JJ      Toileting (which includes using toilet bed pan or urinal)  3  -JJ      Putting on and taking off regular upper body clothing  3  -JJ      Taking care of personal grooming (such as brushing teeth)  3  -JJ      Eating meals  3  -JJ      AM-PAC 6 Clicks Score (OT)  18  -JJ         Functional Assessment    Outcome Measure Options  AM-PAC 6 Clicks Daily Activity (OT)  -        User Key  (r) = Recorded By, (t) = Taken By, (c) = Cosigned By    Initials Name Provider Type    Christina Marie OTR/L Occupational Therapist          Time Calculation:   Time Calculation- OT     Row Name 10/01/19 1033             Time Calculation- OT    OT Start Time  0850 add 11 minutes for chart review   -      OT Stop Time  0958  -      OT Time Calculation (min)  68 min  -      Total Timed Code Minutes- OT  19 minute(s)  -      OT Received On  10/01/19  -      OT Goal Re-Cert Due Date  10/11/19  -        User Key  (r) = Recorded By, (t) = Taken By, (c) = Cosigned By    Initials Name Provider Type    Christina Oliver OTR/L Occupational Therapist        Therapy Charges for Today     Code Description Service Date Service Provider Modifiers Qty    14773945819  OT EVAL MOD COMPLEXITY 4 10/1/2019 Christina Dahl OTR/L GO 1    21860172569 HC OT SELF CARE/MGMT/TRAIN EA 15 MIN 10/1/2019 Christina Dahl OTR/L GO 1               SANDI Trujillo/L  10/1/2019    Electronically signed by Christina Dahl OTR/L at 10/01/19 1053          Speech Language Pathology Notes (most recent note)      Georgi Hernandez, CCC-SLP at 10/01/19 1111          Acute Care - Speech Language Pathology Initial Evaluation  [facility]     Patient Name: Jarod Ramírez JrTomasa  : 1960  MRN: [id number]  Today's Date: 10/1/2019  Onset of Illness/Injury or Date of Surgery: 19     Referring Physician:   "Bishop      Admit Date: 9/26/2019  Completed trials of honey, nectar, and thin with pt today. Pt exhibited throat clearing with honey thick and an immediate cough with thin. He had congested coughing prior to PO trials as well. Recommend modified barium swallow study to be completed today to further assess swallow function.     Also completed informal speech/language evaluation. Pt was able to count to ten without cues, but required a phonemic cue to state days of the week and semantic cues to state months of the year. He was unable to independently name any objects. He exhibited semantic paraphasias and neologisms when attempting to name items. For example he said \"shannon\" for comb initially and \"knife\" for fork initially. He was able to name items with mod to max cues including phonemic, semantic, and written cues. He had mild to moderate difficulty with phrase completion and moderate difficulty with sentence completion tasks. He was able to accurately answer simple yes/no questions when shown the written words \"yes\" and \"no.\" He was unable to accurately answer more complex yes/no questions, despite cues. He was unable to follow an instruction to point to his nose, even with modeling and hand-over-hand instruction. He was inconsistently able to follow some single step commands for oral motor tasks. SLP will continue to follow and treat for the above concerns.   Georgi Hernandez, CCC-SLP 10/1/2019 11:14 AM    Visit Dx:    ICD-10-CM ICD-9-CM   1. Dysphagia, unspecified type R13.10 787.20   2. Impaired mobility Z74.09 799.89   3. Impaired mobility and ADLs Z74.09 799.89   4. Aphasia R47.01 784.3     Patient Active Problem List   Diagnosis   • Degeneration of cervical intervertebral disc   • Closed fracture of lumbar vertebra (CMS/HCC)   • Degeneration of lumbar or lumbosacral intervertebral disc   • Smoker   • BMI 27.0-27.9,adult   • Acute cerebrovascular accident (CVA) of cerebellum (CMS/HCC)   • Alcoholism /alcohol " abuse (CMS/[facility])   • CVA (cerebral vascular accident) (CMS/[facility])   • Hyperlipidemia     Past Medical History:   Diagnosis Date   • Hypertension      Past Surgical History:   Procedure Laterality Date   • CATARACT EXTRACTION Left    • WRIST FRACTURE SURGERY Left         SLP EVALUATION (last 72 hours)      SLP SLC Evaluation     Row Name 10/01/19 0939                   Communication Assessment/Intervention    Document Type  evaluation  -MB        Subjective Information  no complaints  -MB        Patient Observations  alert;cooperative  -MB        Patient/Family Observations  Brother present  -MB        Patient Effort  good  -MB           General Information    Patient Profile Reviewed  yes  -MB        Pertinent History Of Current Problem  MRI: Acute infarct in the left MCA , alcohol abuse, HTN  -MB        Precautions/Limitations, Vision  WFL with corrective lenses  -MB        Precautions/Limitations, Hearing  WFL;for purposes of eval  -MB        Patient Level of Education  Unknown  -MB        Prior Level of Function-Communication  WFL  -MB        Plans/Goals Discussed with  patient and family  -MB        Barriers to Rehab  -- Aphasia  -MB        Patient's Goals for Discharge  patient did not state  -MB        Family Goals for Discharge  family did not state  -MB           Pain Assessment    Additional Documentation  Pain Scale: FACES Pre/Post-Treatment (Group)  -MB           Pain Scale: FACES Pre/Post-Treatment    Pain: FACES Scale, Pretreatment  0-->no hurt  -MB           Comprehension Assessment/Intervention    Comprehension Assessment/Intervention  Auditory Comprehension  -MB           Auditory Comprehension Assessment/Intervention    Auditory Comprehension (Communication)  severe impairment  -MB        Answers Questions (Communication)  yes/no;simple;mild impairment;complex;severe impairment  -MB        Able to Follow Commands (Communication)  1-step;severe impairment  -MB           Expression  Assessment/Intervention    Expression Assessment/Intervention  verbal expression;graphic expression  -MB           Verbal Expression Assessment/Intervention    Verbal Expression  severe impairment  -MB        Automatic Speech (Communication)  counting 1-20;WFL;days of week;mild impairment;months of year;moderate impairment  -MB        Repetition  words;mild impairment;moderate impairment  -MB        Phrase Completion  automatic/predictable;mild impairment;moderate impairment  -MB        Confrontational Naming  high frequency;severe impairment  -MB        Conversational Discourse/Fluency  moderate impairment;severe impairment  -MB           Graphic Expression Assessment/Intervention    Graphic Expression  severe impairment  -MB        Graphic Expression to Dictation  words;severe impairment  -MB        Biographical Information  name;WFL  -MB        Graphic Expression, Comment  Pt copied one word with a single letter error  -MB           Oral Musculature and Cranial Nerve Assessment    Oral Motor General Assessment  generalized oral motor weakness  -MB           Motor Speech Assessment/Intervention    Motor Speech Function  moderate impairment  -MB        Characteristics Consistent with Dysarthria  decreased articulation;decreased breath support;slurred speech  -MB           SLP Clinical Impressions    SLP Diagnosis  Receptive/expressive aphasia  -MB        Rehab Potential/Prognosis  good  -MB        SLC Criteria for Skilled Therapy Interventions Met  yes  -MB        Functional Impact  functional impact in social situations;functional impact in ADLs;difficulty communicating wants, needs;difficulty communicating in an emergency;difficulty in expressing complex messages;restrictions in personal and social life  -MB           Recommendations    Therapy Frequency (SLP SLC)  at least;3 days per week  -MB        Predicted Duration Therapy Intervention (Days)  until discharge  -MB        Anticipated Dischage Disposition   inpatient rehabilitation facility  -MB           Communication Treatment Objective and Progress Goals (SLP)    Auditory Comprehension Treatment Objectives  Auditory Comprehension Treatment Objectives (Group)  -MB        Verbal Expression Treatment Objectives  Verbal Expression Treatment Objectives (Group)  -MB        Graphic Expression Treatment Objectives  Graphic Expression Treatment Objectives (Group)  -MB        Augmentative/Alternative Communication Objectives  Augmentative/Alternative Communication Objectives (Group)  -MB           Auditory Comprehension Treatment Objectives    Words/Phrases/Sentences Selection  words/phrases/sentences, SLP goal 1  -MB        Comprehend Questions Selection  comprehend questions, SLP goal 1  -MB        Follow Directions Selection  follow directions, SLP goal 1  -MB           Words/Phrases/Sentences Goal 1 (SLP)    Improve Ability to Comprehend Words/Phrases/Sentences Through: Goal 1 (SLP)  identify objects, field of;identify pictures, field of;90%;independently (over 90% accuracy);other (comment) 2  -MB        Time Frame (Identify Objects and Pictures Goal 1, SLP)  short term goal (STG);by discharge  -MB           Comprehend Questions Goal 1 (SLP)    Improve Ability to Comprehend Questions Goal 1 (SLP)  simple yes/no questions;complex yes/no questions;90%;independently (over 90% accuracy)  -MB        Time Frame (Comprehend Questions Goal 1, SLP)  short term goal (STG);by discharge  -MB           Follow Directions Goal 2 (SLP)    Improve Ability to Follow Directions Goal 1 (SLP)  1 step direction with objects;1 step direction without objects;90%;independently (over 90% accuracy)  -MB        Time Frame (Follow Directions Goal 1, SLP)  short term goal (STG);by discharge  -MB           Verbal Expression Treatment Objectives    Word Retrieval Skills Selection  word retrieval, SLP goal 1  -MB        Phrase and Sentence Level Response Selection  phrase and sentence level response, SLP  goal 1  -MB           Word Retrieval Skills Goal 1 (SLP)    Improve Word Retrieval Skills By Goal 1 (SLP)  completing automatic speech task, days of the week;completing automatic speech task, months;confrontational naming task;repeating words;90%;independently (over 90% accuracy)  -MB        Time Frame (Word Retrieval Goal 1, SLP)  short term goal (STG);by discharge  -MB           Graphic Expression Treatment Objectives    Graphic Expression of Shapes, Letters and Numbers Selection  graphic expression of shapes, letters, and numbers, SLP goal 1  -MB           Graphic Expression of Shapes, Letters, Numbers Goal 1 (SLP)    Improve Graphic Expression of Shapes, Letters, and Numbers Goal 1 (SLP)  copy shapes, numbers, and letters;writing dictated number and letters;90%;independently (over 90% accuracy)  -MB        Time Frame (Graphic Expression of Shapes, Letters, and Numbers Goal 1, SLP)  short term goal (STG);by discharge  -MB           Augmentative/Alternative Communication Objectives    Augmentative/Alternative Communication Selection  augmentative/alternative communication, SLP goal 1  -MB           Augmentative/Alternative Communication Objectives Goal 1 (SLP    Communication (Augmentative/Alternative Communication Goal 1, SLP)  improve ability to use low tech augmentative/alternative communication device  -MB        Improve Communication by (Augmentative/Alternative Communication Goal 1, SLP)  identify picture, field of ____;identify word, field of ____;alphabet/picture board;90%;independently (over 90% accuracy)  -MB          User Key  (r) = Recorded By, (t) = Taken By, (c) = Cosigned By    Initials Name Effective Dates    Georgi Garcia CCC-SLP 08/02/16 -              EDUCATION  The patient has been educated in the following areas:     Communication Impairment.    SLP Recommendation and Plan  SLP Diagnosis: Receptive/expressive aphasia     SLC Criteria for Skilled Therapy Interventions Met:  "yes  Anticipated Dischage Disposition: inpatient rehabilitation facility     Predicted Duration Therapy Intervention (Days): until discharge    Plan of Care Reviewed With: patient, sibling  Outcome Summary: Completed trials of honey, nectar, and thin with pt today. Pt exhibited throat clearing with honey thick and an immediate cough with thin. He had congested coughing prior to PO trials as well. Recommend modified barium swallow study to be completed today to further assess swallow function. Also completed informal speech/language evaluation. Pt was able to count to ten without cues, but required a phonemic cue to state days of the week and semantic cues to state months of the year. He was unable to independently name any objects. He exhibited semantic paraphasias and neologisms when attempting to name items. For example he said \"shannon\" for comb initially and \"knife\" for fork initially. He was able to name items with mod to max cues including phonemic, semantic, and written cues. He had mild to moderate difficulty with phrase completion and moderate difficulty with sentence completion tasks. He was able to accurately answer simple yes/no questions when shown the written words \"yes\" and \"no.\" He was unable to accurately answer more complex yes/no questions, despite cues. He was unable to follow an instruction to point to his nose, even with modeling and hand-over-hand instruction. He was inconsistently able to follow some single step commands for oral motor tasks. SLP will continue to follow and treat for the above concerns.       SLP GOALS     Row Name 10/01/19 0939 09/30/19 0217          Oral Nutrition/Hydration Goal 1 (SLP)    Oral Nutrition/Hydration Goal 1, SLP  LTG: Patient will tolerate LRD without s/s of aspiration.  -MB  LTG: Patient will tolerate LRD without s/s of aspiration.  -MM     Time Frame (Oral Nutrition/Hydration Goal 1, SLP)  by discharge  -MB  by discharge  -MM     Barriers (Oral " Nutrition/Hydration Goal 1, SLP)  Aphasia  -MB  n/a  -MM     Progress/Outcomes (Oral Nutrition/Hydration Goal 1, SLP)  continuing progress toward goal  -MB  goal ongoing  -MM        Words/Phrases/Sentences Goal 1 (SLP)    Improve Ability to Comprehend Words/Phrases/Sentences Through: Goal 1 (SLP)  identify objects, field of;identify pictures, field of;90%;independently (over 90% accuracy);other (comment) 2  -MB  --     Time Frame (Identify Objects and Pictures Goal 1, SLP)  short term goal (STG);by discharge  -MB  --        Comprehend Questions Goal 1 (SLP)    Improve Ability to Comprehend Questions Goal 1 (SLP)  simple yes/no questions;complex yes/no questions;90%;independently (over 90% accuracy)  -MB  --     Time Frame (Comprehend Questions Goal 1, SLP)  short term goal (STG);by discharge  -MB  --        Follow Directions Goal 2 (SLP)    Improve Ability to Follow Directions Goal 1 (SLP)  1 step direction with objects;1 step direction without objects;90%;independently (over 90% accuracy)  -MB  --     Time Frame (Follow Directions Goal 1, SLP)  short term goal (STG);by discharge  -MB  --        Word Retrieval Skills Goal 1 (SLP)    Improve Word Retrieval Skills By Goal 1 (SLP)  completing automatic speech task, days of the week;completing automatic speech task, months;confrontational naming task;repeating words;90%;independently (over 90% accuracy)  -MB  --     Time Frame (Word Retrieval Goal 1, SLP)  short term goal (STG);by discharge  -MB  --        Graphic Expression of Shapes, Letters, Numbers Goal 1 (SLP)    Improve Graphic Expression of Shapes, Letters, and Numbers Goal 1 (SLP)  copy shapes, numbers, and letters;writing dictated number and letters;90%;independently (over 90% accuracy)  -MB  --     Time Frame (Graphic Expression of Shapes, Letters, and Numbers Goal 1, SLP)  short term goal (STG);by discharge  -MB  --        Augmentative/Alternative Communication Objectives Goal 1 (SLP    Communication  (Augmentative/Alternative Communication Goal 1, SLP)  improve ability to use low tech augmentative/alternative communication device  -MB  --     Improve Communication by (Augmentative/Alternative Communication Goal 1, SLP)  identify picture, field of ____;identify word, field of ____;alphabet/picture board;90%;independently (over 90% accuracy)  -MB  --       User Key  (r) = Recorded By, (t) = Taken By, (c) = Cosigned By    Initials Name Provider Type    Georgi Garcia CCC-SLP Speech and Language Pathologist    MM Mayuri Alcazar, MS CCC-SLP Speech and Language Pathologist                  Time Calculation:     Time Calculation- SLP     Row Name 10/01/19 1113             Time Calculation- SLP    SLP Start Time  0939  -MB      SLP Stop Time  1113  -MB      SLP Time Calculation (min)  94 min  -MB      SLP Received On  10/01/19  -MB        User Key  (r) = Recorded By, (t) = Taken By, (c) = Cosigned By    Initials Name Provider Type    Georgi Garcia CCC-SLP Speech and Language Pathologist          Therapy Charges for Today     Code Description Service Date Service Provider Modifiers Qty    17736197617 HC ST TREATMENT SWALLOW 1 10/1/2019 Georgi Hernandez CCC-SLP GN 1    [id number] HC ST EVAL SPEECH AND PROD W LANG  5 10/1/2019 Georgi Hernandez CCC-RENE GN 1                     Georgi BATES. Hernandez, CCC-SLP  10/1/2019 and Acute Care - Speech Language Pathology   Swallow Treatment Note [facility]     Patient Name: Jarod MEANS Desiree Antoine  : 1960  MRN: [id number]  Today's Date: 10/1/2019  Onset of Illness/Injury or Date of Surgery: 19     Referring Physician: Dr. Alas      Admit Date: 2019    Visit Dx:      ICD-10-CM ICD-9-CM   1. Dysphagia, unspecified type R13.10 787.20   2. Impaired mobility Z74.09 799.89   3. Impaired mobility and ADLs Z74.09 799.89   4. Aphasia R47.01 784.3     Patient Active Problem List   Diagnosis   • Degeneration of cervical intervertebral disc   • Closed  fracture of lumbar vertebra (CMS/HCC)   • Degeneration of lumbar or lumbosacral intervertebral disc   • Smoker   • BMI 27.0-27.9,adult   • Acute cerebrovascular accident (CVA) of cerebellum (CMS/HCC)   • Alcoholism /alcohol abuse (CMS/HCC)   • CVA (cerebral vascular accident) (CMS/HCC)   • Hyperlipidemia       Therapy Treatment      Outcome Summary  Outcome Summary/Treatment Plan (SLP)  Anticipated Dischage Disposition: inpatient rehabilitation facility (10/01/19 0939 : Georgi Hernandez, CCC-SLP)      SLP GOALS     Row Name 10/01/19 0939 09/30/19 1430          Oral Nutrition/Hydration Goal 1 (SLP)    Oral Nutrition/Hydration Goal 1, SLP  LTG: Patient will tolerate LRD without s/s of aspiration.  -MB  LTG: Patient will tolerate LRD without s/s of aspiration.  -MM     Time Frame (Oral Nutrition/Hydration Goal 1, SLP)  by discharge  -MB  by discharge  -MM     Barriers (Oral Nutrition/Hydration Goal 1, SLP)  Aphasia  -MB  n/a  -MM     Progress/Outcomes (Oral Nutrition/Hydration Goal 1, SLP)  continuing progress toward goal  -MB  goal ongoing  -MM        Words/Phrases/Sentences Goal 1 (SLP)    Improve Ability to Comprehend Words/Phrases/Sentences Through: Goal 1 (SLP)  identify objects, field of;identify pictures, field of;90%;independently (over 90% accuracy);other (comment) 2  -MB  --     Time Frame (Identify Objects and Pictures Goal 1, SLP)  short term goal (STG);by discharge  -MB  --        Comprehend Questions Goal 1 (SLP)    Improve Ability to Comprehend Questions Goal 1 (SLP)  simple yes/no questions;complex yes/no questions;90%;independently (over 90% accuracy)  -MB  --     Time Frame (Comprehend Questions Goal 1, SLP)  short term goal (STG);by discharge  -MB  --        Follow Directions Goal 2 (SLP)    Improve Ability to Follow Directions Goal 1 (SLP)  1 step direction with objects;1 step direction without objects;90%;independently (over 90% accuracy)  -MB  --     Time Frame (Follow Directions Goal 1, SLP)   short term goal (STG);by discharge  -MB  --        Word Retrieval Skills Goal 1 (SLP)    Improve Word Retrieval Skills By Goal 1 (SLP)  completing automatic speech task, days of the week;completing automatic speech task, months;confrontational naming task;repeating words;90%;independently (over 90% accuracy)  -MB  --     Time Frame (Word Retrieval Goal 1, SLP)  short term goal (STG);by discharge  -MB  --        Graphic Expression of Shapes, Letters, Numbers Goal 1 (SLP)    Improve Graphic Expression of Shapes, Letters, and Numbers Goal 1 (SLP)  copy shapes, numbers, and letters;writing dictated number and letters;90%;independently (over 90% accuracy)  -MB  --     Time Frame (Graphic Expression of Shapes, Letters, and Numbers Goal 1, SLP)  short term goal (STG);by discharge  -MB  --        Augmentative/Alternative Communication Objectives Goal 1 (SLP    Communication (Augmentative/Alternative Communication Goal 1, SLP)  improve ability to use low tech augmentative/alternative communication device  -MB  --     Improve Communication by (Augmentative/Alternative Communication Goal 1, SLP)  identify picture, field of ____;identify word, field of ____;alphabet/picture board;90%;independently (over 90% accuracy)  -MB  --       User Key  (r) = Recorded By, (t) = Taken By, (c) = Cosigned By    Initials Name Provider Type    Georgi Garcia, CCC-SLP Speech and Language Pathologist    Mayuri Staley, MS CCC-SLP Speech and Language Pathologist          EDUCATION  The patient has been educated in the following areas:   Dysphagia (Swallowing Impairment).    SLP Recommendation and Plan           Anticipated Dischage Disposition: inpatient rehabilitation facility                    Time Calculation:   Time Calculation- SLP     Row Name 10/01/19 1113             Time Calculation- SLP    SLP Start Time  0939  -MB      SLP Stop Time  1113  -MB      SLP Time Calculation (min)  94 min  -MB      SLP Received On  10/01/19   -MB        User Key  (r) = Recorded By, (t) = Taken By, (c) = Cosigned By    Initials Name Provider Type    Georgi Garcia CCC-SLP Speech and Language Pathologist          Therapy Charges for Today     Code Description Service Date Service Provider Modifiers Qty    45090756797 HC ST TREATMENT SWALLOW 1 10/1/2019 Georgi Hernandez CCC-SLP GN 1    [id number]  ST EVAL SPEECH AND PROD W LANG  5 10/1/2019 Georgi Hernandez CCC-SLP GN 1                 Georgi BATES. ZEFERINO Hernandez  10/1/2019    Electronically signed by Georgi Hernandez CCC-SLP at 10/01/19 1114

## 2019-10-01 NOTE — PLAN OF CARE
Problem: Patient Care Overview  Goal: Plan of Care Review  Outcome: Ongoing (interventions implemented as appropriate)   10/01/19 1042   Coping/Psychosocial   Plan of Care Reviewed With patient;sibling   OTHER   Outcome Summary OT eval completed. Pt is A&O to person and place only. He is able to follow simple one-step commands approx 50% of the time with increased processing time, repetition of directions, verbal/tactile cues. Supervision for supine to sit at EOB. Multiple verbal cues for donning socks while seated at EOB. Pt is easily distractible and requires cues for attention to task, especially in loud and and visually busy environments. BUE strength 4+/5. Pt was able to complete finger oppposition B intact. Unable to follow commands to complete finger to nose GM testing, even with demo and [place] assist, but able to complete AISSATOU GM testing, B intact. CGA for sit <> stand t/f from EOB and all functional mobility. Demo's a slight R lateral lean during mobility that pt is aware of but unable to correct. Pt would benefit from skilled OT services to address decreased balance, coordination, postural control, and cognition. Pt would benefit from skilled OT services to address these deficits. Pt would benefit from acute IP rehab for continued OT, PT and SLP services.

## 2019-10-01 NOTE — PROGRESS NOTES
Continued Stay Note   [place]     Patient Name: Jarod Ramírez Jr.  MRN: [id number]  Today's Date: 10/1/2019    Admit Date: 9/26/2019    Discharge Plan     Row Name 10/01/19 0831       Plan    Plan  possible acute rehab???    Plan Comments  Patient was apparently intubated but is now extubated.  PT/OT orders pending.  Patient was admitted with no medical coverage but was evaluated by [facility] and is eligible for [place] Medicaid, which is now pending.  Can proceed with discharge planning once payor source in place.        Discharge Codes    No documentation.             JAMAR Cuenca

## 2019-10-01 NOTE — PROGRESS NOTES
Neurology Progress Note      Chief Complaint:  stroke    Subjective     Subjective:    The patient was extubated.  He is sitting at the side of the bed currently.  There seems to be some difficulties from a social standpoint and that his wife has came to visit the patient.  She is exhibiting symptoms of annalee and our patient care relations staff is currently assisting her.  She does not seem competent to make decisions for the patient.  His brother remains at the bedside as well.  He does seem to be a good historian and can make reasonable decisions.  Otherwise the patient is doing well.  He is showing no evidence of withdrawal symptoms.  He continues to have difficulties with spatial dysfunction along with aphasia.  The therapy staff have walked him approximately 50 feet and he does require assistance.  He has somewhat of a foot drop in addition of this has spatial dysfunction of his leg causing difficulties with ambulation.    Medications:  Current Facility-Administered Medications   Medication Dose Route Frequency Provider Last Rate Last Dose   • albuterol (PROVENTIL) nebulizer solution 0.083% 2.5 mg/3mL  2.5 mg Nebulization Once PRN Galo Bethea MD       • aspirin chewable tablet 81 mg  81 mg Oral Daily Candido Alas MD        Or   • aspirin suppository 300 mg  300 mg Rectal Daily Candido Alas MD   300 mg at 10/01/19 1032   • atorvastatin (LIPITOR) tablet 80 mg  80 mg Oral Nightly Candido Alas MD   80 mg at 09/29/19 2220   • chlorhexidine (PERIDEX) 0.12 % solution 15 mL  15 mL Mouth/Throat Q12H Galo Bethea MD   15 mL at 09/30/19 2019   • famotidine (PEPCID) injection 20 mg  20 mg Intravenous Q12H Candido Alas MD   20 mg at 10/01/19 1034   • hypromellose (ISOPTO TEARS) 0.5 % ophthalmic solution 2 drop  2 drop Both Eyes TID PRN Candido Alas MD   2 drop at 09/30/19 2152   • ipratropium-albuterol (DUO-NEB) nebulizer solution 3 mL  3 mL Nebulization 4x Daily - RT Christina Sharif  YARELI Ortiz   3 mL at 10/01/19 1046   • labetalol (NORMODYNE,TRANDATE) injection 20 mg  20 mg Intravenous Q4H PRN Christina Sharif APRN       • lactated ringers infusion  50 mL/hr Intravenous Continuous Christina Sharif APRN 50 mL/hr at 10/01/19 0045 50 mL/hr at 10/01/19 0045   • LORazepam (ATIVAN) tablet 1 mg  1 mg Oral Q2H PRN Candido Alas MD        Or   • LORazepam (ATIVAN) injection 1 mg  1 mg Intravenous Q2H PRN Candido Alas MD        Or   • LORazepam (ATIVAN) tablet 2 mg  2 mg Oral Q1H PRN Candido Alas MD        Or   • LORazepam (ATIVAN) injection 2 mg  2 mg Intravenous Q1H PRN Candido Alas MD   2 mg at 09/29/19 1311    Or   • LORazepam (ATIVAN) injection 2 mg  2 mg Intravenous Q15 Min PRN Candido Alas MD   2 mg at 09/27/19 2007    Or   • LORazepam (ATIVAN) injection 2 mg  2 mg Intramuscular Q15 Min PRN Candido Alas MD       • multiple vitamin (M.V.I. Adult) 10 mL, thiamine (B-1) 100 mg, folic acid 1 mg, potassium chloride 20 mEq in dextrose 5 % and sodium chloride 0.9 % 1,000 mL infusion  75 mL/hr Intravenous Daily Candido Alas MD 75 mL/hr at 10/01/19 1032 75 mL/hr at 10/01/19 1032   • potassium chloride (KAYCIEL) 20 MEQ/15ML (10%) solution 40 mEq  40 mEq Oral Once Candido Alas MD       • sodium chloride 0.9 % flush 10 mL  10 mL Intravenous Q12H Candido Alas MD   10 mL at 10/01/19 1032   • sodium chloride 0.9 % flush 10 mL  10 mL Intravenous PRN Candido Alas MD           Review of Systems:   -A 14 point review of systems is completed and is negative except for agitation      Objective      Vital Signs  Temp:  [98.3 °F (36.8 °C)-99 °F (37.2 °C)] 98.7 °F (37.1 °C)  Heart Rate:  [47-95] 81  Resp:  [10-27] 21  BP: (117-205)/() 156/87  FiO2 (%):  [30 %] 30 %    Physical Exam:    CVS:  RR  Lungs: CTA  Abd:  NT/ND    MS: He is awake.  Sitting at the side of the bed.  He can tell me his name.  Otherwise he has perseveration.  He has difficulties with naming.  He  does not know the year.  He follows commands intermittently.  CN:  II - XII intact  MTR:  Moving all extremities -right arm and leg have drift down to the bed.  DTR:  1+ throughout  Coord/Gait: Spatial dysfunction.  Difficulties drawing a clock.  When you ask him to set the time on a clock he gives the digital interpretation.     Results Review:    I reviewed the patient's new clinical results.    Results from last 7 days   Lab Units 10/01/19  0300 09/28/19  0232   WBC 10*3/mm3 11.97* 12.75*   HEMOGLOBIN g/dL 14.7 13.7   HEMATOCRIT % 42.3 39.7   PLATELETS 10*3/mm3 311 335        Results from last 7 days   Lab Units 10/01/19  0300 09/28/19  0232 09/27/19  0433   SODIUM mmol/L 141 138 137   POTASSIUM mmol/L 3.4* 3.6 3.5   CHLORIDE mmol/L 101 99 96*   CO2 mmol/L 27.0 26.0 25.0   BUN mg/dL 3* 7 7   CREATININE mg/dL 0.49* 0.76 0.58*   CALCIUM mg/dL 8.8 8.5* 8.8   BILIRUBIN mg/dL 0.8 0.9 0.8   ALK PHOS U/L 76 63 70   ALT (SGPT) U/L 27 17 21   AST (SGOT) U/L 32 37 37   GLUCOSE mg/dL 95 110* 94        No results found for: PHOS, MG, PROTIME, INR, APTT  No components found for: POCGLUC  No components found for: A1C  Lab Results   Component Value Date    HDL 51 09/27/2019     (H) 09/27/2019     No components found for: B12  Lab Results   Component Value Date    TSH 1.760 09/27/2019     Labs reviewed:  Liver function normal  CT Head reviewed:        MRI brain reviewed by me.  Left MCA acute infarction mainly in the posterior distribution.  No evidence of hemorrhagic conversion nor edema.    Cardiac echo:  Preserved EF  Carotid ultrasound shows evidence of a left ICA occlusion  Labs reviewed:  WBC is 12K  CT angiography of the head and neck reviewed by me.  On the left the internal carotid arteries completely occluded.  The right has 30% stenosis.  Assessment/Plan     Hospital Problem List      Degeneration of lumbar or lumbosacral intervertebral disc    Smoker    Acute cerebrovascular accident (CVA) of cerebellum  (CMS/[facility])    Alcoholism /alcohol abuse (CMS/[facility])    CVA (cerebral vascular accident) (CMS/[facility])    Hyperlipidemia    Impression:     1.  Alcohol withdrawal - drinks a pint of hard liquor per day per brother who is in the room  2.  Left MCA stroke  3.  Hyperlipidemia with an LDL above goal of 70  4.  History of hypertension  5.  Left ICA occlusion -confirmed with CT angiography.    Plan:  · ETOH withdrawal protocols - appears to be improved  · Lipitor 80  · ASA 81mg  · SCD's  · Systolic blood pressure goal less than 160  · OK to floor per neuro  · Will consult acute rehab        Gab White MD  10/01/19  10:48 AM    35 minutes of critical care time was performed with titration of sedating agents in addition to neurologic examination and interpretation of imaging including CT angiography of head and neck.

## 2019-10-01 NOTE — PLAN OF CARE
Problem: Patient Care Overview  Goal: Plan of Care Review  Outcome: Ongoing (interventions implemented as appropriate)   10/01/19 2430   Coping/Psychosocial   Plan of Care Reviewed With patient;other (see comments)  (Nurse, Wendy)   OTHER   Outcome Summary Flexible endoscopic evaluation of the swallow completed at bedside. Scope was passed through pt's right nare with no difficulty. He appeared to have generalized pharyngeal edema. He was also noted to have structural abnormalities at the posterior aspect of the true vocal folds. Pudding, honey, nectar, and thin consistencies were presented. Pt appeared to have decreased velopharyngeal closure and base of tongue retraction. He had mild to moderate residue in the vallecula with honey thick after the swallow. He had premature loss to the vallecula with nectar secondary to decreased back of tongue control. He had possible laryngeal penetration and aspiration of thin during the swallow as there appeared to be residue on the vocal folds after the swallow. He had definite laryngeal penetration of pudding thick during the swallow as residue was observed in the vestibule after the swallow. The residue then fell further to the vocal folds and the pt appeared to have possible aspiration of pudding resiude after the swallow. Recommend continuing NPO and considering Dobhoff placement for nutrition. Meds via alternate route. May consider ENT consult due to abnormality at posterior true vocal folds. SLP will continue to follow and treat.

## 2019-10-01 NOTE — NURSING NOTE
Dr. White notified of family wishes to go to [facility] acute rehab, he wants this patient to go to [facility] not [facility].

## 2019-10-01 NOTE — THERAPY EVALUATION
Patient Name: Jarod Ramírez Jr.  : 1960    MRN: [id number]                              Today's Date: 10/1/2019       Admit Date: 2019    Visit Dx:     ICD-10-CM ICD-9-CM   1. Dysphagia, unspecified type R13.10 787.20   2. Impaired mobility Z74.09 799.89   3. Impaired mobility and ADLs Z74.09 799.89   4. Aphasia R47.01 784.3     Patient Active Problem List   Diagnosis   • Degeneration of cervical intervertebral disc   • Closed fracture of lumbar vertebra (CMS/HCC)   • Degeneration of lumbar or lumbosacral intervertebral disc   • Smoker   • BMI 27.0-27.9,adult   • Acute cerebrovascular accident (CVA) of cerebellum (CMS/HCC)   • Alcoholism /alcohol abuse (CMS/HCC)   • CVA (cerebral vascular accident) (CMS/HCC)   • Hyperlipidemia     Past Medical History:   Diagnosis Date   • Hypertension      Past Surgical History:   Procedure Laterality Date   • CATARACT EXTRACTION Left    • WRIST FRACTURE SURGERY Left      General Information     Row Name 10/01/19 0819          PT Evaluation Time/Intention    Document Type  evaluation CC: CVA, alcohol abuse. Dx: hyperlipidemia, CVA of cerebellum, degeneration of lumbar and lumbosacral intervertebral disc. MRI- acute infarct L MCA and watershed. US complete occlusion of L ICA. Pt had hx of becoming agitated when off sedation.  -LAURA (r) AF (t) LAURA (c)     Mode of Treatment  physical therapy  -LAURA (r) AF (t) LAURA (c)     Row Name 10/01/19 0819          General Information    Patient Profile Reviewed?  yes  -LAURA (r) AF (t) LAURA (c)     Prior Level of Function  independent:;driving;all household mobility;gait;ADL's  -LAURA (r) AF (t) LAURA (c)     Existing Precautions/Restrictions  fall  -LAURA (r) AF (t) LAURA (c)     Barriers to Rehab  cognitive status difficulty following commands  -LAURA (r) AF (t) LAURA (c)     Row Name 10/01/19 0819          Relationship/Environment    Lives With  sibling(s)  -LAURA (r) AF (t) LAURA (c)     Name(s) of Who Lives With Patient  Brother present  -LAURA (r) AF (t) LAURA (c)      Row Name 10/01/19 0819          Resource/Environmental Concerns    Current Living Arrangements  home/apartment/condo  -LAURA (r) AF (t) LAURA (c)     Row Name 10/01/19 0819          Home Main Entrance    Number of Stairs, Main Entrance  none  -LAURA (r) AF (t) LAURA (c)     Row Name 10/01/19 0819          Stairs Within Home, Primary    Number of Stairs, Within Home, Primary  none  -LAURA (r) AF (t) LAURA (c)     Row Name 10/01/19 0819          Cognitive Assessment/Intervention- PT/OT    Orientation Status (Cognition)  person;place;oriented to  -LAURA (r) AF (t) LAURA (c)     Personal Safety Interventions  fall prevention program maintained;gait belt;muscle strengthening facilitated;nonskid shoes/slippers when out of bed  -LAURA     Row Name 10/01/19 0819          Safety Issues, Functional Mobility    Safety Issues Affecting Function (Mobility)  impulsivity may be secondary to difficulty following commands, Pt would stand up before it was safe or before therapy was ready  -LAURA (r) AF (t) LAURA (c)     Impairments Affecting Function (Mobility)  balance;endurance/activity tolerance;strength;postural/trunk control;range of motion (ROM);sensation/sensory awareness;coordination  -LAURA (r) AF (t) LAURA (c)       User Key  (r) = Recorded By, (t) = Taken By, (c) = Cosigned By    Initials Name Provider Type    Gonzalo Tafoya, PT DPT Physical Therapist    Arti Meraz, PT Student PT Student        Mobility     Row Name 10/01/19 0819          Bed Mobility Assessment/Treatment    Bed Mobility Assessment/Treatment  supine-sit  -LAURA (r) AF (t) LAURA (c)     Supine-Sit [place] (Bed Mobility)  supervision  -LAURA (r) AF (t) LAURA (c)     Assistive Device (Bed Mobility)  head of bed elevated  -LAURA (r) AF (t) LAURA (c)     Row Name 10/01/19 0819          Bed-Chair Transfer    Bed-Chair [place] (Transfers)  contact guard;minimum assist (75% patient effort)  -LAURA (r) AF (t) LAURA (c)     Row Name 10/01/19 0819          Sit-Stand Transfer    Sit-Stand [place]  (Transfers)  contact guard  -LAURA (r) AF (t) LAURA (c)     Row Name 10/01/19 0819          Gait/Stairs Assessment/Training    [place] Level (Gait)  contact guard;minimum assist (75% patient effort)  -LAURA (r) AF (t) LAURA (c)     Distance in Feet (Gait)  ~40  -LAURA (r) AF (t) LAURA (c)     Deviations/Abnormal Patterns (Gait)  steppage  -LAURA (r) AF (t) LAURA (c)     Right Sided Gait Deviations  foot drop/toe drag;lateral trunk flexion  -LAURA (r) AF (t) LAURA (c)     Comment (Gait/Stairs)  During ambulation Pt seemed to have visual neglect to R side, Pt almost bumped into objects in room on R side and needed cueing   (Significant)   -LAURA       User Key  (r) = Recorded By, (t) = Taken By, (c) = Cosigned By    Initials Name Provider Type    Gonzalo Tafoya, PT DPT Physical Therapist    Arti Meraz, PT Student PT Student        Obj/Interventions     Row Name 10/01/19 0819          General ROM    GENERAL ROM COMMENTS  B LE: R DF limited from neutral ~25%, otherwise WFL  -LAURA (r) AF (t) LAURA (c)     Row Name 10/01/19 0819          MMT (Manual Muscle Testing)    General MMT Comments  B LE: R DF 3-/5, B Knee flex 4+/5, otherwise 5/5  -LAURA (r) AF (t) LAURA (c)     Row Name 10/01/19 0819          Static Sitting Balance    Level of [place] (Unsupported Sitting, Static Balance)  supervision  -LAURA (r) AF (t) LAURA (c)     Sitting Position (Unsupported Sitting, Static Balance)  sitting on edge of bed;sitting in chair  -LAURA (r) AF (t) LAURA (c)     Row Name 10/01/19 0819          Static Standing Balance    Level of [place] (Supported Standing, Static Balance)  contact guard assist  -LAURA (r) AF (t) LAURA (c)     Comment (Supported Standing, Static Balance)  would attempt to take steps before therapy was ready  -LAURA (r) AF (t) LAURA (c)     Row Name 10/01/19 0819          Sensory Assessment/Intervention    Sensory General Assessment  -- R foot decrease sensation to light touch  -LAURA (r) AF (t) LAURA (c)       User Key  (r) = Recorded By, (t) = Taken By, (c) =  Cosigned By    Initials Name Provider Type    Gonzalo Tafoya, PT DPT Physical Therapist    Arti Meraz, PT Student PT Student        Goals/Plan     Row Name 10/01/19 0819          Bed Mobility Goal 1 (PT)    Activity/Assistive Device (Bed Mobility Goal 1, PT)  sit to supine/supine to sit  -LAURA (r) AF (t) LAURA (c)     [place] Level/Cues Needed (Bed Mobility Goal 1, PT)  supervision required  -LAURA     Time Frame (Bed Mobility Goal 1, PT)  long term goal (LTG);10 days  -LAURA (r) AF (t) LAURA (c)     Progress/Outcomes (Bed Mobility Goal 1, PT)  goal ongoing  -LAURA (r) AF (t) LAURA (c)     Row Name 10/01/19 0819          Transfer Goal 1 (PT)    Activity/Assistive Device (Transfer Goal 1, PT)  sit-to-stand/stand-to-sit;bed-to-chair/chair-to-bed;toilet  -LAURA (r) AF (t) LAURA (c)     [place] Level/Cues Needed (Transfer Goal 1, PT)  contact guard assist;supervision required  -LAURA (r) AF (t) LAURA (c)     Time Frame (Transfer Goal 1, PT)  long term goal (LTG);10 days  -LAURA (r) AF (t) LAURA (c)     Progress/Outcome (Transfer Goal 1, PT)  goal ongoing  -LAURA (r) AF (t) LAURA (c)     Row Name 10/01/19 0819          Gait Training Goal 1 (PT)    Activity/Assistive Device (Gait Training Goal 1, PT)  gait (walking locomotion);assistive device use  -LAURA (r) AF (t) LAURA (c)     [place] Level (Gait Training Goal 1, PT)  contact guard assist;supervision required  -LAURA (r) AF (t) LAURA (c)     Distance (Gait Goal 1, PT)  100  -LAURA (r) AF (t) LAURA (c)     Time Frame (Gait Training Goal 1, PT)  long term goal (LTG);10 days  -LAURA (r) AF (t) LAURA (c)     Barriers (Gait Training Goal 1, PT)  R side visual neglect  -LAURA (r) AF (t) LAURA (c)     Progress/Outcome (Gait Training Goal 1, PT)  goal ongoing  -LAURA (r) AF (t) LAURA (c)     Row Name 10/01/19 0819          ROM Goal 1 (PT)    ROM Goal 1 (PT)  R DF- neutral  -LAURA (r) AF (t) LAURA (c)     Time Frame (ROM Goal 1, PT)  long term goal (LTG)  -LAURA     Progress/Outcome (ROM Goal 1, PT)  goal ongoing  -LAURA (r) AF (t) LAURA (c)        User Key  (r) = Recorded By, (t) = Taken By, (c) = Cosigned By    Initials Name Provider Type    Gonzalo Tafoya, PT DPT Physical Therapist    Arti Meraz, PT Student PT Student        Clinical Impression     Row Name 10/01/19 0819          Pain Assessment    Additional Documentation  Pain Scale: FACES Pre/Post-Treatment (Group)  -LAURA (r) AF (t) LAURA (c)     Row Name 10/01/19 0819          Pain Scale: FACES Pre/Post-Treatment    Pain: FACES Scale, Pretreatment  0-->no hurt  -LAURA (r) AF (t) LAURA (c)     Row Name 10/01/19 0819          Plan of Care Review    Plan of Care Reviewed With  patient;sibling  -LAURA (r) AF (t) LAURA (c)     Row Name 10/01/19 0819          Physical Therapy Clinical Impression    Patient/Family Goals Statement (PT Clinical Impression)  go home with sibling, but may benefit from rehab for further therapy  -LAURA (r) AF (t) LAURA (c)     Criteria for Skilled Interventions Met (PT Clinical Impression)  yes;treatment indicated  -LAURA (r) AF (t) LAURA (c)     Rehab Potential (PT Clinical Summary)  good, to achieve stated therapy goals  -LAURA (r) AF (t) LAURA (c)     Predicted Duration of Therapy (PT)  until d/c  -LAURA (r) AF (t) LAURA (c)     Row Name 10/01/19 0819          Vital Signs    Pre Systolic BP Rehab  156  -LAURA (r) AF (t) LAURA (c)     Pre Treatment Diastolic BP  87  -LAURA (r) AF (t) LAURA (c)     Post Systolic BP Rehab  142  -LAURA (r) AF (t) LAURA (c)     Post Treatment Diastolic BP  80  -LAURA (r) AF (t) LAURA (c)     Pretreatment Heart Rate (beats/min)  78  -LAURA (r) AF (t) LAURA (c)     Posttreatment Heart Rate (beats/min)  82  -LAURA (r) AF (t) LAURA (c)     Pre SpO2 (%)  91  -LAURA (r) AF (t) LAURA (c)     O2 Delivery Pre Treatment  room air  -LAURA (r) AF (t) LAURA (c)     O2 Delivery Intra Treatment  room air  -LAURA (r) AF (t) LAURA (c)     Post SpO2 (%)  94  -LAURA (r) AF (t) LAURA (c)     O2 Delivery Post Treatment  room air  -LAURA (r) AF (t) LAURA (c)     Pre Patient Position  Supine  -LAURA (r) AF (t) LAURA (c)     Intra Patient Position  Standing  -LAURA (r)  AF (t) LAURA (c)     Post Patient Position  Sitting  -LAURA (r) AF (t) LAURA (c)     Row Name 10/01/19 0819          Positioning and Restraints    Pre-Treatment Position  in bed  -LAURA (r) AF (t) LAURA (c)     Post Treatment Position  chair  -LAURA (r) AF (t) LAURA (c)     In Chair  notified nsg;with SLP;sitting;call light within reach;encouraged to call for assist;with family/caregiver  -LAURA (r) AF (t) LAURA (c)       User Key  (r) = Recorded By, (t) = Taken By, (c) = Cosigned By    Initials Name Provider Type    Gonzalo Tafoya, PT DPT Physical Therapist    Arti Meraz, PT Student PT Student        Outcome Measures     Row Name 10/01/19 0819          How much help from another person do you currently need...    Turning from your back to your side while in flat bed without using bedrails?  3  -LAURA (r) AF (t) LAURA (c)     Moving from lying on back to sitting on the side of a flat bed without bedrails?  3  -LAURA (r) AF (t) LAURA (c)     Moving to and from a bed to a chair (including a wheelchair)?  3  -LAURA (r) AF (t) LAURA (c)     Standing up from a chair using your arms (e.g., wheelchair, bedside chair)?  3  -LAURA (r) AF (t) LAURA (c)     Climbing 3-5 steps with a railing?  3  -LAURA (r) AF (t) LAURA (c)     To walk in hospital room?  3  -LAURA (r) AF (t) LAURA (c)     AM-PAC 6 Clicks Score (PT)  18  -LAURA (r) AF (t)     Row Name 10/01/19 0819          Functional Assessment    Outcome Measure Options  AM-PAC 6 Clicks Basic Mobility (PT)  -LAURA (r) AF (t) LAURA (c)       User Key  (r) = Recorded By, (t) = Taken By, (c) = Cosigned By    Initials Name Provider Type    Gonzalo Tafoya, PT DPT Physical Therapist    Arti Meraz, PT Student PT Student        Physical Therapy Education     Title: PT OT SLP Therapies (In Progress)     Topic: Physical Therapy (In Progress)     Point: Mobility training (Done)     Learning Progress Summary           Patient Acceptance, EETTA,NR by AF at 10/1/2019  8:19 AM    Comment:  Educated on benefits of activity and ongoing PT  POC.   Family Acceptance, E, VU,NR by AF at 10/1/2019  8:19 AM    Comment:  Educated on benefits of activity and ongoing PT POC.                               User Key     Initials Effective Dates Name Provider Type Discipline    SHEILA 08/27/19 -  Arti Haile, PT Student PT Student PT              PT Recommendation and Plan  Planned Therapy Interventions (PT Eval): balance training, bed mobility training, gait training, home exercise program, patient/family education, transfer training, postural re-education, ROM (range of motion), strengthening  Outcome Summary/Treatment Plan (PT)  Anticipated Discharge Disposition (PT): home with assist, home with home health, inpatient rehabilitation facility  Plan of Care Reviewed With: patient, sibling  Outcome Summary: PT eval completed. A&O to person and place. Brother present at eval. Pt had difficulty following most commands and two-steps commands. Pt also had some difficulty answering questions, almost like having trouble finding the right words and spoke in low, muffled tone. Performed supine to sit at supervision. B LE ROM assessed: R DF limited ~25%, otherwise WFL. B LE strength assessed: R DF 2+/5, B knee flex 4+/5, otherwise 5/5. Pt had difficulty understanding commands during strength testing, needed physical demonstration and use of different wording. Coordination of LE assessed: Pt unable to place heel on shin for both sides; can only tap L foot. Performed sit <> stand at CGA; Pt attempted to take steps before therapy was ready, slight impulsivity. During ambulation Pt had drop foot on R, leaned to R side and seemed to have R side visual neglect. Pt ambulated ~40 ft at CGA/Min A x2, needed cueing to avoid bumping into object on R and when Pt was asked about lean Pt acknowledged he was leaning to R. Once in recliner Pt attempted to pick at IV once. Pt seems unsafe to be up without assistance. Pt would benefit from skilled PT to address deficits. PT will continue  seeing Pt. Anticipated d/c to inpatient rehab for further therapy services.      Time Calculation:   PT Charges     Row Name 10/01/19 0819             Time Calculation    Start Time  0855 Chart review [id number]. PT with 78 min  -LAURA (r) AF (t) LAURA (c)      Stop Time  0951  -LAURA (r) AF (t) LAURA (c)      Time Calculation (min)  56 min  -LAURA (r) AF (t)      PT Received On  10/01/19  -LAURA (r) AF (t) LAURA (c)      PT Goal Re-Cert Due Date  10/11/19  -LAURA (r) AF (t) LAURA (c)         Time Calculation- PT    Total Timed Code Minutes- PT  18 minute(s)  -LAURA (r) AF (t) LAURA (c)         Timed Charges    52260 - Gait Training Minutes   18  -LAURA        User Key  (r) = Recorded By, (t) = Taken By, (c) = Cosigned By    Initials Name Provider Type    Gonzalo Tafoya, PT DPT Physical Therapist    Arti Meraz, PT Student PT Student        Therapy Charges for Today     Code Description Service Date Service Provider Modifiers Qty    39182536363 HC PT EVAL MOD COMPLEXITY 4 10/1/2019 Arti Haile, PT Student GP 1    00319362182 HC GAIT TRAINING EA 15 MIN 10/1/2019 Arti Haile, PT Student GP 1          PT G-Codes  Outcome Measure Options: AM-PAC 6 Clicks Daily Activity (OT)  AM-PAC 6 Clicks Score (PT): 18  AM-PAC 6 Clicks Score (OT): 18    Arti Haile PT Student  10/1/2019

## 2019-10-01 NOTE — NURSING NOTE
Patient's wife made an appearance tonight, pt did say he was ok with her visiting for a few minutes. He has expressed his desire to change his power of  to his brother. Consult will be placed for this to be addressed in the morning. Wife states she is going to stay in waiting room until 9:00 in the morning and staying by patient all day.

## 2019-10-01 NOTE — PROGRESS NOTES
PULMONARY AND CRITICAL CARE PROGRESS NOTE - [facility]    Patient: Jarod Ramírez Jr.  1960   MR# [id number]   Acct# [id number]  10/01/19   8:39 AM  Referring Provider: Candido Alas MD    Chief Complaint: altered mental status   Interval history: The patient extubated yesterday.  He is currently resting in bed on room air.  O2 sat 92%, heart rate 90.  He has no new complaints.  No other aggravating or alleviating factors.     Meds:    aspirin 81 mg Oral Daily   Or      aspirin 300 mg Rectal Daily   atorvastatin 80 mg Oral Nightly   chlorhexidine 15 mL Mouth/Throat Q12H   famotidine 20 mg Intravenous Q12H   ipratropium-albuterol 3 mL Nebulization 4x Daily - RT   IV Fluids 1000 mL + additives 75 mL/hr Intravenous Daily   potassium chloride 40 mEq Oral Once   sodium chloride 10 mL Intravenous Q12H       lactated ringers 50 mL/hr Last Rate: 50 mL/hr (10/01/19 0045)     Review of Systems:   Review of Systems   Constitutional: Negative for chills and fever.   Respiratory: Negative for cough and shortness of breath.    Cardiovascular: Negative for chest pain.   Gastrointestinal: Negative for diarrhea, nausea and vomiting.     Physical Exam:  SpO2 Percentage    10/01/19 0500 10/01/19 0623 10/01/19 0629   SpO2: 92% 91% 92%     Temp:  [98.3 °F (36.8 °C)-99 °F (37.2 °C)] 98.3 °F (36.8 °C)  Heart Rate:  [47-87] 87  Resp:  [10-27] 21  BP: (112-205)/() 148/73  FiO2 (%):  [30 %] 30 %    Intake/Output Summary (Last 24 hours) at 10/1/2019 0839  Last data filed at 10/1/2019 0600  Gross per 24 hour   Intake 1524.49 ml   Output 2450 ml   Net -925.51 ml     Physical Exam   Constitutional: He is oriented to person, place, and time. He appears well-developed and well-nourished. No distress.   HENT:   Head: Normocephalic and atraumatic.   Eyes: Conjunctivae and EOM are normal. Pupils are equal, round, and reactive to light. No scleral icterus.   Neck: Normal range of motion. Neck supple.   Cardiovascular:  Normal rate, regular rhythm and normal heart sounds. Exam reveals no friction rub.   No murmur heard.  Pulmonary/Chest: Effort normal and breath sounds normal. No respiratory distress. He has no wheezes. He has no rales.   Abdominal: Soft. Bowel sounds are normal. He exhibits no distension. There is no tenderness.   Musculoskeletal: Normal range of motion. He exhibits no edema.   Neurological: He is alert and oriented to person, place, and time.   Skin: Skin is warm and dry.   Psychiatric: He has a normal mood and affect. His behavior is normal. Judgment and thought content normal.   Nursing note and vitals reviewed.    Laboratory Data:  Results from last 7 days   Lab Units 10/01/19  0300 09/28/19  0232   WBC 10*3/mm3 11.97* 12.75*   HEMOGLOBIN g/dL 14.7 13.7   PLATELETS 10*3/mm3 311 335     Results from last 7 days   Lab Units 10/01/19  0300 09/28/19  0232 09/27/19  0433   SODIUM mmol/L 141 138 137   POTASSIUM mmol/L 3.4* 3.6 3.5   BUN mg/dL 3* 7 7   CREATININE mg/dL 0.49* 0.76 0.58*     Results from last 7 days   Lab Units 09/30/19  0220 09/29/19  0220 09/28/19  0405   PH, ARTERIAL pH units 7.441 7.421 7.412   PCO2, ARTERIAL mm Hg 39.2 38.2 36.8   PO2 ART mm Hg 88.7 86.1 79.9*   FIO2 % 30 30 30     Respiratory Culture   Date Value Ref Range Status   09/27/2019 Light growth (2+) Normal Respiratory Esther  Final     Recent films:  Ct Angiogram Head With & Without Contrast    Result Date: 9/29/2019  CT Angiography Of The Neck With Intravenous Contrast 1. 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  2. Approximately 30% stenosis in the right carotid bulb. 3. Mild to moderate stenosis at the ostium of the right vertebral artery and mild stenosis at the ostium of the left vertebral artery.  CT Angiography Of The Head With Intravenous Contrast 1.   Continued occlusion of the left intracranial ICA with retrograde flow into the petrous segment from the contralateral ICA and the left P-comm. This report was finalized  on 09/29/2019 14:28 by Dr. Jo Garcia MD.    Ct Angiogram Neck With & Without Contrast    Result Date: 9/29/2019  CT Angiography Of The Neck With Intravenous Contrast 1. 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  2. Approximately 30% stenosis in the right carotid bulb. 3. Mild to moderate stenosis at the ostium of the right vertebral artery and mild stenosis at the ostium of the left vertebral artery.  CT Angiography Of The Head With Intravenous Contrast 1.   Continued occlusion of the left intracranial ICA with retrograde flow into the petrous segment from the contralateral ICA and the left P-comm. This report was finalized on 09/29/2019 14:28 by Dr. Jo Garcia MD.    Xr Chest 1 View    Result Date: 9/30/2019  Impression: 1.   No significant interval change since previous exam.   This report was finalized on 09/30/2019 07:19 by Dr. Jo Garcia MD.    Xr Abdomen Kub    Result Date: 9/29/2019  FINDINGS AND IMPRESSION: Enteric tube is in place with tip and side-port projecting over the left upper quadrant of the abdomen, presumably in the stomach. Visualized bowel gas pattern is nonobstructive. This report was finalized on 09/29/2019 22:01 by Dr. Mario Feldman MD.    Films reviewed personally by me.  My interpretation: None to review today  Pulmonary Assessment:  1. Agitation, suspect alcohol withdrawal  2. Left MCA stroke  3. Management of mechanical ventilation for airway protection  4. Tobacco abuse    Recommend:   · The patient extubated yesterday and is currently doing well on room air.  · Continue pulmonary hygiene measures.  · Pulmonary will sign off, call as needed    Electronically signed by YARELI Dubose, 10/01/19, 8:39 AM     Physician substantive contribution:  Pertinent symptoms/interval history include: off vent, not short of breath  Respiratory exam shows pertinent findings of:diminished breath sounds.  Plan includes: appears stable from pulm standpoint.  Mobilize.  Signing off.  Follow up prn.  I have seen and examined patient personally, performing a face-to-face diagnostic evaluation with plan of care reviewed and developed with APRN and nursing staff. I have addended and/or modified the above history of present illness, physical examination, and assessment and plan to reflect my findings and impressions. Essential elements of the care plan were discussed with APRN above.  Agree with findings and assessment/plan as documented above.    Electronically signed by Eleuterio Cervantes MD, on 10/1/2019, 9:16 AM

## 2019-10-01 NOTE — THERAPY EVALUATION
Acute Care - Occupational Therapy Initial Evaluation  [facility]     Patient Name: Jarod Ramírez Jr.  : 1960  MRN: [id number]  Today's Date: 10/1/2019  Onset of Illness/Injury or Date of Surgery: 19  Date of Referral to OT: 19  Referring Physician: Dr. Alas    Admit Date: 2019       ICD-10-CM ICD-9-CM   1. Dysphagia, unspecified type R13.10 787.20   2. Impaired mobility Z74.09 799.89   3. Impaired mobility and ADLs Z74.09 799.89   4. Aphasia R47.01 784.3     Patient Active Problem List   Diagnosis   • Degeneration of cervical intervertebral disc   • Closed fracture of lumbar vertebra (CMS/HCC)   • Degeneration of lumbar or lumbosacral intervertebral disc   • Smoker   • BMI 27.0-27.9,adult   • Acute cerebrovascular accident (CVA) of cerebellum (CMS/HCC)   • Alcoholism /alcohol abuse (CMS/HCC)   • CVA (cerebral vascular accident) (CMS/HCC)   • Hyperlipidemia     Past Medical History:   Diagnosis Date   • Hypertension      Past Surgical History:   Procedure Laterality Date   • CATARACT EXTRACTION Left    • WRIST FRACTURE SURGERY Left           OT ASSESSMENT FLOWSHEET (last 12 hours)      Occupational Therapy Evaluation     Row Name 10/01/19 0850                   OT Evaluation Time/Intention    Subjective Information  complains of  -JJ        Document Type  evaluation see MAR  -JJ        Mode of Treatment  occupational therapy;concurrent therapy  -JJ        Patient Effort  good  -JJ           General Information    Patient Profile Reviewed?  yes  -JJ        Onset of Illness/Injury or Date of Surgery  19  -JJ        Referring Physician  Dr. Alas  -JJ        Patient Observations  alert;cooperative;agree to therapy  -JJ        Patient/Family Observations  Brother present   -JJ        General Observations of Patient  fowlers in bed, IV infusing, alert.   -JJ        Prior Level of Function  independent:;all household mobility;community mobility;transfer;bed mobility;ADL's  -JJ         Equipment Currently Used at Home  none  -JJ        Pertinent History of Current Functional Problem  Pt presented with slurred speech and concerns for stroke. Pt MRI Brain: L MCA acute infarction mainly in the posterior distribution. Pt intubated on 9/28 and Extubated on 9/30. Dx: L MCA stroke, L ICA occlusion, alcohol withdrawls.   -JJ        Existing Precautions/Restrictions  fall  -JJ        Risks Reviewed  patient:;LOB;nausea/vomiting;increased discomfort;dizziness;lines disloged;increased drainage;change in vital signs  -JJ        Benefits Reviewed  patient:;improve function;increase independence;increase strength;increase balance;decrease pain;decrease risk of DVT;improve skin integrity;increase knowledge  -JJ           Resource/Environmental Concerns    Current Living Arrangements  home/apartment/condo  -JJ        Resource/Environmental Concerns  none  -JJ           Cognitive Assessment/Interventions    Additional Documentation  Cognitive Assessment/Intervention (Group)  -JJ           Cognitive Assessment/Intervention- PT/OT    Affect/Mental Status (Cognitive)  confused  -JJ        Orientation Status (Cognition)  oriented to;person;place  -JJ        Follows Commands (Cognition)  WFL;follows one step commands;50-74% accuracy;increased processing time needed;physical/tactile prompts required;repetition of directions required;verbal cues/prompting required;delayed response/completion  -JJ        Cognitive Function (Cognitive)  memory deficit;safety deficit  -JJ        Safety Deficit (Cognitive)  ability to follow commands;at risk behavior observed;awareness of need for assistance;insight into deficits/self awareness;safety precautions awareness;safety precautions follow-through/compliance;mild deficit;moderate deficit  -JJ        Personal Safety Interventions  fall prevention program maintained;gait belt;muscle strengthening facilitated;nonskid shoes/slippers when out of bed;supervised activity;elopement  precautions initiated  -           Safety Issues, Functional Mobility    Safety Issues Affecting Function (Mobility)  at risk behavior observed;awareness of need for assistance;ability to follow commands;insight into deficits/self awareness;safety precaution awareness;safety precautions follow-through/compliance;problem solving;impulsivity  -        Impairments Affecting Function (Mobility)  balance;endurance/activity tolerance;strength;postural/trunk control;sensation/sensory awareness;coordination;cognition  -           Bed Mobility Assessment/Treatment    Bed Mobility Assessment/Treatment  supine-sit  -        Supine-Sit [place] (Bed Mobility)  supervision  -        Bed Mobility, Safety Issues  cognitive deficits limit understanding  -        Assistive Device (Bed Mobility)  head of bed elevated  -           Functional Mobility    Functional Mobility- Ind. Level  contact guard assist;2 person assist required  -        Functional Mobility- Safety Issues  balance decreased during turns;step length decreased  -        Functional Mobility- Comment  bed into hallway and back to chair  -           Transfer Assessment/Treatment    Transfer Assessment/Treatment  sit-stand transfer;stand-sit transfer  -           Sit-Stand Transfer    Sit-Stand [place] (Transfers)  contact guard  -           Stand-Sit Transfer    Stand-Sit [place] (Transfers)  contact guard  -           ADL Assessment/Intervention    BADL Assessment/Intervention  lower body dressing;toileting  -           Lower Body Dressing Assessment/Training    Lower Body Dressing [place] Level  don;socks;independent  -        Lower Body Dressing Position  edge of bed sitting  -        Comment (Lower Body Dressing)  Pt required multiple verbal and tacile cues and increased processing time to complete task. Multiple verbal cues for attention to task, very easily distracted.   -           Toileting Assessment/Training     [place] Level (Toileting)  perform perineal hygiene;maximum assist (25% patient effort)  -        Toileting Position  unsupported standing  -J           BADL Safety/Performance    Impairments, BADL Safety/Performance  balance;cognition;endurance/activity tolerance;coordination;strength;shortness of breath;trunk/postural control  -        Cognitive Impairments, BADL Safety/Performance  awareness, need for assistance;insight into deficits/self awareness;safety precaution awareness;safety precaution follow-through  -        Skilled BADL Treatment/Intervention  BADL process/adaptation training  -           General ROM    GENERAL ROM COMMENTS  BUE AROM WFL  -J           MMT (Manual Muscle Testing)    General MMT Comments  BUE strength 4+/5  -           Motor Assessment/Interventions    Additional Documentation  Balance (Group);Fine Motor Testing & Training (Group);Gross Motor Coordination (Group);Writing Assessment/Intervention (Group)  -           Gross Motor Coordination    Gross Motor Impairments  rapid alternating;finger to nose  -        Gross Motor Skill, Impairments Detail  AISSATOU: B intact, Finger to Nose: Pt unable to complete task with demo and [place] assist.   -           Balance    Balance  static sitting balance;static standing balance  -           Static Sitting Balance    Level of [place] (Unsupported Sitting, Static Balance)  contact guard assist  -        Sitting Position (Unsupported Sitting, Static Balance)  sitting on edge of bed  -        Time Able to Maintain Position (Unsupported Sitting, Static Balance)  more than 5 minutes  -           Static Standing Balance    Level of [place] (Supported Standing, Static Balance)  contact guard assist;2 person assist  -        Time Able to Maintain Position (Supported Standing, Static Balance)  2 to 3 minutes  -        Assistive Device Utilized (Supported Standing, Static Balance)  -- HHAx1  -           Fine Motor  Testing & Training    Fine Motor Tests  -- Finger Opposition: B intact  -JJ           Writing Assessment/Intervention    Comment (Writing)  Able to write name on second attempt, on first attempt, pt unable to follow commands to write name and monique circles on line provided for name.   -JJ           Sensory Assessment/Intervention    Sensory General Assessment  light touch sensation deficits identified;pain sensation deficits identified  -JJ           Light Touch Sensation Assessment    Left Upper Extremity: Light Touch Sensation Assessment  intact  -JJ        Right Upper Extremity: Light Touch Sensation Assessment  intact  -JJ           Pain Sensation Assessment    Left Upper Extremity: Pain Sensation Assessment  intact  -JJ        Right Upper Extremity: Pain Sensation Assessment  intact  -JJ           Positioning and Restraints    Pre-Treatment Position  in bed  -JJ        Post Treatment Position  chair  -JJ        In Chair  sitting;notified nsg;call light within reach;encouraged to call for assist;exit alarm on;with family/caregiver  -J           Pain Assessment    Additional Documentation  Pain Scale: Numbers Pre/Post-Treatment (Group)  -JJ           Pain Scale: Numbers Pre/Post-Treatment    Pain Scale: Numbers, Pretreatment  0/10 - no pain  -JJ        Pain Scale: Numbers, Post-Treatment  0/10 - no pain  -JJ        Pain Intervention(s)  Repositioned;Ambulation/increased activity;Medication (See MAR)  -JJ           Wound 09/27/19 0800 Right posterior finger    Wound - Properties Group Date first assessed: 09/27/19  -NW Time first assessed: 0800  -NW Present on Hospital Admission: Y  -NW Side: Right  -NW Orientation: posterior  -NW Location: finger  -NW       Plan of Care Review    Plan of Care Reviewed With  patient  -JJ           Clinical Impression (OT)    Date of Referral to OT  09/26/19  -JJ        OT Diagnosis  impaired adls and mobility  -JJ        Prognosis (OT Eval)  good  -JJ        Patient/Family Goals  Statement (OT Eval)  return to PLOF  -JJ        Criteria for Skilled Therapeutic Interventions Met (OT Eval)  yes;treatment indicated  -JJ        Rehab Potential (OT Eval)  good, to achieve stated therapy goals  -JJ        Therapy Frequency (OT Eval)  daily  -JJ        Predicted Duration of Therapy Intervention (Therapy Eval)  10 dasy  -JJ        Care Plan Review (OT)  evaluation/treatment results reviewed;care plan/treatment goals reviewed;risks/benefits reviewed;current/potential barriers reviewed;patient/other agree to care plan  -JJ        Anticipated Discharge Disposition (OT)  inpatient rehabilitation facility  -JJ           Vital Signs    Pre Systolic BP Rehab  156  -JJ        Pre Treatment Diastolic BP  87  -JJ        Post Systolic BP Rehab  142  -JJ        Post Treatment Diastolic BP  80  -JJ        Pretreatment Heart Rate (beats/min)  78  -JJ        Posttreatment Heart Rate (beats/min)  83  -JJ        Pre SpO2 (%)  91  -JJ        O2 Delivery Pre Treatment  room air  -JJ        Post SpO2 (%)  92  -JJ        O2 Delivery Post Treatment  room air  -JJ        Pre Patient Position  Supine  -JJ        Intra Patient Position  Standing  -JJ        Post Patient Position  Sitting  -JJ           Planned OT Interventions    Planned Therapy Interventions (OT Eval)  activity tolerance training;BADL retraining;functional balance retraining;occupation/activity based interventions;neuromuscular control/coordination retraining;patient/caregiver education/training;strengthening exercise;transfer/mobility retraining  -JJ           OT Goals    Dressing Goal Selection (OT)  dressing, OT goal 1  -JJ        Toileting Goal Selection (OT)  toileting, OT goal 1  -JJ        Balance Goal Selection (OT)  balance, OT goal 1  -JJ        Additional Documentation  Balance Goal Selection (OT) (Row)  -JJ           Dressing Goal 1 (OT)    Activity/Assistive Device (Dressing Goal 1, OT)  dressing skills, all  -JJ        Time Frame (Dressing  Goal 1, OT)  long term goal (LTG);by discharge  -JJ        Progress/Outcome (Dressing Goal 1, OT)  goal ongoing  -JJ           Toileting Goal 1 (OT)    Activity/Device (Toileting Goal 1, OT)  toileting skills, all;commode  -JJ        [place] Level/Cues Needed (Toileting Goal 1, OT)  independent  -JJ        Time Frame (Toileting Goal 1, OT)  long term goal (LTG);by discharge  -JJ        Progress/Outcome (Toileting Goal 1, OT)  goal ongoing  -JJ           Balance Goal 1 (OT)    Activity/Assistive Device (Balance Goal 1, OT)  sitting, dynamic;standing, dynamic;standing, static  -JJ        [place] Level/Cues Needed (Balance Goal 1, OT)  standby assist  -JJ        Time Frame (Balance Goal 1, OT)  long term goal (LTG);by discharge  -JJ        Progress/Outcomes (Balance Goal 1, OT)  goal ongoing  -J          User Key  (r) = Recorded By, (t) = Taken By, (c) = Cosigned By    Initials Name Effective Dates    NW Dunia Multani RN 07/12/18 -     JChristina Marie OTR/L 10/12/18 -          Occupational Therapy Education     Title: PT OT SLP Therapies (In Progress)     Topic: Occupational Therapy (In Progress)     Point: ADL training (Done)     Description: Instruct learner(s) on proper safety adaptation and remediation techniques during self care or transfers.   Instruct in proper use of assistive devices.    Learning Progress Summary           Patient Acceptance, E, VU by YUDI at 10/1/2019 10:40 AM    Comment:  OT POC, adls, t/fs, bed mobility, safety/environmental modifications, processing, d/c planning.                   Point: Body mechanics (Done)     Description: Instruct learner(s) on proper positioning and spine alignment during self-care, functional mobility activities and/or exercises.    Learning Progress Summary           Patient Acceptance, E, VU by YUDI at 10/1/2019 10:40 AM    Comment:  OT POC, adls, t/fs, bed mobility, safety/environmental modifications, processing, d/c planning.                                User Key     Initials Effective Dates Name Provider Type Discipline     10/12/18 -  Christina Dahl OTR/L Occupational Therapist OT                  OT Recommendation and Plan  Outcome Summary/Treatment Plan (OT)  Anticipated Discharge Disposition (OT): inpatient rehabilitation facility  Planned Therapy Interventions (OT Eval): activity tolerance training, BADL retraining, functional balance retraining, occupation/activity based interventions, neuromuscular control/coordination retraining, patient/caregiver education/training, strengthening exercise, transfer/mobility retraining  Therapy Frequency (OT Eval): daily  Plan of Care Review  Plan of Care Reviewed With: patient, sibling  Plan of Care Reviewed With: patient, sibling  Outcome Summary: OT eval completed. Pt is A&O to person and place only. He is able to follow simple one-step commands approx 50% of the time with increased processing time, repetition of directions, verbal/tactile cues. Supervision for supine to sit at EOB. Multiple verbal cues for donning socks while seated at EOB. Pt is easily distractible and requires cues for attention to task, especially in loud and and visually busy environments. BUE strength 4+/5. Pt was able to complete finger oppposition B intact. Unable to follow commands to complete finger to nose GM testing, even with demo and [place] assist, but able to complete AISSATOU GM testing, B intact. CGA for sit <> stand t/f from EOB and all functional mobility. Demo's a slight R lateral lean during mobility that pt is aware of but unable to correct. Pt would benefit from skilled OT services to address decreased balance, coordination, postural control, and cognition. Pt would benefit from skilled OT services to address these deficits. Pt would benefit from acute IP rehab for continued OT, PT and SLP services.     Outcome Measures     Row Name 10/01/19 1000             How much help from another is currently needed...    Putting  on and taking off regular lower body clothing?  3  -JJ      Bathing (including washing, rinsing, and drying)  3  -JJ      Toileting (which includes using toilet bed pan or urinal)  3  -JJ      Putting on and taking off regular upper body clothing  3  -JJ      Taking care of personal grooming (such as brushing teeth)  3  -JJ      Eating meals  3  -JJ      AM-PAC 6 Clicks Score (OT)  18  -J         Functional Assessment    Outcome Measure Options  AM-PAC 6 Clicks Daily Activity (OT)  -        User Key  (r) = Recorded By, (t) = Taken By, (c) = Cosigned By    Initials Name Provider Type    Christina Oliver OTR/L Occupational Therapist          Time Calculation:   Time Calculation- OT     Row Name 10/01/19 1033             Time Calculation- OT    OT Start Time  0850 add 11 minutes for chart review   -      OT Stop Time  0958  -      OT Time Calculation (min)  68 min  -      Total Timed Code Minutes- OT  19 minute(s)  -      OT Received On  10/01/19  -      OT Goal Re-Cert Due Date  10/11/19  -        User Key  (r) = Recorded By, (t) = Taken By, (c) = Cosigned By    Initials Name Provider Type    Christina Oliver OTR/L Occupational Therapist        Therapy Charges for Today     Code Description Service Date Service Provider Modifiers Qty    [id number]  OT EVAL MOD COMPLEXITY 4 10/1/2019 Christina Dahl OTR/L GO 1    [id number]  OT SELF CARE/MGMT/TRAIN EA 15 MIN 10/1/2019 Christina Dahl OTR/L GO 1               SANDI Trujillo/KIKO  10/1/2019

## 2019-10-01 NOTE — PROGRESS NOTES
[facility] Medicine Services  INPATIENT PROGRESS NOTE    Length of Stay: 5  Date of Admission: 9/26/2019  Primary Care Physician: Jose Karimi MD    Subjective   Chief Complaint: Respiratory failure/CVA/alcohol withdrawal?    HPI   Patient is extubated yesterday.  Patient still very aphasic.  Patient denies any chest pain.  Patient is currently not requiring oxygen.    Review of Systems   Constitutional: Positive for activity change, appetite change and fatigue. Negative for chills and fever.   HENT: Negative for hearing loss, nosebleeds, tinnitus and trouble swallowing.    Eyes: Negative for visual disturbance.   Respiratory: Positive for shortness of breath. Negative for cough, chest tightness and wheezing.    Cardiovascular: Negative for chest pain, palpitations and leg swelling.   Gastrointestinal: Negative for abdominal distention, abdominal pain, blood in stool, constipation, diarrhea, nausea and vomiting.   Endocrine: Negative for cold intolerance, heat intolerance, polydipsia, polyphagia and polyuria.   Genitourinary: Negative for decreased urine volume, difficulty urinating, dysuria, flank pain, frequency and hematuria.   Musculoskeletal: Positive for arthralgias, gait problem and myalgias. Negative for joint swelling.   Skin: Negative for rash.   Allergic/Immunologic: Negative for immunocompromised state.   Neurological: Positive for weakness. Negative for dizziness, syncope, light-headedness and headaches.   Hematological: Negative for adenopathy. Does not bruise/bleed easily.   Psychiatric/Behavioral: Positive for confusion. Negative for sleep disturbance. The patient is not nervous/anxious.           All pertinent negatives and positives are as above. All other systems have been reviewed and are negative unless otherwise stated.     Objective    Temp:  [98.3 °F (36.8 °C)-99 °F (37.2 °C)] 98.3 °F (36.8 °C)  Heart Rate:  [47-87] 87  Resp:  [10-27] 21  BP:  (112-205)/() 148/73  FiO2 (%):  [30 %] 30 %    Intake/Output Summary (Last 24 hours) at 10/1/2019 0756  Last data filed at 10/1/2019 0600  Gross per 24 hour   Intake 1524.49 ml   Output 2850 ml   Net -1325.51 ml     Physical Exam   Constitutional: He appears well-developed.   HENT:   Head: Normocephalic.   Eyes: Conjunctivae are normal. Pupils are equal, round, and reactive to light.   Neck: Neck supple. No JVD present. No thyromegaly present.   Cardiovascular: Normal rate, regular rhythm, normal heart sounds and intact distal pulses. Exam reveals no gallop and no friction rub.   No murmur heard.  Pulmonary/Chest: Effort normal. No respiratory distress. He has wheezes. He has no rales. He exhibits no tenderness.   Rhonchi bilateral.  Good air movement.   Abdominal: Soft. Bowel sounds are normal. He exhibits no distension. There is no tenderness. There is no rebound and no guarding.   Musculoskeletal: He exhibits no edema, tenderness or deformity.   Lymphadenopathy:     He has no cervical adenopathy.   Neurological: He is alert. He displays normal reflexes. No cranial nerve deficit. He exhibits abnormal muscle tone. Coordination abnormal.   Skin: Skin is warm and dry. Capillary refill takes 2 to 3 seconds. No rash noted.   Psychiatric: He has a normal mood and affect. His behavior is normal.   Nursing note and vitals reviewed.      Results Review:  Lab Results (last 24 hours)     Procedure Component Value Units Date/Time    Comprehensive Metabolic Panel [[id number]]  (Abnormal) Collected:  10/01/19 0300    Specimen:  Blood Updated:  10/01/19 0341     Glucose 95 mg/dL      BUN 3 mg/dL      Creatinine 0.49 mg/dL      Sodium 141 mmol/L      Potassium 3.4 mmol/L      Chloride 101 mmol/L      CO2 27.0 mmol/L      Calcium 8.8 mg/dL      Total Protein 6.8 g/dL      Albumin 3.50 g/dL      ALT (SGPT) 27 U/L      AST (SGOT) 32 U/L      Alkaline Phosphatase 76 U/L      Total Bilirubin 0.8 mg/dL      eGFR Non  Amer  >150 mL/min/1.73      Globulin 3.3 gm/dL      A/G Ratio 1.1 g/dL      BUN/Creatinine Ratio 6.1     Anion Gap 13.0 mmol/L     Narrative:       GFR Normal >60  Chronic Kidney Disease <60  Kidney Failure <15    CBC & Differential [[id number]] Collected:  10/01/19 0300    Specimen:  Blood Updated:  10/01/19 0322    Narrative:       The following orders were created for panel order CBC & Differential.  Procedure                               Abnormality         Status                     ---------                               -----------         ------                     CBC Auto Differential[[id number]]        Abnormal            Final result                 Please view results for these tests on the individual orders.    CBC Auto Differential [[id number]]  (Abnormal) Collected:  10/01/19 0300    Specimen:  Blood Updated:  10/01/19 0322     WBC 11.97 10*3/mm3      RBC 4.55 10*6/mm3      Hemoglobin 14.7 g/dL      Hematocrit 42.3 %      MCV 93.0 fL      MCH 32.3 pg      MCHC 34.8 g/dL      RDW 13.2 %      RDW-SD 45.4 fl      MPV 9.7 fL      Platelets 311 10*3/mm3      Neutrophil % 73.0 %      Lymphocyte % 16.8 %      Monocyte % 7.5 %      Eosinophil % 1.8 %      Basophil % 0.5 %      Immature Grans % 0.4 %      Neutrophils, Absolute 8.74 10*3/mm3      Lymphocytes, Absolute 2.01 10*3/mm3      Monocytes, Absolute 0.90 10*3/mm3      Eosinophils, Absolute 0.21 10*3/mm3      Basophils, Absolute 0.06 10*3/mm3      Immature Grans, Absolute 0.05 10*3/mm3      nRBC 0.0 /100 WBC            Cultures:  Respiratory Culture   Date Value Ref Range Status   09/27/2019 Light growth (2+) Normal Respiratory Esther  Final       Radiology Data:    Imaging Results (last 24 hours)     ** No results found for the last 24 hours. **          No Known Allergies    Scheduled meds:     aspirin 81 mg Oral Daily   Or      aspirin 300 mg Rectal Daily   atorvastatin 80 mg Oral Nightly   chlorhexidine 15 mL Mouth/Throat Q12H   famotidine 20 mg Intravenous  Q12H   ipratropium-albuterol 3 mL Nebulization 4x Daily - RT   IV Fluids 1000 mL + additives 75 mL/hr Intravenous Daily   sodium chloride 10 mL Intravenous Q12H       PRN meds:  •  albuterol  •  hypromellose  •  labetalol  •  LORazepam **OR** LORazepam **OR** LORazepam **OR** LORazepam **OR** LORazepam **OR** LORazepam  •  sodium chloride    Assessment/Plan       Degeneration of lumbar or lumbosacral intervertebral disc    Smoker    Acute cerebrovascular accident (CVA) of cerebellum (CMS/[facility])    Alcoholism /alcohol abuse (CMS/[facility])    CVA (cerebral vascular accident) (CMS/[facility])    Hyperlipidemia      Plan:  Respiratory failure.  Resolved.  Extubated 19.     CVA. Left MCA stroke. Continue aspirin.  Continue Lipitor. Neurology consult.   CTA of the head and neck- complete occlusion of the entire cervical left ICA, 30% stenosis of the right carotid bulb, mild stenosis at the ostium of the left vertebral artery.  MRI of the head- acute infarct in the left MCA and watershed territory- no hemorrhagic conversion, abnormal left ICA flow void.  Echocardiogram- ejection fraction 61%, diastolic dysfunction grade 1.    Hypokalemia- Po potassium once speech clear.     Reflux.  Pepcid and Zofran as needed     COPD.  Continue duo nebs.     Alcohol withdrawal?.  Ciwa protocol.  Patient stated last has drink about 2 weeks ago.     Hyperlipidemia.  Continue Lipitor.     Tobacco abuse.  Tobacco counseling.     Urinary incontinence.    DC [facility] cath 2019.     SCD.     Nutrition.  Consult nutritionist for tube feeding.  Continue banana bag.     Deconditioning.  PT and OT consult.     Discharge Plannin to 3 days. Transfer from [facility].  Plan to transfer the medical floor today.  Plan for  to evaluate for power of .    Candido Alas MD   10/01/19   7:56 AM

## 2019-10-01 NOTE — PLAN OF CARE
Problem: Patient Care Overview  Goal: Plan of Care Review  Outcome: Ongoing (interventions implemented as appropriate)   10/01/19 1511   Coping/Psychosocial   Plan of Care Reviewed With patient;sibling   Plan of Care Review   Progress no change   OTHER   Outcome Summary Pt has been NPO now for 5 days. He is no longer on the vent. He is to have a dysphagia study done today per ST recommendations. If pt is unable to be started safely on a po diet, he would benefit from alternate means of nutrition support. Will cont to follow for ST recommendations and nutrition POC.       Problem: Nutrition, Imbalanced: Inadequate Oral Intake (Adult)  Goal: Identify Related Risk Factors and Signs and Symptoms  Outcome: Outcome(s) achieved Date Met: 10/01/19    Goal: Improved Oral Intake  Outcome: Ongoing (interventions implemented as appropriate)    Goal: Prevent Further Weight Loss  Outcome: Ongoing (interventions implemented as appropriate)

## 2019-10-01 NOTE — MBS/VFSS/FEES
Acute Care - Speech Language Pathology   Swallow Initial Evaluation [facility]     Patient Name: Jarod Ramírez Jr.  : 1960  MRN: [id number]  Today's Date: 10/1/2019  Onset of Illness/Injury or Date of Surgery: 19     Referring Physician: Dr. Alas      Admit Date: 2019  Flexible endoscopic evaluation of the swallow completed at bedside. Scope was passed through pt's right nare with no difficulty. He appeared to have generalized pharyngeal edema. He was also noted to have structural abnormalities at the posterior aspect of the true vocal folds. Pudding, honey, nectar, and thin consistencies were presented. Pt appeared to have decreased velopharyngeal closure and base of tongue retraction. He had mild to moderate residue in the vallecula with honey thick after the swallow. He had premature loss to the vallecula with nectar secondary to decreased back of tongue control. He had possible laryngeal penetration and aspiration of thin during the swallow as there appeared to be residue on the vocal folds after the swallow. He had definite laryngeal penetration of pudding thick during the swallow as residue was observed in the vestibule after the swallow. The residue then fell further to the vocal folds and the pt appeared to have possible aspiration of pudding resiude after the swallow.   Recommend:  1. Continuing NPO and considering Dobhoff placement for nutrition.   2. Meds via alternate route.   3. May consider ENT consult due to abnormality at posterior true vocal folds.   4. SLP will continue to follow and treat.   Georgi Hernandez, CCC-SLP 10/1/2019 4:25 PM    Visit Dx:     ICD-10-CM ICD-9-CM   1. Dysphagia, unspecified type R13.10 787.20   2. Impaired mobility Z74.09 799.89   3. Impaired mobility and ADLs Z74.09 799.89   4. Aphasia R47.01 784.3     Patient Active Problem List   Diagnosis   • Degeneration of cervical intervertebral disc   • Closed fracture of lumbar vertebra (CMS/HCC)   •  Degeneration of lumbar or lumbosacral intervertebral disc   • Smoker   • BMI 27.0-27.9,adult   • Acute cerebrovascular accident (CVA) of cerebellum (CMS/[facility])   • Alcoholism /alcohol abuse (CMS/[facility])   • CVA (cerebral vascular accident) (CMS/[facility])   • Hyperlipidemia     Past Medical History:   Diagnosis Date   • Hypertension      Past Surgical History:   Procedure Laterality Date   • CATARACT EXTRACTION Left    • WRIST FRACTURE SURGERY Left         SWALLOW EVALUATION (last 72 hours)      [facility] Adult Swallow Evaluation     Row Name 10/01/19 1446 09/30/19 1430                Rehab Evaluation    Document Type  evaluation  -MB  re-evaluation  -MM       Subjective Information  no complaints  -MB  no complaints  -MM       Patient Observations  alert;cooperative  -MB  alert;cooperative;agree to therapy  -MM       Patient/Family Observations  No family present  -MB  Brother present.  -MM       Patient Effort  --  good  -MM       Symptoms Noted During/After Treatment  --  none  -MM          General Information    Patient Profile Reviewed  yes  -MB  yes  -MM       Pertinent History Of Current Problem  MRI: Acute infarct in the left MCA , alcohol abuse, HTN  -MB  MRI: Acute infarct in the left MCA , alcohol abuse, HTN  -MM       Current Method of Nutrition  NPO  -MB  NPO  -MM       Precautions/Limitations, Vision  WFL with corrective lenses  -MB  -- Unable to fully assess but shakes head to RN when asked   -MM       Precautions/Limitations, Hearing  WFL;for purposes of eval  -MB  WFL;for purposes of eval  -MM       Prior Level of Function-Communication  WFL  -MB  WFL  -MM       Prior Level of Function-Swallowing  no diet consistency restrictions  -MB  no diet consistency restrictions  -MM       Plans/Goals Discussed with  patient  -MB  patient;family;other (see comments);agreed upon LONI Bansal  -VANDANA       Barriers to Rehab  -- aphasia  -MB  cognitive status  -MM       Patient's Goals for Discharge  patient did not state  -MB   patient did not state  -MM       Family Goals for Discharge  --  patient able to return to all previous activities/roles  -MM          Pain Assessment    Additional Documentation  --  Pain Scale: FACES Pre/Post-Treatment (Group)  -MM          Pain Scale: FACES Pre/Post-Treatment    Pain: FACES Scale, Pretreatment  0-->no hurt  -MB  0-->no hurt  -MM       Pain: FACES Scale, Post-Treatment  --  0-->no hurt  -MM          Oral Motor and Function    Dentition Assessment  --  -- Patient would not open mouth to visualize teeth  -MM       Secretion Management  --  wet vocal quality s/p extubation   -MM       Mucosal Quality  --  sticky  -MM       Volitional Swallow  --  unable to elicit  -MM       Volitional Cough  --  weak  -MM          Oral Musculature and Cranial Nerve Assessment    Oral Motor General Assessment  --  generalized oral motor weakness  -MM          General Eating/Swallowing Observations    Respiratory Support Currently in Use  --  nasal cannula  -MM       Eating/Swallowing Skills  --  fed by SLP  -MM       Positioning During Eating  --  upright in bed  -MM       Utensils Used  --  spoon  -MM       Consistencies Trialed  --  -- ice chip   -MM          Clinical Swallow Eval    Oral Prep Phase  --  -- not fully assessed  -MM       Oral Transit  --  impaired  -MM       Oral Residue  --  -- not fully assessed  -MM       Pharyngeal Phase  --  suspected pharyngeal impairment  -MM       Esophageal Phase  --  -- Not fully assessed   -MM       Clinical Swallow Evaluation Summary  --  Clinical bedside swallow evaluation completed. Patient is s/p extubation. Intubated since Friday. Acute CVA. Able to follow simple commands and whispers some answers and statements appropriately. Productive cough. Weak vocal quality. Lingual ROM WFL. The patient completed 1 ice chip with weak throat clearing behavior. No further PO trials were given due to weak vocal quality and patient becoming more lethargic. SLP recommends: 1) NPO 2)  Meds via alternate route 3) OK for ice chips with RN following oral care. SLP will reassess in AM.   -MM          Oral Transit Concerns    Oral Transit Concerns  --  delayed initiation of bolus transit  -MM       Delayed Intiation of Bolus Transit  --  -- ice chip   -MM          Pharyngeal Phase Concerns    Pharyngeal Phase Concerns  --  wet vocal quality;multiple swallows  -MM       Wet Vocal Quality  --  -- ice chip   -MM       Multiple Swallows  --  other (see comments) ice chip and secretions   -MM          Fiberoptic Endoscopic Evaluation of Swallowing (FEES)    Risks/Benefits Reviewed  risks/benefits explained;patient  -MB  --       Nasal Entry  right:  -MB  --          Anatomy and Physiology    Anatomic Considerations  edema;vocal folds  -MB  --       Comment  generalized edema, structural abnormality at posterior vocal folds  -MB  --       Velopharyngeal  reduced movement  -MB  --       Base of Tongue  symmetrical;range reduced  -MB  --       Epiglottis  edematous  -MB  --       Laryngeal Function Breathing  decreased movement right  -MB  --       Laryngeal Function Phonation  CNA  -MB  --       Laryngeal Function to Breath Hold  CNA  -MB  --       Secretion Rating Scale (Nikolas et al. 1996)  3- secretions inside the laryngeal vestibule/aspiration, not cleared  -MB  --       Secretion Description  thick;clear  -MB  --       Spontaneous Swallow  CNA  -MB  --       Sensory  reduced sensation  -MB  --       Utensils Used  Spoon;Straw  -MB  --       Consistencies Trialed  thin liquids;nectar-thick liquids;honey-thick liquids;pudding/puree  -MB  --          FEES Interpretation    Oral Phase  tongue pumping;prespill of liquids into pharynx  -MB  --          Initiation of Pharyngeal Swallow    Initiation of Pharyngeal Swallow  bolus in valleculae  -MB  --       Pharyngeal Phase  impaired pharyngeal phase of swallowing  -MB  --       Penetration During the Swallow  pudding/puree  -MB  --       Aspiration After the  Swallow  pudding/puree  -MB  --       FEES Summary  Scope was passed through pt's right nare with no difficulty. He appeared to have generalized pharyngeal edema. He was also noted to have structural abnormalities at the posterior aspect of the true vocal folds. Pudding, honey, nectar, and thin consistencies were presented. Pt appeared to have decreased velopharyngeal closure and base of tongue retraction. He had mild to moderate residue in the vallecula with honey thick after the swallow. He had premature loss to the vallecula with nectar secondary to decreased back of tongue control. He had possible laryngeal penetration and aspiration of thin during the swallow as there appeared to be residue on the vocal folds after the swallow. He had definite laryngeal penetration of pudding thick during the swallow as residue was observed in the vestibule after the swallow. The residue then fell further to the vocal folds and the pt appeared to have possible aspiration of pudding resiude after the swallow.   -MB  --          Clinical Impression    SLP Swallowing Diagnosis  mod-severe;pharyngeal dysfunction  -MB  suspected pharyngeal dysfunction  -MM       Functional Impact  risk of aspiration/pneumonia;risk of malnutrition;risk of dehydration  -MB  risk of aspiration/pneumonia;risk of malnutrition;risk of dehydration  -MM       Rehab Potential/Prognosis, Swallowing  adequate, monitor progress closely  -MB  re-evaluate goals as necessary  -MM       Swallow Criteria for Skilled Therapeutic Interventions Met  demonstrates skilled criteria  -MB  demonstrates skilled criteria  -MM          Recommendations    Therapy Frequency (Swallow)  at least;3 days per week  -MB  at least;3 days per week  -MM       Predicted Duration Therapy Intervention (Days)  until discharge  -MB  until discharge  -MM       SLP Diet Recommendation  NPO;ice chips between meals after oral care, with supervision;temporary alternate methods of  nutrition/hydration  -MB  NPO;ice chips between meals after oral care, with supervision  -MM       Recommended Diagnostics  --  reassess via clinical swallow evaluation  -MM       Recommended Precautions and Strategies  upright posture during/after eating  -MB  upright posture during/after eating  -MM       SLP Rec. for Method of Medication Administration  meds via alternate route  -MB  meds via alternate route  -MM       Monitor for Signs of Aspiration  --  notify SLP if any concerns  -MM       Anticipated Dischage Disposition  inpatient rehabilitation facility  -MB  unknown  -MM          Swallow Goals (SLP)    Oral Nutrition/Hydration Goal Selection (SLP)  oral nutrition/hydration, SLP goal 1  -MB  oral nutrition/hydration, SLP goal 1  -MM       Lingual Strengthening Goal Selection (SLP)  lingual strengthening, SLP goal 1  -MB  --       Pharyngeal Strengthening Exercise Goal Selection (SLP)  pharyngeal strengthening exercise, SLP goal 1  -MB  --       Additional Documentation  lingual strengthening goal selection (SLP);pharyngeal strengthening exercise goal selection (SLP)  -MB  --          Oral Nutrition/Hydration Goal 1 (SLP)    Oral Nutrition/Hydration Goal 1, SLP  LTG: Patient will tolerate LRD without s/s of aspiration.  -MB  LTG: Patient will tolerate LRD without s/s of aspiration.  -MM       Time Frame (Oral Nutrition/Hydration Goal 1, SLP)  by discharge  -MB  by discharge  -MM       Barriers (Oral Nutrition/Hydration Goal 1, SLP)  Aphasia  -MB  n/a  -MM       Progress/Outcomes (Oral Nutrition/Hydration Goal 1, SLP)  goal ongoing  -MB  goal ongoing  -MM          Lingual Strengthening Goal 1 (SLP)    Activity (Lingual Strengthening Goal 1, SLP)  increase tongue back strength  -MB  --       Increase Tongue Back Strength  lingual movement exercises  -MB  --       Rock/Accuracy (Lingual Strengthening Goal 1, SLP)  independently (over 90% accuracy)  -MB  --       Time Frame (Lingual Strengthening Goal 1,  SLP)  short term goal (STG);by discharge  -MB  --       Barriers (Lingual Strengthening Goal 1, SLP)  n/a  -MB  --       Progress/Outcomes (Lingual Strengthening Goal 1, SLP)  goal ongoing  -MB  --          Pharyngeal Strengthening Exercise Goal 1 (SLP)    Activity (Pharyngeal Strengthening Goal 1, SLP)  increase epiglottic inversion and retroflexion;increase squeeze/positive pressure generation;increase tongue base retraction  -MB  --       Increase Epiglottic Inversion and Retroflexion  Mendelsohn  -MB  --       Increase Squeeze/Positive Pressure Generation  hard effortful swallow  -MB  --       Increase Tongue Base Retraction  yoni  -MB  --       [place]arlan/Accuracy (Pharyngeal Strengthening Goal 1, SLP)  independently (over 90% accuracy)  -MB  --       Time Frame (Pharyngeal Strengthening Goal 1, SLP)  short term goal (STG);by discharge  -MB  --       Barriers (Pharyngeal Strengthening Goal 1, SLP)  n/a  -MB  --       Progress/Outcomes (Pharyngeal Strengthening Goal 1, SLP)  goal ongoing  -MB  --         User Key  (r) = Recorded By, (t) = Taken By, (c) = Cosigned By    Initials Name Effective Dates    Georgi Garcia, CCC-SLP 08/02/16 -     MM Mayuri Alcazar, MS CCC-SLP 05/24/19 -           EDUCATION  The patient has been educated in the following areas:   Dysphagia (Swallowing Impairment).    SLP Recommendation and Plan  SLP Swallowing Diagnosis: mod-severe, pharyngeal dysfunction  SLP Diet Recommendation: NPO, ice chips between meals after oral care, with supervision, temporary alternate methods of nutrition/hydration  Recommended Precautions and Strategies: upright posture during/after eating  SLP Rec. for Method of Medication Administration: meds via alternate route           Swallow Criteria for Skilled Therapeutic Interventions Met: demonstrates skilled criteria  Anticipated Dischage Disposition: inpatient rehabilitation facility  Rehab Potential/Prognosis, Swallowing: adequate, monitor  progress closely  Therapy Frequency (Swallow): at least, 3 days per week  Predicted Duration Therapy Intervention (Days): until discharge       Plan of Care Reviewed With: patient, other (see comments)(Nurse, Wendy)  Outcome Summary: Flexible endoscopic evaluation of the swallow completed at bedside. Scope was passed through pt's right nare with no difficulty. He appeared to have generalized pharyngeal edema. He was also noted to have structural abnormalities at the posterior aspect of the true vocal folds. Pudding, honey, nectar, and thin consistencies were presented. Pt appeared to have decreased velopharyngeal closure and base of tongue retraction. He had mild to moderate residue in the vallecula with honey thick after the swallow. He had premature loss to the vallecula with nectar secondary to decreased back of tongue control. He had possible laryngeal penetration and aspiration of thin during the swallow as there appeared to be residue on the vocal folds after the swallow. He had definite laryngeal penetration of pudding thick during the swallow as residue was observed in the vestibule after the swallow. The residue then fell further to the vocal folds and the pt appeared to have possible aspiration of pudding resiude after the swallow. Recommend continuing NPO and considering Dobhoff placement for nutrition. Meds via alternate route. May consider ENT consult due to abnormality at posterior true vocal folds. SLP will continue to follow and treat.     SLP GOALS     Row Name 10/01/19 1446 10/01/19 0939 09/30/19 1430       Oral Nutrition/Hydration Goal 1 (SLP)    Oral Nutrition/Hydration Goal 1, SLP  LTG: Patient will tolerate LRD without s/s of aspiration.  -MB  LTG: Patient will tolerate LRD without s/s of aspiration.  -MB  LTG: Patient will tolerate LRD without s/s of aspiration.  -MM    Time Frame (Oral Nutrition/Hydration Goal 1, SLP)  by discharge  -MB  by discharge  -MB  by discharge  -MM    Barriers (Oral  Nutrition/Hydration Goal 1, SLP)  Aphasia  -MB  Aphasia  -MB  n/a  -MM    Progress/Outcomes (Oral Nutrition/Hydration Goal 1, SLP)  goal ongoing  -MB  continuing progress toward goal  -MB  goal ongoing  -MM       Lingual Strengthening Goal 1 (SLP)    Activity (Lingual Strengthening Goal 1, SLP)  increase tongue back strength  -MB  --  --    Increase Tongue Back Strength  lingual movement exercises  -MB  --  --    Dresser/Accuracy (Lingual Strengthening Goal 1, SLP)  independently (over 90% accuracy)  -MB  --  --    Time Frame (Lingual Strengthening Goal 1, SLP)  short term goal (STG);by discharge  -MB  --  --    Barriers (Lingual Strengthening Goal 1, SLP)  n/a  -MB  --  --    Progress/Outcomes (Lingual Strengthening Goal 1, SLP)  goal ongoing  -MB  --  --       Pharyngeal Strengthening Exercise Goal 1 (SLP)    Activity (Pharyngeal Strengthening Goal 1, SLP)  increase epiglottic inversion and retroflexion;increase squeeze/positive pressure generation;increase tongue base retraction  -MB  --  --    Increase Epiglottic Inversion and Retroflexion  Mendelsohn  -MB  --  --    Increase Squeeze/Positive Pressure Generation  hard effortful swallow  -MB  --  --    Increase Tongue Base Retraction  yoni  -MB  --  --    Dresser/Accuracy (Pharyngeal Strengthening Goal 1, SLP)  independently (over 90% accuracy)  -MB  --  --    Time Frame (Pharyngeal Strengthening Goal 1, SLP)  short term goal (STG);by discharge  -MB  --  --    Barriers (Pharyngeal Strengthening Goal 1, SLP)  n/a  -MB  --  --    Progress/Outcomes (Pharyngeal Strengthening Goal 1, SLP)  goal ongoing  -MB  --  --       Words/Phrases/Sentences Goal 1 (SLP)    Improve Ability to Comprehend Words/Phrases/Sentences Through: Goal 1 (SLP)  --  identify objects, field of;identify pictures, field of;90%;independently (over 90% accuracy);other (comment) 2  -MB  --    Time Frame (Identify Objects and Pictures Goal 1, SLP)  --  short term goal (STG);by discharge   -MB  --       Comprehend Questions Goal 1 (SLP)    Improve Ability to Comprehend Questions Goal 1 (SLP)  --  simple yes/no questions;complex yes/no questions;90%;independently (over 90% accuracy)  -MB  --    Time Frame (Comprehend Questions Goal 1, SLP)  --  short term goal (STG);by discharge  -MB  --       Follow Directions Goal 2 (SLP)    Improve Ability to Follow Directions Goal 1 (SLP)  --  1 step direction with objects;1 step direction without objects;90%;independently (over 90% accuracy)  -MB  --    Time Frame (Follow Directions Goal 1, SLP)  --  short term goal (STG);by discharge  -MB  --       Word Retrieval Skills Goal 1 (SLP)    Improve Word Retrieval Skills By Goal 1 (SLP)  --  completing automatic speech task, days of the week;completing automatic speech task, months;confrontational naming task;repeating words;90%;independently (over 90% accuracy)  -MB  --    Time Frame (Word Retrieval Goal 1, SLP)  --  short term goal (STG);by discharge  -MB  --       Graphic Expression of Shapes, Letters, Numbers Goal 1 (SLP)    Improve Graphic Expression of Shapes, Letters, and Numbers Goal 1 (SLP)  --  copy shapes, numbers, and letters;writing dictated number and letters;90%;independently (over 90% accuracy)  -MB  --    Time Frame (Graphic Expression of Shapes, Letters, and Numbers Goal 1, SLP)  --  short term goal (STG);by discharge  -MB  --       Augmentative/Alternative Communication Objectives Goal 1 (SLP    Communication (Augmentative/Alternative Communication Goal 1, SLP)  --  improve ability to use low tech augmentative/alternative communication device  -MB  --    Improve Communication by (Augmentative/Alternative Communication Goal 1, SLP)  --  identify picture, field of ____;identify word, field of ____;alphabet/picture board;90%;independently (over 90% accuracy)  -MB  --      User Key  (r) = Recorded By, (t) = Taken By, (c) = Cosigned By    Initials Name Provider Type    Georgi Garcia, CCC-SLP  Speech and Language Pathologist    Mayuri Staley, MS CCC-SLP Speech and Language Pathologist             Time Calculation:   Time Calculation- SLP     Row Name 10/01/19 1623 10/01/19 1113          Time Calculation- SLP    SLP Start Time  1446  -MB  0939  -MB     SLP Stop Time  1610  -MB  1113  -MB     SLP Time Calculation (min)  84 min  -MB  94 min  -MB     SLP Received On  10/01/19  -MB  10/01/19  -MB     SLP Goal Re-Cert Due Date  10/11/19  -MB  --       User Key  (r) = Recorded By, (t) = Taken By, (c) = Cosigned By    Initials Name Provider Type    Georgi Garcia, CCC-SLP Speech and Language Pathologist          Therapy Charges for Today     Code Description Service Date Service Provider Modifiers Qty    46167602739 HC ST TREATMENT SWALLOW 1 10/1/2019 Georgi Hernandez CCC-SLP GN 1    13902968044 HC ST EVAL SPEECH AND PROD W LANG  5 10/1/2019 Georgi Hernandez CCC-SLP GN 1    68119246164 HC ST FIBEROPTIC ENDO EVAL SWALL 6 10/1/2019 Georgi Hernandez CCC-SLP GN 1               ZEFERINO Rosales  10/1/2019

## 2019-10-01 NOTE — PLAN OF CARE
Problem: Patient Care Overview  Goal: Plan of Care Review  Outcome: Ongoing (interventions implemented as appropriate)   09/30/19 2007   Coping/Psychosocial   Plan of Care Reviewed With patient;sibling   Plan of Care Review   Progress improving   OTHER   Outcome Summary pt was able to be extubated today. Low dose precedex if needed. remains npo x ice chips for now [place] cath removed and has voided since. pt dean's see nihss blood pressure remains elevated but no treatment until sbp >220. brother has been helpful and with patient at bedside this afternoon

## 2019-10-01 NOTE — PLAN OF CARE
Problem: Patient Care Overview  Goal: Plan of Care Review  Outcome: Ongoing (interventions implemented as appropriate)   10/01/19 4700   Coping/Psychosocial   Plan of Care Reviewed With patient   Plan of Care Review   Progress improving   OTHER   Outcome Summary Patient's neurological status has waxed and waned, at times his speech is understandable, logical, and flows well where as at other times it is very slurred and he is aphasic. His brother has been at the bedside and is helpful with care. Wife has called RN and House Supervisor numerous times. VSS on room air. Does have a loose cough and has been producing respiratory secretions. Patient is able to feel the urge to urinate but often cannot wait for urinal before urinating some already. Condom catheter was attempted but patient is turning in bed a lot and catheter has come off x4. Otherwise pt is calm and cooperative, no irritation.        Problem: Restraint, Nonbehavioral (Nonviolent)  Goal: Rationale and Justification  Outcome: Outcome(s) achieved Date Met: 10/01/19    Goal: Nonbehavioral (Nonviolent) Restraint: Absence of Injury/Harm  Outcome: Outcome(s) achieved Date Met: 10/01/19    Goal: Nonbehavioral (Nonviolent) Restraint: Achievement of Discontinuation Criteria  Outcome: Outcome(s) achieved Date Met: 10/01/19    Goal: Nonbehavioral (Nonviolent) Restraint: Preservation of Dignity and Wellbeing  Outcome: Outcome(s) achieved Date Met: 10/01/19

## 2019-10-01 NOTE — PLAN OF CARE
Problem: Patient Care Overview  Goal: Plan of Care Review  Outcome: Ongoing (interventions implemented as appropriate)   10/01/19 0809   Coping/Psychosocial   Plan of Care Reviewed With patient;sibling   OTHER   Outcome Summary PT eval completed. A&O to person and place. Brother present at eval. Pt had difficulty following most commands and two-steps commands. Pt also had some difficulty answering questions, almost like having trouble finding the right words and spoke in low, muffled tone. Performed supine to sit at supervision. B LE ROM assessed: R DF limited ~25%, otherwise WFL. B LE strength assessed: R DF 2+/5, B knee flex 4+/5, otherwise 5/5. Pt had difficulty understanding commands during strength testing, needed physical demonstration and use of different wording. Coordination of LE assessed: Pt unable to place heel on shin for both sides; can only tap L foot. Performed sit <> stand at CGA; Pt attempted to take steps before therapy was ready, slight impulsivity. During ambulation Pt had drop foot on R, leaned to R side and seemed to have R side visual neglect. Pt ambulated ~40 ft at CGA/Min A x2, needed cueing to avoid bumping into object on R and when Pt was asked about lean Pt acknowledged he was leaning to R. Once in recliner Pt attempted to pick at IV once. Pt seems unsafe to be up without assistance. Pt would benefit from skilled PT to address deficits. PT will continue seeing Pt. Anticipated d/c to inpatient rehab for further therapy services.

## 2019-10-01 NOTE — PROGRESS NOTES
Continued Stay Note  [facility]     Patient Name: Jarod Ramírez Jr.  MRN: [id number]  Today's Date: 10/1/2019    Admit Date: 9/26/2019    Discharge Plan     Row Name 10/01/19 1149       Plan    Plan  Possible Acute Rehab    Plan Comments  Was informed that patient's brother Lázaro Rmaírez was wanting to pursue emergency guardianship on patient so he can make medical decisions. Due to patient being confused and having aphasia, Dr Alas is in agreement that patient is unable to make medical decisions at this time. Guardianship letter obtained and provided to patient's brother. Brother is going to [facility] to [facility] for guardianship. Patient is needing acute rehab, patient's brother would like referral made to [facility]. Informed OPHELIA Cuenca of this. Will continue to follow.         Discharge Codes    No documentation.             Merced Hewitt RN

## 2019-10-01 NOTE — THERAPY EVALUATION
"Acute Care - Speech Language Pathology Initial Evaluation  [facility]     Patient Name: Jarod Ramírez Jr.  : 1960  MRN: [id number]  Today's Date: 10/1/2019  Onset of Illness/Injury or Date of Surgery: 19     Referring Physician: Dr. Alas      Admit Date: 2019  Completed trials of honey, nectar, and thin with pt today. Pt exhibited throat clearing with honey thick and an immediate cough with thin. He had congested coughing prior to PO trials as well. Recommend modified barium swallow study to be completed today to further assess swallow function.     Also completed informal speech/language evaluation. Pt was able to count to ten without cues, but required a phonemic cue to state days of the week and semantic cues to state months of the year. He was unable to independently name any objects. He exhibited semantic paraphasias and neologisms when attempting to name items. For example he said \"shannon\" for comb initially and \"knife\" for fork initially. He was able to name items with mod to max cues including phonemic, semantic, and written cues. He had mild to moderate difficulty with phrase completion and moderate difficulty with sentence completion tasks. He was able to accurately answer simple yes/no questions when shown the written words \"yes\" and \"no.\" He was unable to accurately answer more complex yes/no questions, despite cues. He was unable to follow an instruction to point to his nose, even with modeling and hand-over-hand instruction. He was inconsistently able to follow some single step commands for oral motor tasks. SLP will continue to follow and treat for the above concerns.   Georgi Hernandez, CCC-SLP 10/1/2019 11:14 AM    Visit Dx:    ICD-10-CM ICD-9-CM   1. Dysphagia, unspecified type R13.10 787.20   2. Impaired mobility Z74.09 799.89   3. Impaired mobility and ADLs Z74.09 799.89   4. Aphasia R47.01 784.3     Patient Active Problem List   Diagnosis   • Degeneration of cervical " intervertebral disc   • Closed fracture of lumbar vertebra (CMS/HCC)   • Degeneration of lumbar or lumbosacral intervertebral disc   • Smoker   • BMI 27.0-27.9,adult   • Acute cerebrovascular accident (CVA) of cerebellum (CMS/HCC)   • Alcoholism /alcohol abuse (CMS/HCC)   • CVA (cerebral vascular accident) (CMS/HCC)   • Hyperlipidemia     Past Medical History:   Diagnosis Date   • Hypertension      Past Surgical History:   Procedure Laterality Date   • CATARACT EXTRACTION Left    • WRIST FRACTURE SURGERY Left         SLP EVALUATION (last 72 hours)      SLP SLC Evaluation     Row Name 10/01/19 0939                   Communication Assessment/Intervention    Document Type  evaluation  -MB        Subjective Information  no complaints  -MB        Patient Observations  alert;cooperative  -MB        Patient/Family Observations  Brother present  -MB        Patient Effort  good  -MB           General Information    Patient Profile Reviewed  yes  -MB        Pertinent History Of Current Problem  MRI: Acute infarct in the left MCA , alcohol abuse, HTN  -MB        Precautions/Limitations, Vision  WFL with corrective lenses  -MB        Precautions/Limitations, Hearing  WFL;for purposes of eval  -MB        Patient Level of Education  Unknown  -MB        Prior Level of Function-Communication  WFL  -MB        Plans/Goals Discussed with  patient and family  -MB        Barriers to Rehab  -- Aphasia  -MB        Patient's Goals for Discharge  patient did not state  -MB        Family Goals for Discharge  family did not state  -MB           Pain Assessment    Additional Documentation  Pain Scale: FACES Pre/Post-Treatment (Group)  -MB           Pain Scale: FACES Pre/Post-Treatment    Pain: FACES Scale, Pretreatment  0-->no hurt  -MB           Comprehension Assessment/Intervention    Comprehension Assessment/Intervention  Auditory Comprehension  -MB           Auditory Comprehension Assessment/Intervention    Auditory Comprehension  (Communication)  severe impairment  -MB        Answers Questions (Communication)  yes/no;simple;mild impairment;complex;severe impairment  -MB        Able to Follow Commands (Communication)  1-step;severe impairment  -MB           Expression Assessment/Intervention    Expression Assessment/Intervention  verbal expression;graphic expression  -MB           Verbal Expression Assessment/Intervention    Verbal Expression  severe impairment  -MB        Automatic Speech (Communication)  counting 1-20;WFL;days of week;mild impairment;months of year;moderate impairment  -MB        Repetition  words;mild impairment;moderate impairment  -MB        Phrase Completion  automatic/predictable;mild impairment;moderate impairment  -MB        Confrontational Naming  high frequency;severe impairment  -MB        Conversational Discourse/Fluency  moderate impairment;severe impairment  -MB           Graphic Expression Assessment/Intervention    Graphic Expression  severe impairment  -MB        Graphic Expression to Dictation  words;severe impairment  -MB        Biographical Information  name;WFL  -MB        Graphic Expression, Comment  Pt copied one word with a single letter error  -MB           Oral Musculature and Cranial Nerve Assessment    Oral Motor General Assessment  generalized oral motor weakness  -MB           Motor Speech Assessment/Intervention    Motor Speech Function  moderate impairment  -MB        Characteristics Consistent with Dysarthria  decreased articulation;decreased breath support;slurred speech  -MB           SLP Clinical Impressions    SLP Diagnosis  Receptive/expressive aphasia  -MB        Rehab Potential/Prognosis  good  -MB        SLC Criteria for Skilled Therapy Interventions Met  yes  -MB        Functional Impact  functional impact in social situations;functional impact in ADLs;difficulty communicating wants, needs;difficulty communicating in an emergency;difficulty in expressing complex messages;restrictions  in personal and social life  -MB           Recommendations    Therapy Frequency (SLP SLC)  at least;3 days per week  -MB        Predicted Duration Therapy Intervention (Days)  until discharge  -MB        Anticipated Dischage Disposition  inpatient rehabilitation facility  -MB           Communication Treatment Objective and Progress Goals (SLP)    Auditory Comprehension Treatment Objectives  Auditory Comprehension Treatment Objectives (Group)  -MB        Verbal Expression Treatment Objectives  Verbal Expression Treatment Objectives (Group)  -MB        Graphic Expression Treatment Objectives  Graphic Expression Treatment Objectives (Group)  -MB        Augmentative/Alternative Communication Objectives  Augmentative/Alternative Communication Objectives (Group)  -MB           Auditory Comprehension Treatment Objectives    Words/Phrases/Sentences Selection  words/phrases/sentences, SLP goal 1  -MB        Comprehend Questions Selection  comprehend questions, SLP goal 1  -MB        Follow Directions Selection  follow directions, SLP goal 1  -MB           Words/Phrases/Sentences Goal 1 (SLP)    Improve Ability to Comprehend Words/Phrases/Sentences Through: Goal 1 (SLP)  identify objects, field of;identify pictures, field of;90%;independently (over 90% accuracy);other (comment) 2  -MB        Time Frame (Identify Objects and Pictures Goal 1, SLP)  short term goal (STG);by discharge  -MB           Comprehend Questions Goal 1 (SLP)    Improve Ability to Comprehend Questions Goal 1 (SLP)  simple yes/no questions;complex yes/no questions;90%;independently (over 90% accuracy)  -MB        Time Frame (Comprehend Questions Goal 1, SLP)  short term goal (STG);by discharge  -MB           Follow Directions Goal 2 (SLP)    Improve Ability to Follow Directions Goal 1 (SLP)  1 step direction with objects;1 step direction without objects;90%;independently (over 90% accuracy)  -MB        Time Frame (Follow Directions Goal 1, SLP)  short term  goal (STG);by discharge  -MB           Verbal Expression Treatment Objectives    Word Retrieval Skills Selection  word retrieval, SLP goal 1  -MB        Phrase and Sentence Level Response Selection  phrase and sentence level response, SLP goal 1  -MB           Word Retrieval Skills Goal 1 (SLP)    Improve Word Retrieval Skills By Goal 1 (SLP)  completing automatic speech task, days of the week;completing automatic speech task, months;confrontational naming task;repeating words;90%;independently (over 90% accuracy)  -MB        Time Frame (Word Retrieval Goal 1, SLP)  short term goal (STG);by discharge  -MB           Graphic Expression Treatment Objectives    Graphic Expression of Shapes, Letters and Numbers Selection  graphic expression of shapes, letters, and numbers, SLP goal 1  -MB           Graphic Expression of Shapes, Letters, Numbers Goal 1 (SLP)    Improve Graphic Expression of Shapes, Letters, and Numbers Goal 1 (SLP)  copy shapes, numbers, and letters;writing dictated number and letters;90%;independently (over 90% accuracy)  -MB        Time Frame (Graphic Expression of Shapes, Letters, and Numbers Goal 1, SLP)  short term goal (STG);by discharge  -MB           Augmentative/Alternative Communication Objectives    Augmentative/Alternative Communication Selection  augmentative/alternative communication, SLP goal 1  -MB           Augmentative/Alternative Communication Objectives Goal 1 (SLP    Communication (Augmentative/Alternative Communication Goal 1, SLP)  improve ability to use low tech augmentative/alternative communication device  -MB        Improve Communication by (Augmentative/Alternative Communication Goal 1, SLP)  identify picture, field of ____;identify word, field of ____;alphabet/picture board;90%;independently (over 90% accuracy)  -MB          User Key  (r) = Recorded By, (t) = Taken By, (c) = Cosigned By    Initials Name Effective Dates    Georgi Garcia, CCC-SLP 08/02/16 -         "      EDUCATION  The patient has been educated in the following areas:     Communication Impairment.    SLP Recommendation and Plan  SLP Diagnosis: Receptive/expressive aphasia     SLC Criteria for Skilled Therapy Interventions Met: yes  Anticipated Dischage Disposition: inpatient rehabilitation facility     Predicted Duration Therapy Intervention (Days): until discharge    Plan of Care Reviewed With: patient, sibling  Outcome Summary: Completed trials of honey, nectar, and thin with pt today. Pt exhibited throat clearing with honey thick and an immediate cough with thin. He had congested coughing prior to PO trials as well. Recommend modified barium swallow study to be completed today to further assess swallow function. Also completed informal speech/language evaluation. Pt was able to count to ten without cues, but required a phonemic cue to state days of the week and semantic cues to state months of the year. He was unable to independently name any objects. He exhibited semantic paraphasias and neologisms when attempting to name items. For example he said \"shannon\" for comb initially and \"knife\" for fork initially. He was able to name items with mod to max cues including phonemic, semantic, and written cues. He had mild to moderate difficulty with phrase completion and moderate difficulty with sentence completion tasks. He was able to accurately answer simple yes/no questions when shown the written words \"yes\" and \"no.\" He was unable to accurately answer more complex yes/no questions, despite cues. He was unable to follow an instruction to point to his nose, even with modeling and hand-over-hand instruction. He was inconsistently able to follow some single step commands for oral motor tasks. SLP will continue to follow and treat for the above concerns.       SLP GOALS     Row Name 10/01/19 0939 09/30/19 9114          Oral Nutrition/Hydration Goal 1 (SLP)    Oral Nutrition/Hydration Goal 1, SLP  LTG: Patient will " tolerate LRD without s/s of aspiration.  -MB  LTG: Patient will tolerate LRD without s/s of aspiration.  -MM     Time Frame (Oral Nutrition/Hydration Goal 1, SLP)  by discharge  -MB  by discharge  -MM     Barriers (Oral Nutrition/Hydration Goal 1, SLP)  Aphasia  -MB  n/a  -MM     Progress/Outcomes (Oral Nutrition/Hydration Goal 1, SLP)  continuing progress toward goal  -MB  goal ongoing  -MM        Words/Phrases/Sentences Goal 1 (SLP)    Improve Ability to Comprehend Words/Phrases/Sentences Through: Goal 1 (SLP)  identify objects, field of;identify pictures, field of;90%;independently (over 90% accuracy);other (comment) 2  -MB  --     Time Frame (Identify Objects and Pictures Goal 1, SLP)  short term goal (STG);by discharge  -MB  --        Comprehend Questions Goal 1 (SLP)    Improve Ability to Comprehend Questions Goal 1 (SLP)  simple yes/no questions;complex yes/no questions;90%;independently (over 90% accuracy)  -MB  --     Time Frame (Comprehend Questions Goal 1, SLP)  short term goal (STG);by discharge  -MB  --        Follow Directions Goal 2 (SLP)    Improve Ability to Follow Directions Goal 1 (SLP)  1 step direction with objects;1 step direction without objects;90%;independently (over 90% accuracy)  -MB  --     Time Frame (Follow Directions Goal 1, SLP)  short term goal (STG);by discharge  -MB  --        Word Retrieval Skills Goal 1 (SLP)    Improve Word Retrieval Skills By Goal 1 (SLP)  completing automatic speech task, days of the week;completing automatic speech task, months;confrontational naming task;repeating words;90%;independently (over 90% accuracy)  -MB  --     Time Frame (Word Retrieval Goal 1, SLP)  short term goal (STG);by discharge  -MB  --        Graphic Expression of Shapes, Letters, Numbers Goal 1 (SLP)    Improve Graphic Expression of Shapes, Letters, and Numbers Goal 1 (SLP)  copy shapes, numbers, and letters;writing dictated number and letters;90%;independently (over 90% accuracy)  -MB  --      Time Frame (Graphic Expression of Shapes, Letters, and Numbers Goal 1, SLP)  short term goal (STG);by discharge  -MB  --        Augmentative/Alternative Communication Objectives Goal 1 (SLP    Communication (Augmentative/Alternative Communication Goal 1, SLP)  improve ability to use low tech augmentative/alternative communication device  -MB  --     Improve Communication by (Augmentative/Alternative Communication Goal 1, SLP)  identify picture, field of ____;identify word, field of ____;alphabet/picture board;90%;independently (over 90% accuracy)  -MB  --       User Key  (r) = Recorded By, (t) = Taken By, (c) = Cosigned By    Initials Name Provider Type    Georgi Garcia CCC-SLP Speech and Language Pathologist    MM Mayuri Alcazar, MS CCC-SLP Speech and Language Pathologist                  Time Calculation:     Time Calculation- SLP     Row Name 10/01/19 1113             Time Calculation- SLP    SLP Start Time  0939  -MB      SLP Stop Time  1113  -MB      SLP Time Calculation (min)  94 min  -MB      SLP Received On  10/01/19  -MB        User Key  (r) = Recorded By, (t) = Taken By, (c) = Cosigned By    Initials Name Provider Type    Georgi Garcia CCC-SLP Speech and Language Pathologist          Therapy Charges for Today     Code Description Service Date Service Provider Modifiers Qty    91285018916 HC ST TREATMENT SWALLOW 1 10/1/2019 Georgi Hernandez CCC-SLP GN 1    60563125531 HC ST EVAL SPEECH AND PROD W LANG  5 10/1/2019 Georgi Hernandez CCC-SLP GN 1                     ZEFERINO Rosales  10/1/2019 and Acute Care - Speech Language Pathology   Swallow Treatment Note [facility]     Patient Name: Jarod Ramírez Jr.  : 1960  MRN: [id number]  Today's Date: 10/1/2019  Onset of Illness/Injury or Date of Surgery: 19     Referring Physician: Dr. Alas      Admit Date: 2019    Visit Dx:      ICD-10-CM ICD-9-CM   1. Dysphagia, unspecified type R13.10 787.20    2. Impaired mobility Z74.09 799.89   3. Impaired mobility and ADLs Z74.09 799.89   4. Aphasia R47.01 784.3     Patient Active Problem List   Diagnosis   • Degeneration of cervical intervertebral disc   • Closed fracture of lumbar vertebra (CMS/HCC)   • Degeneration of lumbar or lumbosacral intervertebral disc   • Smoker   • BMI 27.0-27.9,adult   • Acute cerebrovascular accident (CVA) of cerebellum (CMS/HCC)   • Alcoholism /alcohol abuse (CMS/HCC)   • CVA (cerebral vascular accident) (CMS/[facility])   • Hyperlipidemia       Therapy Treatment      Outcome Summary  Outcome Summary/Treatment Plan (SLP)  Anticipated Dischage Disposition: inpatient rehabilitation facility (10/01/19 0939 : Georgi Hernandez CCC-SLP)      SLP GOALS     Row Name 10/01/19 0939 09/30/19 8100          Oral Nutrition/Hydration Goal 1 (SLP)    Oral Nutrition/Hydration Goal 1, SLP  LTG: Patient will tolerate LRD without s/s of aspiration.  -MB  LTG: Patient will tolerate LRD without s/s of aspiration.  -MM     Time Frame (Oral Nutrition/Hydration Goal 1, SLP)  by discharge  -MB  by discharge  -MM     Barriers (Oral Nutrition/Hydration Goal 1, SLP)  Aphasia  -MB  n/a  -MM     Progress/Outcomes (Oral Nutrition/Hydration Goal 1, SLP)  continuing progress toward goal  -MB  goal ongoing  -MM        Words/Phrases/Sentences Goal 1 (SLP)    Improve Ability to Comprehend Words/Phrases/Sentences Through: Goal 1 (SLP)  identify objects, field of;identify pictures, field of;90%;independently (over 90% accuracy);other (comment) 2  -MB  --     Time Frame (Identify Objects and Pictures Goal 1, SLP)  short term goal (STG);by discharge  -MB  --        Comprehend Questions Goal 1 (SLP)    Improve Ability to Comprehend Questions Goal 1 (SLP)  simple yes/no questions;complex yes/no questions;90%;independently (over 90% accuracy)  -MB  --     Time Frame (Comprehend Questions Goal 1, SLP)  short term goal (STG);by discharge  -MB  --        Follow Directions Goal 2 (SLP)     Improve Ability to Follow Directions Goal 1 (SLP)  1 step direction with objects;1 step direction without objects;90%;independently (over 90% accuracy)  -MB  --     Time Frame (Follow Directions Goal 1, SLP)  short term goal (STG);by discharge  -MB  --        Word Retrieval Skills Goal 1 (SLP)    Improve Word Retrieval Skills By Goal 1 (SLP)  completing automatic speech task, days of the week;completing automatic speech task, months;confrontational naming task;repeating words;90%;independently (over 90% accuracy)  -MB  --     Time Frame (Word Retrieval Goal 1, SLP)  short term goal (STG);by discharge  -MB  --        Graphic Expression of Shapes, Letters, Numbers Goal 1 (SLP)    Improve Graphic Expression of Shapes, Letters, and Numbers Goal 1 (SLP)  copy shapes, numbers, and letters;writing dictated number and letters;90%;independently (over 90% accuracy)  -MB  --     Time Frame (Graphic Expression of Shapes, Letters, and Numbers Goal 1, SLP)  short term goal (STG);by discharge  -MB  --        Augmentative/Alternative Communication Objectives Goal 1 (SLP    Communication (Augmentative/Alternative Communication Goal 1, SLP)  improve ability to use low tech augmentative/alternative communication device  -MB  --     Improve Communication by (Augmentative/Alternative Communication Goal 1, SLP)  identify picture, field of ____;identify word, field of ____;alphabet/picture board;90%;independently (over 90% accuracy)  -MB  --       User Key  (r) = Recorded By, (t) = Taken By, (c) = Cosigned By    Initials Name Provider Type    Georgi Garcia, CCC-SLP Speech and Language Pathologist    Mayuri Staley, MS CCC-SLP Speech and Language Pathologist          EDUCATION  The patient has been educated in the following areas:   Dysphagia (Swallowing Impairment).    SLP Recommendation and Plan           Anticipated Dischage Disposition: inpatient rehabilitation facility                    Time Calculation:   Time  Calculation- SLP     Row Name 10/01/19 1113             Time Calculation- SLP    SLP Start Time  0939  -MB      SLP Stop Time  1113  -MB      SLP Time Calculation (min)  94 min  -MB      SLP Received On  10/01/19  -MB        User Key  (r) = Recorded By, (t) = Taken By, (c) = Cosigned By    Initials Name Provider Type    Georgi Garcia CCC-SLP Speech and Language Pathologist          Therapy Charges for Today     Code Description Service Date Service Provider Modifiers Qty    48071768919 HC ST TREATMENT SWALLOW 1 10/1/2019 Georgi Hernandez CCC-SLP GN 1    [id number]  ST EVAL SPEECH AND PROD W LANG  5 10/1/2019 Georgi Hernandez CCC-SLP GN 1                 ZEFERINO Rosales  10/1/2019

## 2019-10-01 NOTE — PAYOR COMM NOTE
"FROM: RUBY CARREON  PHONE: [phone]  FAX: [phone]    ID: [id number]    Morgan Sanchez Jr. (59 y.o. Male)     Date of Birth Social Security Number Address Home Phone MRN    1960  [address] [place] [address] [phone] [id number]    Orthodox Marital Status          Other Single       Admission Date Admission Type Admitting Provider Attending Provider Department, Room/Bed    9/26/19 Urgent Candido Alas MD Truong, Khai C, MD [facility] CARDIAC CARE, C007/1    Discharge Date Discharge Disposition Discharge Destination                       Attending Provider:  Candido Alas MD    Allergies:  No Known Allergies    Isolation:  None   Infection:  None   Code Status:  CPR    Ht:  162.6 cm (64\")   Wt:  78.2 kg (172 lb 4.8 oz)    Admission Cmt:  None   Principal Problem:  None                Active Insurance as of 9/26/2019     Primary Coverage     Payor Plan Insurance Group Employer/Plan Group    [employer] [place] [employer] [place]ICA[place]      Payor Plan Address Payor Plan Phone Number Payor Plan Fax Number Effective Dates    [address] [address] [phone]  6/22/2017 - None Entered    [facility] [id number]       Subscriber Name Subscriber Birth Date Member ID       MORGAN SANCHEZ 1960 [id number]                 Emergency Contacts      (Rel.) Home Phone Work Phone Mobile Phone    Láazro Sanchez (Brother) [phone] -- --               History & Physical      Candido Alas MD at 09/26/19 [address] [place] [address] Medicine Services  HISTORY AND PHYSICAL    Date of Admission: 9/26/2019  Primary Care Physician: Jose Karimi MD    Subjective     Chief Complaint: CVA/alcohol abuse    History of Present Illness  Patient is a 59-year-old  male transferred from [facility] for evaluation altered mental status/confusion.  Patient has been drinking alcohol at work.  Patient came to ER confused disoriented with decreased " "responsiveness.  No history of trauma.  Unable to get the answer for the patient this time.  Most information is from the chart.    Review of Systems   Unable to obtain due to altered mental status.  Otherwise complete ROS reviewed and negative except as mentioned in the HPI.      Past Medical History:   Past Medical History:   Diagnosis Date   • Hypertension        Past Surgical History:  Past Surgical History:   Procedure Laterality Date   • CATARACT EXTRACTION Left    • WRIST FRACTURE SURGERY Left        Family History: family history includes Cancer in his sister; Diabetes in his father; No Known Problems in his brother and mother.    Social History:  reports that he has been smoking cigarettes.  He has never used smokeless tobacco. He reports that he drinks alcohol. He reports that he does not use drugs.    Medications:  Prior to Admission medications    Medication Sig Start Date End Date Taking? Authorizing Provider   cyclobenzaprine (FLEXERIL) 10 MG tablet Take 1 tablet by mouth 3 (Three) Times a Day As Needed for Muscle Spasms. 6/22/17   Galo Aguilera APRN   lisinopril (PRINIVIL,ZESTRIL) 10 MG tablet  5/10/17   Dahiana Hall MD   meloxicam (MOBIC) 15 MG tablet  5/10/17   Dahiana Hall MD   MethylPREDNISolone (MEDROL, ALBA,) 4 MG tablet Take as directed on package instructions. 6/22/17   Galo Aguilera APRN   traMADol (ULTRAM) 50 MG tablet  5/30/17   Dahiana Hall MD     Allergies:  No Known Allergies    Objective     Vital Signs: /86 (BP Location: Right arm, Patient Position: Sitting)   Pulse 74   Temp 98 °F (36.7 °C) (Oral)   Resp 16   Ht 163.8 cm (64.5\")   Wt 79.8 kg (176 lb)   SpO2 99%   BMI 29.74 kg/m²    Physical Exam   Constitutional: He appears well-developed.   HENT:   Head: Normocephalic and atraumatic.   Eyes: Conjunctivae are normal. Pupils are equal, round, and reactive to light.   Neck: Neck supple. No JVD present. No thyromegaly present. "   Cardiovascular: Normal rate, regular rhythm, normal heart sounds and intact distal pulses. Exam reveals no gallop and no friction rub.   No murmur heard.  Pulmonary/Chest: Effort normal and breath sounds normal. No respiratory distress. He has no wheezes. He has no rales. He exhibits no tenderness.   Decrease in breath sound bilateral, clear.   Abdominal: Soft. Bowel sounds are normal. He exhibits no distension. There is no tenderness. There is no rebound and no guarding.   Musculoskeletal: He exhibits no edema, tenderness or deformity.   Lymphadenopathy:     He has no cervical adenopathy.   Neurological: He is alert. He displays normal reflexes. No cranial nerve deficit. He exhibits abnormal muscle tone. Coordination abnormal.   Unable exam patient is this patient's unable to answer any question follow commands.  Patient is awake alert though appears   Skin: Skin is warm and dry. Capillary refill takes 2 to 3 seconds. No rash noted.   Nursing note and vitals reviewed.          Results Reviewed:  Laboratory- white blood cells 10, hemoglobin 15, hematocrit 46, platelets 400, ammonia level less than 10, sodium 137, potassium 3.6, chloride is 97, CO2 is 26, BUN 11, creatinine 0.82, total protein 8.1, albumin 3.8, bilirubin 0.69, alkaline phosphatase 80, AST 35, calcium 8.8, glucose 87, ALT is 29, GFR is greater than 60, alcohol level less than 3, acetaminophen level is 0, salicylate level is 5.3, urine drug screen is negative.    urinalysis shows moderate bilirubin, negative blood, nitrite negative, leukocyte neck negative.    Radiology Data:    Imaging Results (last 24 hours)     ** No results found for the last 24 hours. **          I have personally reviewed and interpreted the radiology studies and ECG obtained at time of admission.     Assessment / Plan      Assessment & Plan  Active Hospital Problems    Diagnosis   • Acute cerebrovascular accident (CVA) of cerebellum (CMS/[facility])   • Alcoholism /alcohol abuse  (CMS/[facility])   • Smoker     Plans    CVA.  Stroke protocol.  Consult neurology.  MRI of the head.  Carotid duplex.  Echocardiogram.  CT scan of the head at [facility]- low attenuation within the left posterior temporal and right lower lobe concerning for evolving infarct, chronic ischemic deep white matter changes.    Alcohol abuse.  Ciwa.  Banana bag.    Smoker.  Consult patient once patient more alert.    Code Status: full code     I discussed the patient's findings and my recommendations with: patient    Estimated length of stay:2-5 days.     Candido Alas MD   09/26/19   9:58 PM              Electronically signed by Candido Alas MD at 09/26/19 5146       Emergency Department Notes     No notes of this type exist for this encounter.           Physician Progress Notes (last 7 days) (Notes from 09/24/19 1206 through 10/01/19 1206)      Gab White MD at 10/01/19 1048            Neurology Progress Note      Chief Complaint:  stroke    Subjective     Subjective:    The patient was extubated.  He is sitting at the side of the bed currently.  There seems to be some difficulties from a social standpoint and that his wife has came to visit the patient.  She is exhibiting symptoms of annalee and our patient care relations staff is currently assisting her.  She does not seem competent to make decisions for the patient.  His brother remains at the bedside as well.  He does seem to be a good historian and can make reasonable decisions.  Otherwise the patient is doing well.  He is showing no evidence of withdrawal symptoms.  He continues to have difficulties with spatial dysfunction along with aphasia.  The therapy staff have walked him approximately 50 feet and he does require assistance.  He has somewhat of a foot drop in addition of this has spatial dysfunction of his leg causing difficulties with ambulation.    Medications:  Current Facility-Administered Medications   Medication Dose Route Frequency Provider  Last Rate Last Dose   • albuterol (PROVENTIL) nebulizer solution 0.083% 2.5 mg/3mL  2.5 mg Nebulization Once PRN Galo Bethea MD       • aspirin chewable tablet 81 mg  81 mg Oral Daily Candido Alas MD        Or   • aspirin suppository 300 mg  300 mg Rectal Daily Candido Alas MD   300 mg at 10/01/19 1032   • atorvastatin (LIPITOR) tablet 80 mg  80 mg Oral Nightly Candido Alas MD   80 mg at 09/29/19 2220   • chlorhexidine (PERIDEX) 0.12 % solution 15 mL  15 mL Mouth/Throat Q12H Galo Bethea MD   15 mL at 09/30/19 2019   • famotidine (PEPCID) injection 20 mg  20 mg Intravenous Q12H Candido Alas MD   20 mg at 10/01/19 1034   • hypromellose (ISOPTO TEARS) 0.5 % ophthalmic solution 2 drop  2 drop Both Eyes TID PRN Candido Alas MD   2 drop at 09/30/19 2152   • ipratropium-albuterol (DUO-NEB) nebulizer solution 3 mL  3 mL Nebulization 4x Daily - RT Christina Sharif APRN   3 mL at 10/01/19 1046   • labetalol (NORMODYNE,TRANDATE) injection 20 mg  20 mg Intravenous Q4H PRN Christina Sharif APRN       • lactated ringers infusion  50 mL/hr Intravenous Continuous Christina Sharif APRN 50 mL/hr at 10/01/19 0045 50 mL/hr at 10/01/19 0045   • LORazepam (ATIVAN) tablet 1 mg  1 mg Oral Q2H PRN Candido Alas MD        Or   • LORazepam (ATIVAN) injection 1 mg  1 mg Intravenous Q2H PRN Candido Alas MD        Or   • LORazepam (ATIVAN) tablet 2 mg  2 mg Oral Q1H PRN Candido Alas MD        Or   • LORazepam (ATIVAN) injection 2 mg  2 mg Intravenous Q1H PRN Candido Alas MD   2 mg at 09/29/19 1311    Or   • LORazepam (ATIVAN) injection 2 mg  2 mg Intravenous Q15 Min PRN Candido Alas MD   2 mg at 09/27/19 2007    Or   • LORazepam (ATIVAN) injection 2 mg  2 mg Intramuscular Q15 Min PRN Candido Alas MD       • multiple vitamin (M.V.I. Adult) 10 mL, thiamine (B-1) 100 mg, folic acid 1 mg, potassium chloride 20 mEq in dextrose 5 % and sodium chloride 0.9 % 1,000 mL infusion   75 mL/hr Intravenous Daily Candido Alas MD 75 mL/hr at 10/01/19 1032 75 mL/hr at 10/01/19 1032   • potassium chloride (KAYCIEL) 20 MEQ/15ML (10%) solution 40 mEq  40 mEq Oral Once Candido Alas MD       • sodium chloride 0.9 % flush 10 mL  10 mL Intravenous Q12H Candido Alas MD   10 mL at 10/01/19 1032   • sodium chloride 0.9 % flush 10 mL  10 mL Intravenous PRN Candido Alas MD           Review of Systems:   -A 14 point review of systems is completed and is negative except for agitation      Objective      Vital Signs  Temp:  [98.3 °F (36.8 °C)-99 °F (37.2 °C)] 98.7 °F (37.1 °C)  Heart Rate:  [47-95] 81  Resp:  [10-27] 21  BP: (117-205)/() 156/87  FiO2 (%):  [30 %] 30 %    Physical Exam:    CVS:  RR  Lungs: CTA  Abd:  NT/ND    MS: He is awake.  Sitting at the side of the bed.  He can tell me his name.  Otherwise he has perseveration.  He has difficulties with naming.  He does not know the year.  He follows commands intermittently.  CN:  II - XII intact  MTR:  Moving all extremities -right arm and leg have drift down to the bed.  DTR:  1+ throughout  Coord/Gait: Spatial dysfunction.  Difficulties drawing a clock.  When you ask him to set the time on a clock he gives the digital interpretation.     Results Review:    I reviewed the patient's new clinical results.    Results from last 7 days   Lab Units 10/01/19  0300 09/28/19  0232   WBC 10*3/mm3 11.97* 12.75*   HEMOGLOBIN g/dL 14.7 13.7   HEMATOCRIT % 42.3 39.7   PLATELETS 10*3/mm3 311 335        Results from last 7 days   Lab Units 10/01/19  0300 09/28/19  0232 09/27/19  0433   SODIUM mmol/L 141 138 137   POTASSIUM mmol/L 3.4* 3.6 3.5   CHLORIDE mmol/L 101 99 96*   CO2 mmol/L 27.0 26.0 25.0   BUN mg/dL 3* 7 7   CREATININE mg/dL 0.49* 0.76 0.58*   CALCIUM mg/dL 8.8 8.5* 8.8   BILIRUBIN mg/dL 0.8 0.9 0.8   ALK PHOS U/L 76 63 70   ALT (SGPT) U/L 27 17 21   AST (SGOT) U/L 32 37 37   GLUCOSE mg/dL 95 110* 94        No results found for: PHOS, MG,  PROTIME, INR, APTT  No components found for: POCGLUC  No components found for: A1C  Lab Results   Component Value Date    HDL 51 09/27/2019     (H) 09/27/2019     No components found for: B12  Lab Results   Component Value Date    TSH 1.760 09/27/2019     Labs reviewed:  Liver function normal  CT Head reviewed:        MRI brain reviewed by me.  Left MCA acute infarction mainly in the posterior distribution.  No evidence of hemorrhagic conversion nor edema.    Cardiac echo:  Preserved EF  Carotid ultrasound shows evidence of a left ICA occlusion  Labs reviewed:  WBC is 12K  CT angiography of the head and neck reviewed by me.  On the left the internal carotid arteries completely occluded.  The right has 30% stenosis.  Assessment/Plan     Hospital Problem List      Degeneration of lumbar or lumbosacral intervertebral disc    Smoker    Acute cerebrovascular accident (CVA) of cerebellum (CMS/HCC)    Alcoholism /alcohol abuse (CMS/HCC)    CVA (cerebral vascular accident) (CMS/HCC)    Hyperlipidemia    Impression:     1.  Alcohol withdrawal - drinks a pint of hard liquor per day per brother who is in the room  2.  Left MCA stroke  3.  Hyperlipidemia with an LDL above goal of 70  4.  History of hypertension  5.  Left ICA occlusion -confirmed with CT angiography.    Plan:  · ETOH withdrawal protocols - appears to be improved  · Lipitor 80  · ASA 81mg  · SCD's  · Systolic blood pressure goal less than 160  · OK to floor per neuro  · Will consult acute rehab        Gab White MD  10/01/19  10:48 AM    35 minutes of critical care time was performed with titration of sedating agents in addition to neurologic examination and interpretation of imaging including CT angiography of head and neck.    Electronically signed by Gab White MD at 10/01/19 6344     Eleuterio Cervantes MD at 10/01/19 8127              PULMONARY AND CRITICAL CARE PROGRESS NOTE - [facility]    Patient: Jarod Ramírez    1960   MR# [id number]   [facility]# [id number]  10/01/19   8:39 AM  Referring Provider: Candido Alas MD    Chief Complaint: altered mental status   Interval history: The patient extubated yesterday.  He is currently resting in bed on room air.  O2 sat 92%, heart rate 90.  He has no new complaints.  No other aggravating or alleviating factors.     Meds:    aspirin 81 mg Oral Daily   Or      aspirin 300 mg Rectal Daily   atorvastatin 80 mg Oral Nightly   chlorhexidine 15 mL Mouth/Throat Q12H   famotidine 20 mg Intravenous Q12H   ipratropium-albuterol 3 mL Nebulization 4x Daily - RT   IV Fluids 1000 mL + additives 75 mL/hr Intravenous Daily   potassium chloride 40 mEq Oral Once   sodium chloride 10 mL Intravenous Q12H       lactated ringers 50 mL/hr Last Rate: 50 mL/hr (10/01/19 0045)     Review of Systems:   Review of Systems   Constitutional: Negative for chills and fever.   Respiratory: Negative for cough and shortness of breath.    Cardiovascular: Negative for chest pain.   Gastrointestinal: Negative for diarrhea, nausea and vomiting.     Physical Exam:  SpO2 Percentage    10/01/19 0500 10/01/19 0623 10/01/19 0629   SpO2: 92% 91% 92%     Temp:  [98.3 °F (36.8 °C)-99 °F (37.2 °C)] 98.3 °F (36.8 °C)  Heart Rate:  [47-87] 87  Resp:  [10-27] 21  BP: (112-205)/() 148/73  FiO2 (%):  [30 %] 30 %    Intake/Output Summary (Last 24 hours) at 10/1/2019 0839  Last data filed at 10/1/2019 0600  Gross per 24 hour   Intake 1524.49 ml   Output 2450 ml   Net -925.51 ml     Physical Exam   Constitutional: He is oriented to person, place, and time. He appears well-developed and well-nourished. No distress.   HENT:   Head: Normocephalic and atraumatic.   Eyes: Conjunctivae and EOM are normal. Pupils are equal, round, and reactive to light. No scleral icterus.   Neck: Normal range of motion. Neck supple.   Cardiovascular: Normal rate, regular rhythm and normal heart sounds. Exam reveals no friction rub.   No murmur  heard.  Pulmonary/Chest: Effort normal and breath sounds normal. No respiratory distress. He has no wheezes. He has no rales.   Abdominal: Soft. Bowel sounds are normal. He exhibits no distension. There is no tenderness.   Musculoskeletal: Normal range of motion. He exhibits no edema.   Neurological: He is alert and oriented to person, place, and time.   Skin: Skin is warm and dry.   Psychiatric: He has a normal mood and affect. His behavior is normal. Judgment and thought content normal.   Nursing note and vitals reviewed.    Laboratory Data:  Results from last 7 days   Lab Units 10/01/19  0300 09/28/19  0232   WBC 10*3/mm3 11.97* 12.75*   HEMOGLOBIN g/dL 14.7 13.7   PLATELETS 10*3/mm3 311 335     Results from last 7 days   Lab Units 10/01/19  0300 09/28/19  0232 09/27/19  0433   SODIUM mmol/L 141 138 137   POTASSIUM mmol/L 3.4* 3.6 3.5   BUN mg/dL 3* 7 7   CREATININE mg/dL 0.49* 0.76 0.58*     Results from last 7 days   Lab Units 09/30/19  0220 09/29/19  0220 09/28/19  0405   PH, ARTERIAL pH units 7.441 7.421 7.412   PCO2, ARTERIAL mm Hg 39.2 38.2 36.8   PO2 ART mm Hg 88.7 86.1 79.9*   FIO2 % 30 30 30     Respiratory Culture   Date Value Ref Range Status   09/27/2019 Light growth (2+) Normal Respiratory Esther  Final     Recent films:  Ct Angiogram Head With & Without Contrast    Result Date: 9/29/2019  CT Angiography Of The Neck With Intravenous Contrast 1. 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  2. Approximately 30% stenosis in the right carotid bulb. 3. Mild to moderate stenosis at the ostium of the right vertebral artery and mild stenosis at the ostium of the left vertebral artery.  CT Angiography Of The Head With Intravenous Contrast 1.   Continued occlusion of the left intracranial ICA with retrograde flow into the petrous segment from the contralateral ICA and the left P-comm. This report was finalized on 09/29/2019 14:28 by Dr. Jo Garcia MD.    Ct Angiogram Neck With & Without  Contrast    Result Date: 9/29/2019  CT Angiography Of The Neck With Intravenous Contrast 1. 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  2. Approximately 30% stenosis in the right carotid bulb. 3. Mild to moderate stenosis at the ostium of the right vertebral artery and mild stenosis at the ostium of the left vertebral artery.  CT Angiography Of The Head With Intravenous Contrast 1.   Continued occlusion of the left intracranial ICA with retrograde flow into the petrous segment from the contralateral ICA and the left P-comm. This report was finalized on 09/29/2019 14:28 by Dr. Jo Garcia MD.    Xr Chest 1 View    Result Date: 9/30/2019  Impression: 1.   No significant interval change since previous exam.   This report was finalized on 09/30/2019 07:19 by Dr. Jo Garcia MD.    Xr Abdomen Kub    Result Date: 9/29/2019  FINDINGS AND IMPRESSION: Enteric tube is in place with tip and side-port projecting over the left upper quadrant of the abdomen, presumably in the stomach. Visualized bowel gas pattern is nonobstructive. This report was finalized on 09/29/2019 22:01 by Dr. Mario Feldman MD.    Films reviewed personally by me.  My interpretation: None to review today  Pulmonary Assessment:  1. Agitation, suspect alcohol withdrawal  2. Left MCA stroke  3. Management of mechanical ventilation for airway protection  4. Tobacco abuse    Recommend:   · The patient extubated yesterday and is currently doing well on room air.  · Continue pulmonary hygiene measures.  · Pulmonary will sign off, call as needed    Electronically signed by YARELI Dubose, 10/01/19, 8:39 AM     Physician substantive contribution:  Pertinent symptoms/interval history include: off vent, not short of breath  Respiratory exam shows pertinent findings of:diminished breath sounds.  Plan includes: appears stable from pulm standpoint.  Mobilize. Signing off.  Follow up prn.  I have seen and examined patient personally,  performing a face-to-face diagnostic evaluation with plan of care reviewed and developed with APRN and nursing staff. I have addended and/or modified the above history of present illness, physical examination, and assessment and plan to reflect my findings and impressions. Essential elements of the care plan were discussed with APRN above.  Agree with findings and assessment/plan as documented above.    Electronically signed by Eleuterio Cervantes MD, on 10/1/2019, 9:16 AM           Electronically signed by Eleuterio Cervantes MD at 10/01/19 0916     Candido Alas MD at 10/01/19 0756              [facility] Medicine Services  INPATIENT PROGRESS NOTE    Length of Stay: 5  Date of Admission: 9/26/2019  Primary Care Physician: Jose Karimi MD    Subjective   Chief Complaint: Respiratory failure/CVA/alcohol withdrawal?    HPI   Patient is extubated yesterday.  Patient still very aphasic.  Patient denies any chest pain.  Patient is currently not requiring oxygen.    Review of Systems   Constitutional: Positive for activity change, appetite change and fatigue. Negative for chills and fever.   HENT: Negative for hearing loss, nosebleeds, tinnitus and trouble swallowing.    Eyes: Negative for visual disturbance.   Respiratory: Positive for shortness of breath. Negative for cough, chest tightness and wheezing.    Cardiovascular: Negative for chest pain, palpitations and leg swelling.   Gastrointestinal: Negative for abdominal distention, abdominal pain, blood in stool, constipation, diarrhea, nausea and vomiting.   Endocrine: Negative for cold intolerance, heat intolerance, polydipsia, polyphagia and polyuria.   Genitourinary: Negative for decreased urine volume, difficulty urinating, dysuria, flank pain, frequency and hematuria.   Musculoskeletal: Positive for arthralgias, gait problem and myalgias. Negative for joint swelling.   Skin: Negative for rash.   Allergic/Immunologic:  Negative for immunocompromised state.   Neurological: Positive for weakness. Negative for dizziness, syncope, light-headedness and headaches.   Hematological: Negative for adenopathy. Does not bruise/bleed easily.   Psychiatric/Behavioral: Positive for confusion. Negative for sleep disturbance. The patient is not nervous/anxious.           All pertinent negatives and positives are as above. All other systems have been reviewed and are negative unless otherwise stated.     Objective    Temp:  [98.3 °F (36.8 °C)-99 °F (37.2 °C)] 98.3 °F (36.8 °C)  Heart Rate:  [47-87] 87  Resp:  [10-27] 21  BP: (112-205)/() 148/73  FiO2 (%):  [30 %] 30 %    Intake/Output Summary (Last 24 hours) at 10/1/2019 0756  Last data filed at 10/1/2019 0600  Gross per 24 hour   Intake 1524.49 ml   Output 2850 ml   Net -1325.51 ml     Physical Exam   Constitutional: He appears well-developed.   HENT:   Head: Normocephalic.   Eyes: Conjunctivae are normal. Pupils are equal, round, and reactive to light.   Neck: Neck supple. No JVD present. No thyromegaly present.   Cardiovascular: Normal rate, regular rhythm, normal heart sounds and intact distal pulses. Exam reveals no gallop and no friction rub.   No murmur heard.  Pulmonary/Chest: Effort normal. No respiratory distress. He has wheezes. He has no rales. He exhibits no tenderness.   Rhonchi bilateral.  Good air movement.   Abdominal: Soft. Bowel sounds are normal. He exhibits no distension. There is no tenderness. There is no rebound and no guarding.   Musculoskeletal: He exhibits no edema, tenderness or deformity.   Lymphadenopathy:     He has no cervical adenopathy.   Neurological: He is alert. He displays normal reflexes. No cranial nerve deficit. He exhibits abnormal muscle tone. Coordination abnormal.   Skin: Skin is warm and dry. Capillary refill takes 2 to 3 seconds. No rash noted.   Psychiatric: He has a normal mood and affect. His behavior is normal.   Nursing note and vitals  reviewed.      Results Review:  Lab Results (last 24 hours)     Procedure Component Value Units Date/Time    Comprehensive Metabolic Panel [[id number]]  (Abnormal) Collected:  10/01/19 0300    Specimen:  Blood Updated:  10/01/19 0341     Glucose 95 mg/dL      BUN 3 mg/dL      Creatinine 0.49 mg/dL      Sodium 141 mmol/L      Potassium 3.4 mmol/L      Chloride 101 mmol/L      CO2 27.0 mmol/L      Calcium 8.8 mg/dL      Total Protein 6.8 g/dL      Albumin 3.50 g/dL      ALT (SGPT) 27 U/L      AST (SGOT) 32 U/L      Alkaline Phosphatase 76 U/L      Total Bilirubin 0.8 mg/dL      eGFR Non African Amer >150 mL/min/1.73      Globulin 3.3 gm/dL      A/G Ratio 1.1 g/dL      BUN/Creatinine Ratio 6.1     Anion Gap 13.0 mmol/L     Narrative:       GFR Normal >60  Chronic Kidney Disease <60  Kidney Failure <15    CBC & Differential [[id number]] Collected:  10/01/19 0300    Specimen:  Blood Updated:  10/01/19 0322    Narrative:       The following orders were created for panel order CBC & Differential.  Procedure                               Abnormality         Status                     ---------                               -----------         ------                     CBC Auto Differential[[id number]]        Abnormal            Final result                 Please view results for these tests on the individual orders.    CBC Auto Differential [[id number]]  (Abnormal) Collected:  10/01/19 0300    Specimen:  Blood Updated:  10/01/19 0322     WBC 11.97 10*3/mm3      RBC 4.55 10*6/mm3      Hemoglobin 14.7 g/dL      Hematocrit 42.3 %      MCV 93.0 fL      MCH 32.3 pg      MCHC 34.8 g/dL      RDW 13.2 %      RDW-SD 45.4 fl      MPV 9.7 fL      Platelets 311 10*3/mm3      Neutrophil % 73.0 %      Lymphocyte % 16.8 %      Monocyte % 7.5 %      Eosinophil % 1.8 %      Basophil % 0.5 %      Immature Grans % 0.4 %      Neutrophils, Absolute 8.74 10*3/mm3      Lymphocytes, Absolute 2.01 10*3/mm3      Monocytes, Absolute 0.90 10*3/mm3       Eosinophils, Absolute 0.21 10*3/mm3      Basophils, Absolute 0.06 10*3/mm3      Immature Grans, Absolute 0.05 10*3/mm3      nRBC 0.0 /100 WBC            Cultures:  Respiratory Culture   Date Value Ref Range Status   09/27/2019 Light growth (2+) Normal Respiratory Esther  Final       Radiology Data:    Imaging Results (last 24 hours)     ** No results found for the last 24 hours. **          No Known Allergies    Scheduled meds:     aspirin 81 mg Oral Daily   Or      aspirin 300 mg Rectal Daily   atorvastatin 80 mg Oral Nightly   chlorhexidine 15 mL Mouth/Throat Q12H   famotidine 20 mg Intravenous Q12H   ipratropium-albuterol 3 mL Nebulization 4x Daily - RT   IV Fluids 1000 mL + additives 75 mL/hr Intravenous Daily   sodium chloride 10 mL Intravenous Q12H       PRN meds:  •  albuterol  •  hypromellose  •  labetalol  •  LORazepam **OR** LORazepam **OR** LORazepam **OR** LORazepam **OR** LORazepam **OR** LORazepam  •  sodium chloride    Assessment/Plan       Degeneration of lumbar or lumbosacral intervertebral disc    Smoker    Acute cerebrovascular accident (CVA) of cerebellum (CMS/[facility])    Alcoholism /alcohol abuse (CMS/[facility])    CVA (cerebral vascular accident) (CMS/[facility])    Hyperlipidemia      Plan:  Respiratory failure.  Resolved.  Extubated 9/30/19.     CVA. Left MCA stroke. Continue aspirin.  Continue Lipitor. Neurology consult.   CTA of the head and neck- complete occlusion of the entire cervical left ICA, 30% stenosis of the right carotid bulb, mild stenosis at the ostium of the left vertebral artery.  MRI of the head- acute infarct in the left MCA and watershed territory- no hemorrhagic conversion, abnormal left ICA flow void.  Echocardiogram- ejection fraction 61%, diastolic dysfunction grade 1.    Hypokalemia- Po potassium once speech clear.     Reflux.  Pepcid and Zofran as needed     COPD.  Continue duo nebs.     Alcohol withdrawal?.  Ciwa protocol.  Patient stated last has drink about 2 weeks  ago.     Hyperlipidemia.  Continue Lipitor.     Tobacco abuse.  Tobacco counseling.     Urinary incontinence.     DC Villalobos cath 2019.     SCD.     Nutrition.  Consult nutritionist for tube feeding.  Continue banana bag.     Deconditioning.  PT and OT consult.     Discharge Plannin to 3 days. Transfer from [facility].  Plan to transfer the medical floor today.  Plan for  to evaluate for power of .    Candido Alas MD   10/01/19   7:56 AM                    Electronically signed by Candido Alas MD at 10/01/19 0826     Gab White MD at 19 1057            Neurology Progress Note      Chief Complaint:  stroke    Subjective     Subjective:    Agitation continued.  Pulmonary consulted and suggested Precedex and wean off Versed.  With pause of sedation, wakes up quickly and will intermittently follow commands.      Medications:  Current Facility-Administered Medications   Medication Dose Route Frequency Provider Last Rate Last Dose   • albuterol (PROVENTIL) nebulizer solution 0.083% 2.5 mg/3mL  2.5 mg Nebulization Once PRN Galo Bethea MD       • aspirin chewable tablet 81 mg  81 mg Oral Daily Candido Alas MD        Or   • aspirin suppository 300 mg  300 mg Rectal Daily Candido Alas MD   300 mg at 19 0927   • atorvastatin (LIPITOR) tablet 80 mg  80 mg Oral Nightly Candido Alas MD   80 mg at 19 2220   • chlorhexidine (PERIDEX) 0.12 % solution 15 mL  15 mL Mouth/Throat Q12H Galo Bethea MD   15 mL at 19 0929   • dexmedetomidine HCl (PRECEDEX) 400 mcg in sodium chloride 0.9 % 100 mL (4 mcg/mL) infusion  0.2-1.5 mcg/kg/hr Intravenous Titrated Demario Peña MD       • ipratropium-albuterol (DUO-NEB) nebulizer solution 3 mL  3 mL Nebulization 4x Daily - RT Christina Sharif APRN   3 mL at 19 0637   • labetalol (NORMODYNE,TRANDATE) injection 20 mg  20 mg Intravenous Q4H PRN Christina Sharif APRN       •  lactated ringers infusion  50 mL/hr Intravenous Continuous Sharif, Christina OrtizYARELI   Stopped at 09/30/19 0940   • LORazepam (ATIVAN) tablet 1 mg  1 mg Oral Q2H PRN Candido Alas MD        Or   • LORazepam (ATIVAN) injection 1 mg  1 mg Intravenous Q2H PRN Candido Alas MD        Or   • LORazepam (ATIVAN) tablet 2 mg  2 mg Oral Q1H PRN Candido Alas MD        Or   • LORazepam (ATIVAN) injection 2 mg  2 mg Intravenous Q1H PRN Candido Alas MD   2 mg at 09/29/19 1311    Or   • LORazepam (ATIVAN) injection 2 mg  2 mg Intravenous Q15 Min PRN Candido lAas MD   2 mg at 09/27/19 2007    Or   • LORazepam (ATIVAN) injection 2 mg  2 mg Intramuscular Q15 Min PRN Candido Alas MD       • midazolam (VERSED) 250 mg in sodium chloride 0.9 % 250 mL (1 mg/mL) infusion  1 mg/hr Intravenous Titrated Galo Bethea MD 4 mL/hr at 09/30/19 0818 4 mg/hr at 09/30/19 0818   • multiple vitamin (M.V.I. Adult) 10 mL, thiamine (B-1) 100 mg, folic acid 1 mg, potassium chloride 10 mEq in dextrose 5 % and sodium chloride 0.9 % 1,000 mL infusion  75 mL/hr Intravenous Daily Candido Alas MD 75 mL/hr at 09/30/19 0941 75 mL/hr at 09/30/19 0941   • propofol (DIPRIVAN) infusion 10 mg/mL 100 mL  5-75 mcg/kg/min Intravenous Titrated Galo Bethea MD 16.76 mL/hr at 09/30/19 0845 35 mcg/kg/min at 09/30/19 0845   • sodium chloride 0.9 % flush 10 mL  10 mL Intravenous Q12H Candido Alas MD   10 mL at 09/30/19 0924   • sodium chloride 0.9 % flush 10 mL  10 mL Intravenous PRN Candido Alas MD           Review of Systems:   -A 14 point review of systems is completed and is negative except for agitation      Objective      Vital Signs  Temp:  [97.8 °F (36.6 °C)-98.8 °F (37.1 °C)] 98.8 °F (37.1 °C)  Heart Rate:  [48-85] 69  Resp:  [10-14] 11  BP: ()/(56-97) 171/95  FiO2 (%):  [30 %] 30 %    Physical Exam:    CVS:  RR  Lungs: CTA  Abd:  NT/ND    MS:  Sedated and agitated  CN:  II - XII intact  MTR:  Moving all  extremities -right arm and leg have drift down to the bed.  DTR:  1+ throughout  Coord/Gait:  No tremors     Results Review:    I reviewed the patient's new clinical results.    Results from last 7 days   Lab Units 09/28/19  0232   WBC 10*3/mm3 12.75*   HEMOGLOBIN g/dL 13.7   HEMATOCRIT % 39.7   PLATELETS 10*3/mm3 335        Results from last 7 days   Lab Units 09/28/19  0232 09/27/19  0433   SODIUM mmol/L 138 137   POTASSIUM mmol/L 3.6 3.5   CHLORIDE mmol/L 99 96*   CO2 mmol/L 26.0 25.0   BUN mg/dL 7 7   CREATININE mg/dL 0.76 0.58*   CALCIUM mg/dL 8.5* 8.8   BILIRUBIN mg/dL 0.9 0.8   ALK PHOS U/L 63 70   ALT (SGPT) U/L 17 21   AST (SGOT) U/L 37 37   GLUCOSE mg/dL 110* 94        No results found for: PHOS, MG, PROTIME, INR, APTT  No components found for: POCGLUC  No components found for: A1C  Lab Results   Component Value Date    HDL 51 09/27/2019     (H) 09/27/2019     No components found for: B12  Lab Results   Component Value Date    TSH 1.760 09/27/2019     Labs reviewed:  Liver function normal  CT Head reviewed:        MRI brain reviewed by me.  Left MCA acute infarction mainly in the posterior distribution.  No evidence of hemorrhagic conversion nor edema.    Cardiac echo:  Preserved EF  Carotid ultrasound shows evidence of a left ICA occlusion  Labs reviewed:  WBC is 12K  CT angiography of the head and neck reviewed by me.  On the left the internal carotid arteries completely occluded.  The right has 30% stenosis.  Assessment/Plan     Hospital Problem List      Degeneration of lumbar or lumbosacral intervertebral disc    Smoker    Acute cerebrovascular accident (CVA) of cerebellum (CMS/HCC)    Alcoholism /alcohol abuse (CMS/HCC)    CVA (cerebral vascular accident) (CMS/HCC)    Hyperlipidemia    Impression:     1.  Alcohol withdrawal - drinks a pint of hard liquor per day per brother who is in the room  2.  Left MCA stroke  3.  Hyperlipidemia with an LDL above goal of 70  4.  History of hypertension  5.   Left ICA occlusion -confirmed with CT angiography.    Plan:  · ETOH withdrawal protocols  · Lipitor 80  · ASA 81mg  · SCD's  · Systolic blood pressure goal less than 160  · Agree with attempting to wean.        Gab White MD  09/30/19  10:57 AM    35 minutes of critical care time was performed with titration of sedating agents in addition to neurologic examination and interpretation of imaging including CT angiography of head and neck.    Electronically signed by Gab White MD at 09/30/19 1104     Candido Alas MD at 09/30/19 1713              [facility] Medicine Services  INPATIENT PROGRESS NOTE    Length of Stay: 4  Date of Admission: 9/26/2019  Primary Care Physician: Jose Karimi MD    Subjective   Chief Complaint: Respiratory failure/CVA/alcohol withdrawal    HPI   Patient is intubated. Assist control 10/ PEEP 5/oxygen 30%/tidal volume 620.     Review of Systems   Unable to assess secondary to the patient's intubated and sedated state.     All pertinent negatives and positives are as above. All other systems have been reviewed and are negative unless otherwise stated.     Objective    Temp:  [97.6 °F (36.4 °C)-98.6 °F (37 °C)] 98.5 °F (36.9 °C)  Heart Rate:  [48-79] 74  Resp:  [10-14] 11  BP: ()/(52-97) 157/96  FiO2 (%):  [30 %] 30 %    Intake/Output Summary (Last 24 hours) at 9/30/2019 0754  Last data filed at 9/30/2019 0400  Gross per 24 hour   Intake 1663.08 ml   Output 1650 ml   Net 13.08 ml     Physical Exam   Constitutional: He appears well-developed.   HENT:   Head: Normocephalic.   Eyes: Conjunctivae are normal. Pupils are equal, round, and reactive to light.   Neck: Neck supple. No JVD present. No thyromegaly present.   Cardiovascular: Normal rate, regular rhythm, normal heart sounds and intact distal pulses. Exam reveals no gallop and no friction rub.   No murmur heard.  Pulmonary/Chest: No respiratory distress. He has no wheezes. He  has no rales. He exhibits no tenderness.   Light rhonchi bilateral.  Patient is intubated.   Abdominal: Soft. Bowel sounds are normal. He exhibits no distension. There is no tenderness. There is no rebound and no guarding.   Musculoskeletal: He exhibits no edema, tenderness or deformity.   Lymphadenopathy:     He has no cervical adenopathy.   Neurological: He displays normal reflexes. No cranial nerve deficit. He exhibits abnormal muscle tone. Coordination abnormal.   Skin: Skin is warm and dry. Capillary refill takes 2 to 3 seconds. No rash noted.   Nursing note and vitals reviewed.      Results Review:  Lab Results (last 24 hours)     Procedure Component Value Units Date/Time    Blood Gas, Arterial [[id number]]  (Abnormal) Collected:  09/30/19 0220    Specimen:  Arterial Blood Updated:  09/30/19 0228     Site Right Radial     Lexx's Test Positive     pH, Arterial 7.441 pH units      pCO2, Arterial 39.2 mm Hg      pO2, Arterial 88.7 mm Hg      HCO3, Arterial 26.7 mmol/L      Comment: 83 Value above reference range        Base Excess, Arterial 2.4 mmol/L      Comment: 83 Value above reference range        O2 Saturation, Arterial 97.6 %      Temperature 37.0 C      Barometric Pressure for Blood Gas 752 mmHg      Modality Ventilator     FIO2 30 %      Ventilator Mode AC     Set Tidal Volume 620     Set Mech Resp Rate 10.0     PEEP 5.0     Collected by [id number]     Comment: Meter: [id number]     :  [id number]       Respiratory Culture - Sputum, ET Suction [[id number]] Collected:  09/27/19 2029    Specimen:  Sputum from ET Suction Updated:  09/29/19 0929     Respiratory Culture Light growth (2+) Normal Respiratory Artemio     Gram Stain Greater than 25 WBCs per low power field      Moderate (3+) Mixed gram positive artemio      Few (2+) Epithelial cells seen           Cultures:  Respiratory Culture   Date Value Ref Range Status   09/27/2019 Light growth (2+) Normal Respiratory Artemio  Final       Radiology Data:     Imaging Results (last 24 hours)     Procedure Component Value Units Date/Time    XR Chest 1 View [[id number]] Collected:  09/30/19 0718     Updated:  09/30/19 0722    Narrative:       Frontal supine radiograph of the chest 9/30/2019 3:30 AM CDT     History: Intubated Patient; R13.10-Dysphagia, unspecified     Comparison: Chest x-ray dated 09/29/2019      Findings:   Lines and tubes are stable in position. No new opacities or  pneumothoraces are visualized in the chest. The cardiomediastinal  silhouette and pulmonary vascularity are unchanged.       No acute osseous or soft tissue abnormality is noted.        Impression:       Impression:   1.   No significant interval change since previous exam.        This report was finalized on 09/30/2019 07:19 by Dr. Jo Garcia MD.    XR Abdomen KUB [[id number]] Collected:  09/29/19 2201     Updated:  09/29/19 2204    Narrative:       EXAMINATION: XR ABDOMEN KUB- 9/29/2019 10:01 PM CDT     HISTORY: OG tube placement     COMPARISON: 09/28/2019 3:30 AM       Impression:       FINDINGS AND IMPRESSION:  Enteric tube is in place with tip and side-port projecting over the left  upper quadrant of the abdomen, presumably in the stomach. Visualized  bowel gas pattern is nonobstructive.  This report was finalized on 09/29/2019 22:01 by Dr. Mario Feldman MD.    CT Angiogram Neck With & Without Contrast [[id number]] Collected:  09/29/19 1419     Updated:  09/29/19 1432    Narrative:       EXAM:   CT NECK ANGIOGRAM  CT HEAD ANGIOGRAM 09/29/2019     COMPARISON:   None.      HISTORY:  59 years-old Male[place]      TECHNIQUE:      Multiple CT images were obtained of the of the head and neck after the  administration of IV contrast.  3D MIP reformats were generated in the  axial, sagittal and coronal planes were sent to PACS for interpretation.        Grading of carotid artery stenosis is performed by NASCET criteria.     FINDINGS:      CT Neck Angiogram:     There is conventional anatomy with  3 vessel aortic arch. The origins of  the great vessels are patent. The bilateral subclavian arteries are  patent.     The right common carotid artery demonstrates atherosclerotic disease at  the bifurcation, extending into the right internal carotid artery with  approximately 30% stenosis. The remaining right ICA is without  flow-limiting stenosis.     The left common carotid artery demonstrates atherosclerotic disease  without focal flow limiting stenosis or occlusion. There is complete  occlusion of the left ICA from the bifurcation up to the visualized  intracranial segment.     Mild to moderate stenosis at the ostium of the right vertebral artery  origin atherosclerotic disease. The remaining right vertebral artery is  without flow-limiting stenosis or occlusion.     Origin of the left vertebral artery demonstrated mild stenosis related  to atherosclerotic disease. The remaining left vertebral arteries  without flow-limiting stenosis focally.        Additional findings: The patient is intubated. There is an enteric tube  in place. Prevertebral soft tissue swelling, favored to reflect retained  secretions.     CT Head Angiogram:     There is mild stenosis in the right carotid siphon related to  atherosclerotic disease. The right carotid terminus is well visualized.  The right A1, A2, M1 and M2 are well visualized. There is an anterior  communicating artery which supplies the left A1 and A2 segments. The  left A1 is diminutive. The left M1 visualized proximal M2 are  well-opacified. There is some retrograde opacification of the left  petrous ICA with a present posterior communicating artery on the left as  well.     The basilar artery, bilateral PCAs and SCA's are well opacified.     .          Impression:       CT Angiography Of The Neck With Intravenous Contrast   1. 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     2. Approximately 30% stenosis in the right carotid bulb.  3. Mild to moderate stenosis at  the ostium of the right vertebral artery  and mild stenosis at the ostium of the left vertebral artery.     CT Angiography Of The Head With Intravenous Contrast  1.   Continued occlusion of the left intracranial ICA with retrograde  flow into the petrous segment from the contralateral ICA and the left  P-comm.  This report was finalized on 09/29/2019 14:28 by Dr. Jo Garcia MD.    CT Angiogram Head With & Without Contrast [[id number]] Collected:  09/29/19 1419     Updated:  09/29/19 1432    Narrative:       EXAM:   CT NECK ANGIOGRAM  CT HEAD ANGIOGRAM 09/29/2019     COMPARISON:   None.      HISTORY:  59 years-old Male[facility]      TECHNIQUE:      Multiple CT images were obtained of the of the head and neck after the  administration of IV contrast.  3D MIP reformats were generated in the  axial, sagittal and coronal planes were sent to PACS for interpretation.        Grading of carotid artery stenosis is performed by NASCET criteria.     FINDINGS:      CT Neck Angiogram:     There is conventional anatomy with 3 vessel aortic arch. The origins of  the great vessels are patent. The bilateral subclavian arteries are  patent.     The right common carotid artery demonstrates atherosclerotic disease at  the bifurcation, extending into the right internal carotid artery with  approximately 30% stenosis. The remaining right ICA is without  flow-limiting stenosis.     The left common carotid artery demonstrates atherosclerotic disease  without focal flow limiting stenosis or occlusion. There is complete  occlusion of the left ICA from the bifurcation up to the visualized  intracranial segment.     Mild to moderate stenosis at the ostium of the right vertebral artery  origin atherosclerotic disease. The remaining right vertebral artery is  without flow-limiting stenosis or occlusion.     Origin of the left vertebral artery demonstrated mild stenosis related  to atherosclerotic disease. The remaining left vertebral  arteries  without flow-limiting stenosis focally.        Additional findings: The patient is intubated. There is an enteric tube  in place. Prevertebral soft tissue swelling, favored to reflect retained  secretions.     CT Head Angiogram:     There is mild stenosis in the right carotid siphon related to  atherosclerotic disease. The right carotid terminus is well visualized.  The right A1, A2, M1 and M2 are well visualized. There is an anterior  communicating artery which supplies the left A1 and A2 segments. The  left A1 is diminutive. The left M1 visualized proximal M2 are  well-opacified. There is some retrograde opacification of the left  petrous ICA with a present posterior communicating artery on the left as  well.     The basilar artery, bilateral PCAs and SCA's are well opacified.     .          Impression:       CT Angiography Of The Neck With Intravenous Contrast   1. 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     2. Approximately 30% stenosis in the right carotid bulb.  3. Mild to moderate stenosis at the ostium of the right vertebral artery  and mild stenosis at the ostium of the left vertebral artery.     CT Angiography Of The Head With Intravenous Contrast  1.   Continued occlusion of the left intracranial ICA with retrograde  flow into the petrous segment from the contralateral ICA and the left  P-comm.  This report was finalized on 09/29/2019 14:28 by Dr. Jo Garcia MD.    US Carotid Bilateral [[id number]] Collected:  09/29/19 1130     Updated:  09/29/19 1134    Narrative:       History: CVA       Impression:       Impression:  1. There is less than 50% stenosis of the right internal carotid artery.  2. There is complete occlusion of the left internal carotid artery.  3. Antegrade flow is demonstrated in the left vertebral. The right  vertebral was not visualized.  4. Further imaging/valuation may be warranted with a CTA of the head and  neck.     Comments: Bilateral carotid vertebral  arterial duplex scan was  performed.     Grayscale imaging shows intimal thickening and calcified elements at the  carotid bifurcation. The right internal carotid artery peak systolic  velocity is 82.1 cm/sec. The end-diastolic velocity is 35.8 cm/sec. The  right ICA/CCA ratio is approximately 1.15 . These findings correlate  with less than 50% stenosis of the right internal carotid artery.     There is complete occlusion of the left internal carotid artery.     There is greater than 50% stenosis of the right external carotid artery.  This report was finalized on 09/29/2019 11:31 by Dr. Gaetano Teran MD.          No Known Allergies    Scheduled meds:     aspirin 81 mg Oral Daily   Or      aspirin 300 mg Rectal Daily   atorvastatin 80 mg Oral Nightly   chlorhexidine 15 mL Mouth/Throat Q12H   ipratropium-albuterol 3 mL Nebulization 4x Daily - RT   sodium chloride 10 mL Intravenous Q12H       PRN meds:  •  albuterol  •  labetalol  •  LORazepam **OR** LORazepam **OR** LORazepam **OR** LORazepam **OR** LORazepam **OR** LORazepam  •  sodium chloride    Assessment/Plan       Degeneration of lumbar or lumbosacral intervertebral disc    Smoker    Acute cerebrovascular accident (CVA) of cerebellum (CMS/HCC)    Alcoholism /alcohol abuse (CMS/[facility])    CVA (cerebral vascular accident) (CMS/[facility])    Hyperlipidemia      Plan:  Respiratory failure.  Intubated to protect airway.  Continue propofol drip.  Continue Versed drip.  Assist control 10/ PEEP 5/oxygen 30%/tidal volume 620.     CVA. Left MCA stroke. Continue aspirin.  Continue Lipitor. Neurology consult.   CTA of the head and neck- complete occlusion of the entire cervical left ICA, 30% stenosis of the right carotid bulb, mild stenosis at the ostium of the left vertebral artery.  MRI of the head- acute infarct in the left MCA and watershed territory- no hemorrhagic conversion, abnormal left ICA flow void.  Echocardiogram- ejection fraction 61%, diastolic dysfunction grade  1.    COPD.  Continue duo nebs.    Alcohol withdrawal.  Ciwa protocol.    Hyperlipidemia.  Continue Lipitor.    Tobacco abuse.  Tobacco counseling.    Urinary incontinence.  Continue urinary cath.    SCD.    Nutrition.  Consult nutritionist for tube feeding.  Continue banana bag.    Deconditioning.  PT and OT consult.    Discharge Planning: Transfer from [facility].    Candido Alas MD   09/30/19   7:54 AM                    Electronically signed by Candido Alas MD at 09/30/19 0889     Galo Bethea MD at 09/29/19 1039              [facility] Medicine Services  INPATIENT PROGRESS NOTE    Patient Name: Jarod Ramírez Jr.  Date of Admission: 9/26/2019  Today's Date: 09/29/19  Length of Stay: 3  Primary Care Physician: Jose Karimi MD    Subjective   Chief Complaint: Intubated  HPI   Doing ok.  Currently sedated.  Got agitated off sedation  No seizure like activity        Review of Systems   Unable to perform ROS: Intubated        All pertinent negatives and positives are as above. All other systems have been reviewed and are negative unless otherwise stated.     Objective    Temp:  [97.5 °F (36.4 °C)-98.4 °F (36.9 °C)] 97.9 °F (36.6 °C)  Heart Rate:  [53-83] 55  Resp:  [10-20] 11  BP: ()/(46-99) 108/63  FiO2 (%):  [30 %] 30 %  Physical Exam   Constitutional: He appears well-developed and well-nourished. He is sedated and intubated.   HENT:   Head: Normocephalic and atraumatic.   Right Ear: External ear normal.   Left Ear: External ear normal.   Nose: Nose normal.   Mouth/Throat: Oropharynx is clear and moist.   Eyes: Conjunctivae and EOM are normal. Pupils are equal, round, and reactive to light. Right eye exhibits no discharge. Left eye exhibits no discharge. No scleral icterus.   Neck: Normal range of motion. Neck supple. No tracheal deviation present. No thyromegaly present.   Cardiovascular: Normal rate, regular rhythm, normal heart  sounds and intact distal pulses. Exam reveals no gallop and no friction rub.   No murmur heard.  Pulmonary/Chest: Effort normal and breath sounds normal. No stridor. He is intubated. No respiratory distress. He has no wheezes. He has no rales. He exhibits no tenderness.   Abdominal: Soft. Bowel sounds are normal. He exhibits no distension and no mass. There is no tenderness. There is no rebound and no guarding. No hernia.   Musculoskeletal: Normal range of motion. He exhibits no edema or deformity.   Lymphadenopathy:     He has no cervical adenopathy.   Neurological: He has normal reflexes. He is unresponsive. He displays normal reflexes. No cranial nerve deficit. He exhibits normal muscle tone. Coordination normal.   Skin: Skin is warm and dry. No rash noted. No erythema. No pallor.   Vitals reviewed.        Results Review:  I have reviewed the labs, radiology results, and diagnostic studies.    Laboratory Data:   Results from last 7 days   Lab Units 09/28/19  0232   WBC 10*3/mm3 12.75*   HEMOGLOBIN g/dL 13.7   HEMATOCRIT % 39.7   PLATELETS 10*3/mm3 335        Results from last 7 days   Lab Units 09/28/19  0232 09/27/19  0433   SODIUM mmol/L 138 137   POTASSIUM mmol/L 3.6 3.5   CHLORIDE mmol/L 99 96*   CO2 mmol/L 26.0 25.0   BUN mg/dL 7 7   CREATININE mg/dL 0.76 0.58*   CALCIUM mg/dL 8.5* 8.8   BILIRUBIN mg/dL 0.9 0.8   ALK PHOS U/L 63 70   ALT (SGPT) U/L 17 21   AST (SGOT) U/L 37 37   GLUCOSE mg/dL 110* 94       Culture Data:   Respiratory Culture   Date Value Ref Range Status   09/27/2019 Light growth (2+) Normal Respiratory Esther  Final       Radiology Data:   Imaging Results (last 24 hours)     Procedure Component Value Units Date/Time    XR Chest 1 View [[id number]] Collected:  09/29/19 0743     Updated:  09/29/19 0748    Narrative:       Frontal supine radiograph of the chest 9/29/2019 3:38 AM CDT     History: Intubated Patient; R13.10-Dysphagia, unspecified     Comparison: Chest x-ray dated 09/28/2019       Findings:   Enteric tube has been advanced.  Endotracheal tube has been advanced and approximate the alex.  Recommend retraction by 2 cm.. No new opacities or pneumothoraces are  visualized in the chest. The cardiomediastinal silhouette and pulmonary  vascularity are unchanged.       No acute osseous or soft tissue abnormality is noted.        Impression:       Impression:   1.   Endotracheal tube has been advanced. Recommend retraction by 2 cm..        This report was finalized on 09/29/2019 07:44 by Dr. Jo Garcia MD.    US Carotid Bilateral [[id number]] Updated:  09/28/19 1543    MRI Brain Without Contrast [[id number]] Collected:  09/28/19 1315     Updated:  09/28/19 1320    Narrative:       EXAM: MR BRAIN WITHOUT IV CONTRAST 09/28/2019     COMPARISON: None      HISTORY: 59 years-old Male. Stroke.      TECHNIQUE:   Routine pulse sequences of the brain were obtained without IV contrast.      REPORT:     There is extensive diffusion restriction within the left MCA territory  involving the frontal, temporal and parietal lobes, consistent with  acute left MCA territory infarct. There is also involvement of the  watershed territory. There is no evidence of hemorrhagic conversion.     No midline shift. No acute hydrocephalus. The basal cisterns are  preserved. The sella, parasellar region, brainstem and cerebellum  demonstrate no acute finding.     There is loss of the normal flow void of the left ICA, concerning for  proximal ICA flow limiting stenosis/occlusion.     The patient is intubated. There is fluid in the nasopharynx.          Impression:       1.  Acute infarct in the left MCA and watershed territory. No  hemorrhagic conversion.  2.  Abnormal left ICA flow void, consistent with a more proximal flow  limiting stenosis or occlusion in the left ICA.  This report was finalized on 09/28/2019 13:17 by Dr. Jo Garcia MD.          I have reviewed the patient's current medications.     Assessment/Plan      Active Hospital Problems    Diagnosis   • Hyperlipidemia   • Acute cerebrovascular accident (CVA) of cerebellum (CMS/HCC)   • Alcoholism /alcohol abuse (CMS/HCC)   • CVA (cerebral vascular accident) (CMS/[facility])   • Smoker   • Degeneration of lumbar or lumbosacral intervertebral disc       1.  stroke  -CTA head/neck  -Neuro following  -ASA  -Lipitor  -Therapy once out of DT window     2.  Severe alcohol withdrawal   -Intubated  -Propofol  -versed     3.  HLD  -statin     4.  Tobacco abuse  -cessation counseling when better             Discharge Planning: I expect the patient to be discharged to home in ? days    Galo Bethea MD   09/29/19   10:39 AM      Electronically signed by Galo Bethea MD at 09/29/19 1041     Gab White MD at 09/29/19 0921            Neurology Progress Note      Chief Complaint:  stroke    Subjective     Subjective:    He did well overnight.  His sedation was paused this morning.  He was able to move all 4 extremities.  He did have some weakness of his right arm and leg with drift down the bed.  He was also able to mouth around the ventilator.  Not long after this he became extremely anxious and was bucking the ventilator along with attempting to dislodge lines and tubing.  His sedation was then increased again.    Medications:  Current Facility-Administered Medications   Medication Dose Route Frequency Provider Last Rate Last Dose   • albuterol (PROVENTIL) nebulizer solution 0.083% 2.5 mg/3mL  2.5 mg Nebulization Once PRN Galo Bethea MD       • aspirin chewable tablet 81 mg  81 mg Oral Daily Candido Alas MD        Or   • aspirin suppository 300 mg  300 mg Rectal Daily Candido Alas MD   300 mg at 09/28/19 0825   • atorvastatin (LIPITOR) tablet 80 mg  80 mg Oral Nightly Candido Alas MD       • chlorhexidine (PERIDEX) 0.12 % solution 15 mL  15 mL Mouth/Throat Q12H Galo Bethea MD   15 mL at 09/28/19 2053   • ipratropium-albuterol (DUO-NEB)  nebulizer solution 3 mL  3 mL Nebulization 4x Daily - RT Christina Sharif APRN   3 mL at 09/29/19 0636   • labetalol (NORMODYNE,TRANDATE) injection 20 mg  20 mg Intravenous Q4H PRN Christina Sharif APRN       • lactated ringers infusion  50 mL/hr Intravenous Continuous Christina Sharif APRN 50 mL/hr at 09/29/19 0507 50 mL/hr at 09/29/19 0507   • LORazepam (ATIVAN) tablet 1 mg  1 mg Oral Q2H PRN Candido Alas MD        Or   • LORazepam (ATIVAN) injection 1 mg  1 mg Intravenous Q2H PRN Candido Alas MD        Or   • LORazepam (ATIVAN) tablet 2 mg  2 mg Oral Q1H PRN Candido Alas MD        Or   • LORazepam (ATIVAN) injection 2 mg  2 mg Intravenous Q1H PRN Candido Alas MD   2 mg at 09/27/19 1537    Or   • LORazepam (ATIVAN) injection 2 mg  2 mg Intravenous Q15 Min PRN Candido Alas MD   2 mg at 09/27/19 2007    Or   • LORazepam (ATIVAN) injection 2 mg  2 mg Intramuscular Q15 Min PRN Candido Alas MD       • midazolam (VERSED) 250 mg in sodium chloride 0.9 % 250 mL (1 mg/mL) infusion  1 mg/hr Intravenous Titrated Galo Bethea MD 6 mL/hr at 09/29/19 0739 6 mg/hr at 09/29/19 0739   • multiple vitamin (M.V.I. Adult) 10 mL, thiamine (B-1) 100 mg, folic acid 1 mg, potassium chloride 10 mEq in dextrose 5 % and sodium chloride 0.9 % 1,000 mL infusion  100 mL/hr Intravenous Daily Candido Alas  mL/hr at 09/28/19 1000 100 mL/hr at 09/28/19 1000   • propofol (DIPRIVAN) infusion 10 mg/mL 100 mL  5-75 mcg/kg/min Intravenous Titrated Galo Bethea MD 14.36 mL/hr at 09/29/19 0735 30 mcg/kg/min at 09/29/19 0735   • sodium chloride 0.9 % flush 10 mL  10 mL Intravenous Q12H Candido Alas MD   10 mL at 09/28/19 0819   • sodium chloride 0.9 % flush 10 mL  10 mL Intravenous PRN Candido Alas MD           Review of Systems:   -A 14 point review of systems is completed and is negative except for agitation      Objective      Vital Signs  Temp:  [97.5 °F (36.4 °C)-98.4 °F  (36.9 °C)] 97.6 °F (36.4 °C)  Heart Rate:  [55-85] 62  Resp:  [10-20] 11  BP: ()/(46-99) 156/92  FiO2 (%):  [30 %] 30 %    Physical Exam:    CVS:  RR  Lungs: CTA  Abd:  NT/ND    MS:  Sedated and agitated  CN:  II - XII intact  MTR:  Moving all extremities -right arm and leg have drift down to the bed.  DTR:  1+ throughout  Coord/Gait:  No tremors     Results Review:    I reviewed the patient's new clinical results.    Results from last 7 days   Lab Units 09/28/19  0232   WBC 10*3/mm3 12.75*   HEMOGLOBIN g/dL 13.7   HEMATOCRIT % 39.7   PLATELETS 10*3/mm3 335        Results from last 7 days   Lab Units 09/28/19  0232 09/27/19  0433   SODIUM mmol/L 138 137   POTASSIUM mmol/L 3.6 3.5   CHLORIDE mmol/L 99 96*   CO2 mmol/L 26.0 25.0   BUN mg/dL 7 7   CREATININE mg/dL 0.76 0.58*   CALCIUM mg/dL 8.5* 8.8   BILIRUBIN mg/dL 0.9 0.8   ALK PHOS U/L 63 70   ALT (SGPT) U/L 17 21   AST (SGOT) U/L 37 37   GLUCOSE mg/dL 110* 94        No results found for: PHOS, MG, PROTIME, INR, APTT  No components found for: POCGLUC  No components found for: A1C  Lab Results   Component Value Date    HDL 51 09/27/2019     (H) 09/27/2019     No components found for: B12  Lab Results   Component Value Date    TSH 1.760 09/27/2019     Labs reviewed:  Liver function normal  CT Head reviewed:        MRI brain reviewed by me.  Left MCA acute infarction mainly in the posterior distribution.  No evidence of hemorrhagic conversion nor edema.    Cardiac echo:  Preserved EF  Carotid ultrasound shows evidence of a left ICA occlusion  Labs reviewed:  WBC is 12K  Assessment/Plan     Hospital Problem List      Degeneration of lumbar or lumbosacral intervertebral disc    Smoker    Acute cerebrovascular accident (CVA) of cerebellum (CMS/HCC)    Alcoholism /alcohol abuse (CMS/HCC)    CVA (cerebral vascular accident) (CMS/HCC)    Hyperlipidemia    Impression:     1.  Alcohol withdrawal - drinks a pint of hard liquor per day per brother who is in the  room  2.  Left MCA stroke  3.  Hyperlipidemia with an LDL above goal of 70  4.  History of hypertension  5.  Left ICA occlusion    Plan:  · ETOH withdrawal protocols  · CTA Head and Neck to rule out string sign in left ICA  · Lipitor 80  · ASA 81mg  · SCD's  · May be able to wean sedation tomorrow enough to extubate based on exam this morning.  Patient becoming more cooperative.        Gab White MD  09/29/19  9:21 AM      Electronically signed by Gab White MD at 09/29/19 0936     Galo Bethea MD at 09/28/19 1217              [facility]  INPATIENT PROGRESS NOTE    Patient Name: Jarod Ramírez Jr.  Date of Admission: 9/26/2019  Today's Date: 09/28/19  Length of Stay: 2  Primary Care Physician: Jose Karimi MD    Subjective   Chief Complaint: intubated  HPI   Doing ok, still agitated this AM when sedation paused  No events overnight  Afebrile        Review of Systems   Unable to perform ROS: Intubated        All pertinent negatives and positives are as above. All other systems have been reviewed and are negative unless otherwise stated.     Objective    Temp:  [97.4 °F (36.3 °C)-98.5 °F (36.9 °C)] 98.5 °F (36.9 °C)  Heart Rate:  [63-90] 65  Resp:  [10-20] 10  BP: ()/() 138/77  FiO2 (%):  [30 %-80 %] 30 %  Physical Exam   Constitutional: He appears well-developed and well-nourished. He is sedated and intubated.   HENT:   Head: Normocephalic and atraumatic.   Right Ear: External ear normal.   Left Ear: External ear normal.   Nose: Nose normal.   Mouth/Throat: Oropharynx is clear and moist.   Eyes: Conjunctivae and EOM are normal. Pupils are equal, round, and reactive to light. Right eye exhibits no discharge. Left eye exhibits no discharge. No scleral icterus.   Neck: Normal range of motion. Neck supple. No tracheal deviation present. No thyromegaly present.   Cardiovascular: Normal rate, regular rhythm, normal heart  sounds and intact distal pulses. Exam reveals no gallop and no friction rub.   No murmur heard.  Pulmonary/Chest: Effort normal and breath sounds normal. No stridor. He is intubated. No respiratory distress. He has no wheezes. He has no rales. He exhibits no tenderness.   Abdominal: Soft. Bowel sounds are normal. He exhibits no distension and no mass. There is no tenderness. There is no rebound and no guarding. No hernia.   Musculoskeletal: Normal range of motion. He exhibits no edema or deformity.   Lymphadenopathy:     He has no cervical adenopathy.   Neurological: He has normal reflexes. He is unresponsive. He displays normal reflexes. No cranial nerve deficit. He exhibits normal muscle tone. Coordination normal.   Skin: Skin is warm and dry. No rash noted. No erythema. No pallor.   Vitals reviewed.        Results Review:  I have reviewed the labs, radiology results, and diagnostic studies.    Laboratory Data:   Results from last 7 days   Lab Units 09/28/19  0232   WBC 10*3/mm3 12.75*   HEMOGLOBIN g/dL 13.7   HEMATOCRIT % 39.7   PLATELETS 10*3/mm3 335        Results from last 7 days   Lab Units 09/28/19  0232 09/27/19  0433   SODIUM mmol/L 138 137   POTASSIUM mmol/L 3.6 3.5   CHLORIDE mmol/L 99 96*   CO2 mmol/L 26.0 25.0   BUN mg/dL 7 7   CREATININE mg/dL 0.76 0.58*   CALCIUM mg/dL 8.5* 8.8   BILIRUBIN mg/dL 0.9 0.8   ALK PHOS U/L 63 70   ALT (SGPT) U/L 17 21   AST (SGOT) U/L 37 37   GLUCOSE mg/dL 110* 94       Culture Data:        Radiology Data:   Imaging Results (last 24 hours)     Procedure Component Value Units Date/Time    XR Chest 1 View [[id number]] Collected:  09/28/19 0710     Updated:  09/28/19 0714    Narrative:       Exam:   XR CHEST 1 VW-       Date:  09/28/2019      History:  Male, age  59 years;Intubated Patient; R13.10-Dysphagia,  unspecified     COMPARISON:  Chest x-ray dated 09/27/2019     Findings :  Endotracheal tube is unchanged in position. New enteric tube.  The heart and mediastinum are  normal in size. Lungs are without focal  infiltrate, mass or effusions.  The bones show no acute pathology.         Impression:       Impression:     1.  New enteric tube.  2.  Otherwise, no interval changes.     This report was finalized on 09/28/2019 07:11 by Dr. Jo Garcia MD.    XR Abdomen KUB [[id number]] Collected:  09/28/19 0709     Updated:  09/28/19 0713    Narrative:       Exam:   XR ABDOMEN KUB-       Date:  9/28/2019 7:10 AM CDT     History:  Male, age  59 years; og placement; R13.10-Dysphagia,  unspecified     COMPARISON:  None.       Impression:       Findings and impression:  Enteric tube in place with tip projecting over the left upper quadrant.  However, the side port approximates the GE junction. Recommend  advancement by 10 cm.        This report was finalized on 09/28/2019 07:10 by Dr. Jo Garcia MD.    XR Chest 1 View [[id number]] Collected:  09/27/19 2003     Updated:  09/27/19 2007    Narrative:       HISTORY: Intubated     CXR: Frontal view the chest obtained.     COMPARISON: None     FINDINGS: Endotracheal tube slightly low lying with the tip at the T4  level. Maria R difficult to localize. There is a diminished level of  inspiration with vascular crowding basilar atelectasis. There is no  dense consolidation or convincing edema. No pleural effusion or  pneumothorax. There is no acute bony pathology.       Impression:       1. Endotracheal tube tip at the T4 level.  2. Diminished level of inspiration with mild vascular crowding and  basilar atelectasis.  This report was finalized on 09/27/2019 20:04 by Dr. Christina Kim MD.          I have reviewed the patient's current medications.     Assessment/Plan     Active Hospital Problems    Diagnosis   • Hyperlipidemia   • Acute cerebrovascular accident (CVA) of cerebellum (CMS/HCC)   • Alcoholism /alcohol abuse (CMS/HCC)   • CVA (cerebral vascular accident) (CMS/HCC)   • Smoker   • Degeneration of lumbar or lumbosacral intervertebral disc        1.  ?stroke  -MRI  -Echo/Carotids  -Neuro following  -ASA  -Lipitor  -Therapy once out of DT window    2.  Severe alcohol withdrawal   -Intubated  -Propofol  -versed    3.  HLD  -statin    4.  Tobacco abuse  -cessation counseling when better      Discharge Planning: continue in ccu    Galo Bethea MD   09/28/19   12:17 PM      Electronically signed by Galo Bethea MD at 09/28/19 1219     Gab White MD at 09/28/19 1029            Neurology Progress Note      Chief Complaint:  stroke    Subjective     Subjective:    Had to be moved to unit overnight and intubated secondary to extreme agitation with dislodging of tubing and pulling at lines.   Medications:  Current Facility-Administered Medications   Medication Dose Route Frequency Provider Last Rate Last Dose   • albuterol (PROVENTIL) nebulizer solution 0.083% 2.5 mg/3mL  2.5 mg Nebulization Once PRN Galo Bethea MD       • aspirin chewable tablet 81 mg  81 mg Oral Daily Candido Alas MD        Or   • aspirin suppository 300 mg  300 mg Rectal Daily Candido Alas MD   300 mg at 09/28/19 0825   • atorvastatin (LIPITOR) tablet 80 mg  80 mg Oral Nightly Candido Alas MD       • chlorhexidine (PERIDEX) 0.12 % solution 15 mL  15 mL Mouth/Throat Q12H Galo Bethea MD   15 mL at 09/28/19 0819   • ipratropium-albuterol (DUO-NEB) nebulizer solution 3 mL  3 mL Nebulization 4x Daily - RT Christina Sharif APRN   3 mL at 09/28/19 0643   • labetalol (NORMODYNE,TRANDATE) injection 20 mg  20 mg Intravenous Q4H PRN Christina Sharif APRN       • lactated ringers infusion  50 mL/hr Intravenous Continuous Christina Sharif APRN 50 mL/hr at 09/28/19 0836 50 mL/hr at 09/28/19 0836   • LORazepam (ATIVAN) tablet 1 mg  1 mg Oral Q2H PRN Candido Alas MD        Or   • LORazepam (ATIVAN) injection 1 mg  1 mg Intravenous Q2H PRN Candido Alas MD        Or   • LORazepam (ATIVAN) tablet 2 mg  2 mg Oral Q1H PRN Bishop  Candido BARCENAS MD        Or   • LORazepam (ATIVAN) injection 2 mg  2 mg Intravenous Q1H PRN Candido Alas MD   2 mg at 09/27/19 1537    Or   • LORazepam (ATIVAN) injection 2 mg  2 mg Intravenous Q15 Min PRN Candido Alas MD   2 mg at 09/27/19 2007    Or   • LORazepam (ATIVAN) injection 2 mg  2 mg Intramuscular Q15 Min PRN Candido Alas MD       • midazolam (VERSED) 250 mg in sodium chloride 0.9 % 250 mL (1 mg/mL) infusion  1 mg/hr Intravenous Titrated Galo Bethea MD 7 mL/hr at 09/28/19 0935 7 mg/hr at 09/28/19 0935   • multiple vitamin (M.V.I. Adult) 10 mL, thiamine (B-1) 100 mg, folic acid 1 mg, potassium chloride 10 mEq in dextrose 5 % and sodium chloride 0.9 % 1,000 mL infusion  100 mL/hr Intravenous Daily Candido Alas  mL/hr at 09/28/19 1000 100 mL/hr at 09/28/19 1000   • propofol (DIPRIVAN) infusion 10 mg/mL 100 mL  5-75 mcg/kg/min Intravenous Titrated Galo Bethea MD 9.58 mL/hr at 09/28/19 1029 20 mcg/kg/min at 09/28/19 1029   • sodium chloride 0.9 % flush 10 mL  10 mL Intravenous Q12H Candido Alas MD   10 mL at 09/28/19 0819   • sodium chloride 0.9 % flush 10 mL  10 mL Intravenous PRN Candido Alas MD           Review of Systems:   -A 14 point review of systems is completed and is negative except for agitation      Objective      Vital Signs  Temp:  [97.4 °F (36.3 °C)-98.5 °F (36.9 °C)] 98.5 °F (36.9 °C)  Heart Rate:  [63-90] 70  Resp:  [10-20] 10  BP: ()/() 147/85  FiO2 (%):  [30 %-80 %] 30 %    Physical Exam:    CVS:  RR  Lungs: CTA  Abd:  NT/ND    MS:  Sedated and agitated  CN:  II - XII intact  MTR:  Moving all extremities  DTR:  1+ throughout  Coord/Gait:  No tremors     Results Review:    I reviewed the patient's new clinical results.    Results from last 7 days   Lab Units 09/28/19  0232   WBC 10*3/mm3 12.75*   HEMOGLOBIN g/dL 13.7   HEMATOCRIT % 39.7   PLATELETS 10*3/mm3 335        Results from last 7 days   Lab Units 09/28/19  0232 09/27/19  0433   SODIUM  mmol/L 138 137   POTASSIUM mmol/L 3.6 3.5   CHLORIDE mmol/L 99 96*   CO2 mmol/L 26.0 25.0   BUN mg/dL 7 7   CREATININE mg/dL 0.76 0.58*   CALCIUM mg/dL 8.5* 8.8   BILIRUBIN mg/dL 0.9 0.8   ALK PHOS U/L 63 70   ALT (SGPT) U/L 17 21   AST (SGOT) U/L 37 37   GLUCOSE mg/dL 110* 94        No results found for: PHOS, MG, PROTIME, INR, APTT  No components found for: POCGLUC  No components found for: A1C  Lab Results   Component Value Date    HDL 51 09/27/2019     (H) 09/27/2019     No components found for: B12  Lab Results   Component Value Date    TSH 1.760 09/27/2019     Labs reviewed:  Liver function normal  CT Head reviewed:      CT of head without contrast reviewed by me.  There is some hypoattenuation left parietal occipital region; however, this is not as discrete as I thought it could be.  This could represent an evolving infarct.  This also could be artifact.    Cardiac echo:  Preserved EF  Carotid ultrasound pending  MRI Brain pending  Assessment/Plan     Hospital Problem List      Degeneration of lumbar or lumbosacral intervertebral disc    Smoker    Acute cerebrovascular accident (CVA) of cerebellum (CMS/HCC)    Alcoholism /alcohol abuse (CMS/HCC)    CVA (cerebral vascular accident) (CMS/HCC)    Hyperlipidemia    Impression:     1.  Alcohol withdrawal - drinks a pint of hard liquor per day per brother who is in the room  2.  Concern for expressive aphasia with abnormal head CT from outside hospital concerning for left temporal stroke.  3.  Hyperlipidemia with an LDL above goal of 70  4.  History of hypertension    Plan:  · ETOH withdrawal protocols  · MRI Brain pending - ordered  · Lipitor 80  · ASA 81mg  · SCD's        Gab White MD  09/28/19  10:29 AM      Electronically signed by Gab White MD at 09/28/19 1034     Christina Sharif APRN at 09/27/19 1314     Attestation signed by Galo Bethea MD at 09/28/19 4677    I personally evaluated and examined the patient in  conjunction with YARELI Gaines and agree with the assessment, treatment plan, and disposition of the patient as recorded by her. My history, exam, and further recommendations are:     S:  Doing poorly.  Very agitated.  Difficult to sedate.  Still agitated despite 14 mg ativan    O:  CVS:  Tachycardic    A:  Alcohol withdrawal, questionable CVA    P:  Moved to ICU and intubated to allow for appropriate sedation        Galo Bethea MD  9/27/19  3:36 PM                        [facility]  INPATIENT PROGRESS NOTE    Patient Name: Jarod Ramírez Jr.  Date of Admission: 9/26/2019  Today's Date: 09/27/19  Length of Stay: 1  Primary Care Physician: Jose Karimi MD    Subjective   Chief Complaint: follow up   HPI   Pt is in 4 point restraints. He is pulling at his lines and tubes. He is aphasic, but moves all 4 extremities. No family at bedside. Discussion with nurse states his brother states if the brother is out of town, then he will binge drink almost continuously. He is incontinent.    Review of Systems   All pertinent negatives and positives are as above. All other systems have been reviewed and are negative unless otherwise stated.     Objective    Temp:  [97.5 °F (36.4 °C)-98.3 °F (36.8 °C)] 98.3 °F (36.8 °C)  Heart Rate:  [71-84] 71  Resp:  [16-20] 18  BP: (140-171)/(86-92) 171/92  Physical Exam   Constitutional: He appears well-developed and well-nourished.   HENT:   Head: Normocephalic and atraumatic.   Eyes: EOM are normal. Pupils are equal, round, and reactive to light. No scleral icterus.   Neck: Normal range of motion. Neck supple. No JVD present. Carotid bruit is not present. No tracheal deviation present. No thyromegaly present.   Cardiovascular: Normal rate and regular rhythm. Exam reveals no gallop and no friction rub.   No murmur heard.  One reading only sinus 78. He will not leave his telemetry pads in place.    Pulmonary/Chest:  Effort normal. No respiratory distress. He has wheezes. He has no rales. He exhibits no tenderness.   Coarse and wheezy bilaterally.    Abdominal: Soft. Bowel sounds are normal. He exhibits no distension. There is no tenderness.   Musculoskeletal: Normal range of motion. He exhibits no edema.   Neurological: No cranial nerve deficit.   He opens his eyes but does not verbalize.    Skin: Skin is warm and dry. No rash noted.   Psychiatric: He has a normal mood and affect.     Results Review:  I have reviewed the labs, radiology results, and diagnostic studies.    Laboratory Data:          Results from last 7 days   Lab Units 09/27/19  0433   SODIUM mmol/L 137   POTASSIUM mmol/L 3.5   CHLORIDE mmol/L 96*   CO2 mmol/L 25.0   BUN mg/dL 7   CREATININE mg/dL 0.58*   CALCIUM mg/dL 8.8   BILIRUBIN mg/dL 0.8   ALK PHOS U/L 70   ALT (SGPT) U/L 21   AST (SGOT) U/L 37   GLUCOSE mg/dL 94     Radiology Data:   Imaging Results (last 24 hours)     ** No results found for the last 24 hours. **        I have reviewed the patient's current medications.     Assessment/Plan     Active Hospital Problems    Diagnosis   • Hyperlipidemia   • Acute cerebrovascular accident (CVA) of cerebellum (CMS/HCC)   • Alcoholism /alcohol abuse (CMS/HCC)   • CVA (cerebral vascular accident) (CMS/HCC)   • Smoker   • Degeneration of lumbar or lumbosacral intervertebral disc     Plan:  1. Suspect he is in ETOH withdrawal. Continue CIWA protocol.  2. He has received ASA suppository.   3. Speech will have to clear him to swallow before he can take lipitor.   4. Await MRI/echo/carotid dopplers.   5. Continue restraints as long as he keeps pulling at lines and tubes.   6. Add duonebs    Discharge Planning: I expect the patient to be discharged to home in ? days.    Christina Sharif, YARELI   09/27/19   1:29 PM    Electronically signed by Galo Bethea MD at 09/28/19 7217          Consult Notes (last 7 days) (Notes from 09/24/19 through 10/01/19)       Gab White MD at 09/27/19 1048          Neurology Consult Note    Referring Provider: Dr. EVA Bethea  Reason for Consultation: stroke      History of present illness:      This is a 59-year-old male with a known history of chronic alcohol use.  I received a phone call overnight from the emergency room physician at [facility].  They reported that the patient is a .  His wife; who he is  from currently, had difficulties yesterday getting a hold of him.  He was found initially thought to be confused although later there was a concern that it instead represented aphasia.  They denied seeing any focal weakness.  He was taken to the ER there.  Head CT did show a left MCA distribution abnormality.  He also was extremely combative.  He was transferred to the hospitalist service at our hospital.  Overnight there were reports that he required multiple sedating medications secondary to agitation.      Past Medical History:   Diagnosis Date   • Hypertension        No Known Allergies  No current facility-administered medications on file prior to encounter.      Current Outpatient Medications on File Prior to Encounter   Medication Sig   • cyclobenzaprine (FLEXERIL) 10 MG tablet Take 1 tablet by mouth 3 (Three) Times a Day As Needed for Muscle Spasms.   • lisinopril (PRINIVIL,ZESTRIL) 10 MG tablet    • meloxicam (MOBIC) 15 MG tablet    • MethylPREDNISolone (MEDROL, ALBA,) 4 MG tablet Take as directed on package instructions.   • traMADol (ULTRAM) 50 MG tablet        Social History     Socioeconomic History   • Marital status: Single     Spouse name: Not on file   • Number of children: Not on file   • Years of education: Not on file   • Highest education level: Not on file   Tobacco Use   • Smoking status: Current Every Day Smoker     Types: Cigarettes   • Smokeless tobacco: Never Used   • Tobacco comment: 1.5 packs daily   Substance and Sexual Activity   • Alcohol use: Yes   •  Drug use: No   • Sexual activity: Defer     Family History   Problem Relation Age of Onset   • No Known Problems Mother    • Diabetes Father    • Cancer Sister    • No Known Problems Brother        Review of Systems  -A 14 point review of system was attempted but not able to be performed secondary to mental status      Vital Signs   Temp:  [97.5 °F (36.4 °C)-98.3 °F (36.8 °C)] 98.3 °F (36.8 °C)  Heart Rate:  [71-84] 71  Resp:  [16-20] 18  BP: (140-171)/(86-92) 171/92    General Exam:  Head:  Normal cephalic, atraumatic  HEENT:  Neck supple  Fundoscopic Exam:  No signs of disc edema  CVS:  Regular rate and rhythm.  No murmurs  Carotid Examination:  No bruits  Lungs: Wheezing  Abdomen:  Non-tender, Non-distended  Extremities:  No signs of peripheral edema  Skin: Flushed skin.    Neurologic Exam:    Mental Status:    -He is status post sedating medication being given overnight and is currently undergoing a banana bag.  He is restless.  He is nonverbal.  He does awaken to stimulation and is somewhat combative.    CN II:  Visual fields full.  Pupils equally reactive to light  CN III, IV, VI:  Extraocular Muscles full with no signs of nystagmus  CN V:  Facial sensory is symmetric with no asymmetries.  CN VII:  Facial motor symmetric  CN VIII:  Gross hearing intact bilaterally  CN IX:  Palate elevates symmetrically  CN X:  Palate elevates symmetrically  CN XI:  Shoulder shrug symmetric  CN XII:  Tongue is midline on protrusion    Motor:     -He moves all extremities symmetrically.    DTR:  -Right   Bicep: 2+ Tricep: 2+ Brachoradialis: 2+   Patella: 2+ Ankle: 2+ Neg Babinski  -Left   Bicep: 2+ Tricep: 2+ Brachoradialis: 2+   Patella: 2+ Ankle: 2+ Neg Babinski    Sensory:  -withdraws throughout    Coordination:  -no active tremors    Gait  -restrained      Results Review:  Lab Results (last 24 hours)     Procedure Component Value Units Date/Time    Lipid Panel [[id number]]  (Abnormal) Collected:  09/27/19 9063    Specimen:   Blood Updated:  09/27/19 0540     Total Cholesterol 195 mg/dL      Triglycerides 208 mg/dL      HDL Cholesterol 51 mg/dL      LDL Cholesterol  102 mg/dL      VLDL Cholesterol 41.6 mg/dL      LDL/HDL Ratio 2.01    Narrative:       Cholesterol Reference Ranges  (U.S. Department of Health and Human Services ATP III Classifications)    Desirable          <200 mg/dL  Borderline High    200-239 mg/dL  High Risk          >240 mg/dL      Triglyceride Reference Ranges  (U.S. Department of Health and Human Services ATP III Classifications)    Normal           <150 mg/dL  Borderline High  150-199 mg/dL  High             200-499 mg/dL  Very High        >500 mg/dL    HDL Reference Ranges  (U.S. Department of Health and Human Services ATP III Classifcations)    Low     <40 mg/dl (major risk factor for CHD)  High    >60 mg/dl ('negative' risk factor for CHD)        LDL Reference Ranges  (U.S. Department of Health and Human Services ATP III Classifcations)    Optimal          <100 mg/dL  Near Optimal     100-129 mg/dL  Borderline High  130-159 mg/dL  High             160-189 mg/dL  Very High        >189 mg/dL    Comprehensive Metabolic Panel [[id number]]  (Abnormal) Collected:  09/27/19 0433    Specimen:  Blood Updated:  09/27/19 0540     Glucose 94 mg/dL      BUN 7 mg/dL      Creatinine 0.58 mg/dL      Sodium 137 mmol/L      Potassium 3.5 mmol/L      Chloride 96 mmol/L      CO2 25.0 mmol/L      Calcium 8.8 mg/dL      Total Protein 7.1 g/dL      Albumin 4.00 g/dL      ALT (SGPT) 21 U/L      AST (SGOT) 37 U/L      Alkaline Phosphatase 70 U/L      Total Bilirubin 0.8 mg/dL      eGFR Non African Amer 144 mL/min/1.73      Globulin 3.1 gm/dL      A/G Ratio 1.3 g/dL      BUN/Creatinine Ratio 12.1     Anion Gap 16.0 mmol/L     Narrative:       GFR Normal >60  Chronic Kidney Disease <60  Kidney Failure <15    TSH [[id number]]  (Normal) Collected:  09/27/19 0433    Specimen:  Blood Updated:  09/27/19 0540     TSH 1.760 uIU/mL     Hemoglobin  A1c [[id number]]  (Abnormal) Collected:  09/27/19 0433    Specimen:  Blood Updated:  09/27/19 0526     Hemoglobin A1C 4.70 %     Narrative:       Hemoglobin A1C Ranges:    Increased Risk for Diabetes  5.7% to 6.4%  Diabetes                     >= 6.5%  Diabetic Goal                < 7.0%        CT of head without contrast reviewed by me.  There is some hypoattenuation left parietal occipital region; however, this is not as discrete as I thought it could be.  This could represent an evolving infarct.  This also could be artifact.    .  Imaging Results (last 24 hours)     ** No results found for the last 24 hours. **        Lab work reviewed from outside ER:  Telemetry strip shows normal sinus rhythm  CT of head without contrast reviewed by me shows low attenuation left posterior temporal and parietal regions  CBC shows a white count of 10 with hemoglobin of 15 and a platelet count of 400  Ammonia level less than 10  Urine analysis negative  Urine drug screen negative  Metabolic panel shows no significant electrolyte abnormalities.  Liver functions unremarkable  Alcohol level negligible  Acetaminophen level negligible  Salicylate level normal    Impression    1.  Alcohol withdrawal  2.  Concern for expressive aphasia with abnormal head CT from outside hospital concerning for left temporal stroke.  3.  Hyperlipidemia with an LDL above goal of 70  4.  History of hypertension    Plan    1.  MRI brain  2.  Alcohol withdrawal protocols  3.  Lipitor 80 mg daily  4.  Aspirin 81 mg daily  5.  Carotid ultrasound  6.  Cardiac echo  7.  Sequential compression devices    I discussed the patients findings and my recommendations with nursing staff    Gab White MD  09/27/19  10:48 AM        Electronically signed by Gab White MD at 09/27/19 1111     Demario Peña MD at 09/30/19 0926      Consult Orders    1. Inpatient Pulmonology Consult [[id number]] ordered by Candido Alas MD at 09/30/19 0818                                Referring Provider: Dr. Alas  Reason for Consultation: [facility]    Patient Care Team:  Jose Karimi MD as PCP - General (Family Medicine)    Chief complaint:   Altered mental status    History of present illness:  Subjective   This is a 59-year-old male patient, smoker and ? alcoholic.  He presented on 9/26 for suspected stroke.  Apparently, he was found confused at work (he's a ), staggering around and had trouble speaking.  He was transferred to [facility] ER.   He had a CT of the brain which showed possible left MCA abnormalities which prompted transfer to our facility for further care.     Patient is estranged from his wife for the last 6 weeks and was living with his brother.  There was a question about him being drinking alcohol excessively but his brother has denied that.  Alcohol level on presentation was negative.    On arrival here, patient became more combative and required several sedatives and he was ultimately intubated on 9/27.  He has been sedated with propofol and Versed.  He is currently on Versed 4 mg/h and propofol 35 mics per KG per minute.  Per staff, when sedation is held, patient moves all his extremities and follow commands sometimes but becomes very agitated.  On my assessment, he is moving in bed despite being sedated with the above medications but is not really opening his eyes or following any commands for me.    Most of the history was obtained from the chart and staff.    Labs reviewed:  ABG 7.4/39/88 on 30% FiO2.  WBC on 9/28 12; bicarb 26    Review of Systems  Cannot obtain.  Patient is on the ventilator.    History  Past Medical History:   Diagnosis Date   • Hypertension    ,   Past Surgical History:   Procedure Laterality Date   • CATARACT EXTRACTION Left    • WRIST FRACTURE SURGERY Left    ,   Family History   Problem Relation Age of Onset   • No Known Problems Mother    • Diabetes Father    • Cancer Sister    • No Known  Problems Brother    ,   Social History     Tobacco Use   • Smoking status: Current Every Day Smoker     Types: Cigarettes   • Smokeless tobacco: Never Used   • Tobacco comment: 1.5 packs daily   Substance Use Topics   • Alcohol use: Yes   • Drug use: No   ,   No medications prior to admission.   , Scheduled Meds:    aspirin 81 mg Oral Daily   Or      aspirin 300 mg Rectal Daily   atorvastatin 80 mg Oral Nightly   chlorhexidine 15 mL Mouth/Throat Q12H   ipratropium-albuterol 3 mL Nebulization 4x Daily - RT   IV Fluids 1000 mL + additives 75 mL/hr Intravenous Daily   sodium chloride 10 mL Intravenous Q12H   , Continuous Infusions:    lactated ringers 50 mL/hr Last Rate: 50 mL/hr (09/29/19 1641)   midazolam 1 mg/hr Last Rate: 4 mg/hr (09/30/19 0818)   propofol 5-75 mcg/kg/min Last Rate: 35 mcg/kg/min (09/30/19 0815)    and Allergies:  Patient has no known allergies.    Objective     Vital Signs   Temp:  [97.8 °F (36.6 °C)-98.6 °F (37 °C)] 98.5 °F (36.9 °C)  Heart Rate:  [48-74] 74  Resp:  [10-14] 11  BP: ()/(52-97) 157/96  FiO2 (%):  [30 %] 30 %    Physical Exam:  Constitutional: Not in acute distress.  On mechanical ventilation  Eyes: Injected conjunctiva, pupils equal reactive to light.  ENMT: ET tube size 8.  Neck: Large. Trachea midline. No thyromegaly  Heart: RRR, no murmur  Lungs: Good and equal air entry bilaterally.  Non labored breathing on the ventilator.  No crackles or wheezing  Abdomen: Soft. No tenderness or dullness. No HSM.  Positive bowel sounds  Extremities: No cyanosis, clubbing or pitting edema.  Warm extremities and well-perfused.  Neuro: Sedated.  Withdraw all extremities to painful stimuli, otherwise cannot assess due to sedation.  Psych: Cannot assess   Integumentary: No rash.  Normal skin turgor  Lymphatic: No palpable cervical or supraclavicular lymph nodes.      Diagnostic imaging:  I personally and independently reviewed the following images:   CXR 9/30/2019: ET in good position.   Clear lung fields.        Assessment   25. Agitation, suspect alcohol withdrawal  26. Left MCA stroke  27. Management of mechanical ventilation for airway protection  28. Tobacco abuse    Recommendations:  · Start Precedex.  Titrate to wean off Versed first and then propofol second.  · Ventilator management: Reduce tidal volume from 650 to 550 for lung protective ventilation.  Will place him on spontaneous trial once his sedative dose is decreased.  · DuoNeb for possible COPD.  He is currently not in exacerbation.  · Aspirin and statin per neurology for stroke.    I discussed the patients findings and my recommendations with nursing staff    Demario Peña MD  09/30/19  9:27 AM              Electronically signed by Demario Peña MD at 09/30/19 9713

## 2019-10-02 ENCOUNTER — APPOINTMENT (OUTPATIENT)
Dept: GENERAL RADIOLOGY | Facility: HOSPITAL | Age: 59
End: 2019-10-02

## 2019-10-02 PROBLEM — I69.391 DYSPHAGIA DUE TO RECENT CEREBRAL INFARCTION: Status: ACTIVE | Noted: 2019-10-02

## 2019-10-02 LAB
ALBUMIN SERPL-MCNC: 3.4 G/DL (ref 3.5–5.2)
ALBUMIN/GLOB SERPL: 1 G/DL
ALP SERPL-CCNC: 70 U/L (ref 39–117)
ALT SERPL W P-5'-P-CCNC: 20 U/L (ref 1–41)
ANION GAP SERPL CALCULATED.3IONS-SCNC: 12 MMOL/L (ref 5–15)
AST SERPL-CCNC: 26 U/L (ref 1–40)
BASOPHILS # BLD AUTO: 0.08 10*3/MM3 (ref 0–0.2)
BASOPHILS NFR BLD AUTO: 0.7 % (ref 0–1.5)
BILIRUB SERPL-MCNC: 0.6 MG/DL (ref 0.2–1.2)
BUN BLD-MCNC: 5 MG/DL (ref 6–20)
BUN/CREAT SERPL: 8.3 (ref 7–25)
CALCIUM SPEC-SCNC: 8.9 MG/DL (ref 8.6–10.5)
CHLORIDE SERPL-SCNC: 97 MMOL/L (ref 98–107)
CO2 SERPL-SCNC: 30 MMOL/L (ref 22–29)
CREAT BLD-MCNC: 0.6 MG/DL (ref 0.76–1.27)
DEPRECATED RDW RBC AUTO: 45.5 FL (ref 37–54)
EOSINOPHIL # BLD AUTO: 0.2 10*3/MM3 (ref 0–0.4)
EOSINOPHIL NFR BLD AUTO: 1.8 % (ref 0.3–6.2)
ERYTHROCYTE [DISTWIDTH] IN BLOOD BY AUTOMATED COUNT: 13.2 % (ref 12.3–15.4)
GFR SERPL CREATININE-BSD FRML MDRD: 139 ML/MIN/1.73
GLOBULIN UR ELPH-MCNC: 3.5 GM/DL
GLUCOSE BLD-MCNC: 161 MG/DL (ref 65–99)
HCT VFR BLD AUTO: 41.1 % (ref 37.5–51)
HGB BLD-MCNC: 14 G/DL (ref 13–17.7)
IMM GRANULOCYTES # BLD AUTO: 0.05 10*3/MM3 (ref 0–0.05)
IMM GRANULOCYTES NFR BLD AUTO: 0.5 % (ref 0–0.5)
LYMPHOCYTES # BLD AUTO: 1.95 10*3/MM3 (ref 0.7–3.1)
LYMPHOCYTES NFR BLD AUTO: 17.7 % (ref 19.6–45.3)
MCH RBC QN AUTO: 31.8 PG (ref 26.6–33)
MCHC RBC AUTO-ENTMCNC: 34.1 G/DL (ref 31.5–35.7)
MCV RBC AUTO: 93.4 FL (ref 79–97)
MONOCYTES # BLD AUTO: 1.1 10*3/MM3 (ref 0.1–0.9)
MONOCYTES NFR BLD AUTO: 10 % (ref 5–12)
NEUTROPHILS # BLD AUTO: 7.65 10*3/MM3 (ref 1.7–7)
NEUTROPHILS NFR BLD AUTO: 69.3 % (ref 42.7–76)
NRBC BLD AUTO-RTO: 0 /100 WBC (ref 0–0.2)
PLATELET # BLD AUTO: 301 10*3/MM3 (ref 140–450)
PMV BLD AUTO: 9.4 FL (ref 6–12)
POTASSIUM BLD-SCNC: 3.6 MMOL/L (ref 3.5–5.2)
PROT SERPL-MCNC: 6.9 G/DL (ref 6–8.5)
RBC # BLD AUTO: 4.4 10*6/MM3 (ref 4.14–5.8)
SODIUM BLD-SCNC: 139 MMOL/L (ref 136–145)
WBC NRBC COR # BLD: 11.03 10*3/MM3 (ref 3.4–10.8)

## 2019-10-02 PROCEDURE — 80053 COMPREHEN METABOLIC PANEL: CPT | Performed by: FAMILY MEDICINE

## 2019-10-02 PROCEDURE — S0260 H&P FOR SURGERY: HCPCS | Performed by: OTOLARYNGOLOGY

## 2019-10-02 PROCEDURE — 99233 SBSQ HOSP IP/OBS HIGH 50: CPT | Performed by: PSYCHIATRY & NEUROLOGY

## 2019-10-02 PROCEDURE — 92526 ORAL FUNCTION THERAPY: CPT

## 2019-10-02 PROCEDURE — 0CJS8ZZ INSPECTION OF LARYNX, VIA NATURAL OR ARTIFICIAL OPENING ENDOSCOPIC: ICD-10-PCS | Performed by: OTOLARYNGOLOGY

## 2019-10-02 PROCEDURE — 97110 THERAPEUTIC EXERCISES: CPT

## 2019-10-02 PROCEDURE — 85025 COMPLETE CBC W/AUTO DIFF WBC: CPT | Performed by: FAMILY MEDICINE

## 2019-10-02 PROCEDURE — 94760 N-INVAS EAR/PLS OXIMETRY 1: CPT

## 2019-10-02 PROCEDURE — 99252 IP/OBS CONSLTJ NEW/EST SF 35: CPT | Performed by: OTOLARYNGOLOGY

## 2019-10-02 PROCEDURE — 97535 SELF CARE MNGMENT TRAINING: CPT

## 2019-10-02 PROCEDURE — 25010000002 THIAMINE PER 100 MG: Performed by: FAMILY MEDICINE

## 2019-10-02 PROCEDURE — 71045 X-RAY EXAM CHEST 1 VIEW: CPT

## 2019-10-02 PROCEDURE — 25010000002 POTASSIUM CHLORIDE PER 2 MEQ OF POTASSIUM: Performed by: FAMILY MEDICINE

## 2019-10-02 PROCEDURE — 94799 UNLISTED PULMONARY SVC/PX: CPT

## 2019-10-02 PROCEDURE — 97116 GAIT TRAINING THERAPY: CPT

## 2019-10-02 PROCEDURE — 92507 TX SP LANG VOICE COMM INDIV: CPT

## 2019-10-02 PROCEDURE — 31575 DIAGNOSTIC LARYNGOSCOPY: CPT | Performed by: OTOLARYNGOLOGY

## 2019-10-02 PROCEDURE — 74018 RADEX ABDOMEN 1 VIEW: CPT

## 2019-10-02 RX ORDER — PANTOPRAZOLE SODIUM 40 MG/10ML
40 INJECTION, POWDER, LYOPHILIZED, FOR SOLUTION INTRAVENOUS
Status: DISCONTINUED | OUTPATIENT
Start: 2019-10-03 | End: 2019-10-08

## 2019-10-02 RX ADMIN — THIAMINE HYDROCHLORIDE 75 ML/HR: 100 INJECTION, SOLUTION INTRAMUSCULAR; INTRAVENOUS at 15:45

## 2019-10-02 RX ADMIN — IPRATROPIUM BROMIDE AND ALBUTEROL SULFATE 3 ML: 2.5; .5 SOLUTION RESPIRATORY (INHALATION) at 15:23

## 2019-10-02 RX ADMIN — ASPIRIN 300 MG: 300 SUPPOSITORY RECTAL at 12:09

## 2019-10-02 RX ADMIN — IPRATROPIUM BROMIDE AND ALBUTEROL SULFATE 3 ML: 2.5; .5 SOLUTION RESPIRATORY (INHALATION) at 19:11

## 2019-10-02 RX ADMIN — IPRATROPIUM BROMIDE AND ALBUTEROL SULFATE 3 ML: 2.5; .5 SOLUTION RESPIRATORY (INHALATION) at 07:21

## 2019-10-02 RX ADMIN — SODIUM CHLORIDE, PRESERVATIVE FREE 10 ML: 5 INJECTION INTRAVENOUS at 20:11

## 2019-10-02 RX ADMIN — CHLORHEXIDINE GLUCONATE 15 ML: 1.2 RINSE ORAL at 20:11

## 2019-10-02 RX ADMIN — IPRATROPIUM BROMIDE AND ALBUTEROL SULFATE 3 ML: 2.5; .5 SOLUTION RESPIRATORY (INHALATION) at 11:00

## 2019-10-02 NOTE — PAYOR COMM NOTE
"Morgan Sanchez Jr. (59 y.o. Male)     Date of Birth Social Security Number Address Home Phone MRN    1960  [address] [place] [address] [phone] [id number]    [employer] Marital Status          Other Single       Admission Date Admission Type Admitting Provider Attending Provider Department, Room/Bed    9/26/19 Urgent Candido Alas MD Truong, Khai C, MD [facility] 3A, 355/1    Discharge Date Discharge Disposition Discharge Destination                       Attending Provider:  Candido Alas MD    Allergies:  No Known Allergies    Isolation:  None   Infection:  None   Code Status:  CPR    Ht:  162.6 cm (64\")   Wt:  78.2 kg (172 lb 4.8 oz)    Admission Cmt:  None   Principal Problem:  None                Active Insurance as of 9/26/2019     Primary Coverage     Payor Plan Insurance Group Employer/Plan Group    [employer] [place] [employer]      Payor Plan Address Payor Plan Phone Number Payor Plan Fax Number Effective Dates    [address] [address] [phone]  6/22/2017 - None Entered    [facility] [id number]       Subscriber Name Subscriber Birth Date Member ID       MORGAN SANCHEZ JR. 1960 [id number]                 Emergency Contacts      (Rel.) Home Phone Work Phone Mobile Phone    Lázaro Sanchez (Brother) [phone] -- --               Physician Progress Notes (last 48 hours) (Notes from 09/30/19 1212 through 10/02/19 1212)      Gab White MD at 10/02/19 1042            Neurology Progress Note      Chief Complaint:  stroke    Subjective     Subjective:    Moved to floor overnight.  Remains aphasic.  His wife is being disruptive and has been asked to leave the hospital.  An ethics committee met this morning in regards to establishing guardianship for patient.      ST consult reviewed:  1. Continuing NPO and considering Dobhoff placement for nutrition.   2. Meds via alternate route.   3. May consider ENT consult due to abnormality at posterior true " vocal folds.   4. SLP will continue to follow and treat.     Medications:  Current Facility-Administered Medications   Medication Dose Route Frequency Provider Last Rate Last Dose   • albuterol (PROVENTIL) nebulizer solution 0.083% 2.5 mg/3mL  2.5 mg Nebulization Once PRN Galo Bethea MD       • aspirin chewable tablet 81 mg  81 mg Oral Daily Candido Alas MD        Or   • aspirin suppository 300 mg  300 mg Rectal Daily Candido Alas MD   300 mg at 10/01/19 1032   • atorvastatin (LIPITOR) tablet 80 mg  80 mg Oral Nightly Candido Alas MD   80 mg at 09/29/19 2220   • chlorhexidine (PERIDEX) 0.12 % solution 15 mL  15 mL Mouth/Throat Q12H Galo Bethea MD   15 mL at 09/30/19 2019   • famotidine (PEPCID) injection 20 mg  20 mg Intravenous Q12H Candido Alas MD   20 mg at 10/01/19 2045   • hypromellose (ISOPTO TEARS) 0.5 % ophthalmic solution 2 drop  2 drop Both Eyes TID PRN Candido Alas MD   2 drop at 09/30/19 2152   • ipratropium-albuterol (DUO-NEB) nebulizer solution 3 mL  3 mL Nebulization 4x Daily - RT Christina Sharif APRN   3 mL at 10/02/19 0721   • labetalol (NORMODYNE,TRANDATE) injection 20 mg  20 mg Intravenous Q4H PRN Christina Sharif APRN       • lactated ringers infusion  50 mL/hr Intravenous Continuous Christina Sharif APRN 50 mL/hr at 10/01/19 0045 50 mL/hr at 10/01/19 0045   • LORazepam (ATIVAN) tablet 1 mg  1 mg Oral Q2H PRN Candido Alas MD        Or   • LORazepam (ATIVAN) injection 1 mg  1 mg Intravenous Q2H PRN Candido Alas MD        Or   • LORazepam (ATIVAN) tablet 2 mg  2 mg Oral Q1H PRN Candido Alas MD        Or   • LORazepam (ATIVAN) injection 2 mg  2 mg Intravenous Q1H PRN Candido Alas MD   2 mg at 09/29/19 1311    Or   • LORazepam (ATIVAN) injection 2 mg  2 mg Intravenous Q15 Min PRN Candido Alas MD   2 mg at 09/27/19 2007    Or   • LORazepam (ATIVAN) injection 2 mg  2 mg Intramuscular Q15 Min PRN Candido Alas MD       •  multiple vitamin (M.V.I. Adult) 10 mL, thiamine (B-1) 100 mg, folic acid 1 mg, potassium chloride 20 mEq in dextrose 5 % and sodium chloride 0.9 % 1,000 mL infusion  75 mL/hr Intravenous Daily Candido Alas MD 75 mL/hr at 10/01/19 1032 75 mL/hr at 10/01/19 1032   • potassium chloride (KAYCIEL) 20 MEQ/15ML (10%) solution 40 mEq  40 mEq Oral Once Candido Alas MD       • sodium chloride 0.9 % flush 10 mL  10 mL Intravenous Q12H Candido Alas MD   10 mL at 10/01/19 2048   • sodium chloride 0.9 % flush 10 mL  10 mL Intravenous PRN Candido Alas MD           Review of Systems:   -A 14 point review of systems is completed and is negative except for agitation      Objective      Vital Signs  Temp:  [98.3 °F (36.8 °C)-99.8 °F (37.7 °C)] 98.3 °F (36.8 °C)  Heart Rate:  [69-93] 87  Resp:  [15-24] 18  BP: (109-175)/(63-96) 130/66    Physical Exam:    CVS:  RR  Lungs: CTA  Abd:  NT/ND    MS: He is awake.  Sitting at the side of the bed.  He can tell me his name.  Otherwise he has perseveration.  He has difficulties with naming.  He does not know the year.  Shakes head yes/no appropriately.  Follows commands  CN:  II - XII intact  MTR:  Moving all extremities -right arm and leg have drift down to the bed.  DTR:  1+ throughout  Coord/Gait: Spatial dysfunction.  Difficulties drawing a clock.  When you ask him to set the time on a clock he gives the digital interpretation.     Results Review:    I reviewed the patient's new clinical results.    Results from last 7 days   Lab Units 10/02/19  0655 10/01/19  0300 09/28/19  0232   WBC 10*3/mm3 11.03* 11.97* 12.75*   HEMOGLOBIN g/dL 14.0 14.7 13.7   HEMATOCRIT % 41.1 42.3 39.7   PLATELETS 10*3/mm3 301 311 335        Results from last 7 days   Lab Units 10/02/19  0655 10/01/19  0300 09/28/19  0232   SODIUM mmol/L 139 141 138   POTASSIUM mmol/L 3.6 3.4* 3.6   CHLORIDE mmol/L 97* 101 99   CO2 mmol/L 30.0* 27.0 26.0   BUN mg/dL 5* 3* 7   CREATININE mg/dL 0.60* 0.49* 0.76   CALCIUM  mg/dL 8.9 8.8 8.5*   BILIRUBIN mg/dL 0.6 0.8 0.9   ALK PHOS U/L 70 76 63   ALT (SGPT) U/L 20 27 17   AST (SGOT) U/L 26 32 37   GLUCOSE mg/dL 161* 95 110*        No results found for: PHOS, MG, PROTIME, INR, APTT  No components found for: POCGLUC  No components found for: A1C  Lab Results   Component Value Date    HDL 51 09/27/2019     (H) 09/27/2019     No components found for: B12  Lab Results   Component Value Date    TSH 1.760 09/27/2019     Labs reviewed:  Liver function normal  CT Head reviewed:        MRI brain reviewed by me.  Left MCA acute infarction mainly in the posterior distribution.  No evidence of hemorrhagic conversion nor edema.    Cardiac echo:  Preserved EF  Carotid ultrasound shows evidence of a left ICA occlusion  Labs reviewed:  WBC is 12K  CT angiography of the head and neck reviewed by me.  On the left the internal carotid arteries completely occluded.  The right has 30% stenosis.  Assessment/Plan     Hospital Problem List      Degeneration of lumbar or lumbosacral intervertebral disc    Smoker    Acute cerebrovascular accident (CVA) of cerebellum (CMS/HCC)    Alcoholism /alcohol abuse (CMS/HCC)    CVA (cerebral vascular accident) (CMS/HCC)    Hyperlipidemia    Impression:     1.  Alcohol withdrawal - drinks a pint of hard liquor per day per brother who is in the room  2.  Left MCA stroke  3.  Hyperlipidemia with an LDL above goal of 70  4.  History of hypertension  5.  Left ICA occlusion -confirmed with CT angiography.  6.  Dysphagia  7.  Vocal cord mass?    Plan:  · ETOH withdrawal protocols - appears to be improved  · Lipitor 80  · ASA 81mg  · SCD's  · Systolic blood pressure goal less than 140  · Consulted acute rehab  · ENT consult  · Start Dobhoff and tube feeds ( nutrition consult)  · GI consult will need to be considered for PEG  · Guardianship process taking place; however, currently patient is competant to make decisions and has mainly an expressive aphasia.  He comprehends  but has difficulties forming sentences.        Gab White MD  10/02/19  10:42 AM    35 minutes of critical care time was performed with titration of sedating agents in addition to neurologic examination and interpretation of imaging including CT angiography of head and neck.    Electronically signed by Gab White MD at 10/02/19 1051     Gab White MD at 10/01/19 1048            Neurology Progress Note      Chief Complaint:  stroke    Subjective     Subjective:    The patient was extubated.  He is sitting at the side of the bed currently.  There seems to be some difficulties from a social standpoint and that his wife has came to visit the patient.  She is exhibiting symptoms of annalee and our patient care relations staff is currently assisting her.  She does not seem competent to make decisions for the patient.  His brother remains at the bedside as well.  He does seem to be a good historian and can make reasonable decisions.  Otherwise the patient is doing well.  He is showing no evidence of withdrawal symptoms.  He continues to have difficulties with spatial dysfunction along with aphasia.  The therapy staff have walked him approximately 50 feet and he does require assistance.  He has somewhat of a foot drop in addition of this has spatial dysfunction of his leg causing difficulties with ambulation.    Medications:  Current Facility-Administered Medications   Medication Dose Route Frequency Provider Last Rate Last Dose   • albuterol (PROVENTIL) nebulizer solution 0.083% 2.5 mg/3mL  2.5 mg Nebulization Once PRN Galo Bethea MD       • aspirin chewable tablet 81 mg  81 mg Oral Daily Candido Alas MD        Or   • aspirin suppository 300 mg  300 mg Rectal Daily Candido Alas MD   300 mg at 10/01/19 1032   • atorvastatin (LIPITOR) tablet 80 mg  80 mg Oral Nightly Candido Alas MD   80 mg at 09/29/19 2220   • chlorhexidine (PERIDEX) 0.12 % solution 15 mL  15 mL Mouth/Throat Q12H  Galo Bethea MD   15 mL at 09/30/19 2019   • famotidine (PEPCID) injection 20 mg  20 mg Intravenous Q12H Candido Alas MD   20 mg at 10/01/19 1034   • hypromellose (ISOPTO TEARS) 0.5 % ophthalmic solution 2 drop  2 drop Both Eyes TID PRN Candido Alas MD   2 drop at 09/30/19 2152   • ipratropium-albuterol (DUO-NEB) nebulizer solution 3 mL  3 mL Nebulization 4x Daily - RT Christina Sharif APRN   3 mL at 10/01/19 1046   • labetalol (NORMODYNE,TRANDATE) injection 20 mg  20 mg Intravenous Q4H PRN Christina Sharif APRN       • lactated ringers infusion  50 mL/hr Intravenous Continuous Christina Sharif APRN 50 mL/hr at 10/01/19 0045 50 mL/hr at 10/01/19 0045   • LORazepam (ATIVAN) tablet 1 mg  1 mg Oral Q2H PRN Candido Alas MD        Or   • LORazepam (ATIVAN) injection 1 mg  1 mg Intravenous Q2H PRN Candido Alas MD        Or   • LORazepam (ATIVAN) tablet 2 mg  2 mg Oral Q1H PRN Candido Alas MD        Or   • LORazepam (ATIVAN) injection 2 mg  2 mg Intravenous Q1H PRN Candido Alas MD   2 mg at 09/29/19 1311    Or   • LORazepam (ATIVAN) injection 2 mg  2 mg Intravenous Q15 Min PRN Candido Alas MD   2 mg at 09/27/19 2007    Or   • LORazepam (ATIVAN) injection 2 mg  2 mg Intramuscular Q15 Min PRN Candido Alas MD       • multiple vitamin (M.V.I. Adult) 10 mL, thiamine (B-1) 100 mg, folic acid 1 mg, potassium chloride 20 mEq in dextrose 5 % and sodium chloride 0.9 % 1,000 mL infusion  75 mL/hr Intravenous Daily Candido Alas MD 75 mL/hr at 10/01/19 1032 75 mL/hr at 10/01/19 1032   • potassium chloride (KAYCIEL) 20 MEQ/15ML (10%) solution 40 mEq  40 mEq Oral Once Candido Alas MD       • sodium chloride 0.9 % flush 10 mL  10 mL Intravenous Q12H Candido Alas MD   10 mL at 10/01/19 1032   • sodium chloride 0.9 % flush 10 mL  10 mL Intravenous PRN Candido Alas MD           Review of Systems:   -A 14 point review of systems is completed and is negative except for  agitation      Objective      Vital Signs  Temp:  [98.3 °F (36.8 °C)-99 °F (37.2 °C)] 98.7 °F (37.1 °C)  Heart Rate:  [47-95] 81  Resp:  [10-27] 21  BP: (117-205)/() 156/87  FiO2 (%):  [30 %] 30 %    Physical Exam:    CVS:  RR  Lungs: CTA  Abd:  NT/ND    MS: He is awake.  Sitting at the side of the bed.  He can tell me his name.  Otherwise he has perseveration.  He has difficulties with naming.  He does not know the year.  He follows commands intermittently.  CN:  II - XII intact  MTR:  Moving all extremities -right arm and leg have drift down to the bed.  DTR:  1+ throughout  Coord/Gait: Spatial dysfunction.  Difficulties drawing a clock.  When you ask him to set the time on a clock he gives the digital interpretation.     Results Review:    I reviewed the patient's new clinical results.    Results from last 7 days   Lab Units 10/01/19  0300 09/28/19  0232   WBC 10*3/mm3 11.97* 12.75*   HEMOGLOBIN g/dL 14.7 13.7   HEMATOCRIT % 42.3 39.7   PLATELETS 10*3/mm3 311 335        Results from last 7 days   Lab Units 10/01/19  0300 09/28/19  0232 09/27/19  0433   SODIUM mmol/L 141 138 137   POTASSIUM mmol/L 3.4* 3.6 3.5   CHLORIDE mmol/L 101 99 96*   CO2 mmol/L 27.0 26.0 25.0   BUN mg/dL 3* 7 7   CREATININE mg/dL 0.49* 0.76 0.58*   CALCIUM mg/dL 8.8 8.5* 8.8   BILIRUBIN mg/dL 0.8 0.9 0.8   ALK PHOS U/L 76 63 70   ALT (SGPT) U/L 27 17 21   AST (SGOT) U/L 32 37 37   GLUCOSE mg/dL 95 110* 94        No results found for: PHOS, MG, PROTIME, INR, APTT  No components found for: POCGLUC  No components found for: A1C  Lab Results   Component Value Date    HDL 51 09/27/2019     (H) 09/27/2019     No components found for: B12  Lab Results   Component Value Date    TSH 1.760 09/27/2019     Labs reviewed:  Liver function normal  CT Head reviewed:        MRI brain reviewed by me.  Left MCA acute infarction mainly in the posterior distribution.  No evidence of hemorrhagic conversion nor edema.    Cardiac echo:  Preserved  EF  Carotid ultrasound shows evidence of a left ICA occlusion  Labs reviewed:  WBC is 12K  CT angiography of the head and neck reviewed by me.  On the left the internal carotid arteries completely occluded.  The right has 30% stenosis.  Assessment/Plan     Hospital Problem List      Degeneration of lumbar or lumbosacral intervertebral disc    Smoker    Acute cerebrovascular accident (CVA) of cerebellum (CMS/HCC)    Alcoholism /alcohol abuse (CMS/HCC)    CVA (cerebral vascular accident) (CMS/[facility])    Hyperlipidemia    Impression:     1.  Alcohol withdrawal - drinks a pint of hard liquor per day per brother who is in the room  2.  Left MCA stroke  3.  Hyperlipidemia with an LDL above goal of 70  4.  History of hypertension  5.  Left ICA occlusion -confirmed with CT angiography.    Plan:  · ETOH withdrawal protocols - appears to be improved  · Lipitor 80  · ASA 81mg  · SCD's  · Systolic blood pressure goal less than 160  · OK to floor per neuro  · Will consult acute rehab        Gab White MD  10/01/19  10:48 AM    35 minutes of critical care time was performed with titration of sedating agents in addition to neurologic examination and interpretation of imaging including CT angiography of head and neck.    Electronically signed by Gab White MD at 10/01/19 9037     Eleuterio Cervantes MD at 10/01/19 5917              PULMONARY AND CRITICAL CARE PROGRESS NOTE - [facility]    Patient: Jarod Ramírez Jr.  1960   MR# [id number]   Acct# [id number]  10/01/19   8:39 AM  Referring Provider: Candido Alas MD    Chief Complaint: altered mental status   Interval history: The patient extubated yesterday.  He is currently resting in bed on room air.  O2 sat 92%, heart rate 90.  He has no new complaints.  No other aggravating or alleviating factors.     Meds:    aspirin 81 mg Oral Daily   Or      aspirin 300 mg Rectal Daily   atorvastatin 80 mg Oral Nightly   chlorhexidine 15 mL Mouth/Throat  Q12H   famotidine 20 mg Intravenous Q12H   ipratropium-albuterol 3 mL Nebulization 4x Daily - RT   IV Fluids 1000 mL + additives 75 mL/hr Intravenous Daily   potassium chloride 40 mEq Oral Once   sodium chloride 10 mL Intravenous Q12H       lactated ringers 50 mL/hr Last Rate: 50 mL/hr (10/01/19 0045)     Review of Systems:   Review of Systems   Constitutional: Negative for chills and fever.   Respiratory: Negative for cough and shortness of breath.    Cardiovascular: Negative for chest pain.   Gastrointestinal: Negative for diarrhea, nausea and vomiting.     Physical Exam:  SpO2 Percentage    10/01/19 0500 10/01/19 0623 10/01/19 0629   SpO2: 92% 91% 92%     Temp:  [98.3 °F (36.8 °C)-99 °F (37.2 °C)] 98.3 °F (36.8 °C)  Heart Rate:  [47-87] 87  Resp:  [10-27] 21  BP: (112-205)/() 148/73  FiO2 (%):  [30 %] 30 %    Intake/Output Summary (Last 24 hours) at 10/1/2019 0839  Last data filed at 10/1/2019 0600  Gross per 24 hour   Intake 1524.49 ml   Output 2450 ml   Net -925.51 ml     Physical Exam   Constitutional: He is oriented to person, place, and time. He appears well-developed and well-nourished. No distress.   HENT:   Head: Normocephalic and atraumatic.   Eyes: Conjunctivae and EOM are normal. Pupils are equal, round, and reactive to light. No scleral icterus.   Neck: Normal range of motion. Neck supple.   Cardiovascular: Normal rate, regular rhythm and normal heart sounds. Exam reveals no friction rub.   No murmur heard.  Pulmonary/Chest: Effort normal and breath sounds normal. No respiratory distress. He has no wheezes. He has no rales.   Abdominal: Soft. Bowel sounds are normal. He exhibits no distension. There is no tenderness.   Musculoskeletal: Normal range of motion. He exhibits no edema.   Neurological: He is alert and oriented to person, place, and time.   Skin: Skin is warm and dry.   Psychiatric: He has a normal mood and affect. His behavior is normal. Judgment and thought content normal.   Nursing  note and vitals reviewed.    Laboratory Data:  Results from last 7 days   Lab Units 10/01/19  0300 09/28/19  0232   WBC 10*3/mm3 11.97* 12.75*   HEMOGLOBIN g/dL 14.7 13.7   PLATELETS 10*3/mm3 311 335     Results from last 7 days   Lab Units 10/01/19  0300 09/28/19  0232 09/27/19  0433   SODIUM mmol/L 141 138 137   POTASSIUM mmol/L 3.4* 3.6 3.5   BUN mg/dL 3* 7 7   CREATININE mg/dL 0.49* 0.76 0.58*     Results from last 7 days   Lab Units 09/30/19  0220 09/29/19  0220 09/28/19  0405   PH, ARTERIAL pH units 7.441 7.421 7.412   PCO2, ARTERIAL mm Hg 39.2 38.2 36.8   PO2 ART mm Hg 88.7 86.1 79.9*   FIO2 % 30 30 30     Respiratory Culture   Date Value Ref Range Status   09/27/2019 Light growth (2+) Normal Respiratory Estehr  Final     Recent films:  Ct Angiogram Head With & Without Contrast    Result Date: 9/29/2019  CT Angiography Of The Neck With Intravenous Contrast 1. 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  2. Approximately 30% stenosis in the right carotid bulb. 3. Mild to moderate stenosis at the ostium of the right vertebral artery and mild stenosis at the ostium of the left vertebral artery.  CT Angiography Of The Head With Intravenous Contrast 1.   Continued occlusion of the left intracranial ICA with retrograde flow into the petrous segment from the contralateral ICA and the left P-comm. This report was finalized on 09/29/2019 14:28 by Dr. Jo Garcia MD.    Ct Angiogram Neck With & Without Contrast    Result Date: 9/29/2019  CT Angiography Of The Neck With Intravenous Contrast 1. 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  2. Approximately 30% stenosis in the right carotid bulb. 3. Mild to moderate stenosis at the ostium of the right vertebral artery and mild stenosis at the ostium of the left vertebral artery.  CT Angiography Of The Head With Intravenous Contrast 1.   Continued occlusion of the left intracranial ICA with retrograde flow into the petrous segment from the contralateral  ICA and the left P-comm. This report was finalized on 09/29/2019 14:28 by Dr. Jo Garcia MD.    Xr Chest 1 View    Result Date: 9/30/2019  Impression: 1.   No significant interval change since previous exam.   This report was finalized on 09/30/2019 07:19 by Dr. Jo Garcia MD.    Xr Abdomen Kub    Result Date: 9/29/2019  FINDINGS AND IMPRESSION: Enteric tube is in place with tip and side-port projecting over the left upper quadrant of the abdomen, presumably in the stomach. Visualized bowel gas pattern is nonobstructive. This report was finalized on 09/29/2019 22:01 by Dr. Mario Feldman MD.    Films reviewed personally by me.  My interpretation: None to review today  Pulmonary Assessment:  1. Agitation, suspect alcohol withdrawal  2. Left MCA stroke  3. Management of mechanical ventilation for airway protection  4. Tobacco abuse    Recommend:   · The patient extubated yesterday and is currently doing well on room air.  · Continue pulmonary hygiene measures.  · Pulmonary will sign off, call as needed    Electronically signed by YARELI Dubose, 10/01/19, 8:39 AM     Physician substantive contribution:  Pertinent symptoms/interval history include: off vent, not short of breath  Respiratory exam shows pertinent findings of:diminished breath sounds.  Plan includes: appears stable from pulm standpoint.  Mobilize. Signing off.  Follow up prn.  I have seen and examined patient personally, performing a face-to-face diagnostic evaluation with plan of care reviewed and developed with APRN and nursing staff. I have addended and/or modified the above history of present illness, physical examination, and assessment and plan to reflect my findings and impressions. Essential elements of the care plan were discussed with APRN above.  Agree with findings and assessment/plan as documented above.    Electronically signed by Eleuterio Cervantes MD, on 10/1/2019, 9:16 AM           Electronically signed by Helen  Eleuterio Enciso MD at 10/01/19 0916     Candido Alas MD at 10/01/19 0756              [facility] Medicine Services  INPATIENT PROGRESS NOTE    Length of Stay: 5  Date of Admission: 9/26/2019  Primary Care Physician: Jose Karimi MD    Subjective   Chief Complaint: Respiratory failure/CVA/alcohol withdrawal?    HPI   Patient is extubated yesterday.  Patient still very aphasic.  Patient denies any chest pain.  Patient is currently not requiring oxygen.    Review of Systems   Constitutional: Positive for activity change, appetite change and fatigue. Negative for chills and fever.   HENT: Negative for hearing loss, nosebleeds, tinnitus and trouble swallowing.    Eyes: Negative for visual disturbance.   Respiratory: Positive for shortness of breath. Negative for cough, chest tightness and wheezing.    Cardiovascular: Negative for chest pain, palpitations and leg swelling.   Gastrointestinal: Negative for abdominal distention, abdominal pain, blood in stool, constipation, diarrhea, nausea and vomiting.   Endocrine: Negative for cold intolerance, heat intolerance, polydipsia, polyphagia and polyuria.   Genitourinary: Negative for decreased urine volume, difficulty urinating, dysuria, flank pain, frequency and hematuria.   Musculoskeletal: Positive for arthralgias, gait problem and myalgias. Negative for joint swelling.   Skin: Negative for rash.   Allergic/Immunologic: Negative for immunocompromised state.   Neurological: Positive for weakness. Negative for dizziness, syncope, light-headedness and headaches.   Hematological: Negative for adenopathy. Does not bruise/bleed easily.   Psychiatric/Behavioral: Positive for confusion. Negative for sleep disturbance. The patient is not nervous/anxious.           All pertinent negatives and positives are as above. All other systems have been reviewed and are negative unless otherwise stated.     Objective    Temp:  [98.3 °F (36.8 °C)-99 °F  (37.2 °C)] 98.3 °F (36.8 °C)  Heart Rate:  [47-87] 87  Resp:  [10-27] 21  BP: (112-205)/() 148/73  FiO2 (%):  [30 %] 30 %    Intake/Output Summary (Last 24 hours) at 10/1/2019 0756  Last data filed at 10/1/2019 0600  Gross per 24 hour   Intake 1524.49 ml   Output 2850 ml   Net -1325.51 ml     Physical Exam   Constitutional: He appears well-developed.   HENT:   Head: Normocephalic.   Eyes: Conjunctivae are normal. Pupils are equal, round, and reactive to light.   Neck: Neck supple. No JVD present. No thyromegaly present.   Cardiovascular: Normal rate, regular rhythm, normal heart sounds and intact distal pulses. Exam reveals no gallop and no friction rub.   No murmur heard.  Pulmonary/Chest: Effort normal. No respiratory distress. He has wheezes. He has no rales. He exhibits no tenderness.   Rhonchi bilateral.  Good air movement.   Abdominal: Soft. Bowel sounds are normal. He exhibits no distension. There is no tenderness. There is no rebound and no guarding.   Musculoskeletal: He exhibits no edema, tenderness or deformity.   Lymphadenopathy:     He has no cervical adenopathy.   Neurological: He is alert. He displays normal reflexes. No cranial nerve deficit. He exhibits abnormal muscle tone. Coordination abnormal.   Skin: Skin is warm and dry. Capillary refill takes 2 to 3 seconds. No rash noted.   Psychiatric: He has a normal mood and affect. His behavior is normal.   Nursing note and vitals reviewed.      Results Review:  Lab Results (last 24 hours)     Procedure Component Value Units Date/Time    Comprehensive Metabolic Panel [[id number]]  (Abnormal) Collected:  10/01/19 0300    Specimen:  Blood Updated:  10/01/19 0341     Glucose 95 mg/dL      BUN 3 mg/dL      Creatinine 0.49 mg/dL      Sodium 141 mmol/L      Potassium 3.4 mmol/L      Chloride 101 mmol/L      CO2 27.0 mmol/L      Calcium 8.8 mg/dL      Total Protein 6.8 g/dL      Albumin 3.50 g/dL      ALT (SGPT) 27 U/L      AST (SGOT) 32 U/L      Alkaline  Phosphatase 76 U/L      Total Bilirubin 0.8 mg/dL      eGFR Non African Amer >150 mL/min/1.73      Globulin 3.3 gm/dL      A/G Ratio 1.1 g/dL      BUN/Creatinine Ratio 6.1     Anion Gap 13.0 mmol/L     Narrative:       GFR Normal >60  Chronic Kidney Disease <60  Kidney Failure <15    CBC & Differential [[id number]] Collected:  10/01/19 0300    Specimen:  Blood Updated:  10/01/19 0322    Narrative:       The following orders were created for panel order CBC & Differential.  Procedure                               Abnormality         Status                     ---------                               -----------         ------                     CBC Auto Differential[[id number]]        Abnormal            Final result                 Please view results for these tests on the individual orders.    CBC Auto Differential [[id number]]  (Abnormal) Collected:  10/01/19 0300    Specimen:  Blood Updated:  10/01/19 0322     WBC 11.97 10*3/mm3      RBC 4.55 10*6/mm3      Hemoglobin 14.7 g/dL      Hematocrit 42.3 %      MCV 93.0 fL      MCH 32.3 pg      MCHC 34.8 g/dL      RDW 13.2 %      RDW-SD 45.4 fl      MPV 9.7 fL      Platelets 311 10*3/mm3      Neutrophil % 73.0 %      Lymphocyte % 16.8 %      Monocyte % 7.5 %      Eosinophil % 1.8 %      Basophil % 0.5 %      Immature Grans % 0.4 %      Neutrophils, Absolute 8.74 10*3/mm3      Lymphocytes, Absolute 2.01 10*3/mm3      Monocytes, Absolute 0.90 10*3/mm3      Eosinophils, Absolute 0.21 10*3/mm3      Basophils, Absolute 0.06 10*3/mm3      Immature Grans, Absolute 0.05 10*3/mm3      nRBC 0.0 /100 WBC            Cultures:  Respiratory Culture   Date Value Ref Range Status   09/27/2019 Light growth (2+) Normal Respiratory Esther  Final       Radiology Data:    Imaging Results (last 24 hours)     ** No results found for the last 24 hours. **          No Known Allergies    Scheduled meds:     aspirin 81 mg Oral Daily   Or      aspirin 300 mg Rectal Daily   atorvastatin 80 mg Oral  Nightly   chlorhexidine 15 mL Mouth/Throat Q12H   famotidine 20 mg Intravenous Q12H   ipratropium-albuterol 3 mL Nebulization 4x Daily - RT   IV Fluids 1000 mL + additives 75 mL/hr Intravenous Daily   sodium chloride 10 mL Intravenous Q12H       PRN meds:  •  albuterol  •  hypromellose  •  labetalol  •  LORazepam **OR** LORazepam **OR** LORazepam **OR** LORazepam **OR** LORazepam **OR** LORazepam  •  sodium chloride    Assessment/Plan       Degeneration of lumbar or lumbosacral intervertebral disc    Smoker    Acute cerebrovascular accident (CVA) of cerebellum (CMS/[facility])    Alcoholism /alcohol abuse (CMS/[facility])    CVA (cerebral vascular accident) (CMS/[facility])    Hyperlipidemia      Plan:  Respiratory failure.  Resolved.  Extubated 19.     CVA. Left MCA stroke. Continue aspirin.  Continue Lipitor. Neurology consult.   CTA of the head and neck- complete occlusion of the entire cervical left ICA, 30% stenosis of the right carotid bulb, mild stenosis at the ostium of the left vertebral artery.  MRI of the head- acute infarct in the left MCA and watershed territory- no hemorrhagic conversion, abnormal left ICA flow void.  Echocardiogram- ejection fraction 61%, diastolic dysfunction grade 1.    Hypokalemia- Po potassium once speech clear.     Reflux.  Pepcid and Zofran as needed     COPD.  Continue duo nebs.     Alcohol withdrawal?.  Ciwa protocol.  Patient stated last has drink about 2 weeks ago.     Hyperlipidemia.  Continue Lipitor.     Tobacco abuse.  Tobacco counseling.     Urinary incontinence.     DC Villalobos cath 2019.     SCD.     Nutrition.  Consult nutritionist for tube feeding.  Continue banana bag.     Deconditioning.  PT and OT consult.     Discharge Plannin to 3 days. Transfer from [facility].  Plan to transfer the medical floor today.  Plan for  to evaluate for power of .    Candido Alas MD   10/01/19   7:56 AM                    Electronically signed by Candido Alas  MD SWAPNA at 10/01/19 0829       Consult Notes (last 48 hours) (Notes from 09/30/19 1212 through 10/02/19 1212)     No notes of this type exist for this encounter.

## 2019-10-02 NOTE — THERAPY TREATMENT NOTE
Acute Care - Occupational Therapy Treatment Note  [facility]     Patient Name: Jarod Ramírez Jr.  : 1960  MRN: [id number]  Today's Date: 10/2/2019  Onset of Illness/Injury or Date of Surgery: 19  Date of Referral to OT: 19  Referring Physician: Dr. Alas    Admit Date: 2019       ICD-10-CM ICD-9-CM   1. Dysphagia, unspecified type R13.10 787.20   2. Impaired mobility Z74.09 799.89   3. Impaired mobility and ADLs Z74.09 799.89   4. Aphasia R47.01 784.3     Patient Active Problem List   Diagnosis   • Degeneration of cervical intervertebral disc   • Closed fracture of lumbar vertebra (CMS/HCC)   • Degeneration of lumbar or lumbosacral intervertebral disc   • Smoker   • BMI 27.0-27.9,adult   • Acute cerebrovascular accident (CVA) of cerebellum (CMS/HCC)   • Alcoholism /alcohol abuse (CMS/HCC)   • CVA (cerebral vascular accident) (CMS/HCC)   • Hyperlipidemia     Past Medical History:   Diagnosis Date   • Hypertension      Past Surgical History:   Procedure Laterality Date   • CATARACT EXTRACTION Left    • WRIST FRACTURE SURGERY Left        Therapy Treatment    Rehabilitation Treatment Summary     Row Name 10/02/19 1114 10/02/19 0914          Treatment Time/Intention    Discipline  occupational therapy assistant  -TS  speech language pathologist  -MB,AS,MB2     Document Type  therapy note (daily note)  -  therapy note (daily note)  -MB,AS,MBMarsha     Subjective Information  no complaints  -TS2  no complaints  -MB,AS,MB2     Mode of Treatment  --  speech-language pathology  -MB,AS,MB2     Patient/Family Observations  --  no family present  -MB,AS,MB2     Patient Effort  good  -TS2  good  -MB,AS,MB2     Existing Precautions/Restrictions  fall  -TS2  --     Treatment Considerations/Comments  aphasia  -TS2  --     Recorded by [TS] Yaritza Lopez COTTON/L 10/02/19 1114  [TS2] Yaritza Lopez COTTON/L 10/02/19 1242 [JD MERINO,MB2] Georgi Hernandez CCC-SLP (r) Eliecer Williamson, Speech Therapy  Student (t) Georgi Hernandez, CCC-SLP (c) 10/02/19 1231     Row Name 10/02/19 1114             Cognitive Assessment/Intervention- PT/OT    Follows Commands (Cognition)  follows one step commands;25-49% accuracy;50-74% accuracy;increased processing time needed;delayed response/completion;repetition of directions required  -TS      Personal Safety Interventions  fall prevention program maintained;gait belt;nonskid shoes/slippers when out of bed;elopement precautions initiated  -TS      Recorded by [TS] Yaritza Lopez COTTON/L 10/02/19 1242      Row Name 10/02/19 1114             Bed Mobility Assessment/Treatment    Bed Mobility Assessment/Treatment  supine-sit  -TS      Supine-Sit [place] (Bed Mobility)  supervision  -TS      Comment (Bed Mobility)  pt came to long sitting and then came to EOB  -TS      Recorded by [TS] Yaritza Lopez COTTON/L 10/02/19 1242      Row Name 10/02/19 1114             Functional Mobility    Functional Mobility- Ind. Level  contact guard assist  -TS      Functional Mobility- Safety Issues  balance decreased during turns  -TS      Functional Mobility- Comment  in room, in BR  -TS      Recorded by [TS] Yaritza Lopez COTTON/L 10/02/19 1242      Row Name 10/02/19 1114             Transfer Assessment/Treatment    Transfer Assessment/Treatment  sit-stand transfer;stand-sit transfer  -TS      Recorded by [TS] Yaritza Lopez COTTON/L 10/02/19 1242      Row Name 10/02/19 1114             Sit-Stand Transfer    Sit-Stand [place] (Transfers)  contact guard;stand by assist  -TS      Recorded by [TS] Yaritza Lopez COTTON/L 10/02/19 1242      Row Name 10/02/19 1114             Stand-Sit Transfer    Stand-Sit [place] (Transfers)  contact guard  -TS      Recorded by [TS] Yaritza Lopez COTTON/L 10/02/19 1242      Row Name 10/02/19 1114             ADL Assessment/Intervention    BADL Assessment/Intervention  grooming;lower body dressing  -TS      Recorded by  [TS] Yaritza Lopez COTA/L 10/02/19 1242      Row Name 10/02/19 1114             Lower Body Dressing Assessment/Training    Lower Body Dressing [place] Level  don;socks;minimum assist (75% patient effort)  -TS      Lower Body Dressing Position  edge of bed sitting  -TS      Comment (Lower Body Dressing)  pt presented with socks and unable to don socks without assist  -TS      Recorded by [TS] Yaritza Lopez COTA/L 10/02/19 1242      Row Name 10/02/19 1114             Grooming Assessment/Training    [place] Level (Grooming)  wash face, hands;set up;verbal cues;contact guard assist  -TS      Grooming Position  sink side  -TS      Recorded by [TS] Yaritza Lopez COTA/L 10/02/19 1242      Row Name 10/02/19 1114             Positioning and Restraints    Pre-Treatment Position  in bed  -TS      Post Treatment Position  chair  -TS      In Chair  reclined;call light within reach;encouraged to call for assist;exit alarm on;notified nsg  -TS      Recorded by [TS] Yaritza Lopez COTA/L 10/02/19 1242      Row Name 10/02/19 1114 10/02/19 0914          Pain Scale: FACES Pre/Post-Treatment    Pain: FACES Scale, Pretreatment  0-->no hurt  -TS  0-->no hurt  -MB,AS,MB2     Pain: FACES Scale, Post-Treatment  0-->no hurt  -TS  --     Recorded by [TS] Yaritza Lopez COTA/L 10/02/19 1242 [MB,AS,MB2] Georgi Hernandez CCC-SLP (r) Eliecer Williamson, Speech Therapy Student (t) Georgi Hernandez CCC-SLP (c) 10/02/19 1231     Row Name                Wound 09/27/19 0800 Right posterior finger    Wound - Properties Group Date first assessed: 09/27/19 [NW] Time first assessed: 0800 [NW] Present on Hospital Admission: Y [NW] Side: Right [NW] Orientation: posterior [NW] Location: finger [NW] Recorded by:  [NW] Dunia Multani RN 09/27/19 1624    Row Name 10/02/19 1114             Outcome Summary/Treatment Plan (OT)    Daily Summary of Progress (OT)  progress towards functional goals is fair   -TS      Recorded by [TS] Yaritza Lopez COTA/L 10/02/19 1242      Row Name 10/02/19 0914             Outcome Summary/Treatment Plan (SLP)    Daily Summary of Progress (SLP)  progress towards functional goals is fair  -MB,AS,MB2      Barriers to Overall Progress (SLP)  aphasia  -MB,AS,MB2      Plan for Continued Treatment (SLP)  continue to follow and treat  -MB,AS,MB2      Anticipated Dischage Disposition  inpatient rehabilitation facility  -MB,AS,MB2      Recorded by [MB,AS,MB2] Georgi Hernandez CCC-SLP (r) Eliecer Williamson, Speech Therapy Student (t) Georgi Hernandez CCC-SLP (c) 10/02/19 1231        User Key  (r) = Recorded By, (t) = Taken By, (c) = Cosigned By    Initials Name Effective Dates Discipline    Georgi Garcia CCC-SLP 08/02/16 -  SLP    TS Yaritza Lopez COTA/L 08/02/16 -  OT    NW Dunia Multani RN 07/12/18 -  Nurse    AS Eliecer Williamson, Speech Therapy Student 09/03/19 -  SLP        Wound 09/27/19 0800 Right posterior finger (Active)   Dressing Appearance open to air 10/2/2019  8:43 AM   Closure None 10/2/2019  8:43 AM   Base dry;white 10/1/2019  5:24 PM   Edges jagged 10/2/2019  8:43 AM   Drainage Amount none 10/2/2019  8:43 AM   Dressing Care, Wound open to air 10/2/2019  8:43 AM     Rehab Goal Summary     Row Name 10/02/19 0917             Swallow Goals (SLP)    Oral Nutrition/Hydration Goal Selection (SLP)  oral nutrition/hydration, SLP goal 1  -MB (r) AS (t) MB (c)      Lingual Strengthening Goal Selection (SLP)  lingual strengthening, SLP goal 1  -MB (r) AS (t) MB (c)      Pharyngeal Strengthening Exercise Goal Selection (SLP)  pharyngeal strengthening exercise, SLP goal 1  -MB (r) AS (t) MB (c)      Additional Documentation  lingual strengthening goal selection (SLP);pharyngeal strengthening exercise goal selection (SLP)  -MB (r) AS (t) MB (c)         Oral Nutrition/Hydration Goal 1 (SLP)    Oral Nutrition/Hydration Goal 1, SLP  LTG: Patient will tolerate  LRD without s/s of aspiration.  -MB (r) AS (t) MB (c)      Time Frame (Oral Nutrition/Hydration Goal 1, SLP)  by discharge  -MB (r) AS (t) MB (c)      Barriers (Oral Nutrition/Hydration Goal 1, SLP)  Aphasia  -MB (r) AS (t) MB (c)      Progress/Outcomes (Oral Nutrition/Hydration Goal 1, SLP)  goal ongoing  -MB (r) AS (t) MB (c)         Lingual Strengthening Goal 1 (SLP)    Activity (Lingual Strengthening Goal 1, SLP)  increase tongue back strength  -MB (r) AS (t) MB (c)      Increase Tongue Back Strength  lingual movement exercises  -MB (r) AS (t) MB (c)      [place]/Accuracy (Lingual Strengthening Goal 1, SLP)  independently (over 90% accuracy)  -MB (r) AS (t) MB (c)      Time Frame (Lingual Strengthening Goal 1, SLP)  short term goal (STG);by discharge  -MB (r) AS (t) MB (c)      Barriers (Lingual Strengthening Goal 1, SLP)  n/a  -MB (r) AS (t) MB (c)      Progress/Outcomes (Lingual Strengthening Goal 1, SLP)  good progress toward goal  -MB (r) AS (t) MB (c)         Pharyngeal Strengthening Exercise Goal 1 (SLP)    Activity (Pharyngeal Strengthening Goal 1, SLP)  increase epiglottic inversion and retroflexion;increase squeeze/positive pressure generation;increase tongue base retraction  -MB (r) AS (t) MB (c)      Increase Epiglottic Inversion and Retroflexion  [place]  -MB (r) AS (t) MB (c)      Increase Squeeze/Positive Pressure Generation  hard effortful swallow  -MB (r) AS (t) MB (c)      Increase Tongue Base Retraction  yoni  -MB (r) AS (t) MB (c)      [place]/Accuracy (Pharyngeal Strengthening Goal 1, SLP)  independently (over 90% accuracy)  -MB (r) AS (t) MB (c)      Time Frame (Pharyngeal Strengthening Goal 1, SLP)  short term goal (STG);by discharge  -MB (r) AS (t) MB (c)      Barriers (Pharyngeal Strengthening Goal 1, SLP)  cognitive status  -MB (r) AS (t) MB (c)      Progress/Outcomes (Pharyngeal Strengthening Goal 1, SLP)  goal ongoing  -MB (r) AS (t) MB (c)         Communication  Treatment Objective and Progress Goals (SLP)    Auditory Comprehension Treatment Objectives  Auditory Comprehension Treatment Objectives (Group)  -MB (r) AS (t) MB (c)      Verbal Expression Treatment Objectives  Verbal Expression Treatment Objectives (Group)  -MB (r) AS (t) MB (c)      Graphic Expression Treatment Objectives  Graphic Expression Treatment Objectives (Group)  -MB (r) AS (t) MB (c)      Augmentative/Alternative Communication Objectives  Augmentative/Alternative Communication Objectives (Group)  -MB (r) AS (t) MB (c)         Auditory Comprehension Treatment Objectives    Words/Phrases/Sentences Selection  words/phrases/sentences, SLP goal 1  -MB (r) AS (t) MB (c)      Comprehend Questions Selection  comprehend questions, SLP goal 1  -MB (r) AS (t) MB (c)      Follow Directions Selection  follow directions, SLP goal 1  -MB (r) AS (t) MB (c)         Words/Phrases/Sentences Goal 1 (SLP)    Improve Ability to Comprehend Words/Phrases/Sentences Through: Goal 1 (SLP)  identify objects, field of;identify pictures, field of;90%;independently (over 90% accuracy);other (comment)  -MB (r) AS (t) MB (c)      Time Frame (Identify Objects and Pictures Goal 1, SLP)  short term goal (STG);by discharge  -MB (r) AS (t) MB (c)      Barriers (Identify Objects and Pictures Goal 1, SLP)  aphasia  -MB (r) AS (t) MB (c)      Progress/Outcomes (Identify Objects and Pictures Goal 1, SLP)  goal ongoing  -MB (r) AS (t) MB (c)         Comprehend Questions Goal 1 (SLP)    Improve Ability to Comprehend Questions Goal 1 (SLP)  simple yes/no questions;complex yes/no questions;90%;independently (over 90% accuracy)  -MB (r) AS (t) MB (c)      Time Frame (Comprehend Questions Goal 1, SLP)  short term goal (STG);by discharge  -MB (r) AS (t) MB (c)      Barriers (Comprehend Questions Goal 1, SLP)  [place]  -MB (r) AS (t) MB (c)      Progress/Outcomes (Comprehend Questions Goal 1, SLP)  goal ongoing  -MB (r) AS (t) MB (c)         Follow  Directions Goal 2 (SLP)    Improve Ability to Follow Directions Goal 1 (SLP)  1 step direction with objects;1 step direction without objects;90%;independently (over 90% accuracy)  -MB (r) AS (t) MB (c)      Time Frame (Follow Directions Goal 1, SLP)  short term goal (STG);by discharge  -MB (r) AS (t) MB (c)      Progress (Ability to Follow Directions Goal 1, SLP)  50%;with minimal cues (75-90%)  -MB (r) AS (t) MB (c)      Progress/Outcomes (Follow Directions Goal 1, SLP)  continuing progress toward goal  -MB (r) AS (t) MB (c)         Verbal Expression Treatment Objectives    Word Retrieval Skills Selection  word retrieval, SLP goal 1  -MB (r) AS (t) MB (c)      Phrase and Sentence Level Response Selection  phrase and sentence level response, SLP goal 1  -MB (r) AS (t) MB (c)         Word Retrieval Skills Goal 1 (SLP)    Improve Word Retrieval Skills By Goal 1 (SLP)  completing automatic speech task, days of the week;completing automatic speech task, months;confrontational naming task;repeating words;90%;independently (over 90% accuracy)  -MB (r) AS (t) MB (c)      Time Frame (Word Retrieval Goal 1, SLP)  short term goal (STG);by discharge  -MB (r) AS (t) MB (c)      Progress (Word Retrieval Skills Goal 1, SLP)  50%  -MB (r) AS (t) MB (c)      Progress/Outcomes (Word Retrieval Goal 1, SLP)  continuing progress toward goal  -MB (r) AS (t) MB (c)         Graphic Expression Treatment Objectives    Graphic Expression of Shapes, Letters and Numbers Selection  graphic expression of shapes, letters, and numbers, SLP goal 1  -MB (r) AS (t) MB (c)         Graphic Expression of Shapes, Letters, Numbers Goal 1 (SLP)    Improve Graphic Expression of Shapes, Letters, and Numbers Goal 1 (SLP)  copy shapes, numbers, and letters;writing dictated number and letters;90%;independently (over 90% accuracy)  -MB (r) AS (t) MB (c)      Time Frame (Graphic Expression of Shapes, Letters, and Numbers Goal 1, SLP)  short term goal (STG);by  discharge  -MB (r) AS (t) MB (c)      Progress/Outcomes (Graphic Expression of Shapes, Letters, and Numbers Goal 1, SLP)  goal ongoing  -MB (r) AS (t) MB (c)         Augmentative/Alternative Communication Objectives    Augmentative/Alternative Communication Selection  augmentative/alternative communication, SLP goal 1  -MB (r) AS (t) MB (c)         Augmentative/Alternative Communication Objectives Goal 1 (SLP    Communication (Augmentative/Alternative Communication Goal 1, SLP)  improve ability to use low tech augmentative/alternative communication device  -MB (r) AS (t) MB (c)      Improve Communication by (Augmentative/Alternative Communication Goal 1, SLP)  identify picture, field of ____;identify word, field of ____;alphabet/picture board;90%;independently (over 90% accuracy)  -MB (r) AS (t) MB (c)      Time Frame (Augmentative/Alternative Communication Goal 1, SLP)  short term goal (STG);by discharge  -MB (r) AS (t) MB (c)      Progress (Augmentative/Alternative Communication Goal 1, SLP)  70%  -MB (r) AS (t) MB (c)      Progress/Outcomes (Augmentative/Alternative Communication Goal 1, SLP)  good progress toward goal  -MB (r) AS (t) MB (c)        User Key  (r) = Recorded By, (t) = Taken By, (c) = Cosigned By    Initials Name Provider Type Discipline    Georgi Garcia CCC-SLP Speech and Language Pathologist SLP    AS Eliecer Williamson, Speech Therapy Student Speech Therapy Student SLP        Occupational Therapy Education     Title: PT OT SLP Therapies (In Progress)     Topic: Occupational Therapy (In Progress)     Point: ADL training (Done)     Description: Instruct learner(s) on proper safety adaptation and remediation techniques during self care or transfers.   Instruct in proper use of assistive devices.    Learning Progress Summary           Patient Acceptance, E, VU by YUDI at 10/1/2019 10:40 AM    Comment:  OT POC, adls, t/fs, bed mobility, safety/environmental modifications, processing, d/c  planning.                   Point: Body mechanics (Done)     Description: Instruct learner(s) on proper positioning and spine alignment during self-care, functional mobility activities and/or exercises.    Learning Progress Summary           Patient Acceptance, DOMENICO VU by YUDI at 10/1/2019 10:40 AM    Comment:  OT POC, adls, t/fs, bed mobility, safety/environmental modifications, processing, d/c planning.                               User Key     Initials Effective Dates Name Provider Type Discipline    YUDI 10/12/18 -  Christina Dahl OTR/L Occupational Therapist OT                OT Recommendation and Plan  Outcome Summary/Treatment Plan (OT)  Daily Summary of Progress (OT): progress towards functional goals is fair  Daily Summary of Progress (OT): progress towards functional goals is fair  Plan of Care Review  Plan of Care Reviewed With: patient  Plan of Care Reviewed With: patient  Outcome Summary: Pt S for bed mobility and came into long sitting from supine in bed then came to EOB. Pt transfers with CGA with verbal cues to stand and increased time to process cue. Pt ambulated to BR with CGA and stood at side sink for grooming tasks with CGA after set up. Pt followed 50-60% of directions provided with initial simple one step command. Pt would benefit from acute rehab at discharge. Continue OT POC   Outcome Measures     Row Name 10/02/19 1200 10/01/19 1000          How much help from another is currently needed...    Putting on and taking off regular lower body clothing?  3  -TS  3  -JJ     Bathing (including washing, rinsing, and drying)  3  -TS  3  -JJ     Toileting (which includes using toilet bed pan or urinal)  3  -TS  3  -JJ     Putting on and taking off regular upper body clothing  3  -TS  3  -JJ     Taking care of personal grooming (such as brushing teeth)  3  -TS  3  -JJ     Eating meals  3 pt currently NPO  -TS  3  -JJ     AM-PAC 6 Clicks Score (OT)  18  -TS  18  -JJ        Functional Assessment     Outcome Measure Options  AM-PAC 6 Clicks Daily Activity (OT)  -TS  AM-PAC 6 Clicks Daily Activity (OT)  -       User Key  (r) = Recorded By, (t) = Taken By, (c) = Cosigned By    Initials Name Provider Type    TS Yaritza Lopez COTTON/L Occupational Therapy Assistant    Christina Oliver, OTR/L Occupational Therapist           Time Calculation:   Time Calculation- OT     Row Name 10/02/19 1247             Time Calculation- OT    OT Start Time  1114  -TS      OT Stop Time  1155  -TS      OT Time Calculation (min)  41 min  -TS      Total Timed Code Minutes- OT  41 minute(s)  -TS      OT Received On  10/02/19  -TS         Timed Charges    05401 - OT Self Care/Mgmt Minutes  41  -TS        User Key  (r) = Recorded By, (t) = Taken By, (c) = Cosigned By    Initials Name Provider Type    TS Yaritza Lopez COTTON/L Occupational Therapy Assistant        Therapy Charges for Today     Code Description Service Date Service Provider Modifiers Qty    [id number] HC OT SELF CARE/MGMT/TRAIN EA 15 MIN 10/2/2019 Yaritza Lopez COTA/L GO 3               Yaritza MEANS. YURY Lopez/KIKO  10/2/2019

## 2019-10-02 NOTE — PROGRESS NOTES
[facility] Medicine Services  INPATIENT PROGRESS NOTE    Length of Stay: 6  Date of Admission: 9/26/2019  Primary Care Physician: Jose Karimi MD    Subjective   Chief Complaint: Respiratory failure/CVA/alcohol withdrawal?    HPI   Patient is more alert.  Patient oriented x2.  Aphasic status much improved.  Patient denies any chest pain or shortness of breath.  Patient is not requiring oxygen.    Review of Systems   Constitutional: Positive for activity change, appetite change and fatigue. Negative for chills and fever.   HENT: Negative for hearing loss, nosebleeds, tinnitus and trouble swallowing.    Eyes: Negative for visual disturbance.   Respiratory: Positive for shortness of breath. Negative for cough, chest tightness and wheezing.    Cardiovascular: Negative for chest pain, palpitations and leg swelling.   Gastrointestinal: Negative for abdominal distention, abdominal pain, blood in stool, constipation, diarrhea, nausea and vomiting.   Endocrine: Negative for cold intolerance, heat intolerance, polydipsia, polyphagia and polyuria.   Genitourinary: Negative for decreased urine volume, difficulty urinating, dysuria, flank pain, frequency and hematuria.   Musculoskeletal: Positive for arthralgias, gait problem and myalgias. Negative for joint swelling.   Skin: Negative for rash.   Allergic/Immunologic: Negative for immunocompromised state.   Neurological: Positive for weakness. Negative for dizziness, syncope, light-headedness and headaches.   Hematological: Negative for adenopathy. Does not bruise/bleed easily.   Psychiatric/Behavioral: Positive for confusion. Negative for sleep disturbance. The patient is not nervous/anxious.    All pertinent negatives and positives are as above. All other systems have been reviewed and are negative unless otherwise stated.     Objective    Temp:  [98.3 °F (36.8 °C)-99.8 °F (37.7 °C)] 98.3 °F (36.8 °C)  Heart Rate:  [69-93] 83  Resp:   [15-18] 18  BP: (109-175)/(58-96) 112/58    Intake/Output Summary (Last 24 hours) at 10/2/2019 1334  Last data filed at 10/1/2019 2346  Gross per 24 hour   Intake --   Output 1250 ml   Net -1250 ml     Physical Exam  Constitutional: He appears well-developed.   HENT:   Head: Normocephalic.   Eyes: Conjunctivae are normal. Pupils are equal, round, and reactive to light.   Neck: Neck supple. No JVD present. No thyromegaly present.   Cardiovascular: Normal rate, regular rhythm, normal heart sounds and intact distal pulses. Exam reveals no gallop and no friction rub.   No murmur heard.  Pulmonary/Chest: Effort normal. No respiratory distress. He has wheezes. He has no rales. He exhibits no tenderness.   Rhonchi bilateral.  Good air movement.   Abdominal: Soft. Bowel sounds are normal. He exhibits no distension. There is no tenderness. There is no rebound and no guarding.   Musculoskeletal: He exhibits no edema, tenderness or deformity.   Lymphadenopathy:     He has no cervical adenopathy.   Neurological: He is alert. He displays normal reflexes. No cranial nerve deficit. He exhibits abnormal muscle tone. Coordination abnormal.   Skin: Skin is warm and dry. Capillary refill takes 2 to 3 seconds. No rash noted.   Psychiatric: He has a normal mood and affect. His behavior is normal.   Nursing note and vitals reviewed.     Results Review:  Lab Results (last 24 hours)     Procedure Component Value Units Date/Time    Comprehensive Metabolic Panel [[id number]]  (Abnormal) Collected:  10/02/19 0655    Specimen:  Blood Updated:  10/02/19 0724     Glucose 161 mg/dL      BUN 5 mg/dL      Creatinine 0.60 mg/dL      Sodium 139 mmol/L      Potassium 3.6 mmol/L      Chloride 97 mmol/L      CO2 30.0 mmol/L      Calcium 8.9 mg/dL      Total Protein 6.9 g/dL      Albumin 3.40 g/dL      ALT (SGPT) 20 U/L      AST (SGOT) 26 U/L      Alkaline Phosphatase 70 U/L      Total Bilirubin 0.6 mg/dL      eGFR Non African Amer 139 mL/min/1.73       Globulin 3.5 gm/dL      A/G Ratio 1.0 g/dL      BUN/Creatinine Ratio 8.3     Anion Gap 12.0 mmol/L     Narrative:       GFR Normal >60  Chronic Kidney Disease <60  Kidney Failure <15    CBC & Differential [[id number]] Collected:  10/02/19 0655    Specimen:  Blood Updated:  10/02/19 0706    Narrative:       The following orders were created for panel order CBC & Differential.  Procedure                               Abnormality         Status                     ---------                               -----------         ------                     CBC Auto Differential[[id number]]        Abnormal            Final result                 Please view results for these tests on the individual orders.    CBC Auto Differential [[id number]]  (Abnormal) Collected:  10/02/19 0655    Specimen:  Blood Updated:  10/02/19 0706     WBC 11.03 10*3/mm3      RBC 4.40 10*6/mm3      Hemoglobin 14.0 g/dL      Hematocrit 41.1 %      MCV 93.4 fL      MCH 31.8 pg      MCHC 34.1 g/dL      RDW 13.2 %      RDW-SD 45.5 fl      MPV 9.4 fL      Platelets 301 10*3/mm3      Neutrophil % 69.3 %      Lymphocyte % 17.7 %      Monocyte % 10.0 %      Eosinophil % 1.8 %      Basophil % 0.7 %      Immature Grans % 0.5 %      Neutrophils, Absolute 7.65 10*3/mm3      Lymphocytes, Absolute 1.95 10*3/mm3      Monocytes, Absolute 1.10 10*3/mm3      Eosinophils, Absolute 0.20 10*3/mm3      Basophils, Absolute 0.08 10*3/mm3      Immature Grans, Absolute 0.05 10*3/mm3      nRBC 0.0 /100 WBC            Cultures:  Respiratory Culture   Date Value Ref Range Status   09/27/2019 Light growth (2+) Normal Respiratory Esther  Final       Radiology Data:    Imaging Results (last 24 hours)     Procedure Component Value Units Date/Time    XR Abdomen KUB [[id number]] Collected:  10/01/19 1627     Updated:  10/01/19 1631    Narrative:       XR ABDOMEN KUB- 10/1/2019 3:58 PM CDT     HISTORY: dobhoff pplacement; R13.10-Dysphagia, unspecified; Z74.09-Other  reduced mobility;  Z74.09-Other reduced mobility; R47.01-Aphasia       COMPARISON: 09/29/2019     FINDINGS:  There is a nonspecific bowel gas pattern. Dobbhoff feeding tube is  satisfactorily positioned: In the stomach..     No acute skeletal abnormality is identified.        Impression:       1. Dobbhoff feeding tube satisfactorily position coiled in the fundus  the stomach.         This report was finalized on 10/01/2019 16:28 by Dr. Stanford Alanis MD.          No Known Allergies    Scheduled meds:     aspirin 81 mg Oral Daily   Or      aspirin 300 mg Rectal Daily   atorvastatin 80 mg Oral Nightly   chlorhexidine 15 mL Mouth/Throat Q12H   famotidine 20 mg Intravenous Q12H   ipratropium-albuterol 3 mL Nebulization 4x Daily - RT   IV Fluids 1000 mL + additives 75 mL/hr Intravenous Daily   [START ON 10/3/2019] pantoprazole 40 mg Intravenous Q AM   potassium chloride 40 mEq Oral Once   sodium chloride 10 mL Intravenous Q12H       PRN meds:  •  albuterol  •  hypromellose  •  labetalol  •  LORazepam **OR** LORazepam **OR** LORazepam **OR** LORazepam **OR** LORazepam **OR** LORazepam  •  sodium chloride    Assessment/Plan       Degeneration of lumbar or lumbosacral intervertebral disc    Smoker    Acute cerebrovascular accident (CVA) of cerebellum (CMS/HCC)    Alcoholism /alcohol abuse (CMS/HCC)    CVA (cerebral vascular accident) (CMS/HCC)    Hyperlipidemia    Dysphagia due to recent cerebral infarction      Plan:  Respiratory failure.  Resolved.  Extubated 9/30/19.   Consult ENT-laryngoscopy demonstrates some pressure wounds of the posterior vocal folds bilaterally, with some swelling on the left- this is likely due to the endotracheal tube and will resolve on its own.     CVA. Left MCA stroke. Continue aspirin.  Continue Lipitor. Neurology consult.   CTA of the head and neck- complete occlusion of the entire cervical left ICA, 30% stenosis of the right carotid bulb, mild stenosis at the ostium of the left vertebral artery.  MRI of the  head- acute infarct in the left MCA and watershed territory- no hemorrhagic conversion, abnormal left ICA flow void.  Echocardiogram- ejection fraction 61%, diastolic dysfunction grade 1.     Hypokalemia- resolved.     Reflux.  Protonix and Zofran as needed     COPD.  Continue duo nebs.     Alcohol withdrawal?.  Ciwa protocol.  Patient stated last has drink about 2 weeks ago.     Hyperlipidemia.  Continue Lipitor.     Tobacco abuse.  Tobacco counseling.     Urinary incontinence.    DC Villalobos cath 2019.     SCD.     Nutrition.  Consult nutritionist for tube feeding.  Continue banana bag.     Deconditioning.  PT and OT consult.     Discharge Plannin to 3 days. Transfer from [facility].      Candido Alas MD   10/02/19   1:34 PM

## 2019-10-02 NOTE — PLAN OF CARE
Problem: Patient Care Overview  Goal: Plan of Care Review  Outcome: Ongoing (interventions implemented as appropriate)   10/02/19 1803   Coping/Psychosocial   Plan of Care Reviewed With patient   Plan of Care Review   Progress improving   OTHER   Outcome Summary Pt S for bed mobility and came into long sitting from supine in bed then came to EOB. Pt transfers with CGA with verbal cues to stand and increased time to process cue. Pt ambulated to BR with CGA and stood at side sink for grooming tasks with CGA after set up. Pt followed 50-60% of directions provided with initial simple one step command. Pt would benefit from acute rehab at discharge. Continue OT POC

## 2019-10-02 NOTE — DISCHARGE PLACEMENT REQUEST
"Morgan Sanchez Jr. (59 y.o. Male)     Date of Birth Social Security Number Address Home Phone MRN    1960  [address] [place] [address] [phone] [id number]    [employer] Marital Status          Other Single       Admission Date Admission Type Admitting Provider Attending Provider Department, Room/Bed    9/26/19 Urgent Candido Alas MD Truong, Khai C, MD [facility] 3A, 355/1    Discharge Date Discharge Disposition Discharge Destination                       Attending Provider:  Candido Alas MD    Allergies:  No Known Allergies    Isolation:  None   Infection:  None   Code Status:  CPR    Ht:  162.6 cm (64\")   Wt:  78.2 kg (172 lb 4.8 oz)    Admission Cmt:  None   Principal Problem:  None                Active Insurance as of 9/26/2019     Primary Coverage     Payor Plan Insurance Group Employer/Plan Group    [employer] [place] [employer] [place]ICA[place]      Payor Plan Address Payor Plan Phone Number Payor Plan Fax Number Effective Dates    [address] [address] [phone]  6/22/2017 - None Entered    [facility] [id number]       Subscriber Name Subscriber Birth Date Member ID       MORGAN SANCHEZ JR. 1960 [id number]                 Emergency Contacts      (Rel.) Home Phone Work Phone Mobile Phone    GeneLázaro vega (Brother) [phone] -- --            Insurance Information                [facility] MEDICAID Phone: [phone]    Subscriber: Morgan Sanchez Jr. Subscriber#: [id number]    Group#:  Precert#:           "

## 2019-10-02 NOTE — ACP (ADVANCE CARE PLANNING)
Date of First Steps ACP interview: 10/2/2019  Location of interview: Pt's room   First Steps ACP Facilitator: Sara Hopkins RN  Referral Source: nurse  Present for facilitation: Patient    SUMMARY OF ADVANCE CARE PLANNING DISCUSSION:  Jarod visited for First Steps facilitation. We reviewed purpose and goals for advance care planning.     Jarod is oriented to person only and unable to communicate his understanding or wishes.  Appears agitated when attempts at discussion made.  According to staff there are some difficulty in family dynamics also.  Patient is not a candidate for completing a document at this time.  Time spent on preparation, facilitation and documentation was under 30 minutes.    RECOMMENDATIONS/FOLLOW-UP:  Pt remains confused and aphasic; not a candidate for ACP at this time.    CONSULT/NOTE ROUTED  yes    Sara Hopkins, RN

## 2019-10-02 NOTE — THERAPY TREATMENT NOTE
Acute Care - Speech Language Pathology Treatment Note  [facility]     Patient Name: Jarod Ramírez Jr.  : 1960  MRN: [id number]  Today's Date: 10/2/2019         Admit Date: 2019    Visit Dx:      ICD-10-CM ICD-9-CM   1. Dysphagia, unspecified type R13.10 787.20   2. Impaired mobility Z74.09 799.89   3. Impaired mobility and ADLs Z74.09 799.89   4. Aphasia R47.01 784.3     Patient Active Problem List   Diagnosis   • Degeneration of cervical intervertebral disc   • Closed fracture of lumbar vertebra (CMS/HCC)   • Degeneration of lumbar or lumbosacral intervertebral disc   • Smoker   • BMI 27.0-27.9,adult   • Acute cerebrovascular accident (CVA) of cerebellum (CMS/HCC)   • Alcoholism /alcohol abuse (CMS/HCC)   • CVA (cerebral vascular accident) (CMS/HCC)   • Hyperlipidemia        Therapy Treatment  Rehabilitation Treatment Summary     Row Name 10/02/19 0914             Treatment Time/Intention    Discipline  speech language pathologist  (Pended)   -AS      Document Type  therapy note (daily note)  (Pended)   -AS      Subjective Information  no complaints  (Pended)   -AS      Mode of Treatment  speech-language pathology  (Pended)   -AS      Patient/Family Observations  no family present  (Pended)   -AS      Patient Effort  good  (Pended)   -AS      Recorded by [AS] Eliecer Williamson, Speech Therapy Student 10/02/19 0948      Row Name 10/02/19 0914             Pain Scale: FACES Pre/Post-Treatment    Pain: FACES Scale, Pretreatment  0-->no hurt  (Pended)   -AS      Recorded by [AS] Eliecer Williamson, Speech Therapy Student 10/02/19 0948      Row Name                Wound 19 0800 Right posterior finger    Wound - Properties Group Date first assessed: 19 [NW] Time first assessed: 0800 [NW] Present on Hospital Admission: Y [NW] Side: Right [NW] Orientation: posterior [NW] Location: finger [NW] Recorded by:  [NW] Dunia Multani RN 19 [id number]    Row Name 10/02/19 0914             Outcome  Summary/Treatment Plan (SLP)    Daily Summary of Progress (SLP)  progress towards functional goals is fair  (Pended)   -AS      Barriers to Overall Progress (SLP)  aphasia  (Pended)   -AS      Plan for Continued Treatment (SLP)  continue to follow and treat  (Pended)   -AS      Anticipated Dischage Disposition  inpatient rehabilitation facility  (Pended)   -AS      Recorded by [AS] Eliecer Williamson, Speech Therapy Student 10/02/19 0948        User Key  (r) = Recorded By, (t) = Taken By, (c) = Cosigned By    Initials Name Effective Dates Discipline    NW Dunia Multani RN 07/12/18 -  Nurse    AS Eliecer Williamson, Speech Therapy Student 09/03/19 -  SLP          EDUCATION  The patient has been educated in the following areas:   Cognitive Impairment.    SLP Recommendation and Plan  Daily Summary of Progress (SLP): (P) progress towards functional goals is fair  Barriers to Overall Progress (SLP): (P) aphasia  Plan for Continued Treatment (SLP): (P) continue to follow and treat  Anticipated Dischage Disposition: (P) inpatient rehabilitation facility             SLP GOALS     Row Name 10/02/19 0917 10/01/19 1446 10/01/19 0939       Oral Nutrition/Hydration Goal 1 (SLP)    Oral Nutrition/Hydration Goal 1, SLP  LTG: Patient will tolerate LRD without s/s of aspiration.  (Pended)   -AS  LTG: Patient will tolerate LRD without s/s of aspiration.  -MB  LTG: Patient will tolerate LRD without s/s of aspiration.  -MB    Time Frame (Oral Nutrition/Hydration Goal 1, SLP)  by discharge  (Pended)   -AS  by discharge  -MB  by discharge  -MB    Barriers (Oral Nutrition/Hydration Goal 1, SLP)  Aphasia  (Pended)   -AS  Aphasia  -MB  Aphasia  -MB    Progress/Outcomes (Oral Nutrition/Hydration Goal 1, SLP)  goal ongoing  (Pended)   -AS  goal ongoing  -MB  continuing progress toward goal  -MB       Lingual Strengthening Goal 1 (SLP)    Activity (Lingual Strengthening Goal 1, SLP)  increase tongue back strength  (Pended)   -AS  increase  tongue back strength  -MB  --    Increase Tongue Back Strength  lingual movement exercises  (Pended)   -AS  lingual movement exercises  -MB  --    [place]/Accuracy (Lingual Strengthening Goal 1, SLP)  independently (over 90% accuracy)  (Pended)   -AS  independently (over 90% accuracy)  -MB  --    Time Frame (Lingual Strengthening Goal 1, SLP)  short term goal (STG);by discharge  (Pended)   -AS  short term goal (STG);by discharge  -MB  --    Barriers (Lingual Strengthening Goal 1, SLP)  n/a  (Pended)   -AS  n/a  -MB  --    Progress/Outcomes (Lingual Strengthening Goal 1, SLP)  good progress toward goal  (Pended)   -AS  goal ongoing  -MB  --       Pharyngeal Strengthening Exercise Goal 1 (SLP)    Activity (Pharyngeal Strengthening Goal 1, SLP)  increase epiglottic inversion and retroflexion;increase squeeze/positive pressure generation;increase tongue base retraction  (Pended)   -AS  increase epiglottic inversion and retroflexion;increase squeeze/positive pressure generation;increase tongue base retraction  -MB  --    Increase Epiglottic Inversion and Retroflexion  Mendelsohn  (Pended)   -AS  Mendelsohn  -MB  --    Increase Squeeze/Positive Pressure Generation  hard effortful swallow  (Pended)   -AS  hard effortful swallow  -MB  --    Increase Tongue Base Retraction  yoni  (Pended)   -AS  yoni  -MB  --    [place]/Accuracy (Pharyngeal Strengthening Goal 1, SLP)  independently (over 90% accuracy)  (Pended)   -AS  independently (over 90% accuracy)  -MB  --    Time Frame (Pharyngeal Strengthening Goal 1, SLP)  short term goal (STG);by discharge  (Pended)   -AS  short term goal (STG);by discharge  -MB  --    Barriers (Pharyngeal Strengthening Goal 1, SLP)  cognitive status  (Pended)   -AS  n/a  -MB  --    Progress/Outcomes (Pharyngeal Strengthening Goal 1, SLP)  goal ongoing  (Pended)   -AS  goal ongoing  -MB  --       Words/Phrases/Sentences Goal 1 (SLP)    Improve Ability to Comprehend  Words/Phrases/Sentences Through: Goal 1 (SLP)  identify objects, field of;identify pictures, field of;90%;independently (over 90% accuracy);other (comment)  (Pended)   -AS  --  identify objects, field of;identify pictures, field of;90%;independently (over 90% accuracy);other (comment) 2  -MB    Time Frame (Identify Objects and Pictures Goal 1, SLP)  short term goal (STG);by discharge  (Pended)   -AS  --  short term goal (STG);by discharge  -MB    Barriers (Identify Objects and Pictures Goal 1, SLP)  aphasia  (Pended)   -AS  --  --    Progress/Outcomes (Identify Objects and Pictures Goal 1, SLP)  goal ongoing  (Pended)   -AS  --  --       Comprehend Questions Goal 1 (SLP)    Improve Ability to Comprehend Questions Goal 1 (SLP)  simple yes/no questions;complex yes/no questions;90%;independently (over 90% accuracy)  (Pended)   -AS  --  simple yes/no questions;complex yes/no questions;90%;independently (over 90% accuracy)  -MB    Time Frame (Comprehend Questions Goal 1, SLP)  short term goal (STG);by discharge  (Pended)   -AS  --  short term goal (STG);by discharge  -MB    Barriers (Comprehend Questions Goal 1, SLP)  aphaisa  (Pended)   -AS  --  --    Progress/Outcomes (Comprehend Questions Goal 1, SLP)  goal ongoing  (Pended)   -AS  --  --       Follow Directions Goal 2 (SLP)    Improve Ability to Follow Directions Goal 1 (SLP)  1 step direction with objects;1 step direction without objects;90%;independently (over 90% accuracy)  (Pended)   -AS  --  1 step direction with objects;1 step direction without objects;90%;independently (over 90% accuracy)  -MB    Time Frame (Follow Directions Goal 1, SLP)  short term goal (STG);by discharge  (Pended)   -AS  --  short term goal (STG);by discharge  -MB    Progress (Ability to Follow Directions Goal 1, SLP)  50%;with minimal cues (75-90%)  (Pended)   -AS  --  --    Progress/Outcomes (Follow Directions Goal 1, SLP)  continuing progress toward goal  (Pended)   -AS  --  --        Word Retrieval Skills Goal 1 (SLP)    Improve Word Retrieval Skills By Goal 1 (SLP)  completing automatic speech task, days of the week;completing automatic speech task, months;confrontational naming task;repeating words;90%;independently (over 90% accuracy)  (Pended)   -AS  --  completing automatic speech task, days of the week;completing automatic speech task, months;confrontational naming task;repeating words;90%;independently (over 90% accuracy)  -MB    Time Frame (Word Retrieval Goal 1, SLP)  short term goal (STG);by discharge  (Pended)   -AS  --  short term goal (STG);by discharge  -MB    Progress (Word Retrieval Skills Goal 1, SLP)  50%  (Pended)   -AS  --  --    Progress/Outcomes (Word Retrieval Goal 1, SLP)  continuing progress toward goal  (Pended)   -AS  --  --       Graphic Expression of Shapes, Letters, Numbers Goal 1 (SLP)    Improve Graphic Expression of Shapes, Letters, and Numbers Goal 1 (SLP)  copy shapes, numbers, and letters;writing dictated number and letters;90%;independently (over 90% accuracy)  (Pended)   -AS  --  copy shapes, numbers, and letters;writing dictated number and letters;90%;independently (over 90% accuracy)  -MB    Time Frame (Graphic Expression of Shapes, Letters, and Numbers Goal 1, SLP)  short term goal (STG);by discharge  (Pended)   -AS  --  short term goal (STG);by discharge  -MB    Progress/Outcomes (Graphic Expression of Shapes, Letters, and Numbers Goal 1, SLP)  goal ongoing  (Pended)   -AS  --  --       Augmentative/Alternative Communication Objectives Goal 1 (SLP    Communication (Augmentative/Alternative Communication Goal 1, SLP)  improve ability to use low tech augmentative/alternative communication device  (Pended)   -AS  --  improve ability to use low tech augmentative/alternative communication device  -MB    Improve Communication by (Augmentative/Alternative Communication Goal 1, SLP)  identify picture, field of ____;identify word, field of ____;alphabet/picture  board;90%;independently (over 90% accuracy)  (Pended)   -AS  --  identify picture, field of ____;identify word, field of ____;alphabet/picture board;90%;independently (over 90% accuracy)  -MB    Time Frame (Augmentative/Alternative Communication Goal 1, SLP)  short term goal (STG);by discharge  (Pended)   -AS  --  --    Progress (Augmentative/Alternative Communication Goal 1, SLP)  70%  (Pended)   -AS  --  --    Progress/Outcomes (Augmentative/Alternative Communication Goal 1, SLP)  good progress toward goal  (Pended)   -AS  --  --    Row Name 09/30/19 1430             Oral Nutrition/Hydration Goal 1 (SLP)    Oral Nutrition/Hydration Goal 1, SLP  LTG: Patient will tolerate LRD without s/s of aspiration.  -MM      Time Frame (Oral Nutrition/Hydration Goal 1, SLP)  by discharge  -MM      Barriers (Oral Nutrition/Hydration Goal 1, SLP)  n/a  -MM      Progress/Outcomes (Oral Nutrition/Hydration Goal 1, SLP)  goal ongoing  -MM        User Key  (r) = Recorded By, (t) = Taken By, (c) = Cosigned By    Initials Name Provider Type    Georgi Garcia CCC-SLP Speech and Language Pathologist    Mayuri Staley, MS CCC-SLP Speech and Language Pathologist    AS Eliecer Williamson, Speech Therapy Student Speech Therapy Student              Time Calculation:     Time Calculation- SLP     Row Name 10/02/19 0945             Time Calculation- SLP    SLP Start Time  0917  (Pended)   -AS      SLP Stop Time  0941  (Pended)   -AS      SLP Time Calculation (min)  24 min  (Pended)   -AS      SLP Received On  10/02/19  (Pended)   -AS        User Key  (r) = Recorded By, (t) = Taken By, (c) = Cosigned By    Initials Name Provider Type    AS Eliecer Williamson, Speech Therapy Student Speech Therapy Student          Therapy Charges for Today     Code Description Service Date Service Provider Modifiers Qty    13331335724 HC ST TREATMENT SWALLOW 1 10/2/2019 Eliecer Williamson Speech Therapy Student GN 1    [id number] HC ST TREATMENT  SPEECH 1 10/2/2019 Eliecer Williamson, Speech Therapy Student GN 1                     Eliecer Williamson Speech Therapy Student  10/2/2019   and Acute Care - Speech Language Pathology   Swallow Treatment Note [facility]     Patient Name: Jarod Ramírez Jr.  : 1960  MRN: [id number]  Today's Date: 10/2/2019  Onset of Illness/Injury or Date of Surgery: 19     Referring Physician: Dr. Alas      Admit Date: 2019    Visit Dx:      ICD-10-CM ICD-9-CM   1. Dysphagia, unspecified type R13.10 787.20   2. Impaired mobility Z74.09 799.89   3. Impaired mobility and ADLs Z74.09 799.89   4. Aphasia R47.01 784.3     Patient Active Problem List   Diagnosis   • Degeneration of cervical intervertebral disc   • Closed fracture of lumbar vertebra (CMS/HCC)   • Degeneration of lumbar or lumbosacral intervertebral disc   • Smoker   • BMI 27.0-27.9,adult   • Acute cerebrovascular accident (CVA) of cerebellum (CMS/HCC)   • Alcoholism /alcohol abuse (CMS/HCC)   • CVA (cerebral vascular accident) (CMS/HCC)   • Hyperlipidemia       Therapy Treatment  Rehabilitation Treatment Summary     Row Name 10/02/19 0914             Treatment Time/Intention    Discipline  speech language pathologist  (Pended)   -AS      Document Type  therapy note (daily note)  (Pended)   -AS      Subjective Information  no complaints  (Pended)   -AS      Mode of Treatment  speech-language pathology  (Pended)   -AS      Patient/Family Observations  no family present  (Pended)   -AS      Patient Effort  good  (Pended)   -AS      Recorded by [AS] Eliecer Williamson Speech Therapy Student 10/02/19 0948      Row Name 10/02/19 0914             Pain Scale: FACES Pre/Post-Treatment    Pain: FACES Scale, Pretreatment  0-->no hurt  (Pended)   -AS      Recorded by [AS] Eliecer Williamson Speech Therapy Student 10/02/19 0948      Row Name                Wound 19 0800 Right posterior finger    Wound - Properties Group Date first assessed: 19 [NW] Time  first assessed: 0800 [NW] Present on Hospital Admission: Y [NW] Side: Right [NW] Orientation: posterior [NW] Location: finger [NW] Recorded by:  [NW] Dunia Multani RN 09/27/19 1624    Row Name 10/02/19 0914             Outcome Summary/Treatment Plan (SLP)    Daily Summary of Progress (SLP)  progress towards functional goals is fair  (Pended)   -AS      Barriers to Overall Progress (SLP)  aphasia  (Pended)   -AS      Plan for Continued Treatment (SLP)  continue to follow and treat  (Pended)   -AS      Anticipated Dischage Disposition  inpatient rehabilitation facility  (Pended)   -AS      Recorded by [AS] Eliecer Williamson, Speech Therapy Student 10/02/19 0948        User Key  (r) = Recorded By, (t) = Taken By, (c) = Cosigned By    Initials Name Effective Dates Discipline    NW Dunia Multani RN 07/12/18 -  Nurse    AS Eliecer Williamson, Speech Therapy Student 09/03/19 -  SLP          Outcome Summary  Outcome Summary/Treatment Plan (SLP)  Daily Summary of Progress (SLP): (P) progress towards functional goals is fair (10/02/19 0914 : Eliecer Williamson, Speech Therapy Student)  Barriers to Overall Progress (SLP): (P) aphasia (10/02/19 0914 : Eliecer Williamson Speech Therapy Student)  Plan for Continued Treatment (SLP): (P) continue to follow and treat (10/02/19 0914 : Eliecer Williamson Speech Therapy Student)  Anticipated Dischage Disposition: (P) inpatient rehabilitation facility (10/02/19 0914 : Eliecer Williamson Speech Therapy Student)      SLP GOALS     Row Name 10/02/19 0917 10/01/19 1446 10/01/19 0939       Oral Nutrition/Hydration Goal 1 (SLP)    Oral Nutrition/Hydration Goal 1, SLP  LTG: Patient will tolerate LRD without s/s of aspiration.  (Pended)   -AS  LTG: Patient will tolerate LRD without s/s of aspiration.  -MB  LTG: Patient will tolerate LRD without s/s of aspiration.  -MB    Time Frame (Oral Nutrition/Hydration Goal 1, SLP)  by discharge  (Pended)   -AS  by discharge  -MB  by discharge  -MB     Barriers (Oral Nutrition/Hydration Goal 1, SLP)  Aphasia  (Pended)   -AS  Aphasia  -MB  Aphasia  -MB    Progress/Outcomes (Oral Nutrition/Hydration Goal 1, SLP)  goal ongoing  (Pended)   -AS  goal ongoing  -MB  continuing progress toward goal  -MB       Lingual Strengthening Goal 1 (SLP)    Activity (Lingual Strengthening Goal 1, SLP)  increase tongue back strength  (Pended)   -AS  increase tongue back strength  -MB  --    Increase Tongue Back Strength  lingual movement exercises  (Pended)   -AS  lingual movement exercises  -MB  --    [place]/Accuracy (Lingual Strengthening Goal 1, SLP)  independently (over 90% accuracy)  (Pended)   -AS  independently (over 90% accuracy)  -MB  --    Time Frame (Lingual Strengthening Goal 1, SLP)  short term goal (STG);by discharge  (Pended)   -AS  short term goal (STG);by discharge  -MB  --    Barriers (Lingual Strengthening Goal 1, SLP)  n/a  (Pended)   -AS  n/a  -MB  --    Progress/Outcomes (Lingual Strengthening Goal 1, SLP)  good progress toward goal  (Pended)   -AS  goal ongoing  -MB  --       Pharyngeal Strengthening Exercise Goal 1 (SLP)    Activity (Pharyngeal Strengthening Goal 1, SLP)  increase epiglottic inversion and retroflexion;increase squeeze/positive pressure generation;increase tongue base retraction  (Pended)   -AS  increase epiglottic inversion and retroflexion;increase squeeze/positive pressure generation;increase tongue base retraction  -MB  --    Increase Epiglottic Inversion and Retroflexion  Mendelsohn  (Pended)   -AS  Mendelsohn  -MB  --    Increase Squeeze/Positive Pressure Generation  hard effortful swallow  (Pended)   -AS  hard effortful swallow  -MB  --    Increase Tongue Base Retraction  yoni  (Pended)   -AS  yoni  -MB  --    [place]/Accuracy (Pharyngeal Strengthening Goal 1, SLP)  independently (over 90% accuracy)  (Pended)   -AS  independently (over 90% accuracy)  -MB  --    Time Frame (Pharyngeal Strengthening Goal 1, SLP)  short  term goal (STG);by discharge  (Pended)   -AS  short term goal (STG);by discharge  -MB  --    Barriers (Pharyngeal Strengthening Goal 1, SLP)  cognitive status  (Pended)   -AS  n/a  -MB  --    Progress/Outcomes (Pharyngeal Strengthening Goal 1, SLP)  goal ongoing  (Pended)   -AS  goal ongoing  -MB  --       Words/Phrases/Sentences Goal 1 (SLP)    Improve Ability to Comprehend Words/Phrases/Sentences Through: Goal 1 (SLP)  identify objects, field of;identify pictures, field of;90%;independently (over 90% accuracy);other (comment)  (Pended)   -AS  --  identify objects, field of;identify pictures, field of;90%;independently (over 90% accuracy);other (comment) 2  -MB    Time Frame (Identify Objects and Pictures Goal 1, SLP)  short term goal (STG);by discharge  (Pended)   -AS  --  short term goal (STG);by discharge  -MB    Barriers (Identify Objects and Pictures Goal 1, SLP)  aphasia  (Pended)   -AS  --  --    Progress/Outcomes (Identify Objects and Pictures Goal 1, SLP)  goal ongoing  (Pended)   -AS  --  --       Comprehend Questions Goal 1 (SLP)    Improve Ability to Comprehend Questions Goal 1 (SLP)  simple yes/no questions;complex yes/no questions;90%;independently (over 90% accuracy)  (Pended)   -AS  --  simple yes/no questions;complex yes/no questions;90%;independently (over 90% accuracy)  -MB    Time Frame (Comprehend Questions Goal 1, SLP)  short term goal (STG);by discharge  (Pended)   -AS  --  short term goal (STG);by discharge  -MB    Barriers (Comprehend Questions Goal 1, SLP)  aphaisa  (Pended)   -AS  --  --    Progress/Outcomes (Comprehend Questions Goal 1, SLP)  goal ongoing  (Pended)   -AS  --  --       Follow Directions Goal 2 (SLP)    Improve Ability to Follow Directions Goal 1 (SLP)  1 step direction with objects;1 step direction without objects;90%;independently (over 90% accuracy)  (Pended)   -AS  --  1 step direction with objects;1 step direction without objects;90%;independently (over 90% accuracy)   -MB    Time Frame (Follow Directions Goal 1, SLP)  short term goal (STG);by discharge  (Pended)   -AS  --  short term goal (STG);by discharge  -MB    Progress (Ability to Follow Directions Goal 1, SLP)  50%;with minimal cues (75-90%)  (Pended)   -AS  --  --    Progress/Outcomes (Follow Directions Goal 1, SLP)  continuing progress toward goal  (Pended)   -AS  --  --       Word Retrieval Skills Goal 1 (SLP)    Improve Word Retrieval Skills By Goal 1 (SLP)  completing automatic speech task, days of the week;completing automatic speech task, months;confrontational naming task;repeating words;90%;independently (over 90% accuracy)  (Pended)   -AS  --  completing automatic speech task, days of the week;completing automatic speech task, months;confrontational naming task;repeating words;90%;independently (over 90% accuracy)  -MB    Time Frame (Word Retrieval Goal 1, SLP)  short term goal (STG);by discharge  (Pended)   -AS  --  short term goal (STG);by discharge  -MB    Progress (Word Retrieval Skills Goal 1, SLP)  50%  (Pended)   -AS  --  --    Progress/Outcomes (Word Retrieval Goal 1, SLP)  continuing progress toward goal  (Pended)   -AS  --  --       Graphic Expression of Shapes, Letters, Numbers Goal 1 (SLP)    Improve Graphic Expression of Shapes, Letters, and Numbers Goal 1 (SLP)  copy shapes, numbers, and letters;writing dictated number and letters;90%;independently (over 90% accuracy)  (Pended)   -AS  --  copy shapes, numbers, and letters;writing dictated number and letters;90%;independently (over 90% accuracy)  -MB    Time Frame (Graphic Expression of Shapes, Letters, and Numbers Goal 1, SLP)  short term goal (STG);by discharge  (Pended)   -AS  --  short term goal (STG);by discharge  -MB    Progress/Outcomes (Graphic Expression of Shapes, Letters, and Numbers Goal 1, SLP)  goal ongoing  (Pended)   -AS  --  --       Augmentative/Alternative Communication Objectives Goal 1 (SLP    Communication  (Augmentative/Alternative Communication Goal 1, SLP)  improve ability to use low tech augmentative/alternative communication device  (Pended)   -AS  --  improve ability to use low tech augmentative/alternative communication device  -MB    Improve Communication by (Augmentative/Alternative Communication Goal 1, SLP)  identify picture, field of ____;identify word, field of ____;alphabet/picture board;90%;independently (over 90% accuracy)  (Pended)   -AS  --  identify picture, field of ____;identify word, field of ____;alphabet/picture board;90%;independently (over 90% accuracy)  -MB    Time Frame (Augmentative/Alternative Communication Goal 1, SLP)  short term goal (STG);by discharge  (Pended)   -AS  --  --    Progress (Augmentative/Alternative Communication Goal 1, SLP)  70%  (Pended)   -AS  --  --    Progress/Outcomes (Augmentative/Alternative Communication Goal 1, SLP)  good progress toward goal  (Pended)   -AS  --  --    Row Name 09/30/19 1430             Oral Nutrition/Hydration Goal 1 (SLP)    Oral Nutrition/Hydration Goal 1, SLP  LTG: Patient will tolerate LRD without s/s of aspiration.  -MM      Time Frame (Oral Nutrition/Hydration Goal 1, SLP)  by discharge  -MM      Barriers (Oral Nutrition/Hydration Goal 1, SLP)  n/a  -MM      Progress/Outcomes (Oral Nutrition/Hydration Goal 1, SLP)  goal ongoing  -MM        User Key  (r) = Recorded By, (t) = Taken By, (c) = Cosigned By    Initials Name Provider Type    Georgi Garcia, CCC-SLP Speech and Language Pathologist    Mayuri Staley, MS CCC-SLP Speech and Language Pathologist    AS Eliecer Williamson, Speech Therapy Student Speech Therapy Student          EDUCATION  The patient has been educated in the following areas:   Dysphagia (Swallowing Impairment).    SLP Recommendation and Plan  Daily Summary of Progress (SLP): (P) progress towards functional goals is fair  Barriers to Overall Progress (SLP): (P) aphasia  Plan for Continued Treatment (SLP):  (P) continue to follow and treat  Anticipated Dischage Disposition: (P) inpatient rehabilitation facility                    Time Calculation:   Time Calculation- SLP     Row Name 10/02/19 0945             Time Calculation- SLP    SLP Start Time  0917  (Pended)   -AS      SLP Stop Time  0941  (Pended)   -AS      SLP Time Calculation (min)  24 min  (Pended)   -AS      SLP Received On  10/02/19  (Pended)   -AS        User Key  (r) = Recorded By, (t) = Taken By, (c) = Cosigned By    Initials Name Provider Type    AS Eliecer Williamson, Speech Therapy Student Speech Therapy Student          Therapy Charges for Today     Code Description Service Date Service Provider Modifiers Qty    [id number] HC ST TREATMENT SWALLOW 1 10/2/2019 Eliecer Williamson Speech Therapy Student GN 1    [id number] HC ST TREATMENT SPEECH 1 10/2/2019 Eliecer Williamson, Speech Therapy Student GN 1                 Eliecer Williamson Speech Therapy Student  10/2/2019

## 2019-10-02 NOTE — PROGRESS NOTES
Continued Stay Note   Jean     Patient Name: Jarod Ramírez Jr.  MRN: [id number]  Today's Date: 10/2/2019    Admit Date: 9/26/2019    Discharge Plan     Row Name 10/02/19 0841       Plan    Plan Comments  [facility] WAS CANCELLED.         Discharge Codes    No documentation.             JONO Sharif

## 2019-10-02 NOTE — PLAN OF CARE
Problem: Patient Care Overview  Goal: Plan of Care Review  Outcome: Ongoing (interventions implemented as appropriate)   10/02/19 9835   OTHER   Outcome Summary RDN consult for enteral ntn. Initiate Fibersource HN at 20 ml/hr for 8 hrs increase by 10 ml/hr every 4 hrs as tolerated until goal rate of 72ml/hr achieved. Auto flush of 25 ml/hr per pump. Con to follow.       Problem: Nutrition, Enteral (Adult)  Goal: Signs and Symptoms of Listed Potential Problems Will be Absent, Minimized or Managed (Nutrition, Enteral)  Outcome: Ongoing (interventions implemented as appropriate)

## 2019-10-02 NOTE — PLAN OF CARE
Problem: Patient Care Overview  Goal: Plan of Care Review  Outcome: Ongoing (interventions implemented as appropriate)   10/02/19 0218   Coping/Psychosocial   Plan of Care Reviewed With patient   Plan of Care Review   Progress no change   OTHER   Outcome Summary No c/o pain. No neuro changes, alert to person only, DAVISON. Patient remains NPO. VSS, Safety maintained.       Problem: Fall Risk (Adult)  Goal: Absence of Fall  Outcome: Ongoing (interventions implemented as appropriate)      Problem: Stroke (Ischemic) (Adult)  Goal: Signs and Symptoms of Listed Potential Problems Will be Absent, Minimized or Managed (Stroke)  Outcome: Ongoing (interventions implemented as appropriate)      Problem: Skin Injury Risk (Adult)  Goal: Skin Health and Integrity  Outcome: Ongoing (interventions implemented as appropriate)      Problem: Nutrition, Imbalanced: Inadequate Oral Intake (Adult)  Goal: Prevent Further Weight Loss  Outcome: Ongoing (interventions implemented as appropriate)

## 2019-10-02 NOTE — THERAPY TREATMENT NOTE
Acute Care - Physical Therapy Treatment Note  [facility]     Patient Name: Jarod Ramírez Jr.  : 1960  MRN: [id number]  Today's Date: 10/2/2019  Onset of Illness/Injury or Date of Surgery: 19     Referring Physician: Dr. Alas    Admit Date: 2019    Visit Dx:    ICD-10-CM ICD-9-CM   1. Dysphagia, unspecified type R13.10 787.20   2. Impaired mobility Z74.09 799.89   3. Impaired mobility and ADLs Z74.09 799.89   4. Aphasia R47.01 784.3     Patient Active Problem List   Diagnosis   • Degeneration of cervical intervertebral disc   • Closed fracture of lumbar vertebra (CMS/HCC)   • Degeneration of lumbar or lumbosacral intervertebral disc   • Smoker   • BMI 27.0-27.9,adult   • Acute cerebrovascular accident (CVA) of cerebellum (CMS/HCC)   • Alcoholism /alcohol abuse (CMS/HCC)   • CVA (cerebral vascular accident) (CMS/HCC)   • Hyperlipidemia   • Dysphagia due to recent cerebral infarction       Therapy Treatment    Rehabilitation Treatment Summary     Row Name 10/02/19 1317 10/02/19 1114 10/02/19 0914       Treatment Time/Intention    Discipline  physical therapy assistant  -KJ  occupational therapy assistant  -TS  speech language pathologist  -MB,AS,MB2    Document Type  therapy note (daily note)  -KJ2  therapy note (daily note)  -TS  therapy note (daily note)  -MB,AS,MB2    Subjective Information  no complaints  -KJ2  no complaints  -TS2  no complaints  -MB,AS,MB2    Mode of Treatment  physical therapy  -KJ2  --  speech-language pathology  -MB,AS,MB2    Patient/Family Observations  --  --  no family present  -MB,AS,MB2    Patient Effort  good  -KJ2  good  -TS2  good  -MB,AS,MB2    Comment  R foot drop  -KJ2  --  --    Existing Precautions/Restrictions  fall  -KJ2  fall  -TS2  --    Treatment Considerations/Comments  aphasia  -KJ2  aphasia  -TS2  --    Recorded by [KJ] Helen Kirby L, PTA 10/02/19 1318  [KJ2] Helen Kirby, PTA 10/02/19 1431 [TS] Yaritza Lopez COTA/L 10/02/19 1114  [TS2]  Yaritza Lopez COTA/L 10/02/19 1242 [MB,AS,MB2] Georgi Hernandez, CCC-SLP (r) Eliecer Williamson, Speech Therapy Student (t) Georgi Hernandez, CCC-SLP (c) 10/02/19 1231    Row Name 10/02/19 1317 10/02/19 1114          Cognitive Assessment/Intervention- PT/OT    Follows Commands (Cognition)  --  follows one step commands;25-49% accuracy;50-74% accuracy;increased processing time needed;delayed response/completion;repetition of directions required  -TS     Personal Safety Interventions  gait belt  -KJ  fall prevention program maintained;gait belt;nonskid shoes/slippers when out of bed;elopement precautions initiated  -TS     Recorded by [KJ] Helen Kirby, JAIR 10/02/19 1431 [TS] Yaritza Lopez COTA/L 10/02/19 1242     Row Name 10/02/19 1317             Safety Issues, Functional Mobility    Safety Issues Affecting Function (Mobility)  impulsivity;sequencing abilities;safety precautions follow-through/compliance  -KJ      Impairments Affecting Function (Mobility)  balance;coordination;endurance/activity tolerance;postural/trunk control;range of motion (ROM)  -KJ      Comment, Safety Issues/Impairments (Mobility)  Pt. presented with decreased RLE ROM ankle DF:verbal cues were needed for Pt. to perform stepping sequencing correctly:Pt. showed slight sway off of JUSTA with standing activities  -KJ      Recorded by [KJ] Helen Kirby, JAIR 10/02/19 1431      Row Name 10/02/19 1317 10/02/19 1114          Bed Mobility Assessment/Treatment    Bed Mobility Assessment/Treatment  --  supine-sit  -TS     Supine-Sit [place] (Bed Mobility)  --  supervision  -TS     Comment (Bed Mobility)  up in chair  -KJ  pt came to long sitting and then came to EOB  -TS     Recorded by [KJ] Helen Kirby, JAIR 10/02/19 1431 [TS] Yaritza Lopez COTA/L 10/02/19 1242     Row Name 10/02/19 1317 10/02/19 1114          Functional Mobility    Functional Mobility- Ind. Level  verbal cues required;contact guard assist  -KJ   contact guard assist  -TS     Functional Mobility- Safety Issues  weight-shifting ability decreased;sequencing ability decreased;balance decreased during turns  -KJ  balance decreased during turns  -TS     Functional Mobility- Comment  Pt. presented with decreased RLE DF during gait; Decreased balance during turns with a narrow JUSTA  -KJ  in room, in BR  -TS     Recorded by [KJ] Helen Kirby, PTA 10/02/19 1431 [TS] Yaritza Lopez COTTON/L 10/02/19 1242     Row Name 10/02/19 1317 10/02/19 1114          Transfer Assessment/Treatment    Transfer Assessment/Treatment  sit-stand transfer;stand-sit transfer  -KJ  sit-stand transfer;stand-sit transfer  -TS     Recorded by [KJ] Helen Kirby, PTA 10/02/19 1431 [TS] Yaritza Lopez COTA/L 10/02/19 1242     Row Name 10/02/19 1317 10/02/19 1114          Sit-Stand Transfer    Sit-Stand [place] (Transfers)  contact guard;minimum assist (75% patient effort)  -KJ  contact guard;stand by assist  -TS     Recorded by [KJ] Helen Kirby, PTA 10/02/19 1431 [TS] Yaritza Lopez COTTON/L 10/02/19 1242     Row Name 10/02/19 1317 10/02/19 1114          Stand-Sit Transfer    Stand-Sit [place] (Transfers)  contact guard;minimum assist (75% patient effort)  -KJ  contact guard  -TS     Recorded by [KJ] Helen Kirby, PTA 10/02/19 1431 [TS] Yaritza Lopez COTTON/L 10/02/19 1242     Row Name 10/02/19 1317             Gait/Stairs Assessment/Training    [place] Level (Gait)  contact guard;minimum assist (75% patient effort)  -KJ      Distance in Feet (Gait)  50'x2  -KJ      Right Sided Gait Deviations  foot drop/toe drag;heel strike decreased  -KJ      Comment (Gait/Stairs)  LOB with turning, narrow JUSTA required Lucie to recover  -KJ      Recorded by [KJ] Helen Kirby, PTA 10/02/19 1431      Row Name 10/02/19 1114             ADL Assessment/Intervention    BADL Assessment/Intervention  grooming;lower body dressing  -TS      Recorded by [TS] John  LEIGHA AlcarazA/L 10/02/19 1242      Row Name 10/02/19 1114             Lower Body Dressing Assessment/Training    Lower Body Dressing [place] Level  don;socks;minimum assist (75% patient effort)  -TS      Lower Body Dressing Position  edge of bed sitting  -TS      Comment (Lower Body Dressing)  pt presented with socks and unable to don socks without assist  -TS      Recorded by [TS] Yaritza Lopez COTA/L 10/02/19 1242      Row Name 10/02/19 1114             Grooming Assessment/Training    [place] Level (Grooming)  wash face, hands;set up;verbal cues;contact guard assist  -TS      Grooming Position  sink side  -TS      Recorded by [TS] Yaritza Lopez COTA/L 10/02/19 1242      Row Name 10/02/19 1317             Motor Skills Assessment/Interventions    Additional Documentation  Therapeutic Exercise (Group)  -KJ      Recorded by [KJ] Helen Kirby, PTA 10/02/19 1431      Row Name 10/02/19 1317             Therapeutic Exercise    Exercise Type (Therapeutic Exercise)  AROM (active range of motion)  -KJ      Position (Therapeutic Exercise)  seated  -KJ      Sets/Reps (Therapeutic Exercise)  20  -KJ      Recorded by [KJ] Helen Kirby, PTA 10/02/19 1431      Row Name 10/02/19 1317             Balance    Balance  dynamic standing balance;standing balance activity;dynamic balance activity  -KJ      Recorded by [KJ] Helen Kirby, PTA 10/02/19 1431      Row Name 10/02/19 1317             Static Sitting Balance    Level of [place] (Unsupported Sitting, Static Balance)  supervision  -KJ      Sitting Position (Unsupported Sitting, Static Balance)  sitting in chair  -KJ      Recorded by [KJ] Helen Kirby, PTA 10/02/19 1431      Row Name 10/02/19 1317             Static Standing Balance    Level of [place] (Supported Standing, Static Balance)  contact guard assist;minimal assist, 75% patient effort  -KJ      Comment (Supported Standing, Static Balance)  Pt. would attempt to take steps  forwards before therapy was ready  -KJ      Recorded by [KJ] Helen Kirby, PTA 10/02/19 1431      Row Name 10/02/19 1317             Dynamic Balance Activity    Therapeutic Training Performed (Dynamic Balance)  backward walking;side stepping  -KJ      Comment (Dynamic Balance Training)  high marching, toe raises,abduction  -KJ      Recorded by [KJ] Helen Kirby, PTA 10/02/19 1431      Row Name 10/02/19 1317 10/02/19 1114          Positioning and Restraints    Pre-Treatment Position  sitting in chair/recliner  -KJ  in bed  -TS     Post Treatment Position  chair  -KJ  chair  -TS     In Chair  --  reclined;call light within reach;encouraged to call for assist;exit alarm on;notified nsg  -TS     Recorded by [KJ] Helen Kirby, PTA 10/02/19 1431 [TS] Yaritza Lopez COTA/L 10/02/19 1242     Row Name 10/02/19 1317             Pain Scale: Numbers Pre/Post-Treatment    Pain Scale: Numbers, Pretreatment  0/10 - no pain  -KJ      Pain Scale: Numbers, Post-Treatment  0/10 - no pain  -KJ      Recorded by [KJ] Helen Kirby, PTA 10/02/19 1431      Row Name 10/02/19 1114 10/02/19 0914          Pain Scale: FACES Pre/Post-Treatment    Pain: FACES Scale, Pretreatment  0-->no hurt  -TS  0-->no hurt  -MB,AS,MB2     Pain: FACES Scale, Post-Treatment  0-->no hurt  -TS  --     Recorded by [TS] Yaritza Lopez COTTON/L 10/02/19 1242 [MB,AS,MB2] Georgi Hernandez CCC-SLP (r) Eliecer Williamson, Speech Therapy Student (t) Georgi Hernandez CCC-SLP (c) 10/02/19 1231     Row Name                Wound 09/27/19 0800 Right posterior finger    Wound - Properties Group Date first assessed: 09/27/19 [NW] Time first assessed: 0800 [NW] Present on Hospital Admission: Y [NW] Side: Right [NW] Orientation: posterior [NW] Location: finger [NW] Recorded by:  [NW] Dunia Multani RN 09/27/19 1624    Row Name 10/02/19 1114             Outcome Summary/Treatment Plan (OT)    Daily Summary of Progress (OT)  progress towards  functional goals is fair  -TS      Recorded by [TS] Yaritza Lopez COTTON/L 10/02/19 1242      Row Name 10/02/19 0914             Outcome Summary/Treatment Plan (SLP)    Daily Summary of Progress (SLP)  progress towards functional goals is fair  -MB,AS,MB2      Barriers to Overall Progress (SLP)  aphasia  -MB,AS,MB2      Plan for Continued Treatment (SLP)  continue to follow and treat  -MB,AS,MB2      Anticipated Dischage Disposition  inpatient rehabilitation facility  -MB,AS,MB2      Recorded by [MB,AS,MB2] Georgi Hernandez CCC-SLP (r) Eliecer Williamson, Speech Therapy Student (t) Georgi Hernandez CCC-SLP (c) 10/02/19 1231        User Key  (r) = Recorded By, (t) = Taken By, (c) = Cosigned By    Initials Name Effective Dates Discipline    Georgi Garcia CCC-SLP 08/02/16 -  SLP    Helen Whitt, JAIR 08/02/16 -  PT    Yaritza Hand COTTON/L 08/02/16 -  OT    NW Dunia Multani RN 07/12/18 -  Nurse    AS Eliecer Williamson, Speech Therapy Student 09/03/19 -  SLP          Wound 09/27/19 0800 Right posterior finger (Active)   Dressing Appearance open to air 10/2/2019  8:43 AM   Closure None 10/2/2019  8:43 AM   Base dry;white 10/1/2019  5:24 PM   Edges jagged 10/2/2019  8:43 AM   Drainage Amount none 10/2/2019  8:43 AM   Dressing Care, Wound open to air 10/2/2019  8:43 AM       Rehab Goal Summary     Row Name 10/02/19 0917             Swallow Goals (SLP)    Oral Nutrition/Hydration Goal Selection (SLP)  oral nutrition/hydration, SLP goal 1  -MB (r) AS (t) MB (c)      Lingual Strengthening Goal Selection (SLP)  lingual strengthening, SLP goal 1  -MB (r) AS (t) MB (c)      Pharyngeal Strengthening Exercise Goal Selection (SLP)  pharyngeal strengthening exercise, SLP goal 1  -MB (r) AS (t) MB (c)      Additional Documentation  lingual strengthening goal selection (SLP);pharyngeal strengthening exercise goal selection (SLP)  -MB (r) AS (t) MB (c)         Oral Nutrition/Hydration Goal 1  (SLP)    Oral Nutrition/Hydration Goal 1, SLP  LTG: Patient will tolerate LRD without s/s of aspiration.  -MB (r) AS (t) MB (c)      Time Frame (Oral Nutrition/Hydration Goal 1, SLP)  by discharge  -MB (r) AS (t) MB (c)      Barriers (Oral Nutrition/Hydration Goal 1, SLP)  Aphasia  -MB (r) AS (t) MB (c)      Progress/Outcomes (Oral Nutrition/Hydration Goal 1, SLP)  goal ongoing  -MB (r) AS (t) MB (c)         Lingual Strengthening Goal 1 (SLP)    Activity (Lingual Strengthening Goal 1, SLP)  increase tongue back strength  -MB (r) AS (t) MB (c)      Increase Tongue Back Strength  lingual movement exercises  -MB (r) AS (t) MB (c)      [place]/Accuracy (Lingual Strengthening Goal 1, SLP)  independently (over 90% accuracy)  -MB (r) AS (t) MB (c)      Time Frame (Lingual Strengthening Goal 1, SLP)  short term goal (STG);by discharge  -MB (r) AS (t) MB (c)      Barriers (Lingual Strengthening Goal 1, SLP)  n/a  -MB (r) AS (t) MB (c)      Progress/Outcomes (Lingual Strengthening Goal 1, SLP)  good progress toward goal  -MB (r) AS (t) MB (c)         Pharyngeal Strengthening Exercise Goal 1 (SLP)    Activity (Pharyngeal Strengthening Goal 1, SLP)  increase epiglottic inversion and retroflexion;increase squeeze/positive pressure generation;increase tongue base retraction  -MB (r) AS (t) MB (c)      Increase Epiglottic Inversion and Retroflexion  [place]  -MB (r) AS (t) MB (c)      Increase Squeeze/Positive Pressure Generation  hard effortful swallow  -MB (r) AS (t) MB (c)      Increase Tongue Base Retraction  yoni  -MB (r) AS (t) MB (c)      [place]/Accuracy (Pharyngeal Strengthening Goal 1, SLP)  independently (over 90% accuracy)  -MB (r) AS (t) MB (c)      Time Frame (Pharyngeal Strengthening Goal 1, SLP)  short term goal (STG);by discharge  -MB (r) AS (t) MB (c)      Barriers (Pharyngeal Strengthening Goal 1, SLP)  cognitive status  -MB (r) AS (t) MB (c)      Progress/Outcomes (Pharyngeal Strengthening Goal  1, SLP)  goal ongoing  -MB (r) AS (t) MB (c)         Communication Treatment Objective and Progress Goals (SLP)    Auditory Comprehension Treatment Objectives  Auditory Comprehension Treatment Objectives (Group)  -MB (r) AS (t) MB (c)      Verbal Expression Treatment Objectives  Verbal Expression Treatment Objectives (Group)  -MB (r) AS (t) MB (c)      Graphic Expression Treatment Objectives  Graphic Expression Treatment Objectives (Group)  -MB (r) AS (t) MB (c)      Augmentative/Alternative Communication Objectives  Augmentative/Alternative Communication Objectives (Group)  -MB (r) AS (t) MB (c)         Auditory Comprehension Treatment Objectives    Words/Phrases/Sentences Selection  words/phrases/sentences, SLP goal 1  -MB (r) AS (t) MB (c)      Comprehend Questions Selection  comprehend questions, SLP goal 1  -MB (r) AS (t) MB (c)      Follow Directions Selection  follow directions, SLP goal 1  -MB (r) AS (t) MB (c)         Words/Phrases/Sentences Goal 1 (SLP)    Improve Ability to Comprehend Words/Phrases/Sentences Through: Goal 1 (SLP)  identify objects, field of;identify pictures, field of;90%;independently (over 90% accuracy);other (comment)  -MB (r) AS (t) MB (c)      Time Frame (Identify Objects and Pictures Goal 1, SLP)  short term goal (STG);by discharge  -MB (r) AS (t) MB (c)      Barriers (Identify Objects and Pictures Goal 1, SLP)  aphasia  -MB (r) AS (t) MB (c)      Progress/Outcomes (Identify Objects and Pictures Goal 1, SLP)  goal ongoing  -MB (r) AS (t) MB (c)         Comprehend Questions Goal 1 (SLP)    Improve Ability to Comprehend Questions Goal 1 (SLP)  simple yes/no questions;complex yes/no questions;90%;independently (over 90% accuracy)  -MB (r) AS (t) MB (c)      Time Frame (Comprehend Questions Goal 1, SLP)  short term goal (STG);by discharge  -MB (r) AS (t) MB (c)      Barriers (Comprehend Questions Goal 1, SLP)  [place]  -MB (r) AS (t) MB (c)      Progress/Outcomes (Comprehend Questions  Goal 1, SLP)  goal ongoing  -MB (r) AS (t) MB (c)         Follow Directions Goal 2 (SLP)    Improve Ability to Follow Directions Goal 1 (SLP)  1 step direction with objects;1 step direction without objects;90%;independently (over 90% accuracy)  -MB (r) AS (t) MB (c)      Time Frame (Follow Directions Goal 1, SLP)  short term goal (STG);by discharge  -MB (r) AS (t) MB (c)      Progress (Ability to Follow Directions Goal 1, SLP)  50%;with minimal cues (75-90%)  -MB (r) AS (t) MB (c)      Progress/Outcomes (Follow Directions Goal 1, SLP)  continuing progress toward goal  -MB (r) AS (t) MB (c)         Verbal Expression Treatment Objectives    Word Retrieval Skills Selection  word retrieval, SLP goal 1  -MB (r) AS (t) MB (c)      Phrase and Sentence Level Response Selection  phrase and sentence level response, SLP goal 1  -MB (r) AS (t) MB (c)         Word Retrieval Skills Goal 1 (SLP)    Improve Word Retrieval Skills By Goal 1 (SLP)  completing automatic speech task, days of the week;completing automatic speech task, months;confrontational naming task;repeating words;90%;independently (over 90% accuracy)  -MB (r) AS (t) MB (c)      Time Frame (Word Retrieval Goal 1, SLP)  short term goal (STG);by discharge  -MB (r) AS (t) MB (c)      Progress (Word Retrieval Skills Goal 1, SLP)  50%  -MB (r) AS (t) MB (c)      Progress/Outcomes (Word Retrieval Goal 1, SLP)  continuing progress toward goal  -MB (r) AS (t) MB (c)         Graphic Expression Treatment Objectives    Graphic Expression of Shapes, Letters and Numbers Selection  graphic expression of shapes, letters, and numbers, SLP goal 1  -MB (r) AS (t) MB (c)         Graphic Expression of Shapes, Letters, Numbers Goal 1 (SLP)    Improve Graphic Expression of Shapes, Letters, and Numbers Goal 1 (SLP)  copy shapes, numbers, and letters;writing dictated number and letters;90%;independently (over 90% accuracy)  -MB (r) AS (t) MB (c)      Time Frame (Graphic Expression of  Shapes, Letters, and Numbers Goal 1, SLP)  short term goal (STG);by discharge  -MB (r) AS (t) MB (c)      Progress/Outcomes (Graphic Expression of Shapes, Letters, and Numbers Goal 1, SLP)  goal ongoing  -MB (r) AS (t) MB (c)         Augmentative/Alternative Communication Objectives    Augmentative/Alternative Communication Selection  augmentative/alternative communication, SLP goal 1  -MB (r) AS (t) MB (c)         Augmentative/Alternative Communication Objectives Goal 1 (SLP    Communication (Augmentative/Alternative Communication Goal 1, SLP)  improve ability to use low tech augmentative/alternative communication device  -MB (r) AS (t) MB (c)      Improve Communication by (Augmentative/Alternative Communication Goal 1, SLP)  identify picture, field of ____;identify word, field of ____;alphabet/picture board;90%;independently (over 90% accuracy)  -MB (r) AS (t) MB (c)      Time Frame (Augmentative/Alternative Communication Goal 1, SLP)  short term goal (STG);by discharge  -MB (r) AS (t) MB (c)      Progress (Augmentative/Alternative Communication Goal 1, SLP)  70%  -MB (r) AS (t) MB (c)      Progress/Outcomes (Augmentative/Alternative Communication Goal 1, SLP)  good progress toward goal  -MB (r) AS (t) MB (c)        User Key  (r) = Recorded By, (t) = Taken By, (c) = Cosigned By    Initials Name Provider Type Discipline    Georgi Garcia CCC-SLP Speech and Language Pathologist SLP    AS Eliecer Williamson, Speech Therapy Student Speech Therapy Student SLP          Physical Therapy Education     Title: PT OT SLP Therapies (In Progress)     Topic: Physical Therapy (In Progress)     Point: Mobility training (Done)     Learning Progress Summary           Patient Acceptance, E, VU,NR by AF at 10/1/2019  8:19 AM    Comment:  Educated on benefits of activity and ongoing PT POC.   Family Acceptance, E, VU,NR by AF at 10/1/2019  8:19 AM    Comment:  Educated on benefits of activity and ongoing PT POC.                                User Key     Initials Effective Dates Name Provider Type Discipline    AF 08/27/19 -  Arti Haile, PT Student PT Student PT                PT Recommendation and Plan     Plan of Care Reviewed With: patient  Progress: improving  Outcome Summary: Pt. was sitting in chair and performed sit to stand transfer with Min A/CGAx1 with no use of assistive device; Pt. ambulated 50'x2 Lucie/CGA with verbal directional cueing. Noted decreased balance with turning pt. had a narrow JUSTA. Pt. performed standing activities for balance. Pt. had decreased sequencing ability and impulsiveness with stepping before therapy was ready. During gait Pt. had decreased RLE ankle DF and decreased heel strike. Pt. able to perform one step commands. Pt. performed sitting exercises 20 reps   Outcome Measures     Row Name 10/02/19 1200 10/01/19 1000          How much help from another is currently needed...    Putting on and taking off regular lower body clothing?  3  -TS  3  -JJ     Bathing (including washing, rinsing, and drying)  3  -TS  3  -JJ     Toileting (which includes using toilet bed pan or urinal)  3  -TS  3  -JJ     Putting on and taking off regular upper body clothing  3  -TS  3  -JJ     Taking care of personal grooming (such as brushing teeth)  3  -TS  3  -JJ     Eating meals  3 pt currently NPO  -TS  3  -JJ     AM-PAC 6 Clicks Score (OT)  18  -TS  18  -JJ        Functional Assessment    Outcome Measure Options  AM-PAC 6 Clicks Daily Activity (OT)  -TS  AM-PAC 6 Clicks Daily Activity (OT)  -JJ       User Key  (r) = Recorded By, (t) = Taken By, (c) = Cosigned By    Initials Name Provider Type    TS Yaritza Lopez, COTTON/L Occupational Therapy Assistant    Christina Oliver OTR/L Occupational Therapist         Time Calculation:   PT Charges     Row Name 10/02/19 1431             Time Calculation    Start Time  1317  -KJ      Stop Time  1340  -KJ      Time Calculation (min)  23 min  -KJ      PT Received On   10/02/19  -KJ      PT Goal Re-Cert Due Date  10/11/19  -KJ         Time Calculation- PT    Total Timed Code Minutes- PT  23 minute(s)  -KJ        User Key  (r) = Recorded By, (t) = Taken By, (c) = Cosigned By    Initials Name Provider Type    Helen Whitt, JAIR Physical Therapy Assistant        Therapy Charges for Today     Code Description Service Date Service Provider Modifiers Qty    69809122928 HC GAIT TRAINING EA 15 MIN 10/2/2019 Helen Kirby, JAIR GP 1    81079708678 HC PT THER PROC EA 15 MIN 10/2/2019 Helen Kirby, JAIR GP 1          PT G-Codes  Outcome Measure Options: AM-PAC 6 Clicks Daily Activity (OT)  AM-PAC 6 Clicks Score (PT): 18  AM-PAC 6 Clicks Score (OT): 18    Helen Kirby PTA  10/2/2019

## 2019-10-02 NOTE — PROGRESS NOTES
Neurology Progress Note      Chief Complaint:  stroke    Subjective     Subjective:    Moved to floor overnight.  Remains aphasic.  His wife is being disruptive and has been asked to leave the hospital.  An ethics committee met this morning in regards to establishing guardianship for patient.      ST consult reviewed:  1. Continuing NPO and considering Dobhoff placement for nutrition.   2. Meds via alternate route.   3. May consider ENT consult due to abnormality at posterior true vocal folds.   4. SLP will continue to follow and treat.     Medications:  Current Facility-Administered Medications   Medication Dose Route Frequency Provider Last Rate Last Dose   • albuterol (PROVENTIL) nebulizer solution 0.083% 2.5 mg/3mL  2.5 mg Nebulization Once PRN Galo Bethea MD       • aspirin chewable tablet 81 mg  81 mg Oral Daily Candido Alas MD        Or   • aspirin suppository 300 mg  300 mg Rectal Daily Candido Alas MD   300 mg at 10/01/19 1032   • atorvastatin (LIPITOR) tablet 80 mg  80 mg Oral Nightly Candido Alas MD   80 mg at 09/29/19 2220   • chlorhexidine (PERIDEX) 0.12 % solution 15 mL  15 mL Mouth/Throat Q12H Galo Bethea MD   15 mL at 09/30/19 2019   • famotidine (PEPCID) injection 20 mg  20 mg Intravenous Q12H Candido Alas MD   20 mg at 10/01/19 2045   • hypromellose (ISOPTO TEARS) 0.5 % ophthalmic solution 2 drop  2 drop Both Eyes TID PRN Candido Alas MD   2 drop at 09/30/19 2152   • ipratropium-albuterol (DUO-NEB) nebulizer solution 3 mL  3 mL Nebulization 4x Daily - RT Christina Sharif APRN   3 mL at 10/02/19 0721   • labetalol (NORMODYNE,TRANDATE) injection 20 mg  20 mg Intravenous Q4H PRN Christina Sharif APRN       • lactated ringers infusion  50 mL/hr Intravenous Continuous Christina Sharif APRN 50 mL/hr at 10/01/19 0045 50 mL/hr at 10/01/19 0045   • LORazepam (ATIVAN) tablet 1 mg  1 mg Oral Q2H PRN Candido Alas MD        Or   • LORazepam  (ATIVAN) injection 1 mg  1 mg Intravenous Q2H PRN Candido Alas MD        Or   • LORazepam (ATIVAN) tablet 2 mg  2 mg Oral Q1H PRN Candido Alas MD        Or   • LORazepam (ATIVAN) injection 2 mg  2 mg Intravenous Q1H PRN Candido Alas MD   2 mg at 09/29/19 1311    Or   • LORazepam (ATIVAN) injection 2 mg  2 mg Intravenous Q15 Min PRN Candido Alas MD   2 mg at 09/27/19 2007    Or   • LORazepam (ATIVAN) injection 2 mg  2 mg Intramuscular Q15 Min PRN Candido Alas MD       • multiple vitamin (M.V.I. Adult) 10 mL, thiamine (B-1) 100 mg, folic acid 1 mg, potassium chloride 20 mEq in dextrose 5 % and sodium chloride 0.9 % 1,000 mL infusion  75 mL/hr Intravenous Daily Candido Alas MD 75 mL/hr at 10/01/19 1032 75 mL/hr at 10/01/19 1032   • potassium chloride (KAYCIEL) 20 MEQ/15ML (10%) solution 40 mEq  40 mEq Oral Once Candido Alas MD       • sodium chloride 0.9 % flush 10 mL  10 mL Intravenous Q12H Candido Alas MD   10 mL at 10/01/19 2048   • sodium chloride 0.9 % flush 10 mL  10 mL Intravenous PRN Candido Alas MD           Review of Systems:   -A 14 point review of systems is completed and is negative except for agitation      Objective      Vital Signs  Temp:  [98.3 °F (36.8 °C)-99.8 °F (37.7 °C)] 98.3 °F (36.8 °C)  Heart Rate:  [69-93] 87  Resp:  [15-24] 18  BP: (109-175)/(63-96) 130/66    Physical Exam:    CVS:  RR  Lungs: CTA  Abd:  NT/ND    MS: He is awake.  Sitting at the side of the bed.  He can tell me his name.  Otherwise he has perseveration.  He has difficulties with naming.  He does not know the year.  Shakes head yes/no appropriately.  Follows commands  CN:  II - XII intact  MTR:  Moving all extremities -right arm and leg have drift down to the bed.  DTR:  1+ throughout  Coord/Gait: Spatial dysfunction.  Difficulties drawing a clock.  When you ask him to set the time on a clock he gives the digital interpretation.     Results Review:    I reviewed the patient's new clinical  results.    Results from last 7 days   Lab Units 10/02/19  0655 10/01/19  0300 09/28/19  0232   WBC 10*3/mm3 11.03* 11.97* 12.75*   HEMOGLOBIN g/dL 14.0 14.7 13.7   HEMATOCRIT % 41.1 42.3 39.7   PLATELETS 10*3/mm3 301 311 335        Results from last 7 days   Lab Units 10/02/19  0655 10/01/19  0300 09/28/19  0232   SODIUM mmol/L 139 141 138   POTASSIUM mmol/L 3.6 3.4* 3.6   CHLORIDE mmol/L 97* 101 99   CO2 mmol/L 30.0* 27.0 26.0   BUN mg/dL 5* 3* 7   CREATININE mg/dL 0.60* 0.49* 0.76   CALCIUM mg/dL 8.9 8.8 8.5*   BILIRUBIN mg/dL 0.6 0.8 0.9   ALK PHOS U/L 70 76 63   ALT (SGPT) U/L 20 27 17   AST (SGOT) U/L 26 32 37   GLUCOSE mg/dL 161* 95 110*        No results found for: PHOS, MG, PROTIME, INR, APTT  No components found for: POCGLUC  No components found for: A1C  Lab Results   Component Value Date    HDL 51 09/27/2019     (H) 09/27/2019     No components found for: B12  Lab Results   Component Value Date    TSH 1.760 09/27/2019     Labs reviewed:  Liver function normal  CT Head reviewed:        MRI brain reviewed by me.  Left MCA acute infarction mainly in the posterior distribution.  No evidence of hemorrhagic conversion nor edema.    Cardiac echo:  Preserved EF  Carotid ultrasound shows evidence of a left ICA occlusion  Labs reviewed:  WBC is 12K  CT angiography of the head and neck reviewed by me.  On the left the internal carotid arteries completely occluded.  The right has 30% stenosis.  Assessment/Plan     Hospital Problem List      Degeneration of lumbar or lumbosacral intervertebral disc    Smoker    Acute cerebrovascular accident (CVA) of cerebellum (CMS/HCC)    Alcoholism /alcohol abuse (CMS/HCC)    CVA (cerebral vascular accident) (CMS/HCC)    Hyperlipidemia    Impression:     1.  Alcohol withdrawal - drinks a pint of hard liquor per day per brother who is in the room  2.  Left MCA stroke  3.  Hyperlipidemia with an LDL above goal of 70  4.  History of hypertension  5.  Left ICA occlusion  -confirmed with CT angiography.  6.  Dysphagia  7.  Vocal cord mass?    Plan:  · ETOH withdrawal protocols - appears to be improved  · Lipitor 80  · ASA 81mg  · SCD's  · Systolic blood pressure goal less than 140  · Consulted acute rehab  · ENT consult  · Start Dobhoff and tube feeds ( nutrition consult)  · GI consult will need to be considered for PEG  · Guardianship process taking place; however, currently patient is competant to make decisions and has mainly an expressive aphasia.  He comprehends but has difficulties forming sentences.        Gab White MD  10/02/19  10:42 AM    35 minutes of critical care time was performed with titration of sedating agents in addition to neurologic examination and interpretation of imaging including CT angiography of head and neck.

## 2019-10-02 NOTE — PROGRESS NOTES
Continued Stay Note   [place]     Patient Name: Jarod Ramírez Jr.  MRN: [id number]  Today's Date: 10/2/2019    Admit Date: 9/26/2019    Discharge Plan     Row Name 10/02/19 1200       Plan    Plan Comments  ETHICS MEETING HELD THIS MORING REGARDING POSSIBLE GUARDIANSHIP AND ISSUES WITH WIFE. BOTH PHYSICIAN (DR MCKEON AND DR KENNEDY) HAVE SEEN PT TODAY AND DO NOT THINK PT NEEDS A GUARDIAN. SPOKE TO PT WHO STATES HE DOES PLAN ON STAYING WITH HIS BROTHER AFTER REHAB AS HE PLANS TO DIVORCE HIS WIFE. [facility] IS FOLLOWING BUT THEY HAVE CONCERNS OF ACCEPTING PT DUE TO SOCIAL ISSUES AND DC PLAN. ENT IS BEING CONSULTED FOR POSSIBLE DOBHOFF/PEG. WILL FOLLOW.         Discharge Codes    No documentation.             JONO Sharif

## 2019-10-02 NOTE — PLAN OF CARE
"Problem: Patient Care Overview  Goal: Plan of Care Review  Outcome: Ongoing (interventions implemented as appropriate)   10/02/19 0956   Coping/Psychosocial   Plan of Care Reviewed With patient   Plan of Care Review   Progress improving   OTHER   Outcome Summary Swallow and cognitive therapy was completed. When working on his swallow, Pt performed lingual movement exercises with the aid of a visual model. He was unable to complete other swallow enhancement treatments due to his lack of understanding of the directions. When attempting, pt perseverated on the lingual movements and continued to lateralize tongue instead of attempting swallows. When ice chip was applied to tongue to intitiate a swallow pt began to cough. During cognitive treatment, pt was able to state his name and the days of the week without cues. When asked to provide the months of the year he started with June and went through the end but could not complete January-May without a model. Pt was also able to independently state the letters of the alphabet. Pt was able to follow one-step directions to \"show me your___\" He correctly pointed to 8/10 body parts. When presented with a simple picture board, pt was able to accurately point to 15/23 items by name. However, he was unable to point to items when given function such as \"what do you brush your teeth with?\" Another picture board with letters was provided and the pt was able to \"point to __\" several letters but was unable to use it to spell simple words like \"eat.\" Will continue to follow and treat.          "

## 2019-10-02 NOTE — PLAN OF CARE
Problem: Patient Care Overview  Goal: Plan of Care Review  Outcome: Ongoing (interventions implemented as appropriate)   10/02/19 1432   Coping/Psychosocial   Plan of Care Reviewed With patient   Plan of Care Review   Progress improving   OTHER   Outcome Summary Pt. was sitting in chair and performed sit to stand transfer with Min A/CGAx1 with no use of assistive device; Pt. ambulated 50'x2 Lucie/CGA with verbal directional cueing. Noted decreased balance with turning pt. had a narrow JUSTA. Pt. performed standing activities for balance. Pt. had decreased sequencing ability and impulsiveness with stepping before therapy was ready. During gait Pt. had decreased RLE ankle DF and decreased heel strike. Pt. able to perform one step commands. Pt. performed sitting exercises 20 reps

## 2019-10-02 NOTE — PLAN OF CARE
Problem: Patient Care Overview  Goal: Plan of Care Review   10/02/19 1432   Coping/Psychosocial   Plan of Care Reviewed With patient   Plan of Care Review   Progress improving   OTHER   Outcome Summary Pt. was sitting in chair and performed sit to stand transfer with Min A/CGAx1 with no use of assistive device; Pt. ambulated 50'x2 Lucie/CGA with verbal directional cueing. Noted decreased balance with turning pt. had a narrow JUSTA. Pt. performed standing activities for balance. Pt. had decreased sequencing ability and impulsiveness with stepping before therapy was ready. During gait Pt. had decreased RLE ankle DF and decreased heel strike. Pt. able to perform one step commands. Pt. performed sitting AROM exercises 20 reps . Recommend inpatient rehab for PT/OT/SLP.

## 2019-10-02 NOTE — CONSULTS
[facility] ([facility]) Consult Note:    Referring Provider: Galo Bethea, *    Reason for Consultation: Vocal fold abnormality on swallow evaluation    Patient Care Team:  Jose Karimi MD as PCP - General (Family Medicine)    Chief complaint: Vocal fold abnormality on swallow evaluation    Subjective .     History of present illness:  Jarod Ramírez Jr. is a  59 y.o. male who was admitted on September 26, 2019 with a left MCA acute infarction, mainly in the posterior distribution.  He reports confusion to the details regarding his stroke.  He was intubated.  He currently reports no significant change in his voice.  Prior to his admission, he denied any throat pain, change in voice, difficulty swallowing, cough or aspiration.  He does have a history of alcohol abuse, and was drinking at work the day of his stroke.  Nothing is made his voice better or worse.  Again, he is suffering from some confusion regarding his stroke details.    Review of Systems  Review of Systems   Unable to perform ROS: Mental status change       History  Past Medical History:   Diagnosis Date   • Hypertension    ,   Past Surgical History:   Procedure Laterality Date   • CATARACT EXTRACTION Left    • WRIST FRACTURE SURGERY Left    ,   Family History   Problem Relation Age of Onset   • No Known Problems Mother    • Diabetes Father    • Cancer Sister    • No Known Problems Brother    ,   Social History     Tobacco Use   • Smoking status: Current Every Day Smoker     Types: Cigarettes   • Smokeless tobacco: Never Used   • Tobacco comment: 1.5 packs daily   Substance Use Topics   • Alcohol use: Yes   • Drug use: No   ,   No medications prior to admission.    and Allergies:  Patient has no known allergies.    Objective     Vital Signs   Temp:  [98.3 °F (36.8 °C)-99.8 °F (37.7 °C)] 98.3 °F (36.8 °C)  Heart Rate:  [69-93] 83  Resp:  [15-18] 18  BP: (109-175)/(58-96) 112/58    Physical Exam:   Physical Exam   Constitutional: He is  oriented to person, place, and time. He appears well-developed and well-nourished. He is cooperative. No distress.   HENT:   Head: Normocephalic and atraumatic.   Right Ear: External ear normal.   Left Ear: External ear normal.   Nose: Septal deviation present. No nasal deformity.   Mouth/Throat: Uvula is midline, oropharynx is clear and moist and mucous membranes are normal.   Eyes: Conjunctivae and EOM are normal. Pupils are equal, round, and reactive to light. Right eye exhibits no discharge. Left eye exhibits no discharge. No scleral icterus.   Neck: Normal range of motion. Neck supple. No JVD present. No tracheal deviation present. No thyromegaly present.       Pulmonary/Chest: Effort normal. No stridor.   Musculoskeletal: Normal range of motion. He exhibits no edema or deformity.   Lymphadenopathy:     He has no cervical adenopathy.   Neurological: He is alert and oriented to person, place, and time. He has normal strength. No cranial nerve deficit. Coordination normal.   Skin: Skin is warm and dry. No rash noted. He is not diaphoretic. No erythema. No pallor.   Psychiatric: He has a normal mood and affect. His speech is normal and behavior is normal. Judgment and thought content normal. Cognition and memory are normal.   Nursing note and vitals reviewed.      Results Review:     Lab Results (last 24 hours)     Procedure Component Value Units Date/Time    Comprehensive Metabolic Panel [[id number]]  (Abnormal) Collected:  10/02/19 0655    Specimen:  Blood Updated:  10/02/19 0724     Glucose 161 mg/dL      BUN 5 mg/dL      Creatinine 0.60 mg/dL      Sodium 139 mmol/L      Potassium 3.6 mmol/L      Chloride 97 mmol/L      CO2 30.0 mmol/L      Calcium 8.9 mg/dL      Total Protein 6.9 g/dL      Albumin 3.40 g/dL      ALT (SGPT) 20 U/L      AST (SGOT) 26 U/L      Alkaline Phosphatase 70 U/L      Total Bilirubin 0.6 mg/dL      eGFR Non African Amer 139 mL/min/1.73      Globulin 3.5 gm/dL      A/G Ratio 1.0 g/dL       BUN/Creatinine Ratio 8.3     Anion Gap 12.0 mmol/L     Narrative:       GFR Normal >60  Chronic Kidney Disease <60  Kidney Failure <15    CBC & Differential [[id number]] Collected:  10/02/19 0655    Specimen:  Blood Updated:  10/02/19 0706    Narrative:       The following orders were created for panel order CBC & Differential.  Procedure                               Abnormality         Status                     ---------                               -----------         ------                     CBC Auto Differential[[id number]]        Abnormal            Final result                 Please view results for these tests on the individual orders.    CBC Auto Differential [[id number]]  (Abnormal) Collected:  10/02/19 0655    Specimen:  Blood Updated:  10/02/19 0706     WBC 11.03 10*3/mm3      RBC 4.40 10*6/mm3      Hemoglobin 14.0 g/dL      Hematocrit 41.1 %      MCV 93.4 fL      MCH 31.8 pg      MCHC 34.1 g/dL      RDW 13.2 %      RDW-SD 45.5 fl      MPV 9.4 fL      Platelets 301 10*3/mm3      Neutrophil % 69.3 %      Lymphocyte % 17.7 %      Monocyte % 10.0 %      Eosinophil % 1.8 %      Basophil % 0.7 %      Immature Grans % 0.5 %      Neutrophils, Absolute 7.65 10*3/mm3      Lymphocytes, Absolute 1.95 10*3/mm3      Monocytes, Absolute 1.10 10*3/mm3      Eosinophils, Absolute 0.20 10*3/mm3      Basophils, Absolute 0.08 10*3/mm3      Immature Grans, Absolute 0.05 10*3/mm3      nRBC 0.0 /100 WBC              I have personally reviewed the CT angiogram scan of the neck.  The following is my interpretation: Patient is intubated.  Due to the endotracheal tube and Dobbhoff tube, a full evaluation of the laryngeal structures is not possible.  There is no associated lymphadenopathy or obvious laryngeal abnormality.    Assessment/Plan       Degeneration of lumbar or lumbosacral intervertebral disc    Smoker    Acute cerebrovascular accident (CVA) of cerebellum (CMS/HCC)    Alcoholism /alcohol abuse (CMS/HCC)    CVA  (cerebral vascular accident) (CMS/HCC)    Hyperlipidemia    Dysphagia due to recent cerebral infarction      Bedside flexible laryngoscopy demonstrates some pressure wounds of the posterior vocal folds bilaterally, with some swelling on the left.  This is likely due to the endotracheal tube and will resolve on its own.  We will make sure he is on a proton pump inhibitor.  Follow-up in my office in approximately 1 month.    I discussed the patients findings and my recommendations with patient    Jose Johnson MD  10/02/19  1:06 PM

## 2019-10-02 NOTE — OP NOTE
Preprocedure diagnosis: Posterior vocal fold abnormality, dysphasia, recent stroke    Post procedure diagnosis: Pressure erosion of the vocal folds due to intubation, dysphagia, recent stroke    Procedure performed: Flexible fiberoptic laryngoscopy     Surgeon: Jose Johnson M.D.    Anesthesia: None    Details: After patient verification and consent was obtained, the flexible fiberoptic laryngoscope was passed into the oral cavity.  The following is a summary of findings:    Oropharynx: Healthy without mass or lesion    Hypopharynx: Healthy without mass or lesion    Larynx: Posteriorly, there is whitish discoloration and slight ulceration of the posterior aspects of the vocal folds where the endotracheal tube would have been.  There is some asymmetric swelling left greater than right.  There is full vocal fold mobility bilaterally.  No masses or evidence of arytenoid subluxation are seen.    The scope was withdrawn.  The patient tolerated the procedure without any complications.

## 2019-10-02 NOTE — PLAN OF CARE
Problem: Patient Care Overview  Goal: Plan of Care Review  Outcome: Ongoing (interventions implemented as appropriate)   10/02/19 [place]   Coping/Psychosocial   Plan of Care Reviewed With patient   Plan of Care Review   Progress no change   OTHER   Outcome Summary Dobhoff ordered again today. Dietary has made reccommendations for feeding. No new skin problems. Pt self turns.Continue with stroke eduction. Safety maintained but patient is not complient with calling for assist and is impulsive. Continue to monitor.        Problem: Fall Risk (Adult)  Goal: Absence of Fall  Outcome: Ongoing (interventions implemented as appropriate)      Problem: Stroke (Ischemic) (Adult)  Goal: Signs and Symptoms of Listed Potential Problems Will be Absent, Minimized or Managed (Stroke)  Outcome: Ongoing (interventions implemented as appropriate)      Problem: Skin Injury Risk (Adult)  Goal: Skin Health and Integrity  Outcome: Ongoing (interventions implemented as appropriate)      Problem: Nutrition, Imbalanced: Inadequate Oral Intake (Adult)  Goal: Improved Oral Intake  Outcome: Ongoing (interventions implemented as appropriate)    Goal: Prevent Further Weight Loss  Outcome: Ongoing (interventions implemented as appropriate)      Problem: Nutrition, Enteral (Adult)  Goal: Signs and Symptoms of Listed Potential Problems Will be Absent, Minimized or Managed (Nutrition, Enteral)  Outcome: Ongoing (interventions implemented as appropriate)

## 2019-10-03 ENCOUNTER — APPOINTMENT (OUTPATIENT)
Dept: ULTRASOUND IMAGING | Facility: HOSPITAL | Age: 59
End: 2019-10-03

## 2019-10-03 ENCOUNTER — APPOINTMENT (OUTPATIENT)
Dept: GENERAL RADIOLOGY | Facility: HOSPITAL | Age: 59
End: 2019-10-03

## 2019-10-03 LAB
ALBUMIN SERPL-MCNC: 3.3 G/DL (ref 3.5–5.2)
ALBUMIN/GLOB SERPL: 1 G/DL
ALP SERPL-CCNC: 62 U/L (ref 39–117)
ALT SERPL W P-5'-P-CCNC: 18 U/L (ref 1–41)
ANION GAP SERPL CALCULATED.3IONS-SCNC: 13 MMOL/L (ref 5–15)
AST SERPL-CCNC: 24 U/L (ref 1–40)
BASOPHILS # BLD AUTO: 0.05 10*3/MM3 (ref 0–0.2)
BASOPHILS NFR BLD AUTO: 0.5 % (ref 0–1.5)
BILIRUB SERPL-MCNC: 0.5 MG/DL (ref 0.2–1.2)
BUN BLD-MCNC: 7 MG/DL (ref 6–20)
BUN/CREAT SERPL: 12.7 (ref 7–25)
CALCIUM SPEC-SCNC: 8.3 MG/DL (ref 8.6–10.5)
CHLORIDE SERPL-SCNC: 98 MMOL/L (ref 98–107)
CO2 SERPL-SCNC: 28 MMOL/L (ref 22–29)
CREAT BLD-MCNC: 0.55 MG/DL (ref 0.76–1.27)
DEPRECATED RDW RBC AUTO: 45.1 FL (ref 37–54)
EOSINOPHIL # BLD AUTO: 0.2 10*3/MM3 (ref 0–0.4)
EOSINOPHIL NFR BLD AUTO: 2.1 % (ref 0.3–6.2)
ERYTHROCYTE [DISTWIDTH] IN BLOOD BY AUTOMATED COUNT: 13.2 % (ref 12.3–15.4)
GFR SERPL CREATININE-BSD FRML MDRD: >150 ML/MIN/1.73
GLOBULIN UR ELPH-MCNC: 3.3 GM/DL
GLUCOSE BLD-MCNC: 185 MG/DL (ref 65–99)
GLUCOSE BLDC GLUCOMTR-MCNC: 94 MG/DL (ref 70–130)
GLUCOSE BLDC GLUCOMTR-MCNC: 97 MG/DL (ref 70–130)
HCT VFR BLD AUTO: 37.9 % (ref 37.5–51)
HGB BLD-MCNC: 13.3 G/DL (ref 13–17.7)
IMM GRANULOCYTES # BLD AUTO: 0.03 10*3/MM3 (ref 0–0.05)
IMM GRANULOCYTES NFR BLD AUTO: 0.3 % (ref 0–0.5)
LYMPHOCYTES # BLD AUTO: 2.38 10*3/MM3 (ref 0.7–3.1)
LYMPHOCYTES NFR BLD AUTO: 25.4 % (ref 19.6–45.3)
MCH RBC QN AUTO: 32.2 PG (ref 26.6–33)
MCHC RBC AUTO-ENTMCNC: 35.1 G/DL (ref 31.5–35.7)
MCV RBC AUTO: 91.8 FL (ref 79–97)
MONOCYTES # BLD AUTO: 1.06 10*3/MM3 (ref 0.1–0.9)
MONOCYTES NFR BLD AUTO: 11.3 % (ref 5–12)
NEUTROPHILS # BLD AUTO: 5.65 10*3/MM3 (ref 1.7–7)
NEUTROPHILS NFR BLD AUTO: 60.4 % (ref 42.7–76)
NRBC BLD AUTO-RTO: 0 /100 WBC (ref 0–0.2)
PLATELET # BLD AUTO: 278 10*3/MM3 (ref 140–450)
PMV BLD AUTO: 9.7 FL (ref 6–12)
POTASSIUM BLD-SCNC: 3.9 MMOL/L (ref 3.5–5.2)
PROT SERPL-MCNC: 6.6 G/DL (ref 6–8.5)
RBC # BLD AUTO: 4.13 10*6/MM3 (ref 4.14–5.8)
SODIUM BLD-SCNC: 139 MMOL/L (ref 136–145)
WBC NRBC COR # BLD: 9.37 10*3/MM3 (ref 3.4–10.8)

## 2019-10-03 PROCEDURE — 93971 EXTREMITY STUDY: CPT

## 2019-10-03 PROCEDURE — 85025 COMPLETE CBC W/AUTO DIFF WBC: CPT | Performed by: FAMILY MEDICINE

## 2019-10-03 PROCEDURE — 97535 SELF CARE MNGMENT TRAINING: CPT

## 2019-10-03 PROCEDURE — 97110 THERAPEUTIC EXERCISES: CPT

## 2019-10-03 PROCEDURE — 93971 EXTREMITY STUDY: CPT | Performed by: SURGERY

## 2019-10-03 PROCEDURE — 92526 ORAL FUNCTION THERAPY: CPT

## 2019-10-03 PROCEDURE — 25010000002 THIAMINE PER 100 MG: Performed by: PSYCHIATRY & NEUROLOGY

## 2019-10-03 PROCEDURE — 94799 UNLISTED PULMONARY SVC/PX: CPT

## 2019-10-03 PROCEDURE — 99233 SBSQ HOSP IP/OBS HIGH 50: CPT | Performed by: PSYCHIATRY & NEUROLOGY

## 2019-10-03 PROCEDURE — 94760 N-INVAS EAR/PLS OXIMETRY 1: CPT

## 2019-10-03 PROCEDURE — 80053 COMPREHEN METABOLIC PANEL: CPT | Performed by: FAMILY MEDICINE

## 2019-10-03 PROCEDURE — 74230 X-RAY XM SWLNG FUNCJ C+: CPT

## 2019-10-03 PROCEDURE — 82962 GLUCOSE BLOOD TEST: CPT

## 2019-10-03 PROCEDURE — 94762 N-INVAS EAR/PLS OXIMTRY CONT: CPT

## 2019-10-03 PROCEDURE — 97116 GAIT TRAINING THERAPY: CPT

## 2019-10-03 PROCEDURE — 92611 MOTION FLUOROSCOPY/SWALLOW: CPT

## 2019-10-03 RX ORDER — NICOTINE 21 MG/24HR
1 PATCH, TRANSDERMAL 24 HOURS TRANSDERMAL
Status: DISCONTINUED | OUTPATIENT
Start: 2019-10-03 | End: 2019-10-04

## 2019-10-03 RX ORDER — THIAMINE MONONITRATE (VIT B1) 100 MG
100 TABLET ORAL DAILY
Status: DISCONTINUED | OUTPATIENT
Start: 2019-10-04 | End: 2019-10-10 | Stop reason: HOSPADM

## 2019-10-03 RX ADMIN — ASPIRIN 300 MG: 300 SUPPOSITORY RECTAL at 09:44

## 2019-10-03 RX ADMIN — PANTOPRAZOLE SODIUM 40 MG: 40 INJECTION, POWDER, LYOPHILIZED, FOR SOLUTION INTRAVENOUS at 05:03

## 2019-10-03 RX ADMIN — IPRATROPIUM BROMIDE AND ALBUTEROL SULFATE 3 ML: 2.5; .5 SOLUTION RESPIRATORY (INHALATION) at 14:11

## 2019-10-03 RX ADMIN — BARIUM SULFATE 20 ML: 400 SUSPENSION ORAL at 11:30

## 2019-10-03 RX ADMIN — ATORVASTATIN CALCIUM 80 MG: 40 TABLET, FILM COATED ORAL at 22:23

## 2019-10-03 RX ADMIN — SODIUM CHLORIDE, POTASSIUM CHLORIDE, SODIUM LACTATE AND CALCIUM CHLORIDE 50 ML/HR: 600; 310; 30; 20 INJECTION, SOLUTION INTRAVENOUS at 06:31

## 2019-10-03 RX ADMIN — BARIUM SULFATE 20 ML: 0.81 POWDER, FOR SUSPENSION ORAL at 11:30

## 2019-10-03 RX ADMIN — IPRATROPIUM BROMIDE AND ALBUTEROL SULFATE 3 ML: 2.5; .5 SOLUTION RESPIRATORY (INHALATION) at 07:23

## 2019-10-03 RX ADMIN — BARIUM SULFATE 20 ML: 400 PASTE ORAL at 11:30

## 2019-10-03 RX ADMIN — NICOTINE 1 PATCH: 14 PATCH TRANSDERMAL at 17:16

## 2019-10-03 RX ADMIN — THIAMINE HYDROCHLORIDE 100 MG: 100 INJECTION, SOLUTION INTRAMUSCULAR; INTRAVENOUS at 17:16

## 2019-10-03 RX ADMIN — IPRATROPIUM BROMIDE AND ALBUTEROL SULFATE 3 ML: 2.5; .5 SOLUTION RESPIRATORY (INHALATION) at 21:30

## 2019-10-03 NOTE — THERAPY TREATMENT NOTE
Acute Care - Physical Therapy Treatment Note  [facility]     Patient Name: Jarod Ramírez Jr.  : 1960  MRN: [id number]  Today's Date: 10/3/2019  Onset of Illness/Injury or Date of Surgery: 19     Referring Physician: Dr. Alas    Admit Date: 2019    Visit Dx:    ICD-10-CM ICD-9-CM   1. Dysphagia, unspecified type R13.10 787.20   2. Impaired mobility Z74.09 799.89   3. Impaired mobility and ADLs Z74.09 799.89   4. Aphasia R47.01 784.3     Patient Active Problem List   Diagnosis   • Degeneration of cervical intervertebral disc   • Closed fracture of lumbar vertebra (CMS/HCC)   • Degeneration of lumbar or lumbosacral intervertebral disc   • Smoker   • BMI 27.0-27.9,adult   • Acute cerebrovascular accident (CVA) of cerebellum (CMS/HCC)   • Alcoholism /alcohol abuse (CMS/HCC)   • CVA (cerebral vascular accident) (CMS/HCC)   • Hyperlipidemia   • Dysphagia due to recent cerebral infarction       Therapy Treatment    Rehabilitation Treatment Summary     Row Name 10/03/19 1555 10/03/19 1005 10/03/19 0952       Treatment Time/Intention    Discipline  physical therapy assistant  -KJ  occupational therapy assistant  -TS  speech language pathologist  -MB    Document Type  therapy note (daily note)  -KJ  therapy note (daily note)  -TS  therapy note (daily note)  -MB    Subjective Information  no complaints  -KJ  no complaints  -TS  no complaints  -MB    Mode of Treatment  physical therapy  -KJ  --  speech-language pathology  -MB    Patient/Family Observations  --  --  No family present  -MB    Patient Effort  good  -KJ  good  -TS  good  -MB    Existing Precautions/Restrictions  fall  -KJ  fall  -TS  --    Treatment Considerations/Comments  [place], R foot drop(improving)  -KJ  aphasia, decreased safety   -TS  --    Recorded by [KJ] Helen Kirby, PTA 10/03/19 1607 [TS] Yaritza Lopez COTA/KIKO 10/03/19 1206 [MB] Georgi Hernandez CCC-SLP 10/03/19 1008    Row Name 10/03/19 0929             Treatment  Time/Intention    Discipline  physical therapy assistant  -MS,JW,MS2      Document Type  therapy note (daily note)  -MS,JW,MS2      Subjective Information  no complaints  -MS,JW,MS2      Mode of Treatment  physical therapy  -MS,JW,MS2      Patient/Family Observations  no family present  -MS,JW,MS2      Patient Effort  good  -MS,JW,MS2      Comment  RLE foot drop  -MS,JW,MS2      Existing Precautions/Restrictions  fall  -KJ      Treatment Considerations/Comments  aphasia  -KJ      Patient Response to Treatment  good  -MS,JW,MS2      Recorded by [KJ] Helen Kirby PTA 10/03/19 1106  [MS,JW,MS2] Mayuri Schilling, PT, DPT, NCS (r) Georgia Jama PTA Student (t) Mayuri Schilling, PT, DPT, NCS (c) 10/03/19 1236      Row Name 10/03/19 1005             Cognitive Assessment/Intervention- PT/OT    Follows Commands (Cognition)  follows one step commands;75-90% accuracy;increased processing time needed;verbal cues/prompting required  -TS      Recorded by [TS] Yaritza Lopez COTA/L 10/03/19 1206      Row Name 10/03/19 0915             Verbal Expression Assessment/Intervention    Verbal Expression  severe impairment  -MS,JW,MS2      Sentence Formulation  severe impairment  -MS,JW,MS2      Recorded by [MS,JW,MS2] Mayuri Schilling, PT, DPT, NCS (r) Georgia Jama PTA Student (t) Mayuri Schilling, PT, DPT, NCS (c) 10/03/19 1236      Row Name 10/03/19 0915             Safety Issues, Functional Mobility    Safety Issues Affecting Function (Mobility)  impulsivity;sequencing abilities  -MS,JW,MS2      Impairments Affecting Function (Mobility)  balance;coordination  -MS,JW,MS2      Comment, Safety Issues/Impairments (Mobility)  Pt. at risk for falls due to impulsivity.Pt. presents with decreased ROM RLE  ankle DF; Pt. needed verbal/visual cueing to perform balance activities correctly  -MS,JW,MS2      Recorded by [MS,JW,MS2] Mayuri Schilling, PT, DPT, NCS (r) Georgia Jama PTA Student (t) Mayuri Schilling, PT, DPT,  NCS (c) 10/03/19 1236      Row Name 10/03/19 1555             Bed Mobility Assessment/Treatment    Supine-Sit [place] (Bed Mobility)  supervision  -KJ      Bed Mobility, Safety Issues  cognitive deficits limit understanding  -KJ      Assistive Device (Bed Mobility)  head of bed elevated  -KJ      Recorded by [KJ] Helen Kirby PTA 10/03/19 1607      Row Name 10/03/19 1005 10/03/19 0915          Functional Mobility    Functional Mobility- Ind. Level  standby assist;contact guard assist  -TS  contact guard assist;supervision required;verbal cues required;1 person  -MS,JW,MS2     Functional Mobility- Safety Issues  --  sequencing ability decreased  -MS,JW,MS2     Functional Mobility- Comment  in room, in BR  -TS  --     Recorded by [TS] Yaritza Lopez, COTTON/L 10/03/19 1206 [MS,JW,MS2] Mayuri Schilling, PT, DPT, NCS (r) Georgia Jama, JAIR Student (t) Mayuri Schilling, PT, DPT, NCS (c) 10/03/19 1236     Row Name 10/03/19 1005 10/03/19 0915          Transfer Assessment/Treatment    Transfer Assessment/Treatment  sit-stand transfer;stand-sit transfer;shower transfer  -TS  sit-stand transfer;stand-sit transfer  -MS,JW,MS2     Recorded by [TS] Yaritza Lopez, COTTON/L 10/03/19 1206 [MS,JW,MS2] Mayuri Schilling, PT, DPT, NCS (r) Georgia Jama, JAIR Student (t) Mayuri Schilling, PT, DPT, NCS (c) 10/03/19 1236     Row Name 10/03/19 1555 10/03/19 1005 10/03/19 0915       Sit-Stand Transfer    Sit-Stand [place] (Transfers)  contact guard;verbal cues  -KJ  stand by assist  -TS  contact guard;supervision;1 person assist  -MS,JW,MS2    Recorded by [KJ] Helen Kirby PTA 10/03/19 1607 [TS] Yaritza Lopez, COTTON/L 10/03/19 1206 [MS,JW,MS2] Mayuri Schilling, PT, DPT, NCS (r) Georgia Jama PTA Student (t) Mayuri Schilling, PT, DPT, NCS (c) 10/03/19 1236    Row Name 10/03/19 1555 10/03/19 1005 10/03/19 0915       Stand-Sit Transfer    Stand-Sit [place] (Transfers)  supervision  -KJ  stand  by assist  -TS  supervision  -MS,JW,MS2    Recorded by [KJ] Helen Kirby, PTA 10/03/19 1607 [TS] Yaritza Lopez COTA/L 10/03/19 1206 [MS,JW,MS2] Mayuri Schilling, PT, DPT, NCS (r) Georgia Jama PTA Student (t) Mayuri Schilling, PT, DPT, NCS (c) 10/03/19 1236    Row Name 10/03/19 1005             Shower Transfer    Type (Shower Transfer)  sit-stand;stand-sit  -TS      [place] Level (Shower Transfer)  stand by assist  -TS      Assistive Device (Shower Transfer)  shower chair;grab bars/tub rail  -TS      Recorded by [TS] Yaritza Lopez COTA/L 10/03/19 1206      Row Name 10/03/19 1555 10/03/19 0915          Gait/Stairs Assessment/Training    [place] Level (Gait)  contact guard  -KJ  contact guard;minimum assist (75% patient effort);1 person assist  -MS,JW,MS2     Distance in Feet (Gait)  100' x 2  -KJ  100'x2  -MS,JW,MS2     Pattern (Gait)  swing-through  -KJ  swing-through  -MS,JW,MS2     Right Sided Gait Deviations  heel strike decreased;foot drop/toe drag  -KJ  foot drop/toe drag;heel strike decreased  -MS,JW,MS2     Recorded by [KJ] Helen Kirby, PTA 10/03/19 1607 [MS,JW,MS2] Mayuri Schilling, PT, DPT, NCS (r) Georgia Jama, PTA Student (t) Mayuri Schilling, PT, DPT, NCS (c) 10/03/19 1236     Row Name 10/03/19 1005             ADL Assessment/Intervention    BADL Assessment/Intervention  upper body dressing;lower body dressing;grooming;bathing  -TS      Recorded by [TS] Yaritza Lopez COTA/L 10/03/19 1206      Row Name 10/03/19 1005             Bathing Assessment/Intervention    Bathing [place] Level  upper body;lower body;set up;supervision;verbal cues  -TS      Assistive Devices (Bathing)  hand-held shower spray hose;grab bars/tub rail;shower chair  -TS      Bathing Position  supported sitting;supported standing  -TS      Recorded by [TS] Yaritza Lopez COTA/L 10/03/19 1206      Row Name 10/03/19 1005             Upper Body Dressing Assessment/Training     Upper Body Dressing [place] Level  don;doff;set up;supervision  -TS      Upper Body Dressing Position  supported sitting  -TS      Recorded by [TS] Yaritza Lopez COTA/L 10/03/19 1206      Row Name 10/03/19 1005             Lower Body Dressing Assessment/Training    Lower Body Dressing [place] Level  don;doff;socks;undergarment;pants/bottoms;contact guard assist;supervision  -TS      Lower Body Dressing Position  supported sitting;supported standing  -TS      Comment (Lower Body Dressing)  pt attempted to stand to don pants, pt demonstrated decreased balance when attempting to stand to don pants and had LOB requiring min A to correct  -TS      Recorded by [TS] Yaritza Lopez COTA/L 10/03/19 1206      Row Name 10/03/19 1005             Grooming Assessment/Training    [place] Level (Grooming)  oral care regimen;hair care, combing/brushing;set up SBA  -TS      Grooming Position  sink side;supported standing  -TS      Recorded by [TS] Yaritza Lopez COTA/L 10/03/19 1206      Row Name 10/03/19 0915             Therapeutic Exercise    Lower Extremity (Therapeutic Exercise)  LAQ (long arc quad), bilateral;marching while seated;marching while standing  -MS,JW,MS2      Exercise Type (Therapeutic Exercise)  AROM (active range of motion);AAROM (active assistive range of motion)  -MS,JW,MS2      Position (Therapeutic Exercise)  seated;standing  -MS,JW,MS2      Sets/Reps (Therapeutic Exercise)  20  -MS,JW,MS2      Comment (Therapeutic Exercise)  AAROM RLE Ankle DF  -MS,JW,MS2      Recorded by [MS,JW,MS2] Mayuri Schilling, PT, DPT, NCS (r) Georgia Jama, PTA Student (t) Mayuri Schilling, PT, DPT, NCS (c) 10/03/19 1236      Row Name 10/03/19 0915             Balance    Balance  dynamic balance activity  -MS,JW,MS2      Recorded by [MS,JW,MS2] Mayuri Schilling, PT, DPT, NCS (r) Georgia Jama, PTA Student (t) Mayuri Schilling, PT, DPT, NCS (c) 10/03/19 1236      Row Name 10/03/19 0915              Static Sitting Balance    Level of [place] (Unsupported Sitting, Static Balance)  independent  -MS,JW,MS2      Sitting Position (Unsupported Sitting, Static Balance)  sitting in chair  -MS,JW,MS2      Recorded by [MS,JW,MS2] Mayuri Schilling, PT, DPT, NCS (r) Georgia Jama PTA Student (t) Mayuri Schilling, PT, DPT, NCS (c) 10/03/19 1236      Row Name 10/03/19 0915             Static Standing Balance    Level of [place] (Supported Standing, Static Balance)  contact guard assist;minimal assist, 75% patient effort;1 person assist  -MS,JW,MS2      Recorded by [MS,JW,MS2] Mayuri Schilling, PT, DPT, NCS (r) Georgia Jama PTA Student (t) Mayuri Schilling, PT, DPT, NCS (c) 10/03/19 1236      Row Name 10/03/19 0915             Standing Balance Activity    Activities Performed (Standing, Balance Training)  standing unsupported  -MS,JW,MS2      Support Needed for Balance (Standing, Balance Training)  CGA;SBA;1 person assist  -MS,JW,MS2      Recorded by [MS,JW,MS2] Mayuri Schilling, PT, DPT, NCS (r) Georgia Jama PTA Student (t) Mayuri Schilling, PT, DPT, NCS (c) 10/03/19 1236      Row Name 10/03/19 0915             Dynamic Balance Activity    Therapeutic Training Performed (Dynamic Balance)  backward walking;side stepping  -MS,JW,MS2      Comment (Dynamic Balance Training)  Marching;forward/backward stepping;mini squats  -MS,JW,MS2      Recorded by [MS,JW,MS2] Mayuri Schilling, PT, DPT, NCS (r) Georgia Jama PTA Student (t) Mayuri Schilling, PT, DPT, NCS (c) 10/03/19 1236      Row Name 10/03/19 1555 10/03/19 1005 10/03/19 0915       Positioning and Restraints    Pre-Treatment Position  in bed  -KJ  sitting in chair/recliner  -TS  sitting in chair/recliner  -MS,JW,MS2    Post Treatment Position  bed  -KJ  other  -TS  chair  -MS,JW,MS2    In Bed  exit alarm on;call light within reach  -KJ  --  --    In Chair  --  --  exit alarm on;call light within reach  -KJ    Other Position  --  with  other staff with transport  -TS  --    Recorded by [KJ] Helen Kirby, PTA 10/03/19 1607 [TS] Yaritza Lopez COTTON/L 10/03/19 1206 [KJ] Helen Kirby, PTA 10/03/19 1106  [MS,JW,MS2] Mayuri Schilling, PT, DPT, NCS (r) Georgia Jama, JAIR Student (t) Mayuri Schilling, PT, DPT, NCS (c) 10/03/19 1236    Row Name 10/03/19 0915             Pain Scale: Numbers Pre/Post-Treatment    Pain Scale: Numbers, Pretreatment  0/10 - no pain  -MS,JW,MS2      Pain Scale: Numbers, Post-Treatment  0/10 - no pain  -MS,JW,MS2      Recorded by [MS,JW,MS2] Mayuri Schilling, PT, DPT, NCS (r) Georgia Jama, JAIR Student (t) Mayuri Schilling, PT, DPT, NCS (c) 10/03/19 1236      Row Name 10/03/19 1005 10/03/19 0952          Pain Scale: FACES Pre/Post-Treatment    Pain: FACES Scale, Pretreatment  0-->no hurt  -TS  0-->no hurt  -MB     Pain: FACES Scale, Post-Treatment  0-->no hurt  -TS  --     Recorded by [TS] Yaritza Lopez COTTON/L 10/03/19 1206 [MB] Georgi Hernandez CCC-SLP 10/03/19 1008     Row Name                Wound 09/27/19 0800 Right posterior finger    Wound - Properties Group Date first assessed: 09/27/19 [NW] Time first assessed: 0800 [NW] Present on Hospital Admission: Y [NW] Side: Right [NW] Orientation: posterior [NW] Location: finger [NW] Recorded by:  [NW] Dunia Multani RN 09/27/19 1624    Row Name 10/03/19 0952             Outcome Summary/Treatment Plan (SLP)    Daily Summary of Progress (SLP)  progress toward functional goals is good  -MB      Barriers to Overall Progress (SLP)  Aphasia  -MB      Plan for Continued Treatment (SLP)  Complete MBS today  -MB      Anticipated Dischage Disposition  inpatient rehabilitation facility  -MB      Recorded by [MB] Georgi Hernandez, CCC-SLP 10/03/19 1008        User Key  (r) = Recorded By, (t) = Taken By, (c) = Cosigned By    Initials Name Effective Dates Discipline    Georgi Garcia, CCC-SLP 08/02/16 -  SLP    Helen Whitt, JAIR 08/02/16 -   PT    Yaritza Hand N, COTTON/L 08/02/16 -  OT    Mayuri Smith, PT, DPT, NCS 06/19/18 -  PT    Dunia Chairez RN 07/12/18 -  Nurse    Georgia Jean Baptiste, PTA Student 09/18/19 -  PT          Wound 09/27/19 0800 Right posterior finger (Active)   Dressing Appearance open to air 10/3/2019  7:37 AM   Closure None 10/3/2019  7:37 AM   Edges jagged 10/2/2019  8:35 PM   Drainage Amount none 10/2/2019  8:35 PM   Dressing Care, Wound open to air 10/3/2019  7:37 AM       Rehab Goal Summary     Row Name 10/03/19 1118 10/03/19 0952          Oral Nutrition/Hydration Goal 1 (SLP)    Oral Nutrition/Hydration Goal 1, SLP  LTG: Patient will tolerate LRD without s/s of aspiration.  -MB  LTG: Patient will tolerate LRD without s/s of aspiration.  -MB     Time Frame (Oral Nutrition/Hydration Goal 1, SLP)  by discharge  -MB  by discharge  -MB     Barriers (Oral Nutrition/Hydration Goal 1, SLP)  Aphasia  -MB  Aphasia  -MB     Progress/Outcomes (Oral Nutrition/Hydration Goal 1, SLP)  goal ongoing  -MB  continuing progress toward goal  -MB        Lingual Strengthening Goal 1 (SLP)    Activity (Lingual Strengthening Goal 1, SLP)  increase tongue back strength  -MB  increase tongue back strength  -MB     Increase Tongue Back Strength  lingual movement exercises  -MB  lingual movement exercises  -MB     [place]/Accuracy (Lingual Strengthening Goal 1, SLP)  independently (over 90% accuracy)  -MB  independently (over 90% accuracy)  -MB     Time Frame (Lingual Strengthening Goal 1, SLP)  short term goal (STG);by discharge  -MB  short term goal (STG);by discharge  -MB     Barriers (Lingual Strengthening Goal 1, SLP)  n/a  -MB  n/a  -MB     Progress/Outcomes (Lingual Strengthening Goal 1, SLP)  goal ongoing  -MB  good progress toward goal  -MB        Pharyngeal Strengthening Exercise Goal 1 (SLP)    Activity (Pharyngeal Strengthening Goal 1, SLP)  increase timing;increase superior movement of the hyolaryngeal  complex;increase anterior movement of the hyolaryngeal complex;increase epiglottic inversion and retroflexion;increase squeeze/positive pressure generation  -MB  increase epiglottic inversion and retroflexion;increase squeeze/positive pressure generation;increase tongue base retraction  -MB     Increase Timing  gustatory stimulation (sour/cold)  -MB  --     Increase Superior Movement of the Hyolaryngeal Complex  -- NMES  -MB  --     Increase Anterior Movement of the Hyolaryngeal Complex  shaker  -MB  --     Increase Epiglottic Inversion and Retroflexion  Mendelsohn  -MB  Mendelsohn  -MB     Increase Squeeze/Positive Pressure Generation  hard effortful swallow  -MB  hard effortful swallow  -MB     Increase Tongue Base Retraction  yoni  -MB  yoni  -MB     [place]/Accuracy (Pharyngeal Strengthening Goal 1, SLP)  independently (over 90% accuracy)  -MB  independently (over 90% accuracy)  -MB     Time Frame (Pharyngeal Strengthening Goal 1, SLP)  short term goal (STG);by discharge  -MB  short term goal (STG);by discharge  -MB     Barriers (Pharyngeal Strengthening Goal 1, SLP)  cognitive status  -MB  cognitive status  -MB     Progress/Outcomes (Pharyngeal Strengthening Goal 1, SLP)  goal ongoing  -MB  goal ongoing  -MB       User Key  (r) = Recorded By, (t) = Taken By, (c) = Cosigned By    Initials Name Provider Type Discipline    Georgi Garcia CCC-SLP Speech and Language Pathologist SLP          Physical Therapy Education     Title: PT OT SLP Therapies (Done)     Topic: Physical Therapy (Done)     Point: Mobility training (Done)     Learning Progress Summary           Patient Acceptance, E, DU by ZACH at 10/3/2019 11:22 AM    Comment:  Pt. educated on safety awareness/impulsivity performs better with visual cueing    Acceptance, E, VU,NR by AF at 10/1/2019  8:19 AM    Comment:  Educated on benefits of activity and ongoing PT POC.   Family Acceptance, E, VU,NR by AF at 10/1/2019  8:19 AM    Comment:   Educated on benefits of activity and ongoing PT POC.                   Point: Home exercise program (Done)     Learning Progress Summary           Patient Acceptance, E, DU by  at 10/3/2019 11:22 AM    Comment:  Pt. educated on safety awareness/impulsivity performs better with visual cueing                   Point: Body mechanics (Done)     Learning Progress Summary           Patient Acceptance, E, DU by  at 10/3/2019 11:22 AM    Comment:  Pt. educated on safety awareness/impulsivity performs better with visual cueing                   Point: Precautions (Done)     Learning Progress Summary           Patient Acceptance, E, DU by  at 10/3/2019 11:22 AM    Comment:  Pt. educated on safety awareness/impulsivity performs better with visual cueing                               User Key     Initials Effective Dates Name Provider Type Discipline     08/27/19 -  Arti Haile, PT Student PT Student PT     09/18/19 -  Georgia Jama, PTA Student PTA Student PT                PT Recommendation and Plan     Plan of Care Reviewed With: patient  Progress: improving  Outcome Summary: Pt. was sitting in chair and performed sit to stand transfer with Min A/CGAx1 with no use of assistive device; Pt. ambulated 50'x2 [place]/CGA with verbal directional cueing. Noted decreased balance with turning pt. had a narrow JUSTA. Pt. performed standing activities for balance. Pt. had decreased sequencing ability and impulsiveness with stepping before therapy was ready. During gait Pt. had decreased RLE ankle DF and decreased heel strike. Pt. able to perform one step commands. Pt. performed sitting AROM exercises 20 reps . Recommend inpatient rehab for PT/OT/SLP.  Outcome Measures     Row Name 10/03/19 1200 10/02/19 1200 10/01/19 1000       How much help from another is currently needed...    Putting on and taking off regular lower body clothing?  3  -TS  3  -TS  3  -JJ    Bathing (including washing, rinsing, and drying)  3  -TS  3  -TS   3  -JJ    Toileting (which includes using toilet bed pan or urinal)  4  -TS  3  -TS  3  -JJ    Putting on and taking off regular upper body clothing  4  -TS  3  -TS  3  -JJ    Taking care of personal grooming (such as brushing teeth)  3  -TS  3  -TS  3  -JJ    Eating meals  4  -TS  3 pt currently NPO  -TS  3  -JJ    AM-PAC 6 Clicks Score (OT)  21  -TS  18  -TS  18  -JJ       Functional Assessment    Outcome Measure Options  AM-PAC 6 Clicks Daily Activity (OT)  -TS  AM-PAC 6 Clicks Daily Activity (OT)  -TS  AM-PAC 6 Clicks Daily Activity (OT)  -JJ      User Key  (r) = Recorded By, (t) = Taken By, (c) = Cosigned By    Initials Name Provider Type    TS Yaritza Lopez, COTTON/L Occupational Therapy Assistant    Christina Oliver OTR/L Occupational Therapist         Time Calculation:   PT Charges     Row Name 10/03/19 1607 10/03/19 1125          Time Calculation    Start Time  1555  -KJ  0915  -MS (r) JW (t) MS (c)     Stop Time  1607  -KJ  0938  -MS (r) JW (t) MS (c)     Time Calculation (min)  12 min  -KJ  23 min  -MS (r) JW (t)     PT Received On  --  10/03/19  -MS (r) JW (t) MS (c)     PT Goal Re-Cert Due Date  --  10/11/19  -MS (r) JW (t) MS (c)        Time Calculation- PT    Total Timed Code Minutes- PT  12 minute(s)  -KJ  23 minute(s)  -MS (r) JW (t) MS (c)       User Key  (r) = Recorded By, (t) = Taken By, (c) = Cosigned By    Initials Name Provider Type    Helen Whitt, PTA Physical Therapy Assistant    Mayuri Smith, PT, DPT, NCS Physical Therapist    Georgia Jean Baptiste, JAIR Student PTA Student        Therapy Charges for Today     Code Description Service Date Service Provider Modifiers Qty    73753831122 HC GAIT TRAINING EA 15 MIN 10/2/2019 Helen Kirby, PTA GP 1    41000147727 HC PT THER PROC EA 15 MIN 10/2/2019 Helen Kirby, PTA GP 1    61727037305 HC GAIT TRAINING EA 15 MIN 10/3/2019 Helen Kirby, PTA GP 1          PT G-Codes  Outcome Measure Options: AM-PAC 6 Clicks Daily  Activity (OT)  AM-PAC 6 Clicks Score (PT): 18  AM-PAC 6 Clicks Score (OT): 21    Helen Kirby, PTA  10/3/2019

## 2019-10-03 NOTE — PLAN OF CARE
Problem: Patient Care Overview  Goal: Plan of Care Review  Outcome: Ongoing (interventions implemented as appropriate)   10/03/19 1009   Coping/Psychosocial   Plan of Care Reviewed With patient;other (see comments)  (Dr. Alas and nurse, Moon)   Plan of Care Review   Progress improving   OTHER   Outcome Summary Pt sounds less congested today as compared to when had FEES 10/1/19. He completed trials of honey, nectar, and thin liquids with no immediate overt s/s of aspiration. He had a delayed cough at the end of the session. Pt has continually pulled out Dobhoff tubes. Recommend a modified barium swallow study today to determine if pt can be safely started on a PO diet.

## 2019-10-03 NOTE — MBS/VFSS/FEES
Acute Care - Speech Language Pathology   Swallow Initial Evaluation [facility]     Patient Name: Jarod Ramírez Jr.  : 1960  MRN: [id number]  Today's Date: 10/3/2019  Onset of Illness/Injury or Date of Surgery: 19     Referring Physician: Dr. Alas      Admit Date: 2019  Modified barium swallow study completed. Consistencies were presented in the following order: honey, nectar, honey, pudding, mechanical soft, regular solid, honey, pudding x2. He exhibited decreased bolus formation and premature loss to the pyriforms secondary to a delay and decreased back of tongue control with the first honey and nectar thick trials. He had poor hyolaryngeal excursion/elevation and epiglottic inversion with these trials and throughout the rest of the study. He displayed flash penetration with the first honey and nectar trials. He then had severe pharyngeal residue with both as well as residue in the laryngeal vestibule from the nectar thick trial. With the second honey thick trial, he again had flash penetration during the swallow and was cued to complete a chin tuck during multiple swallows, but still had significant oral residue remaining. He had poor bolus formation and premature loss to the vallecula with pudding, mechanical soft, and regular solids. Pharyngeal residue was less with pudding than with honey and nectar and was only mild to moderate with mechanical soft and regular solids. He did appear to have aspiration of nectar thick residue in the laryngeal vestibule during the swallow of pudding thick barium. He did have a delayed cough following this. With the last trial of honey thick barium the pt had premature loss to the vallecula and deep silent laryngeal penetration during the swallow with residue remaining in the laryngeal vestibule afterward. The last two trials of pudding thick resulted in premature loss to the vallecula, but no definite new instances of laryngeal penetration or aspiration.  The pt was cued to complete a chin tuck with multiple swallows of the second trial of honey thick barium and continued to tuck his chin throughout the rest of the study. This did appear to possibly help with residue clearance.   Recommend:  1. Starting a mechanical soft solid diet and pudding thick liquids.   2. Meds crushed with applesauce.   3. Ok for ice chips or small sips of water between meals, following oral care.   4. Strict aspiration precautions.   5. Pt should use chin tuck during swallows and complete multiple swallows with each bite.   6. Encourage occasonal volitional throat clear/cough during PO for airway clearance.   7. Pt should have occasional supervision with meals.   8. Precautions were posted above bed and left on bedside table after reviewing with pt.   9. SLP will continue to follow and treat.   Georgi Hernandez, CCC-SLP 10/3/2019 1:39 PM    Visit Dx:     ICD-10-CM ICD-9-CM   1. Dysphagia, unspecified type R13.10 787.20   2. Impaired mobility Z74.09 799.89   3. Impaired mobility and ADLs Z74.09 799.89   4. Aphasia R47.01 784.3     Patient Active Problem List   Diagnosis   • Degeneration of cervical intervertebral disc   • Closed fracture of lumbar vertebra (CMS/HCC)   • Degeneration of lumbar or lumbosacral intervertebral disc   • Smoker   • BMI 27.0-27.9,adult   • Acute cerebrovascular accident (CVA) of cerebellum (CMS/HCC)   • Alcoholism /alcohol abuse (CMS/HCC)   • CVA (cerebral vascular accident) (CMS/HCC)   • Hyperlipidemia   • Dysphagia due to recent cerebral infarction     Past Medical History:   Diagnosis Date   • Hypertension      Past Surgical History:   Procedure Laterality Date   • CATARACT EXTRACTION Left    • WRIST FRACTURE SURGERY Left         SWALLOW EVALUATION (last 72 hours)      SLP Adult Swallow Evaluation     Row Name 10/03/19 1118 10/01/19 1446 09/30/19 1430             Rehab Evaluation    Document Type  evaluation  -MB  evaluation  -MB  re-evaluation  -MM       "Subjective Information  no complaints  -MB  no complaints  -MB  no complaints  -MM      Patient Observations  alert;cooperative  -MB  alert;cooperative  -MB  alert;cooperative;agree to therapy  -MM      Patient/Family Observations  Nephew present for education, pt responded \"yes\" when asked if SLP could review information with visitors present  -MB  No family present  -MB  Brother present.  -MM      Patient Effort  --  --  good  -MM      Symptoms Noted During/After Treatment  --  --  none  -MM         General Information    Patient Profile Reviewed  yes  -MB  yes  -MB  yes  -MM      Pertinent History Of Current Problem  MRI: Acute infarct in the left MCA , alcohol abuse, HTN  -MB  MRI: Acute infarct in the left MCA , alcohol abuse, HTN  -MB  MRI: Acute infarct in the left MCA , alcohol abuse, HTN  -MM      Current Method of Nutrition  NPO  -MB  NPO  -MB  NPO  -MM      Precautions/Limitations, Vision  WFL with corrective lenses  -MB  WFL with corrective lenses  -MB  -- Unable to fully assess but shakes head to RN when asked   -MM      Precautions/Limitations, Hearing  WFL;for purposes of eval  -MB  WFL;for purposes of eval  -MB  WFL;for purposes of eval  -MM      Prior Level of Function-Communication  WFL  -MB  WFL  -MB  WFL  -MM      Prior Level of Function-Swallowing  no diet consistency restrictions  -MB  no diet consistency restrictions  -MB  no diet consistency restrictions  -MM      Plans/Goals Discussed with  patient and family  -MB  patient  -MB  patient;family;other (see comments);agreed upon LONI Bansal  -VANDANA      Barriers to Rehab  -- aphasia  -MB  -- aphasia  -MB  cognitive status  -MM      Patient's Goals for Discharge  return to PO diet  -MB  patient did not state  -MB  patient did not state  -MM      Family Goals for Discharge  --  --  patient able to return to all previous activities/roles  -MM         Pain Assessment    Additional Documentation  --  --  Pain Scale: FACES Pre/Post-Treatment (Group)  " -MM         Pain Scale: FACES Pre/Post-Treatment    Pain: FACES Scale, Pretreatment  0-->no hurt  -MB  0-->no hurt  -MB  0-->no hurt  -MM      Pain: FACES Scale, Post-Treatment  --  --  0-->no hurt  -MM         Oral Motor and Function    Dentition Assessment  --  --  -- Patient would not open mouth to visualize teeth  -MM      Secretion Management  --  --  wet vocal quality s/p extubation   -MM      Mucosal Quality  --  --  sticky  -MM      Volitional Swallow  --  --  unable to elicit  -MM      Volitional Cough  --  --  weak  -MM         Oral Musculature and Cranial Nerve Assessment    Oral Motor General Assessment  --  --  generalized oral motor weakness  -MM         General Eating/Swallowing Observations    Respiratory Support Currently in Use  room air  -MB  --  nasal cannula  -MM      Eating/Swallowing Skills  --  --  fed by SLP  -MM      Positioning During Eating  --  --  upright in bed  -MM      Utensils Used  --  --  spoon  -MM      Consistencies Trialed  --  --  -- ice chip   -MM         Clinical Swallow Eval    Oral Prep Phase  --  --  -- not fully assessed  -MM      Oral Transit  --  --  impaired  -MM      Oral Residue  --  --  -- not fully assessed  -MM      Pharyngeal Phase  --  --  suspected pharyngeal impairment  -MM      Esophageal Phase  --  --  -- Not fully assessed   -MM      Clinical Swallow Evaluation Summary  --  --  Clinical bedside swallow evaluation completed. Patient is s/p extubation. Intubated since Friday. Acute CVA. Able to follow simple commands and whispers some answers and statements appropriately. Productive cough. Weak vocal quality. Lingual ROM WFL. The patient completed 1 ice chip with weak throat clearing behavior. No further PO trials were given due to weak vocal quality and patient becoming more lethargic. SLP recommends: 1) NPO 2) Meds via alternate route 3) OK for ice chips with RN following oral care. SLP will reassess in AM.   -MM         Oral Transit Concerns    Oral  Transit Concerns  --  --  delayed initiation of bolus transit  -MM      Delayed Intiation of Bolus Transit  --  --  -- ice chip   -MM         Pharyngeal Phase Concerns    Pharyngeal Phase Concerns  --  --  wet vocal quality;multiple swallows  -MM      Wet Vocal Quality  --  --  -- ice chip   -MM      Multiple Swallows  --  --  other (see comments) ice chip and secretions   -MM         MBS/VFSS    Utensils Used  spoon;straw  -MB  --  --      Consistencies Trialed  regular textures;soft textures;nectar/syrup-thick liquids;honey-thick liquids;pudding thick  -MB  --  --         MBS/VFSS Interpretation    Oral Prep Phase  impaired oral phase of swallowing  -MB  --  --      Oral Transit Phase  impaired  -MB  --  --      Oral Residue  impaired  -MB  --  --      VFSS Summary  Consistencies were presented in the following order: honey, nectar, honey, pudding, mechanical soft, regular solid, honey, pudding x2. He exhibited decreased bolus formation and premature loss to the pyriforms secondary to a delay and decreased back of tongue control with the first honey and nectar thick trials. He had poor hyolaryngeal excursion/elevation and epiglottic inversion with these trials and throughout the rest of the study. He displayed flash penetration with the first honey and nectar trials. He then had severe pharyngeal residue with both as well as residue in the laryngeal vestibule from the nectar thick trial. With the second honey thick trial, he again had flash penetration during the swallow and was cued to complete a chin tuck during multiple swallows, but still had significant oral residue remaining. He had poor bolus formation and premature loss to the vallecula with pudding, mechanical soft, and regular solids. Pharyngeal residue was less with pudding than with honey and nectar and was only mild to moderate with mechanical soft and regular solids. He did appear to have aspiration of nectar thick residue in the laryngeal vestibule  during the swallow of pudding thick barium. He did have a delayed cough following this. With the last trial of honey thick barium the pt had premature loss to the vallecula and deep silent laryngeal penetration during the swallow with residue remaining in the laryngeal vestibule afterward. The last two trials of pudding thick resulted in premature loss to the vallecula, but no definite new instances of laryngeal penetration or aspiration. The pt was cued to complete a chin tuck with multiple swallows of the second trial of honey thick barium and continued to tuck his chin throughout the rest of the study. This did appear to possibly help with residue clearance.   -MB  --  --         Oral Preparatory Phase    Oral Preparatory Phase  inadequate manipulation  -MB  --  --      Inadequate Manipulation  all consistencies tested  -MB  --  --         Oral Transit Phase    Impaired Oral Transit Phase  increased A-P transit time;premature spillage of liquids into pharynx  -MB  --  --      Increased A-P Transit Time  all consistencies tested  -MB  --  --      Premature Spillage of Liquids into Pharynx  all consistencies tested  -MB  --  --         Oral Residue    Impaired Oral Residue  diffuse residue throughout oral cavity  -MB  --  --      Diffuse Residue throughout Oral Cavity  all consistencies tested  -MB  --  --         Initiation of Pharyngeal Swallow    Initiation of Pharyngeal Swallow  bolus in valleculae;bolus in pyriform sinuses  -MB  --  --      Pharyngeal Phase  impaired pharyngeal phase of swallowing  -MB  --  --      Penetration During the Swallow  nectar-thick liquids;honey-thick liquids  -MB  --  --      Aspiration After the Swallow  nectar-thick liquids  -MB  --  --      Response to Penetration  no response  -MB  --  --      Response to Aspiration  cough;inconsistent response  -MB  --  --      Pharyngeal Residue  nectar-thick liquids;honey-thick liquids;pudding/puree;valleculae;pyriform sinuses  -MB  --  --       Attempted Compensatory Maneuvers  chin tuck  -MB  --  --      Response to Attempted Compensatory Maneuvers  reduced residue  -MB  --  --         Fiberoptic Endoscopic Evaluation of Swallowing (FEES)    Risks/Benefits Reviewed  --  risks/benefits explained;patient  -MB  --      Nasal Entry  --  right:  -MB  --         Anatomy and Physiology    Anatomic Considerations  --  edema;vocal folds  -MB  --      Comment  --  generalized edema, structural abnormality at posterior vocal folds  -MB  --      Velopharyngeal  --  reduced movement  -MB  --      Base of Tongue  --  symmetrical;range reduced  -MB  --      Epiglottis  --  edematous  -MB  --      Laryngeal Function Breathing  --  decreased movement right  -MB  --      Laryngeal Function Phonation  --  CNA  -MB  --      Laryngeal Function to Breath Hold  --  CNA  -MB  --      Secretion Rating Scale (Nikolas et al. 1996)  --  3- secretions inside the laryngeal vestibule/aspiration, not cleared  -MB  --      Secretion Description  --  thick;clear  -MB  --      Spontaneous Swallow  --  CNA  -MB  --      Sensory  --  reduced sensation  -MB  --      Utensils Used  --  Spoon;Straw  -MB  --      Consistencies Trialed  --  thin liquids;nectar-thick liquids;honey-thick liquids;pudding/puree  -MB  --         FEES Interpretation    Oral Phase  --  tongue pumping;prespill of liquids into pharynx  -MB  --         Initiation of Pharyngeal Swallow    Initiation of Pharyngeal Swallow  --  bolus in valleculae  -MB  --      Pharyngeal Phase  --  impaired pharyngeal phase of swallowing  -MB  --      Penetration During the Swallow  --  pudding/puree  -MB  --      Aspiration After the Swallow  --  pudding/puree  -MB  --      FEES Summary  --  Scope was passed through pt's right nare with no difficulty. He appeared to have generalized pharyngeal edema. He was also noted to have structural abnormalities at the posterior aspect of the true vocal folds. Pudding, honey, nectar, and thin  consistencies were presented. Pt appeared to have decreased velopharyngeal closure and base of tongue retraction. He had mild to moderate residue in the vallecula with honey thick after the swallow. He had premature loss to the vallecula with nectar secondary to decreased back of tongue control. He had possible laryngeal penetration and aspiration of thin during the swallow as there appeared to be residue on the vocal folds after the swallow. He had definite laryngeal penetration of pudding thick during the swallow as residue was observed in the vestibule after the swallow. The residue then fell further to the vocal folds and the pt appeared to have possible aspiration of pudding resiude after the swallow.   -MB  --         Clinical Impression    SLP Swallowing Diagnosis  mild-moderate;oral dysfunction;mod-severe;pharyngeal dysfunction  -MB  mod-severe;pharyngeal dysfunction  -MB  suspected pharyngeal dysfunction  -MM      Functional Impact  risk of aspiration/pneumonia;risk of malnutrition;risk of dehydration  -MB  risk of aspiration/pneumonia;risk of malnutrition;risk of dehydration  -MB  risk of aspiration/pneumonia;risk of malnutrition;risk of dehydration  -MM      Rehab Potential/Prognosis, Swallowing  adequate, monitor progress closely  -MB  adequate, monitor progress closely  -MB  re-evaluate goals as necessary  -MM      Swallow Criteria for Skilled Therapeutic Interventions Met  demonstrates skilled criteria  -MB  demonstrates skilled criteria  -MB  demonstrates skilled criteria  -MM         Recommendations    Therapy Frequency (Swallow)  at least;3 days per week  -MB  at least;3 days per week  -MB  at least;3 days per week  -MM      Predicted Duration Therapy Intervention (Days)  until discharge  -MB  until discharge  -MB  until discharge  -MM      SLP Diet Recommendation  soft textures;ground;pudding thick liquids;ice chips between meals after oral care, with supervision;water between meals after oral  care, with supervision  -MB  NPO;ice chips between meals after oral care, with supervision;temporary alternate methods of nutrition/hydration  -MB  NPO;ice chips between meals after oral care, with supervision  -MM      Recommended Diagnostics  --  --  reassess via clinical swallow evaluation  -MM      Recommended Precautions and Strategies  upright posture during/after eating;small bites of food and sips of liquid;alternate between small bites of food and sips of liquid;multiple swallows per bite of food;multiple swallows per sip of liquid;chin Tuck  -MB  upright posture during/after eating  -MB  upright posture during/after eating  -MM      SLP Rec. for Method of Medication Administration  meds crushed;with pudding or applesauce  -MB  meds via alternate route  -MB  meds via alternate route  -      Monitor for Signs of Aspiration  yes;cough;gurgly voice;throat clearing;pneumonia  -MB  --  notify SLP if any concerns  -MM      Anticipated Dischage Disposition  inpatient rehabilitation facility  -MB  inpatient rehabilitation facility  -MB  unknown  -MM         Swallow Goals (SLP)    Oral Nutrition/Hydration Goal Selection (SLP)  --  oral nutrition/hydration, SLP goal 1  -MB  oral nutrition/hydration, SLP goal 1  -MM      Lingual Strengthening Goal Selection (SLP)  --  lingual strengthening, SLP goal 1  -MB  --      Pharyngeal Strengthening Exercise Goal Selection (SLP)  --  pharyngeal strengthening exercise, SLP goal 1  -MB  --      Additional Documentation  --  lingual strengthening goal selection (SLP);pharyngeal strengthening exercise goal selection (SLP)  -MB  --         Oral Nutrition/Hydration Goal 1 (SLP)    Oral Nutrition/Hydration Goal 1, SLP  LTG: Patient will tolerate LRD without s/s of aspiration.  -MB  LTG: Patient will tolerate LRD without s/s of aspiration.  -MB  LTG: Patient will tolerate LRD without s/s of aspiration.  -MM      Time Frame (Oral Nutrition/Hydration Goal 1, SLP)  by discharge  -MB  by  discharge  -MB  by discharge  -MM      Barriers (Oral Nutrition/Hydration Goal 1, SLP)  Aphasia  -MB  Aphasia  -MB  n/a  -MM      Progress/Outcomes (Oral Nutrition/Hydration Goal 1, SLP)  goal ongoing  -MB  goal ongoing  -MB  goal ongoing  -MM         Lingual Strengthening Goal 1 (SLP)    Activity (Lingual Strengthening Goal 1, SLP)  increase tongue back strength  -MB  increase tongue back strength  -MB  --      Increase Tongue Back Strength  lingual movement exercises  -MB  lingual movement exercises  -MB  --      [place]/Accuracy (Lingual Strengthening Goal 1, SLP)  independently (over 90% accuracy)  -MB  independently (over 90% accuracy)  -MB  --      Time Frame (Lingual Strengthening Goal 1, SLP)  short term goal (STG);by discharge  -MB  short term goal (STG);by discharge  -MB  --      Barriers (Lingual Strengthening Goal 1, SLP)  n/a  -MB  n/a  -MB  --      Progress/Outcomes (Lingual Strengthening Goal 1, SLP)  goal ongoing  -MB  goal ongoing  -MB  --         Pharyngeal Strengthening Exercise Goal 1 (SLP)    Activity (Pharyngeal Strengthening Goal 1, SLP)  increase timing;increase superior movement of the hyolaryngeal complex;increase anterior movement of the hyolaryngeal complex;increase epiglottic inversion and retroflexion;increase squeeze/positive pressure generation  -MB  increase epiglottic inversion and retroflexion;increase squeeze/positive pressure generation;increase tongue base retraction  -MB  --      Increase Timing  gustatory stimulation (sour/cold)  -MB  --  --      Increase Superior Movement of the Hyolaryngeal Complex  -- NMES  -MB  --  --      Increase Anterior Movement of the Hyolaryngeal Complex  shaker  -MB  --  --      Increase Epiglottic Inversion and Retroflexion  Mendelsohn  -MB  Mendelsohn  -MB  --      Increase Squeeze/Positive Pressure Generation  hard effortful swallow  -MB  hard effortful swallow  -MB  --      Increase Tongue Base Retraction  yoni  -MB  yoni  -MB  --       [place]/Accuracy (Pharyngeal Strengthening Goal 1, SLP)  independently (over 90% accuracy)  -MB  independently (over 90% accuracy)  -MB  --      Time Frame (Pharyngeal Strengthening Goal 1, SLP)  short term goal (STG);by discharge  -MB  short term goal (STG);by discharge  -MB  --      Barriers (Pharyngeal Strengthening Goal 1, SLP)  cognitive status  -MB  n/a  -MB  --      Progress/Outcomes (Pharyngeal Strengthening Goal 1, SLP)  goal ongoing  -MB  goal ongoing  -MB  --        User Key  (r) = Recorded By, (t) = Taken By, (c) = Cosigned By    Initials Name Effective Dates    Georgi Garcia, CCC-SLP 08/02/16 -     MM Mayuri Alcazar, MS CCC-SLP 05/24/19 -           EDUCATION  The patient has been educated in the following areas:   Dysphagia (Swallowing Impairment).    SLP Recommendation and Plan  SLP Swallowing Diagnosis: mild-moderate, oral dysfunction, mod-severe, pharyngeal dysfunction  SLP Diet Recommendation: soft textures, ground, pudding thick liquids, ice chips between meals after oral care, with supervision, water between meals after oral care, with supervision  Recommended Precautions and Strategies: upright posture during/after eating, small bites of food and sips of liquid, alternate between small bites of food and sips of liquid, multiple swallows per bite of food, multiple swallows per sip of liquid, chin Tuck  SLP Rec. for Method of Medication Administration: meds crushed, with pudding or applesauce     Monitor for Signs of Aspiration: yes, cough, gurgly voice, throat clearing, pneumonia     Swallow Criteria for Skilled Therapeutic Interventions Met: demonstrates skilled criteria  Anticipated Dischage Disposition: inpatient rehabilitation facility  Rehab Potential/Prognosis, Swallowing: adequate, monitor progress closely  Therapy Frequency (Swallow): at least, 3 days per week  Predicted Duration Therapy Intervention (Days): until discharge       Plan of Care Reviewed With: patient, other  (see comments)(Nurse, Moon)  Progress: improving  Outcome Summary: Modified barium swallow study completed. Consistencies were presented in the following order: honey, nectar, honey, pudding, mechanical soft, regular solid, honey, pudding x2. He exhibited decreased bolus formation and premature loss to the pyriforms secondary to a delay and decreased back of tongue control with the first honey and nectar thick trials. He had poor hyolaryngeal excursion/elevation and epiglottic inversion with these trials and throughout the rest of the study. He displayed flash penetration with the first honey and nectar trials. He then had severe pharyngeal residue with both as well as residue in the laryngeal vestibule from the nectar thick trial. With the second honey thick trial, he again had flash penetration during the swallow and was cued to complete a chin tuck during multiple swallows, but still had significant oral residue remaining. He had poor bolus formation and premature loss to the vallecula with pudding, mechanical soft, and regular solids. Pharyngeal residue was less with pudding than with honey and nectar and was only mild to moderate with mechanical soft and regular solids. He did appear to have aspiration of nectar thick residue in the laryngeal vestibule during the swallow of pudding thick barium. He did have a delayed cough following this. With the last trial of honey thick barium the pt had premature loss to the vallecula and deep silent laryngeal penetration during the swallow with residue remaining in the laryngeal vestibule afterward. The last two trials of pudding thick resulted in premature loss to the vallecula, but no definite new instances of laryngeal penetration or aspiration. The pt was cued to complete a chin tuck with multiple swallows of the second trial of honey thick barium and continued to tuck his chin throughout the rest of the study. This did appear to possibly help with residue  clearance. Recommend starting a mechanical soft solid diet and pudding thick liquids. Meds crushed with applesauce. Ok for ice chips or small sips of water between meals, following oral care. Strict aspiration precautions. Pt should use chin tuck during swallows and complete multiple swallows with each bite. Encourage occasonal volitional throat clear/cough during PO for airway clearance. Pt should have occasional supervision with meals. Precautions were posted above bed and left on bedside table after reviewing with pt. SLP will continue to follow and treat.     SLP GOALS     Row Name 10/03/19 1118 10/03/19 0952 10/02/19 0917       Oral Nutrition/Hydration Goal 1 (SLP)    Oral Nutrition/Hydration Goal 1, SLP  LTG: Patient will tolerate LRD without s/s of aspiration.  -MB  LTG: Patient will tolerate LRD without s/s of aspiration.  -MB  LTG: Patient will tolerate LRD without s/s of aspiration.  -MB (r) AS (t) MB (c)    Time Frame (Oral Nutrition/Hydration Goal 1, SLP)  by discharge  -MB  by discharge  -MB  by discharge  -MB (r) AS (t) MB (c)    Barriers (Oral Nutrition/Hydration Goal 1, SLP)  Aphasia  -MB  Aphasia  -MB  Aphasia  -MB (r) AS (t) MB (c)    Progress/Outcomes (Oral Nutrition/Hydration Goal 1, SLP)  goal ongoing  -MB  continuing progress toward goal  -MB  goal ongoing  -MB (r) AS (t) MB (c)       Lingual Strengthening Goal 1 (SLP)    Activity (Lingual Strengthening Goal 1, SLP)  increase tongue back strength  -MB  increase tongue back strength  -MB  increase tongue back strength  -MB (r) AS (t) MB (c)    Increase Tongue Back Strength  lingual movement exercises  -MB  lingual movement exercises  -MB  lingual movement exercises  -MB (r) AS (t) MB (c)    [place]/Accuracy (Lingual Strengthening Goal 1, SLP)  independently (over 90% accuracy)  -MB  independently (over 90% accuracy)  -MB  independently (over 90% accuracy)  -MB (r) AS (t) MB (c)    Time Frame (Lingual Strengthening Goal 1, SLP)  short term  goal (STG);by discharge  -MB  short term goal (STG);by discharge  -MB  short term goal (STG);by discharge  -MB (r) AS (t) MB (c)    Barriers (Lingual Strengthening Goal 1, SLP)  n/a  -MB  n/a  -MB  n/a  -MB (r) AS (t) MB (c)    Progress/Outcomes (Lingual Strengthening Goal 1, SLP)  goal ongoing  -MB  good progress toward goal  -MB  good progress toward goal  -MB (r) AS (t) MB (c)       Pharyngeal Strengthening Exercise Goal 1 (SLP)    Activity (Pharyngeal Strengthening Goal 1, SLP)  increase timing;increase superior movement of the hyolaryngeal complex;increase anterior movement of the hyolaryngeal complex;increase epiglottic inversion and retroflexion;increase squeeze/positive pressure generation  -MB  increase epiglottic inversion and retroflexion;increase squeeze/positive pressure generation;increase tongue base retraction  -MB  increase epiglottic inversion and retroflexion;increase squeeze/positive pressure generation;increase tongue base retraction  -MB (r) AS (t) MB (c)    Increase Timing  gustatory stimulation (sour/cold)  -MB  --  --    Increase Superior Movement of the Hyolaryngeal Complex  -- NMES  -MB  --  --    Increase Anterior Movement of the Hyolaryngeal Complex  shaker  -MB  --  --    Increase Epiglottic Inversion and Retroflexion  Mendelsohn  -MB  Mendelsohn  -MB  Mendelsohn  -MB (r) AS (t) MB (c)    Increase Squeeze/Positive Pressure Generation  hard effortful swallow  -MB  hard effortful swallow  -MB  hard effortful swallow  -MB (r) AS (t) MB (c)    Increase Tongue Base Retraction  yoni  -MB  yoni  -MB  yoni  -MB (r) AS (t) MB (c)    [place]/Accuracy (Pharyngeal Strengthening Goal 1, SLP)  independently (over 90% accuracy)  -MB  independently (over 90% accuracy)  -MB  independently (over 90% accuracy)  -MB (r) AS (t) MB (c)    Time Frame (Pharyngeal Strengthening Goal 1, SLP)  short term goal (STG);by discharge  -MB  short term goal (STG);by discharge  -MB  short term goal (STG);by  discharge  -MB (r) AS (t) MB (c)    Barriers (Pharyngeal Strengthening Goal 1, SLP)  cognitive status  -MB  cognitive status  -MB  cognitive status  -MB (r) AS (t) MB (c)    Progress/Outcomes (Pharyngeal Strengthening Goal 1, SLP)  goal ongoing  -MB  goal ongoing  -MB  goal ongoing  -MB (r) AS (t) MB (c)       Words/Phrases/Sentences Goal 1 (SLP)    Improve Ability to Comprehend Words/Phrases/Sentences Through: Goal 1 (SLP)  --  --  identify objects, field of;identify pictures, field of;90%;independently (over 90% accuracy);other (comment)  -MB (r) AS (t) MB (c)    Time Frame (Identify Objects and Pictures Goal 1, SLP)  --  --  short term goal (STG);by discharge  -MB (r) AS (t) MB (c)    Barriers (Identify Objects and Pictures Goal 1, SLP)  --  --  aphasia  -MB (r) AS (t) MB (c)    Progress/Outcomes (Identify Objects and Pictures Goal 1, SLP)  --  --  goal ongoing  -MB (r) AS (t) MB (c)       Comprehend Questions Goal 1 (SLP)    Improve Ability to Comprehend Questions Goal 1 (SLP)  --  --  simple yes/no questions;complex yes/no questions;90%;independently (over 90% accuracy)  -MB (r) AS (t) MB (c)    Time Frame (Comprehend Questions Goal 1, SLP)  --  --  short term goal (STG);by discharge  -MB (r) AS (t) MB (c)    Barriers (Comprehend Questions Goal 1, SLP)  --  --  [place]  -MB (r) AS (t) MB (c)    Progress/Outcomes (Comprehend Questions Goal 1, SLP)  --  --  goal ongoing  -MB (r) AS (t) MB (c)       Follow Directions Goal 2 (SLP)    Improve Ability to Follow Directions Goal 1 (SLP)  --  --  1 step direction with objects;1 step direction without objects;90%;independently (over 90% accuracy)  -MB (r) AS (t) MB (c)    Time Frame (Follow Directions Goal 1, SLP)  --  --  short term goal (STG);by discharge  -MB (r) AS (t) MB (c)    Progress (Ability to Follow Directions Goal 1, SLP)  --  --  50%;with minimal cues (75-90%)  -MB (r) AS (t) MB (c)    Progress/Outcomes (Follow Directions Goal 1, SLP)  --  --  continuing  progress toward goal  -MB (r) AS (t) MB (c)       Word Retrieval Skills Goal 1 (SLP)    Improve Word Retrieval Skills By Goal 1 (SLP)  --  --  completing automatic speech task, days of the week;completing automatic speech task, months;confrontational naming task;repeating words;90%;independently (over 90% accuracy)  -MB (r) AS (t) MB (c)    Time Frame (Word Retrieval Goal 1, SLP)  --  --  short term goal (STG);by discharge  -MB (r) AS (t) MB (c)    Progress (Word Retrieval Skills Goal 1, SLP)  --  --  50%  -MB (r) AS (t) MB (c)    Progress/Outcomes (Word Retrieval Goal 1, SLP)  --  --  continuing progress toward goal  -MB (r) AS (t) MB (c)       Graphic Expression of Shapes, Letters, Numbers Goal 1 (SLP)    Improve Graphic Expression of Shapes, Letters, and Numbers Goal 1 (SLP)  --  --  copy shapes, numbers, and letters;writing dictated number and letters;90%;independently (over 90% accuracy)  -MB (r) AS (t) MB (c)    Time Frame (Graphic Expression of Shapes, Letters, and Numbers Goal 1, SLP)  --  --  short term goal (STG);by discharge  -MB (r) AS (t) MB (c)    Progress/Outcomes (Graphic Expression of Shapes, Letters, and Numbers Goal 1, SLP)  --  --  goal ongoing  -MB (r) AS (t) MB (c)       Augmentative/Alternative Communication Objectives Goal 1 (SLP    Communication (Augmentative/Alternative Communication Goal 1, SLP)  --  --  improve ability to use low tech augmentative/alternative communication device  -MB (r) AS (t) MB (c)    Improve Communication by (Augmentative/Alternative Communication Goal 1, SLP)  --  --  identify picture, field of ____;identify word, field of ____;alphabet/picture board;90%;independently (over 90% accuracy)  -MB (r) AS (t) MB (c)    Time Frame (Augmentative/Alternative Communication Goal 1, SLP)  --  --  short term goal (STG);by discharge  -MB (r) AS (t) MB (c)    Progress (Augmentative/Alternative Communication Goal 1, SLP)  --  --  70%  -MB (r) AS (t) MB (c)    Progress/Outcomes  (Augmentative/Alternative Communication Goal 1, SLP)  --  --  good progress toward goal  -MB (r) AS (t) MB (c)    Row Name 10/01/19 1446 10/01/19 0939 09/30/19 1430       Oral Nutrition/Hydration Goal 1 (SLP)    Oral Nutrition/Hydration Goal 1, SLP  LTG: Patient will tolerate LRD without s/s of aspiration.  -MB  LTG: Patient will tolerate LRD without s/s of aspiration.  -MB  LTG: Patient will tolerate LRD without s/s of aspiration.  -MM    Time Frame (Oral Nutrition/Hydration Goal 1, SLP)  by discharge  -MB  by discharge  -MB  by discharge  -MM    Barriers (Oral Nutrition/Hydration Goal 1, SLP)  Aphasia  -MB  Aphasia  -MB  n/a  -MM    Progress/Outcomes (Oral Nutrition/Hydration Goal 1, SLP)  goal ongoing  -MB  continuing progress toward goal  -MB  goal ongoing  -MM       Lingual Strengthening Goal 1 (SLP)    Activity (Lingual Strengthening Goal 1, SLP)  increase tongue back strength  -MB  --  --    Increase Tongue Back Strength  lingual movement exercises  -MB  --  --    [place]/Accuracy (Lingual Strengthening Goal 1, SLP)  independently (over 90% accuracy)  -MB  --  --    Time Frame (Lingual Strengthening Goal 1, SLP)  short term goal (STG);by discharge  -MB  --  --    Barriers (Lingual Strengthening Goal 1, SLP)  n/a  -MB  --  --    Progress/Outcomes (Lingual Strengthening Goal 1, SLP)  goal ongoing  -MB  --  --       Pharyngeal Strengthening Exercise Goal 1 (SLP)    Activity (Pharyngeal Strengthening Goal 1, SLP)  increase epiglottic inversion and retroflexion;increase squeeze/positive pressure generation;increase tongue base retraction  -MB  --  --    Increase Epiglottic Inversion and Retroflexion  Mendelsohn  -MB  --  --    Increase Squeeze/Positive Pressure Generation  hard effortful swallow  -MB  --  --    Increase Tongue Base Retraction  yoni  -MB  --  --    [place]/Accuracy (Pharyngeal Strengthening Goal 1, SLP)  independently (over 90% accuracy)  -MB  --  --    Time Frame (Pharyngeal  Strengthening Goal 1, SLP)  short term goal (STG);by discharge  -MB  --  --    Barriers (Pharyngeal Strengthening Goal 1, SLP)  n/a  -MB  --  --    Progress/Outcomes (Pharyngeal Strengthening Goal 1, SLP)  goal ongoing  -MB  --  --       Words/Phrases/Sentences Goal 1 (SLP)    Improve Ability to Comprehend Words/Phrases/Sentences Through: Goal 1 (SLP)  --  identify objects, field of;identify pictures, field of;90%;independently (over 90% accuracy);other (comment) 2  -MB  --    Time Frame (Identify Objects and Pictures Goal 1, SLP)  --  short term goal (STG);by discharge  -MB  --       Comprehend Questions Goal 1 (SLP)    Improve Ability to Comprehend Questions Goal 1 (SLP)  --  simple yes/no questions;complex yes/no questions;90%;independently (over 90% accuracy)  -MB  --    Time Frame (Comprehend Questions Goal 1, SLP)  --  short term goal (STG);by discharge  -MB  --       Follow Directions Goal 2 (SLP)    Improve Ability to Follow Directions Goal 1 (SLP)  --  1 step direction with objects;1 step direction without objects;90%;independently (over 90% accuracy)  -MB  --    Time Frame (Follow Directions Goal 1, SLP)  --  short term goal (STG);by discharge  -MB  --       Word Retrieval Skills Goal 1 (SLP)    Improve Word Retrieval Skills By Goal 1 (SLP)  --  completing automatic speech task, days of the week;completing automatic speech task, months;confrontational naming task;repeating words;90%;independently (over 90% accuracy)  -MB  --    Time Frame (Word Retrieval Goal 1, SLP)  --  short term goal (STG);by discharge  -MB  --       Graphic Expression of Shapes, Letters, Numbers Goal 1 (SLP)    Improve Graphic Expression of Shapes, Letters, and Numbers Goal 1 (SLP)  --  copy shapes, numbers, and letters;writing dictated number and letters;90%;independently (over 90% accuracy)  -MB  --    Time Frame (Graphic Expression of Shapes, Letters, and Numbers Goal 1, SLP)  --  short term goal (STG);by discharge  -MB  --        Augmentative/Alternative Communication Objectives Goal 1 (SLP    Communication (Augmentative/Alternative Communication Goal 1, SLP)  --  improve ability to use low tech augmentative/alternative communication device  -MB  --    Improve Communication by (Augmentative/Alternative Communication Goal 1, SLP)  --  identify picture, field of ____;identify word, field of ____;alphabet/picture board;90%;independently (over 90% accuracy)  -MB  --      User Key  (r) = Recorded By, (t) = Taken By, (c) = Cosigned By    Initials Name Provider Type    Georgi Garcia CCC-SLP Speech and Language Pathologist    MM Mayuri Alcazar, MS CCC-SLP Speech and Language Pathologist    AS Eliecer Williamson, Speech Therapy Student Speech Therapy Student             Time Calculation:   Time Calculation- SLP     Row Name 10/03/19 1338 10/03/19 1011          Time Calculation- SLP    SLP Start Time  1118  -MB  0952  -MB     SLP Stop Time  1308  -MB  1005  -MB     SLP Time Calculation (min)  110 min  -MB  13 min  -MB     SLP Received On  10/03/19  -MB  10/03/19  -MB     SLP Goal Re-Cert Due Date  10/13/19  -MB  --       User Key  (r) = Recorded By, (t) = Taken By, (c) = Cosigned By    Initials Name Provider Type    Georgi Garcia CCC-SLP Speech and Language Pathologist          Therapy Charges for Today     Code Description Service Date Service Provider Modifiers Qty    [id number] HC ST TREATMENT SWALLOW 1 10/3/2019 Georgi Hernandez CCC-SLP GN 1    [id number] HC ST MOTION FLUORO EVAL SWALLOW 7 10/3/2019 Georgi Hernandez CCC-SLP GN 1               ZEFERINO Rosales  10/3/2019

## 2019-10-03 NOTE — DISCHARGE PLACEMENT REQUEST
"  Referral for [facility]. Please call Vira at [phone] after referral reviewed.   Thanks!    Jarod Sanchez Jr. (59 y.o. Male)     Date of Birth Social Security Number Address Home Phone MRN    1960  [id number] [place] [address] [phone] [id number]    [employer] Marital Status          Other Single       Admission Date Admission Type Admitting Provider Attending Provider Department, Room/Bed    9/26/19 Urgent Candido Alas MD Truong, Khai C, MD [facility] 3A, 355/1    Discharge Date Discharge Disposition Discharge Destination                       Attending Provider:  Candido Alas MD    Allergies:  No Known Allergies    Isolation:  None   Infection:  None   Code Status:  CPR    Ht:  162.6 cm (64\")   Wt:  78 kg (172 lb)    Admission Cmt:  None   Principal Problem:  None                Active Insurance as of 9/26/2019     Primary Coverage     Payor Plan Insurance Group Employer/Plan Group    [employer] [place] [employer] [place]      Payor Plan Address Payor Plan Phone Number Payor Plan Fax Number Effective Dates    [address] [address] [phone]  6/22/2017 - None Entered    [facility] [id number]       Subscriber Name Subscriber Birth Date Member ID       JAROD SANCHEZ JR. 1960 [id number]                 Emergency Contacts      (Rel.) Home Phone Work Phone Mobile Phone    Lázaro Sanchez (Brother) [phone] -- --            Insurance Information                [facility] MEDICAID Phone: [phone]    Subscriber: Jarod Sanchez Jr. Subscriber#: [id number]    Group#:  Precert#:              History & Physical      Candido Alas MD at 09/26/19 2157              [facility] Medicine Services  HISTORY AND PHYSICAL    Date of Admission: 9/26/2019  Primary Care Physician: Jose Karimi MD    Subjective     Chief Complaint: CVA/alcohol abuse    History of Present Illness  Patient is a " "59-year-old  male transferred from [facility] for evaluation altered mental status/confusion.  Patient has been drinking alcohol at work.  Patient came to ER confused disoriented with decreased responsiveness.  No history of trauma.  Unable to get the answer for the patient this time.  Most information is from the chart.    Review of Systems   Unable to obtain due to altered mental status.  Otherwise complete ROS reviewed and negative except as mentioned in the HPI.      Past Medical History:   Past Medical History:   Diagnosis Date   • Hypertension        Past Surgical History:  Past Surgical History:   Procedure Laterality Date   • CATARACT EXTRACTION Left    • WRIST FRACTURE SURGERY Left        Family History: family history includes Cancer in his sister; Diabetes in his father; No Known Problems in his brother and mother.    Social History:  reports that he has been smoking cigarettes.  He has never used smokeless tobacco. He reports that he drinks alcohol. He reports that he does not use drugs.    Medications:  Prior to Admission medications    Medication Sig Start Date End Date Taking? Authorizing Provider   cyclobenzaprine (FLEXERIL) 10 MG tablet Take 1 tablet by mouth 3 (Three) Times a Day As Needed for Muscle Spasms. 6/22/17   Galo Aguilera APRN   lisinopril (PRINIVIL,ZESTRIL) 10 MG tablet  5/10/17   Dahiana Hall MD   meloxicam (MOBIC) 15 MG tablet  5/10/17   Dahiana Hall MD   MethylPREDNISolone (MEDROL, ALBA,) 4 MG tablet Take as directed on package instructions. 6/22/17   Galo Aguilera APRN   traMADol (ULTRAM) 50 MG tablet  5/30/17   Dahiana Hall MD     Allergies:  No Known Allergies    Objective     Vital Signs: /86 (BP Location: Right arm, Patient Position: Sitting)   Pulse 74   Temp 98 °F (36.7 °C) (Oral)   Resp 16   Ht 163.8 cm (64.5\")   Wt 79.8 kg (176 lb)   SpO2 99%   BMI 29.74 kg/m²    Physical Exam   Constitutional: He " appears well-developed.   HENT:   Head: Normocephalic and atraumatic.   Eyes: Conjunctivae are normal. Pupils are equal, round, and reactive to light.   Neck: Neck supple. No JVD present. No thyromegaly present.   Cardiovascular: Normal rate, regular rhythm, normal heart sounds and intact distal pulses. Exam reveals no gallop and no friction rub.   No murmur heard.  Pulmonary/Chest: Effort normal and breath sounds normal. No respiratory distress. He has no wheezes. He has no rales. He exhibits no tenderness.   Decrease in breath sound bilateral, clear.   Abdominal: Soft. Bowel sounds are normal. He exhibits no distension. There is no tenderness. There is no rebound and no guarding.   Musculoskeletal: He exhibits no edema, tenderness or deformity.   Lymphadenopathy:     He has no cervical adenopathy.   Neurological: He is alert. He displays normal reflexes. No cranial nerve deficit. He exhibits abnormal muscle tone. Coordination abnormal.   Unable exam patient is this patient's unable to answer any question follow commands.  Patient is awake alert though appears   Skin: Skin is warm and dry. Capillary refill takes 2 to 3 seconds. No rash noted.   Nursing note and vitals reviewed.          Results Reviewed:  Laboratory- white blood cells 10, hemoglobin 15, hematocrit 46, platelets 400, ammonia level less than 10, sodium 137, potassium 3.6, chloride is 97, CO2 is 26, BUN 11, creatinine 0.82, total protein 8.1, albumin 3.8, bilirubin 0.69, alkaline phosphatase 80, AST 35, calcium 8.8, glucose 87, ALT is 29, GFR is greater than 60, alcohol level less than 3, acetaminophen level is 0, salicylate level is 5.3, urine drug screen is negative.    urinalysis shows moderate bilirubin, negative blood, nitrite negative, leukocyte neck negative.    Radiology Data:    Imaging Results (last 24 hours)     ** No results found for the last 24 hours. **          I have personally reviewed and interpreted the radiology studies and ECG  obtained at time of admission.     Assessment / Plan      Assessment & Plan  Active Hospital Problems    Diagnosis   • Acute cerebrovascular accident (CVA) of cerebellum (CMS/HCC)   • Alcoholism /alcohol abuse (CMS/HCC)   • Smoker     Plans    CVA.  Stroke protocol.  Consult neurology.  MRI of the head.  Carotid duplex.  Echocardiogram.  [facility] at [facility]- low attenuation within the left posterior temporal and right lower lobe concerning for evolving infarct, chronic ischemic deep white matter changes.    Alcohol abuse.  Ciwa.  Banana bag.    Smoker.  Consult patient once patient more alert.    Code Status: full code     I discussed the patient's findings and my recommendations with: patient    Estimated length of stay:2-5 days.     Candido Alas MD   09/26/19   9:58 PM              Electronically signed by Candido Alas MD at 09/26/19 6304          Operative/Procedure Notes (last 7 days) (Notes from 09/26/19 1253 through 10/03/19 1253)      Jose Johnson MD at 10/02/19 1307        Preprocedure diagnosis: Posterior vocal fold abnormality, dysphasia, recent stroke    Post procedure diagnosis: Pressure erosion of the vocal folds due to intubation, dysphagia, recent stroke    Procedure performed: Flexible fiberoptic laryngoscopy     Surgeon: Jose Johnson M.D.    Anesthesia: None    Details: After patient verification and consent was obtained, the flexible fiberoptic laryngoscope was passed into the oral cavity.  The following is a summary of findings:    Oropharynx: Healthy without mass or lesion    Hypopharynx: Healthy without mass or lesion    Larynx: Posteriorly, there is whitish discoloration and slight ulceration of the posterior aspects of the vocal folds where the endotracheal tube would have been.  There is some asymmetric swelling left greater than right.  There is full vocal fold mobility bilaterally.  No masses or evidence of arytenoid subluxation are seen.    The scope was  withdrawn.  The patient tolerated the procedure without any complications.    Electronically signed by Jose Johnson MD at 10/02/19 1308          Physician Progress Notes (last 24 hours) (Notes from 10/02/19 1253 through 10/03/19 1253)      Candido Alas MD at 10/03/19 1014              [facility] Medicine Services  INPATIENT PROGRESS NOTE    Length of Stay: 7  Date of Admission: 9/26/2019  Primary Care Physician: Jose Karimi MD    Subjective   Chief Complaint: Respiratory failure/CVA    HPI   Patient is more alert.  Patient answers every question pretty clearly today.  Aphasic at time.  Patient will need feeding.  Patient denies any chest pain or shortness of breath.    Review of Systems   Constitutional: Positive for activity change, appetite change and fatigue. Negative for chills and fever.   HENT: Negative for hearing loss, nosebleeds, tinnitus and trouble swallowing.    Eyes: Negative for visual disturbance.   Respiratory: Positive for shortness of breath. Negative for cough, chest tightness and wheezing.    Cardiovascular: Negative for chest pain, palpitations and leg swelling.   Gastrointestinal: Negative for abdominal distention, abdominal pain, blood in stool, constipation, diarrhea, nausea and vomiting.   Endocrine: Negative for cold intolerance, heat intolerance, polydipsia, polyphagia and polyuria.   Genitourinary: Negative for decreased urine volume, difficulty urinating, dysuria, flank pain, frequency and hematuria.   Musculoskeletal: Positive for arthralgias, gait problem and myalgias. Negative for joint swelling.   Skin: Negative for rash.   Allergic/Immunologic: Negative for immunocompromised state.   Neurological: Positive for weakness. Negative for dizziness, syncope, light-headedness and headaches.   Hematological: Negative for adenopathy. Does not bruise/bleed easily.   Psychiatric/Behavioral: Positive for confusion. Negative for sleep disturbance.  The patient is not nervous/anxious.    All pertinent negatives and positives are as above. All other systems have been reviewed and are negative unless otherwise stated.     Objective    Temp:  [98.5 °F (36.9 °C)-99.5 °F (37.5 °C)] 99.5 °F (37.5 °C)  Heart Rate:  [70-83] 76  Resp:  [16-18] 18  BP: (112-141)/(58-78) 121/74  No intake or output data in the 24 hours ending 10/03/19 1014  Physical Exam  Constitutional: He appears well-developed.   HENT:   Head: Normocephalic.   Eyes: Conjunctivae are normal. Pupils are equal, round, and reactive to light.   Neck: Neck supple. No JVD present. No thyromegaly present.   Cardiovascular: Normal rate, regular rhythm, normal heart sounds and intact distal pulses. Exam reveals no gallop and no friction rub.   No murmur heard.  Pulmonary/Chest: Effort normal. No respiratory distress. He has wheezes. He has no rales. He exhibits no tenderness.   Rhonchi bilateral.  Good air movement.   Abdominal: Soft. Bowel sounds are normal. He exhibits no distension. There is no tenderness. There is no rebound and no guarding.   Musculoskeletal: He exhibits no edema, tenderness or deformity.   Lymphadenopathy:     He has no cervical adenopathy.   Neurological: He is alert. He displays normal reflexes. No cranial nerve deficit. He exhibits abnormal muscle tone. Coordination abnormal.   Skin: Skin is warm and dry. Capillary refill takes 2 to 3 seconds.  Right arm from previous IV site erythematous, warm, slightly firm.  Probably superficial thrombus.  Psychiatric: He has a normal mood and affect. His behavior is normal.   Nursing note and vitals reviewed.  Results Review:  Lab Results (last 24 hours)     Procedure Component Value Units Date/Time    Comprehensive Metabolic Panel [[id number]]  (Abnormal) Collected:  10/03/19 0437    Specimen:  Blood Updated:  10/03/19 0528     Glucose 185 mg/dL      BUN 7 mg/dL      Creatinine 0.55 mg/dL      Sodium 139 mmol/L      Potassium 3.9 mmol/L       Chloride 98 mmol/L      CO2 28.0 mmol/L      Calcium 8.3 mg/dL      Total Protein 6.6 g/dL      Albumin 3.30 g/dL      ALT (SGPT) 18 U/L      AST (SGOT) 24 U/L      Alkaline Phosphatase 62 U/L      Total Bilirubin 0.5 mg/dL      eGFR Non African Amer >150 mL/min/1.73      Globulin 3.3 gm/dL      A/G Ratio 1.0 g/dL      BUN/Creatinine Ratio 12.7     Anion Gap 13.0 mmol/L     Narrative:       GFR Normal >60  Chronic Kidney Disease <60  Kidney Failure <15    CBC & Differential [[id number]] Collected:  10/03/19 0437    Specimen:  Blood Updated:  10/03/19 0508    Narrative:       The following orders were created for panel order CBC & Differential.  Procedure                               Abnormality         Status                     ---------                               -----------         ------                     CBC Auto Differential[[id number]]        Abnormal            Final result                 Please view results for these tests on the individual orders.    CBC Auto Differential [[id number]]  (Abnormal) Collected:  10/03/19 0437    Specimen:  Blood Updated:  10/03/19 0508     WBC 9.37 10*3/mm3      RBC 4.13 10*6/mm3      Hemoglobin 13.3 g/dL      Hematocrit 37.9 %      MCV 91.8 fL      MCH 32.2 pg      MCHC 35.1 g/dL      RDW 13.2 %      RDW-SD 45.1 fl      MPV 9.7 fL      Platelets 278 10*3/mm3      Neutrophil % 60.4 %      Lymphocyte % 25.4 %      Monocyte % 11.3 %      Eosinophil % 2.1 %      Basophil % 0.5 %      Immature Grans % 0.3 %      Neutrophils, Absolute 5.65 10*3/mm3      Lymphocytes, Absolute 2.38 10*3/mm3      Monocytes, Absolute 1.06 10*3/mm3      Eosinophils, Absolute 0.20 10*3/mm3      Basophils, Absolute 0.05 10*3/mm3      Immature Grans, Absolute 0.03 10*3/mm3      nRBC 0.0 /100 WBC     POC Glucose Once [[id number]]  (Normal) Collected:  10/03/19 0014    Specimen:  Blood Updated:  10/03/19 0028     Glucose 94 mg/dL      Comment: : [id number] Vishnu KristaMeter ID: [id number]               Cultures:  Respiratory Culture   Date Value Ref Range Status   09/27/2019 Light growth (2+) Normal Respiratory Etsher  Final       Radiology Data:    Imaging Results (last 24 hours)     Procedure Component Value Units Date/Time    XR Abdomen KUB [[id number]] Collected:  10/02/19 1805     Updated:  10/02/19 1808    Narrative:       SINGLE VIEW ABDOMEN              HISTORY: to check for dobhoff placement; R13.10-Dysphagia, unspecified;  Z74.09-Other reduced mobility; Z74.09-Other reduced mobility;  R47.01-Aphasia     FINDINGS: Weighted feeding tube is seen with the tip located in the  gastric fundus     The visualized intestinal gas pattern is unremarkable without evidence  of obstruction.            Impression:       Feeding tube tip located in the gastric fundus.     This report was finalized on 10/02/2019 18:05 by Dr Ra Burgess, .          No Known Allergies    Scheduled meds:     aspirin 81 mg Oral Daily   Or      aspirin 300 mg Rectal Daily   atorvastatin 80 mg Oral Nightly   chlorhexidine 15 mL Mouth/Throat Q12H   ipratropium-albuterol 3 mL Nebulization 4x Daily - RT   pantoprazole 40 mg Intravenous Q AM   potassium chloride 40 mEq Oral Once   sodium chloride 10 mL Intravenous Q12H       PRN meds:  •  albuterol  •  hypromellose  •  labetalol  •  LORazepam **OR** LORazepam **OR** LORazepam **OR** LORazepam **OR** LORazepam **OR** LORazepam  •  sodium chloride    Assessment/Plan       Degeneration of lumbar or lumbosacral intervertebral disc    Smoker    Acute cerebrovascular accident (CVA) of cerebellum (CMS/HCC)    Alcoholism /alcohol abuse (CMS/HCC)    CVA (cerebral vascular accident) (CMS/HCC)    Hyperlipidemia    Dysphagia due to recent cerebral infarction      Plan:  Respiratory failure.  Resolved.  Extubated 9/30/19.    Consult ENT-laryngoscopy demonstrates some pressure wounds of the posterior vocal folds bilaterally, with some swelling on the left- this is likely due to the endotracheal tube and will  resolve on its own.     CVA. Left MCA stroke. Continue aspirin.  Continue Lipitor. Neurology consult.   CTA of the head and neck- complete occlusion of the entire cervical left ICA, 30% stenosis of the right carotid bulb, mild stenosis at the ostium of the left vertebral artery.  MRI of the head- acute infarct in the left MCA and watershed territory- no hemorrhagic conversion, abnormal left ICA flow void.  Echocardiogram- ejection fraction 61%, diastolic dysfunction grade 1.    Right arm thrombus.  Warm compress.  Doppler ultrasound of right arm.     Hypokalemia- resolved.     Reflux.  Protonix and Zofran as needed     COPD.  Continue duo nebs.     Alcohol withdrawal?.  Ciwa protocol.  Patient stated last has drink about 2 weeks ago.  Patient has no sign of withdrawal symptoms.     Hyperlipidemia.  Continue Lipitor.     Tobacco abuse.  Tobacco counseling.     Urinary incontinence.    DC Villalobos cath 9/30/2019.     SCD.     Nutrition.  Pending a speech to pass.  Patient keeping pulling off Dobbhoff tube x3 treatment today.     Deconditioning.  PT and OT consult.     Discharge Planning: Plan for rehab placement.  Transfer from [facility].      Candido Alas MD   10/03/19   10:14 AM                    Electronically signed by Candido Alas MD at 10/03/19 1020     Candido Alas MD at 10/02/19 1334              [facility] Medicine Services  INPATIENT PROGRESS NOTE    Length of Stay: 6  Date of Admission: 9/26/2019  Primary Care Physician: Jose Karimi MD    Subjective   Chief Complaint: Respiratory failure/CVA/alcohol withdrawal?    HPI   Patient is more alert.  Patient oriented x2.  Aphasic status much improved.  Patient denies any chest pain or shortness of breath.  Patient is not requiring oxygen.    Review of Systems   Constitutional: Positive for activity change, appetite change and fatigue. Negative for chills and fever.   HENT: Negative for hearing loss,  nosebleeds, tinnitus and trouble swallowing.    Eyes: Negative for visual disturbance.   Respiratory: Positive for shortness of breath. Negative for cough, chest tightness and wheezing.    Cardiovascular: Negative for chest pain, palpitations and leg swelling.   Gastrointestinal: Negative for abdominal distention, abdominal pain, blood in stool, constipation, diarrhea, nausea and vomiting.   Endocrine: Negative for cold intolerance, heat intolerance, polydipsia, polyphagia and polyuria.   Genitourinary: Negative for decreased urine volume, difficulty urinating, dysuria, flank pain, frequency and hematuria.   Musculoskeletal: Positive for arthralgias, gait problem and myalgias. Negative for joint swelling.   Skin: Negative for rash.   Allergic/Immunologic: Negative for immunocompromised state.   Neurological: Positive for weakness. Negative for dizziness, syncope, light-headedness and headaches.   Hematological: Negative for adenopathy. Does not bruise/bleed easily.   Psychiatric/Behavioral: Positive for confusion. Negative for sleep disturbance. The patient is not nervous/anxious.    All pertinent negatives and positives are as above. All other systems have been reviewed and are negative unless otherwise stated.     Objective    Temp:  [98.3 °F (36.8 °C)-99.8 °F (37.7 °C)] 98.3 °F (36.8 °C)  Heart Rate:  [69-93] 83  Resp:  [15-18] 18  BP: (109-175)/(58-96) 112/58    Intake/Output Summary (Last 24 hours) at 10/2/2019 1334  Last data filed at 10/1/2019 2346  Gross per 24 hour   Intake --   Output 1250 ml   Net -1250 ml     Physical Exam  Constitutional: He appears well-developed.   HENT:   Head: Normocephalic.   Eyes: Conjunctivae are normal. Pupils are equal, round, and reactive to light.   Neck: Neck supple. No JVD present. No thyromegaly present.   Cardiovascular: Normal rate, regular rhythm, normal heart sounds and intact distal pulses. Exam reveals no gallop and no friction rub.   No murmur  heard.  Pulmonary/Chest: Effort normal. No respiratory distress. He has wheezes. He has no rales. He exhibits no tenderness.   Rhonchi bilateral.  Good air movement.   Abdominal: Soft. Bowel sounds are normal. He exhibits no distension. There is no tenderness. There is no rebound and no guarding.   Musculoskeletal: He exhibits no edema, tenderness or deformity.   Lymphadenopathy:     He has no cervical adenopathy.   Neurological: He is alert. He displays normal reflexes. No cranial nerve deficit. He exhibits abnormal muscle tone. Coordination abnormal.   Skin: Skin is warm and dry. Capillary refill takes 2 to 3 seconds. No rash noted.   Psychiatric: He has a normal mood and affect. His behavior is normal.   Nursing note and vitals reviewed.     Results Review:  Lab Results (last 24 hours)     Procedure Component Value Units Date/Time    Comprehensive Metabolic Panel [[id number]]  (Abnormal) Collected:  10/02/19 0655    Specimen:  Blood Updated:  10/02/19 0724     Glucose 161 mg/dL      BUN 5 mg/dL      Creatinine 0.60 mg/dL      Sodium 139 mmol/L      Potassium 3.6 mmol/L      Chloride 97 mmol/L      CO2 30.0 mmol/L      Calcium 8.9 mg/dL      Total Protein 6.9 g/dL      Albumin 3.40 g/dL      ALT (SGPT) 20 U/L      AST (SGOT) 26 U/L      Alkaline Phosphatase 70 U/L      Total Bilirubin 0.6 mg/dL      eGFR Non African Amer 139 mL/min/1.73      Globulin 3.5 gm/dL      A/G Ratio 1.0 g/dL      BUN/Creatinine Ratio 8.3     Anion Gap 12.0 mmol/L     Narrative:       GFR Normal >60  Chronic Kidney Disease <60  Kidney Failure <15    CBC & Differential [[id number]] Collected:  10/02/19 0655    Specimen:  Blood Updated:  10/02/19 0706    Narrative:       The following orders were created for panel order CBC & Differential.  Procedure                               Abnormality         Status                     ---------                               -----------         ------                     CBC Auto Differential[[id number]]         Abnormal            Final result                 Please view results for these tests on the individual orders.    CBC Auto Differential [[id number]]  (Abnormal) Collected:  10/02/19 0655    Specimen:  Blood Updated:  10/02/19 0706     WBC 11.03 10*3/mm3      RBC 4.40 10*6/mm3      Hemoglobin 14.0 g/dL      Hematocrit 41.1 %      MCV 93.4 fL      MCH 31.8 pg      MCHC 34.1 g/dL      RDW 13.2 %      RDW-SD 45.5 fl      MPV 9.4 fL      Platelets 301 10*3/mm3      Neutrophil % 69.3 %      Lymphocyte % 17.7 %      Monocyte % 10.0 %      Eosinophil % 1.8 %      Basophil % 0.7 %      Immature Grans % 0.5 %      Neutrophils, Absolute 7.65 10*3/mm3      Lymphocytes, Absolute 1.95 10*3/mm3      Monocytes, Absolute 1.10 10*3/mm3      Eosinophils, Absolute 0.20 10*3/mm3      Basophils, Absolute 0.08 10*3/mm3      Immature Grans, Absolute 0.05 10*3/mm3      nRBC 0.0 /100 WBC            Cultures:  Respiratory Culture   Date Value Ref Range Status   09/27/2019 Light growth (2+) Normal Respiratory Esther  Final       Radiology Data:    Imaging Results (last 24 hours)     Procedure Component Value Units Date/Time    XR Abdomen KUB [[id number]] Collected:  10/01/19 1627     Updated:  10/01/19 1631    Narrative:       XR ABDOMEN KUB- 10/1/2019 3:58 PM CDT     HISTORY: dobhoff pplacement; R13.10-Dysphagia, unspecified; Z74.09-Other  reduced mobility; Z74.09-Other reduced mobility; R47.01-Aphasia       COMPARISON: 09/29/2019     FINDINGS:  There is a nonspecific bowel gas pattern. Dobbhoff feeding tube is  satisfactorily positioned: In the stomach..     No acute skeletal abnormality is identified.        Impression:       1. Dobbhoff feeding tube satisfactorily position coiled in the fundus  the stomach.         This report was finalized on 10/01/2019 16:28 by Dr. Stanford Alanis MD.          No Known Allergies    Scheduled meds:     aspirin 81 mg Oral Daily   Or      aspirin 300 mg Rectal Daily   atorvastatin 80 mg Oral Nightly    chlorhexidine 15 mL Mouth/Throat Q12H   famotidine 20 mg Intravenous Q12H   ipratropium-albuterol 3 mL Nebulization 4x Daily - RT   IV Fluids 1000 mL + additives 75 mL/hr Intravenous Daily   [START ON 10/3/2019] pantoprazole 40 mg Intravenous Q AM   potassium chloride 40 mEq Oral Once   sodium chloride 10 mL Intravenous Q12H       PRN meds:  •  albuterol  •  hypromellose  •  labetalol  •  LORazepam **OR** LORazepam **OR** LORazepam **OR** LORazepam **OR** LORazepam **OR** LORazepam  •  sodium chloride    Assessment/Plan       Degeneration of lumbar or lumbosacral intervertebral disc    Smoker    Acute cerebrovascular accident (CVA) of cerebellum (CMS/HCC)    Alcoholism /alcohol abuse (CMS/HCC)    CVA (cerebral vascular accident) (CMS/HCC)    Hyperlipidemia    Dysphagia due to recent cerebral infarction      Plan:  Respiratory failure.  Resolved.  Extubated 9/30/19.    Consult ENT-laryngoscopy demonstrates some pressure wounds of the posterior vocal folds bilaterally, with some swelling on the left- this is likely due to the endotracheal tube and will resolve on its own.     CVA. Left MCA stroke. Continue aspirin.  Continue Lipitor. Neurology consult.   CTA of the head and neck- complete occlusion of the entire cervical left ICA, 30% stenosis of the right carotid bulb, mild stenosis at the ostium of the left vertebral artery.  MRI of the head- acute infarct in the left MCA and watershed territory- no hemorrhagic conversion, abnormal left ICA flow void.  Echocardiogram- ejection fraction 61%, diastolic dysfunction grade 1.     Hypokalemia- resolved.     Reflux.  Protonix and Zofran as needed     COPD.  Continue duo nebs.     Alcohol withdrawal?.  Ciwa protocol.  Patient stated last has drink about 2 weeks ago.     Hyperlipidemia.  Continue Lipitor.     Tobacco abuse.  Tobacco counseling.     Urinary incontinence.    DC Villalobos cath 9/30/2019.     SCD.     Nutrition.  Consult nutritionist for tube feeding.  Continue  banana bag.     Deconditioning.  PT and OT consult.     Discharge Plannin to 3 days. Transfer from [facility].      Candido Alas MD   10/02/19   1:34 PM                     Electronically signed by Candido Alas MD at 10/02/19 1338          Consult Notes (last 24 hours) (Notes from 10/02/19 1253 through 10/03/19 1253)      Jose Johnson MD at 10/02/19 1259      Consult Orders    1. Inpatient ENT Consult [[id number]] ordered by Gab White MD at 10/02/19 1053                ENT/[facility] ([facility]) Consult Note:    Referring Provider: Galo Bethea, *    Reason for Consultation: Vocal fold abnormality on swallow evaluation    Patient Care Team:  Jose Karimi MD as PCP - General (Family Medicine)    Chief complaint: Vocal fold abnormality on swallow evaluation    Subjective .     History of present illness:  Jarod Ramírez Jr. is a  59 y.o. male who was admitted on 2019 with a left MCA acute infarction, mainly in the posterior distribution.  He reports confusion to the details regarding his stroke.  He was intubated.  He currently reports no significant change in his voice.  Prior to his admission, he denied any throat pain, change in voice, difficulty swallowing, cough or aspiration.  He does have a history of alcohol abuse, and was drinking at work the day of his stroke.  Nothing is made his voice better or worse.  Again, he is suffering from some confusion regarding his stroke details.    Review of Systems  Review of Systems   Unable to perform ROS: Mental status change       History  Past Medical History:   Diagnosis Date   • Hypertension    ,   Past Surgical History:   Procedure Laterality Date   • CATARACT EXTRACTION Left    • WRIST FRACTURE SURGERY Left    ,   Family History   Problem Relation Age of Onset   • No Known Problems Mother    • Diabetes Father    • Cancer Sister    • No Known Problems Brother    ,   Social History     Tobacco Use   •  Smoking status: Current Every Day Smoker     Types: Cigarettes   • Smokeless tobacco: Never Used   • Tobacco comment: 1.5 packs daily   Substance Use Topics   • Alcohol use: Yes   • Drug use: No   ,   No medications prior to admission.    and Allergies:  Patient has no known allergies.    Objective     Vital Signs   Temp:  [98.3 °F (36.8 °C)-99.8 °F (37.7 °C)] 98.3 °F (36.8 °C)  Heart Rate:  [69-93] 83  Resp:  [15-18] 18  BP: (109-175)/(58-96) 112/58    Physical Exam:   Physical Exam   Constitutional: He is oriented to person, place, and time. He appears well-developed and well-nourished. He is cooperative. No distress.   HENT:   Head: Normocephalic and atraumatic.   Right Ear: External ear normal.   Left Ear: External ear normal.   Nose: Septal deviation present. No nasal deformity.   Mouth/Throat: Uvula is midline, oropharynx is clear and moist and mucous membranes are normal.   Eyes: Conjunctivae and EOM are normal. Pupils are equal, round, and reactive to light. Right eye exhibits no discharge. Left eye exhibits no discharge. No scleral icterus.   Neck: Normal range of motion. Neck supple. No JVD present. No tracheal deviation present. No thyromegaly present.       Pulmonary/Chest: Effort normal. No stridor.   Musculoskeletal: Normal range of motion. He exhibits no edema or deformity.   Lymphadenopathy:     He has no cervical adenopathy.   Neurological: He is alert and oriented to person, place, and time. He has normal strength. No cranial nerve deficit. Coordination normal.   Skin: Skin is warm and dry. No rash noted. He is not diaphoretic. No erythema. No pallor.   Psychiatric: He has a normal mood and affect. His speech is normal and behavior is normal. Judgment and thought content normal. Cognition and memory are normal.   Nursing note and vitals reviewed.      Results Review:     Lab Results (last 24 hours)     Procedure Component Value Units Date/Time    Comprehensive Metabolic Panel [[id number]]   (Abnormal) Collected:  10/02/19 0655    Specimen:  Blood Updated:  10/02/19 0724     Glucose 161 mg/dL      BUN 5 mg/dL      Creatinine 0.60 mg/dL      Sodium 139 mmol/L      Potassium 3.6 mmol/L      Chloride 97 mmol/L      CO2 30.0 mmol/L      Calcium 8.9 mg/dL      Total Protein 6.9 g/dL      Albumin 3.40 g/dL      ALT (SGPT) 20 U/L      AST (SGOT) 26 U/L      Alkaline Phosphatase 70 U/L      Total Bilirubin 0.6 mg/dL      eGFR Non African Amer 139 mL/min/1.73      Globulin 3.5 gm/dL      A/G Ratio 1.0 g/dL      BUN/Creatinine Ratio 8.3     Anion Gap 12.0 mmol/L     Narrative:       GFR Normal >60  Chronic Kidney Disease <60  Kidney Failure <15    CBC & Differential [[id number]] Collected:  10/02/19 0655    Specimen:  Blood Updated:  10/02/19 0706    Narrative:       The following orders were created for panel order CBC & Differential.  Procedure                               Abnormality         Status                     ---------                               -----------         ------                     CBC Auto Differential[[id number]]        Abnormal            Final result                 Please view results for these tests on the individual orders.    CBC Auto Differential [[id number]]  (Abnormal) Collected:  10/02/19 0655    Specimen:  Blood Updated:  10/02/19 0706     WBC 11.03 10*3/mm3      RBC 4.40 10*6/mm3      Hemoglobin 14.0 g/dL      Hematocrit 41.1 %      MCV 93.4 fL      MCH 31.8 pg      MCHC 34.1 g/dL      RDW 13.2 %      RDW-SD 45.5 fl      MPV 9.4 fL      Platelets 301 10*3/mm3      Neutrophil % 69.3 %      Lymphocyte % 17.7 %      Monocyte % 10.0 %      Eosinophil % 1.8 %      Basophil % 0.7 %      Immature Grans % 0.5 %      Neutrophils, Absolute 7.65 10*3/mm3      Lymphocytes, Absolute 1.95 10*3/mm3      Monocytes, Absolute 1.10 10*3/mm3      Eosinophils, Absolute 0.20 10*3/mm3      Basophils, Absolute 0.08 10*3/mm3      Immature Grans, Absolute 0.05 10*3/mm3      nRBC 0.0 /100 WBC               I have personally reviewed the CT angiogram scan of the neck.  The following is my interpretation: Patient is intubated.  Due to the endotracheal tube and Dobbhoff tube, a full evaluation of the laryngeal structures is not possible.  There is no associated lymphadenopathy or obvious laryngeal abnormality.    Assessment/Plan       Degeneration of lumbar or lumbosacral intervertebral disc    Smoker    Acute cerebrovascular accident (CVA) of cerebellum (CMS/HCC)    Alcoholism /alcohol abuse (CMS/HCC)    CVA (cerebral vascular accident) (CMS/HCC)    Hyperlipidemia    Dysphagia due to recent cerebral infarction      Bedside flexible laryngoscopy demonstrates some pressure wounds of the posterior vocal folds bilaterally, with some swelling on the left.  This is likely due to the endotracheal tube and will resolve on its own.  We will make sure he is on a proton pump inhibitor.  Follow-up in my office in approximately 1 month.    I discussed the patients findings and my recommendations with patient    Jose Johnson MD  10/02/19  1:06 PM              Electronically signed by Jose Johnson MD at 10/02/19 1307          Physical Therapy Notes (last 24 hours) (Notes from 10/02/19 1253 through 10/03/19 1253)      Helen Kirby PTA at 10/02/19 1442  Version 1 of 1         Problem: Patient Care Overview  Goal: Plan of Care Review  Outcome: Ongoing (interventions implemented as appropriate)   10/02/19 1432   Coping/Psychosocial   Plan of Care Reviewed With patient   Plan of Care Review   Progress improving   OTHER   Outcome Summary Pt. was sitting in chair and performed sit to stand transfer with Min A/CGAx1 with no use of assistive device; Pt. ambulated 50'x2 Lucie/CGA with verbal directional cueing. Noted decreased balance with turning pt. had a narrow JUSTA. Pt. performed standing activities for balance. Pt. had decreased sequencing ability and impulsiveness with stepping before therapy was ready. During gait Pt. had  decreased RLE ankle DF and decreased heel strike. Pt. able to perform one step commands. Pt. performed sitting exercises 20 reps            Electronically signed by Helen Kirby, PTA at 10/02/19 144     Helen Kirby PTA at 10/02/19 144  Version 1 of 1         [facility] - Physical Therapy Treatment Note  [facility]     Patient Name: Jarod Ramírez Jr.  : 1960  MRN: [id number]  Today's Date: 10/2/2019  Onset of Illness/Injury or Date of Surgery: 19     Referring Physician: Dr. Alas    Admit Date: 2019    Visit Dx:    ICD-10-CM ICD-9-CM   1. Dysphagia, unspecified type R13.10 787.20   2. Impaired mobility Z74.09 799.89   3. Impaired mobility and ADLs Z74.09 799.89   4. Aphasia R47.01 784.3     Patient Active Problem List   Diagnosis   • Degeneration of cervical intervertebral disc   • Closed fracture of lumbar vertebra (CMS/HCC)   • Degeneration of lumbar or lumbosacral intervertebral disc   • Smoker   • BMI 27.0-27.9,adult   • Acute cerebrovascular accident (CVA) of cerebellum (CMS/HCC)   • Alcoholism /alcohol abuse (CMS/HCC)   • CVA (cerebral vascular accident) (CMS/HCC)   • Hyperlipidemia   • Dysphagia due to recent cerebral infarction       Therapy Treatment    Rehabilitation Treatment Summary     Row Name 10/02/19 1317 10/02/19 1114 10/02/19 0914       Treatment Time/Intention    Discipline  physical therapy assistant  -KJ  occupational therapy assistant  -TS  speech language pathologist  -MB,AS,MB2    Document Type  therapy note (daily note)  -KJ2  therapy note (daily note)  -TS  therapy note (daily note)  -MB,AS,MB2    Subjective Information  no complaints  -KJ2  no complaints  -TS2  no complaints  -MB,AS,MB2    Mode of Treatment  physical therapy  -KJ2  --  speech-language pathology  -MB,AS,MB2    Patient/Family Observations  --  --  no family present  -MB,AS,MB2    Patient Effort  good  -KJ2  good  -TS2  good  -MB,AS,MB2    Comment  R foot drop  -KJ2  --  --    Existing  Precautions/Restrictions  fall  -KJ2  fall  -TS2  --    Treatment Considerations/Comments  aphasia  -KJ2  aphasia  -TS2  --    Recorded by [KJ] Helen Kirby, PTA 10/02/19 1318  [KJ2] Helen Kirby, PTA 10/02/19 1431 [TS] Yaritza Lopez COTTON/L 10/02/19 1114  [TS2] Yaritza Lopez COTTON/L 10/02/19 1242 [MB,AS,MB2] Georgi Hernandez, CCC-SLP (r) Eliecer Williamson, Speech Therapy Student (t) Georgi Hernandez, CCC-SLP (c) 10/02/19 1231    Row Name 10/02/19 1317 10/02/19 1114          Cognitive Assessment/Intervention- PT/OT    Follows Commands (Cognition)  --  follows one step commands;25-49% accuracy;50-74% accuracy;increased processing time needed;delayed response/completion;repetition of directions required  -TS     Personal Safety Interventions  gait belt  -KJ  fall prevention program maintained;gait belt;nonskid shoes/slippers when out of bed;elopement precautions initiated  -TS     Recorded by [KJ] Helen Kirby, PTA 10/02/19 1431 [TS] Yaritza Lopez COTA/L 10/02/19 1242     Row Name 10/02/19 1317             Safety Issues, Functional Mobility    Safety Issues Affecting Function (Mobility)  impulsivity;sequencing abilities;safety precautions follow-through/compliance  -KJ      Impairments Affecting Function (Mobility)  balance;coordination;endurance/activity tolerance;postural/trunk control;range of motion (ROM)  -KJ      Comment, Safety Issues/Impairments (Mobility)  Pt. presented with decreased RLE ROM ankle DF:verbal cues were needed for Pt. to perform stepping sequencing correctly:Pt. showed slight sway off of JUSTA with standing activities  -KJ      Recorded by [KJ] Helen Kirby, PTA 10/02/19 1431      Row Name 10/02/19 1317 10/02/19 1114          Bed Mobility Assessment/Treatment    Bed Mobility Assessment/Treatment  --  supine-sit  -TS     Supine-Sit [place] (Bed Mobility)  --  supervision  -TS     Comment (Bed Mobility)  up in chair  -KJ  pt came to long sitting and then  came to EOB  -TS     Recorded by [KJ] Heeln Kirby, PTA 10/02/19 1431 [TS] Yaritza Lopez COTA/L 10/02/19 1242     Row Name 10/02/19 1317 10/02/19 1114          Functional Mobility    Functional Mobility- Ind. Level  verbal cues required;contact guard assist  -KJ  contact guard assist  -TS     Functional Mobility- Safety Issues  weight-shifting ability decreased;sequencing ability decreased;balance decreased during turns  -KJ  balance decreased during turns  -TS     Functional Mobility- Comment  Pt. presented with decreased RLE DF during gait; Decreased balance during turns with a narrow JUSTA  -KJ  in room, in BR  -TS     Recorded by [KJ] Helen Kirby, PTA 10/02/19 1431 [TS] Yaritza Lopez COTA/L 10/02/19 1242     Row Name 10/02/19 1317 10/02/19 1114          Transfer Assessment/Treatment    Transfer Assessment/Treatment  sit-stand transfer;stand-sit transfer  -KJ  sit-stand transfer;stand-sit transfer  -TS     Recorded by [KJ] Helen Kirby, PTA 10/02/19 1431 [TS] Yaritza Lopez COTA/L 10/02/19 1242     Row Name 10/02/19 1317 10/02/19 1114          Sit-Stand Transfer    Sit-Stand [place] (Transfers)  contact guard;minimum assist (75% patient effort)  -KJ  contact guard;stand by assist  -TS     Recorded by [KJ] Helen Kirby, PTA 10/02/19 1431 [TS] Yaritza Lopez COTA/L 10/02/19 1242     Row Name 10/02/19 1317 10/02/19 1114          Stand-Sit Transfer    Stand-Sit [place] (Transfers)  contact guard;minimum assist (75% patient effort)  -KJ  contact guard  -TS     Recorded by [KJ] Helen Kirby, PTA 10/02/19 1431 [TS] Yaritza Lopez COTTON/L 10/02/19 1242     Row Name 10/02/19 1317             Gait/Stairs Assessment/Training    [place] Level (Gait)  contact guard;minimum assist (75% patient effort)  -KJ      Distance in Feet (Gait)  50'x2  -KJ      Right Sided Gait Deviations  foot drop/toe drag;heel strike decreased  -KJ      Comment (Gait/Stairs)  LOB with  turning, narrow JUSTA required Lucie to recover  -KJ      Recorded by [KJ] Helen Kirby, PTA 10/02/19 1431      Row Name 10/02/19 1114             ADL Assessment/Intervention    BADL Assessment/Intervention  grooming;lower body dressing  -TS      Recorded by [TS] Yaritza Lopez COTA/L 10/02/19 1242      Row Name 10/02/19 1114             Lower Body Dressing Assessment/Training    Lower Body Dressing Story Level  don;socks;minimum assist (75% patient effort)  -TS      Lower Body Dressing Position  edge of bed sitting  -TS      Comment (Lower Body Dressing)  pt presented with socks and unable to don socks without assist  -TS      Recorded by [TS] Yaritza Lopez COTA/L 10/02/19 1242      Row Name 10/02/19 1114             Grooming Assessment/Training    Story Level (Grooming)  wash face, hands;set up;verbal cues;contact guard assist  -TS      Grooming Position  sink side  -TS      Recorded by [TS] Yaritza Lopez COTA/L 10/02/19 1242      Row Name 10/02/19 1317             Motor Skills Assessment/Interventions    Additional Documentation  Therapeutic Exercise (Group)  -KJ      Recorded by [KJ] Helen Kirby, PTA 10/02/19 1431      Row Name 10/02/19 1317             Therapeutic Exercise    Exercise Type (Therapeutic Exercise)  AROM (active range of motion)  -KJ      Position (Therapeutic Exercise)  seated  -KJ      Sets/Reps (Therapeutic Exercise)  20  -KJ      Recorded by [KJ] Helen Kirby, PTA 10/02/19 1431      Row Name 10/02/19 1317             Balance    Balance  dynamic standing balance;standing balance activity;dynamic balance activity  -KJ      Recorded by [KJ] Helen Kirby, PTA 10/02/19 1431      Row Name 10/02/19 1317             Static Sitting Balance    Level of Story (Unsupported Sitting, Static Balance)  supervision  -KJ      Sitting Position (Unsupported Sitting, Static Balance)  sitting in chair  -KJ      Recorded by [KJ] Helen Kirby, PTA 10/02/19 1431       Row Name 10/02/19 1317             Static Standing Balance    Level of [place] (Supported Standing, Static Balance)  contact guard assist;minimal assist, 75% patient effort  -KJ      Comment (Supported Standing, Static Balance)  Pt. would attempt to take steps forwards before therapy was ready  -KJ      Recorded by [KJ] Helen Kirby, PTA 10/02/19 1431      Row Name 10/02/19 1317             Dynamic Balance Activity    Therapeutic Training Performed (Dynamic Balance)  backward walking;side stepping  -KJ      Comment (Dynamic Balance Training)  high marching, toe raises,abduction  -KJ      Recorded by [KJ] Helen Kirby, PTA 10/02/19 1431      Row Name 10/02/19 1317 10/02/19 1114          Positioning and Restraints    Pre-Treatment Position  sitting in chair/recliner  -KJ  in bed  -TS     Post Treatment Position  chair  -KJ  chair  -TS     In Chair  --  reclined;call light within reach;encouraged to call for assist;exit alarm on;notified nsg  -TS     Recorded by [KJ] Helen Kirby, PTA 10/02/19 1431 [TS] Yaritza Lopez COTA/L 10/02/19 1242     Row Name 10/02/19 1317             Pain Scale: Numbers Pre/Post-Treatment    Pain Scale: Numbers, Pretreatment  0/10 - no pain  -KJ      Pain Scale: Numbers, Post-Treatment  0/10 - no pain  -KJ      Recorded by [KJ] Helen Kirby, PTA 10/02/19 1431      Row Name 10/02/19 1114 10/02/19 0914          Pain Scale: FACES Pre/Post-Treatment    Pain: FACES Scale, Pretreatment  0-->no hurt  -TS  0-->no hurt  -MB,AS,MB2     Pain: FACES Scale, Post-Treatment  0-->no hurt  -TS  --     Recorded by [TS] Yaritza Lopez COTTON/L 10/02/19 1242 [MB,AS,MB2] Georgi Hernandez CCC-SLP (r) Eliecer Williamson, Speech Therapy Student (t) Georgi Hernandez CCC-SLP (c) 10/02/19 1231     Row Name                Wound 09/27/19 0800 Right posterior finger    Wound - Properties Group Date first assessed: 09/27/19 [NW] Time first assessed: 0800 [NW] Present on Hospital  Admission: Y [NW] Side: Right [NW] Orientation: posterior [NW] Location: finger [NW] Recorded by:  [NW] Dunia Multani RN 09/27/19 1624    Row Name 10/02/19 1114             Outcome Summary/Treatment Plan (OT)    Daily Summary of Progress (OT)  progress towards functional goals is fair  -TS      Recorded by [TS] Yaritza Lopez, COTTON/L 10/02/19 1242      Row Name 10/02/19 0914             Outcome Summary/Treatment Plan (SLP)    Daily Summary of Progress (SLP)  progress towards functional goals is fair  -MB,AS,MB2      Barriers to Overall Progress (SLP)  aphasia  -MB,AS,MB2      Plan for Continued Treatment (SLP)  continue to follow and treat  -MB,AS,MB2      Anticipated Dischage Disposition  inpatient rehabilitation facility  -MB,AS,MB2      Recorded by [MB,AS,MB2] Georgi Hernandez CCC-SLP (r) Eliecer Williamson, Speech Therapy Student (t) Georgi Hernandez CCC-SLP (c) 10/02/19 1231        User Key  (r) = Recorded By, (t) = Taken By, (c) = Cosigned By    Initials Name Effective Dates Discipline    MB Georgi Hernandez CCC-SLP 08/02/16 -  SLP    Helen Whitt, JAIR 08/02/16 -  PT    TS Yaritza Lopez, COTTON/L 08/02/16 -  OT    NW Dunia Multani RN 07/12/18 -  Nurse    AS Eliecer Williamson, Speech Therapy Student 09/03/19 -  SLP          Wound 09/27/19 0800 Right posterior finger (Active)   Dressing Appearance open to air 10/2/2019  8:43 AM   Closure None 10/2/2019  8:43 AM   Base dry;white 10/1/2019  5:24 PM   Edges jagged 10/2/2019  8:43 AM   Drainage Amount none 10/2/2019  8:43 AM   Dressing Care, Wound open to air 10/2/2019  8:43 AM       Rehab Goal Summary     Row Name 10/02/19 0917             Swallow Goals (SLP)    Oral Nutrition/Hydration Goal Selection (SLP)  oral nutrition/hydration, SLP goal 1  -MB (r) AS (t) MB (c)      Lingual Strengthening Goal Selection (SLP)  lingual strengthening, SLP goal 1  -MB (r) AS (t) MB (c)      Pharyngeal Strengthening Exercise Goal Selection  (SLP)  pharyngeal strengthening exercise, SLP goal 1  -MB (r) AS (t) MB (c)      Additional Documentation  lingual strengthening goal selection (SLP);pharyngeal strengthening exercise goal selection (SLP)  -MB (r) AS (t) MB (c)         Oral Nutrition/Hydration Goal 1 (SLP)    Oral Nutrition/Hydration Goal 1, SLP  LTG: Patient will tolerate LRD without s/s of aspiration.  -MB (r) AS (t) MB (c)      Time Frame (Oral Nutrition/Hydration Goal 1, SLP)  by discharge  -MB (r) AS (t) MB (c)      Barriers (Oral Nutrition/Hydration Goal 1, SLP)  Aphasia  -MB (r) AS (t) MB (c)      Progress/Outcomes (Oral Nutrition/Hydration Goal 1, SLP)  goal ongoing  -MB (r) AS (t) MB (c)         Lingual Strengthening Goal 1 (SLP)    Activity (Lingual Strengthening Goal 1, SLP)  increase tongue back strength  -MB (r) AS (t) MB (c)      Increase Tongue Back Strength  lingual movement exercises  -MB (r) AS (t) MB (c)      [place]/Accuracy (Lingual Strengthening Goal 1, SLP)  independently (over 90% accuracy)  -MB (r) AS (t) MB (c)      Time Frame (Lingual Strengthening Goal 1, SLP)  short term goal (STG);by discharge  -MB (r) AS (t) MB (c)      Barriers (Lingual Strengthening Goal 1, SLP)  n/a  -MB (r) AS (t) MB (c)      Progress/Outcomes (Lingual Strengthening Goal 1, SLP)  good progress toward goal  -MB (r) AS (t) MB (c)         Pharyngeal Strengthening Exercise Goal 1 (SLP)    Activity (Pharyngeal Strengthening Goal 1, SLP)  increase epiglottic inversion and retroflexion;increase squeeze/positive pressure generation;increase tongue base retraction  -MB (r) AS (t) MB (c)      Increase Epiglottic Inversion and Retroflexion  Mendelsohn  -MB (r) AS (t) MB (c)      Increase Squeeze/Positive Pressure Generation  hard effortful swallow  -MB (r) AS (t) MB (c)      Increase Tongue Base Retraction  yoni  -MB (r) AS (t) MB (c)      [place]/Accuracy (Pharyngeal Strengthening Goal 1, SLP)  independently (over 90% accuracy)  -MB (r) AS (t) MB  (c)      Time Frame (Pharyngeal Strengthening Goal 1, SLP)  short term goal (STG);by discharge  -MB (r) AS (t) MB (c)      Barriers (Pharyngeal Strengthening Goal 1, SLP)  cognitive status  -MB (r) AS (t) MB (c)      Progress/Outcomes (Pharyngeal Strengthening Goal 1, SLP)  goal ongoing  -MB (r) AS (t) MB (c)         Communication Treatment Objective and Progress Goals (SLP)    Auditory Comprehension Treatment Objectives  Auditory Comprehension Treatment Objectives (Group)  -MB (r) AS (t) MB (c)      Verbal Expression Treatment Objectives  Verbal Expression Treatment Objectives (Group)  -MB (r) AS (t) MB (c)      Graphic Expression Treatment Objectives  Graphic Expression Treatment Objectives (Group)  -MB (r) AS (t) MB (c)      Augmentative/Alternative Communication Objectives  Augmentative/Alternative Communication Objectives (Group)  -MB (r) AS (t) MB (c)         Auditory Comprehension Treatment Objectives    Words/Phrases/Sentences Selection  words/phrases/sentences, SLP goal 1  -MB (r) AS (t) MB (c)      Comprehend Questions Selection  comprehend questions, SLP goal 1  -MB (r) AS (t) MB (c)      Follow Directions Selection  follow directions, SLP goal 1  -MB (r) AS (t) MB (c)         Words/Phrases/Sentences Goal 1 (SLP)    Improve Ability to Comprehend Words/Phrases/Sentences Through: Goal 1 (SLP)  identify objects, field of;identify pictures, field of;90%;independently (over 90% accuracy);other (comment)  -MB (r) AS (t) MB (c)      Time Frame (Identify Objects and Pictures Goal 1, SLP)  short term goal (STG);by discharge  -MB (r) AS (t) MB (c)      Barriers (Identify Objects and Pictures Goal 1, SLP)  aphasia  -MB (r) AS (t) MB (c)      Progress/Outcomes (Identify Objects and Pictures Goal 1, SLP)  goal ongoing  -MB (r) AS (t) MB (c)         Comprehend Questions Goal 1 (SLP)    Improve Ability to Comprehend Questions Goal 1 (SLP)  simple yes/no questions;complex yes/no questions;90%;independently (over 90%  accuracy)  -MB (r) AS (t) MB (c)      Time Frame (Comprehend Questions Goal 1, SLP)  short term goal (STG);by discharge  -MB (r) AS (t) MB (c)      Barriers (Comprehend Questions Goal 1, SLP)  aphaisa  -MB (r) AS (t) MB (c)      Progress/Outcomes (Comprehend Questions Goal 1, SLP)  goal ongoing  -MB (r) AS (t) MB (c)         Follow Directions Goal 2 (SLP)    Improve Ability to Follow Directions Goal 1 (SLP)  1 step direction with objects;1 step direction without objects;90%;independently (over 90% accuracy)  -MB (r) AS (t) MB (c)      Time Frame (Follow Directions Goal 1, SLP)  short term goal (STG);by discharge  -MB (r) AS (t) MB (c)      Progress (Ability to Follow Directions Goal 1, SLP)  50%;with minimal cues (75-90%)  -MB (r) AS (t) MB (c)      Progress/Outcomes (Follow Directions Goal 1, SLP)  continuing progress toward goal  -MB (r) AS (t) MB (c)         Verbal Expression Treatment Objectives    Word Retrieval Skills Selection  word retrieval, SLP goal 1  -MB (r) AS (t) MB (c)      Phrase and Sentence Level Response Selection  phrase and sentence level response, SLP goal 1  -MB (r) AS (t) MB (c)         Word Retrieval Skills Goal 1 (SLP)    Improve Word Retrieval Skills By Goal 1 (SLP)  completing automatic speech task, days of the week;completing automatic speech task, months;confrontational naming task;repeating words;90%;independently (over 90% accuracy)  -MB (r) AS (t) MB (c)      Time Frame (Word Retrieval Goal 1, SLP)  short term goal (STG);by discharge  -MB (r) AS (t) MB (c)      Progress (Word Retrieval Skills Goal 1, SLP)  50%  -MB (r) AS (t) MB (c)      Progress/Outcomes (Word Retrieval Goal 1, SLP)  continuing progress toward goal  -MB (r) AS (t) MB (c)         Graphic Expression Treatment Objectives    Graphic Expression of Shapes, Letters and Numbers Selection  graphic expression of shapes, letters, and numbers, SLP goal 1  -MB (r) AS (t) MB (c)         Graphic Expression of Shapes, Letters,  Numbers Goal 1 (SLP)    Improve Graphic Expression of Shapes, Letters, and Numbers Goal 1 (SLP)  copy shapes, numbers, and letters;writing dictated number and letters;90%;independently (over 90% accuracy)  -MB (r) AS (t) MB (c)      Time Frame (Graphic Expression of Shapes, Letters, and Numbers Goal 1, SLP)  short term goal (STG);by discharge  -MB (r) AS (t) MB (c)      Progress/Outcomes (Graphic Expression of Shapes, Letters, and Numbers Goal 1, SLP)  goal ongoing  -MB (r) AS (t) MB (c)         Augmentative/Alternative Communication Objectives    Augmentative/Alternative Communication Selection  augmentative/alternative communication, SLP goal 1  -MB (r) AS (t) MB (c)         Augmentative/Alternative Communication Objectives Goal 1 (SLP    Communication (Augmentative/Alternative Communication Goal 1, SLP)  improve ability to use low tech augmentative/alternative communication device  -MB (r) AS (t) MB (c)      Improve Communication by (Augmentative/Alternative Communication Goal 1, SLP)  identify picture, field of ____;identify word, field of ____;alphabet/picture board;90%;independently (over 90% accuracy)  -MB (r) AS (t) MB (c)      Time Frame (Augmentative/Alternative Communication Goal 1, SLP)  short term goal (STG);by discharge  -MB (r) AS (t) MB (c)      Progress (Augmentative/Alternative Communication Goal 1, SLP)  70%  -MB (r) AS (t) MB (c)      Progress/Outcomes (Augmentative/Alternative Communication Goal 1, SLP)  good progress toward goal  -MB (r) AS (t) MB (c)        User Key  (r) = Recorded By, (t) = Taken By, (c) = Cosigned By    Initials Name Provider Type Discipline    Georgi Garcia CCC-SLP Speech and Language Pathologist SLP    AS Eliecer Williamson Speech Therapy Student Speech Therapy Student SLP          Physical Therapy Education     Title: PT OT SLP Therapies (In Progress)     Topic: Physical Therapy (In Progress)     Point: Mobility training (Done)     Learning Progress Summary            Patient Acceptance, ETTA ACUÑA,NR by AF at 10/1/2019  8:19 AM    Comment:  Educated on benefits of activity and ongoing PT POC.   Family Acceptance, ETTA ACUÑA,NR by AF at 10/1/2019  8:19 AM    Comment:  Educated on benefits of activity and ongoing PT POC.                               User Key     Initials Effective Dates Name Provider Type Discipline     08/27/19 -  Arti Haile, PT Student PT Student PT                PT Recommendation and Plan     Plan of Care Reviewed With: patient  Progress: improving  Outcome Summary: Pt. was sitting in chair and performed sit to stand transfer with Min A/CGAx1 with no use of assistive device; Pt. ambulated 50'x2 Lucie/CGA with verbal directional cueing. Noted decreased balance with turning pt. had a narrow JUSTA. Pt. performed standing activities for balance. Pt. had decreased sequencing ability and impulsiveness with stepping before therapy was ready. During gait Pt. had decreased RLE ankle DF and decreased heel strike. Pt. able to perform one step commands. Pt. performed sitting exercises 20 reps   Outcome Measures     Row Name 10/02/19 1200 10/01/19 1000          How much help from another is currently needed...    Putting on and taking off regular lower body clothing?  3  -TS  3  -JJ     Bathing (including washing, rinsing, and drying)  3  -TS  3  -JJ     Toileting (which includes using toilet bed pan or urinal)  3  -TS  3  -JJ     Putting on and taking off regular upper body clothing  3  -TS  3  -JJ     Taking care of personal grooming (such as brushing teeth)  3  -TS  3  -JJ     Eating meals  3 pt currently NPO  -TS  3  -JJ     AM-PAC 6 Clicks Score (OT)  18  -TS  18  -JJ        Functional Assessment    Outcome Measure Options  AM-PAC 6 Clicks Daily Activity (OT)  -TS  AM-PAC 6 Clicks Daily Activity (OT)  -JJ       User Key  (r) = Recorded By, (t) = Taken By, (c) = Cosigned By    Initials Name Provider Type    TS Yaritza Lopez COTA/L Occupational Therapy Assistant     Christina Oliver OTR/L Occupational Therapist         Time Calculation:   PT Charges     Row Name 10/02/19 1431             Time Calculation    Start Time  1317  -KJ      Stop Time  1340  -KJ      Time Calculation (min)  23 min  -KJ      PT Received On  10/02/19  -KJ      PT Goal Re-Cert Due Date  10/11/19  -KJ         Time Calculation- PT    Total Timed Code Minutes- PT  23 minute(s)  -KJ        User Key  (r) = Recorded By, (t) = Taken By, (c) = Cosigned By    Initials Name Provider Type    Helen Whitt PTA Physical Therapy Assistant        Therapy Charges for Today     Code Description Service Date Service Provider Modifiers Qty    50796887424 HC GAIT TRAINING EA 15 MIN 10/2/2019 Helen Kirby PTA GP 1    03738892252 HC PT THER PROC EA 15 MIN 10/2/2019 Helen Kirby PTA GP 1          PT G-Codes  Outcome Measure Options: AM-PAC 6 Clicks Daily Activity (OT)  AM-PAC 6 Clicks Score (PT): 18  AM-PAC 6 Clicks Score (OT): 18    Helen Kirby PTA  10/2/2019         Electronically signed by Helen Kirby PTA at 10/02/19 1443     Helen Kirby PTA at 10/02/19 1527  Version 1 of 1         Problem: Patient Care Overview  Goal: Plan of Care Review   10/02/19 1432   Coping/Psychosocial   Plan of Care Reviewed With patient   Plan of Care Review   Progress improving   OTHER   Outcome Summary Pt. was sitting in chair and performed sit to stand transfer with Min A/CGAx1 with no use of assistive device; Pt. ambulated 50'x2 Lucie/CGA with verbal directional cueing. Noted decreased balance with turning pt. had a narrow JUSTA. Pt. performed standing activities for balance. Pt. had decreased sequencing ability and impulsiveness with stepping before therapy was ready. During gait Pt. had decreased RLE ankle DF and decreased heel strike. Pt. able to perform one step commands. Pt. performed sitting AROM exercises 20 reps . Recommend inpatient rehab for PT/OT/SLP.           Electronically signed by Helen Kirby  PTA at 10/02/19 1527          Occupational Therapy Notes (last 24 hours) (Notes from 10/02/19 1253 through 10/03/19 1253)      Yaritza Lopez, YURY/L at 10/03/19 1207          Acute Care - Occupational Therapy Treatment Note  [facility]     Patient Name: Jarod Ramírez Jr.  : 1960  MRN: [id number]  Today's Date: 10/3/2019  Onset of Illness/Injury or Date of Surgery: 19  Date of Referral to OT: 19  Referring Physician: Dr. Alas    Admit Date: 2019       ICD-10-CM ICD-9-CM   1. Dysphagia, unspecified type R13.10 787.20   2. Impaired mobility Z74.09 799.89   3. Impaired mobility and ADLs Z74.09 799.89   4. Aphasia R47.01 784.3     Patient Active Problem List   Diagnosis   • Degeneration of cervical intervertebral disc   • Closed fracture of lumbar vertebra (CMS/HCC)   • Degeneration of lumbar or lumbosacral intervertebral disc   • Smoker   • BMI 27.0-27.9,adult   • Acute cerebrovascular accident (CVA) of cerebellum (CMS/HCC)   • Alcoholism /alcohol abuse (CMS/HCC)   • CVA (cerebral vascular accident) (CMS/HCC)   • Hyperlipidemia   • Dysphagia due to recent cerebral infarction     Past Medical History:   Diagnosis Date   • Hypertension      Past Surgical History:   Procedure Laterality Date   • CATARACT EXTRACTION Left    • WRIST FRACTURE SURGERY Left        Therapy Treatment    Rehabilitation Treatment Summary     Row Name 10/03/19 1005 10/03/19 0952 10/03/19 0915       Treatment Time/Intention    Discipline  occupational therapy assistant  -TS  speech language pathologist  -MB  physical therapy assistant  (Pended)   -    Document Type  therapy note (daily note)  -TS  therapy note (daily note)  -MB  therapy note (daily note)  (Pended)   -    Subjective Information  no complaints  -TS  no complaints  -MB  no complaints  (Pended)   -JW    Mode of Treatment  --  speech-language pathology  -MB  physical therapy  (Pended)   -JW    Patient/Family Observations  --  No family present  -MB   no family present  (Pended)   -JW    Patient Effort  good  -TS  good  -MB  good  (Pended)   -JW    Comment  --  --  RLE foot drop  (Pended)   -JW    Existing Precautions/Restrictions  fall  -TS  --  fall  -KJ    Treatment Considerations/Comments  aphasia, decreased safety   -TS  --  aphasia  -KJ    Patient Response to Treatment  --  --  good  (Pended)   -JW    Recorded by [TS] Yaritza Lopez COTTON/L 10/03/19 1206 [MB] Georgi Hernandez CCC-SLP 10/03/19 1008 [JW] Georgia Jama, PTA Student 10/03/19 1034  [KJ] Helen Kirby, PTA 10/03/19 1106    Row Name 10/03/19 1005             Cognitive Assessment/Intervention- PT/OT    Follows Commands (Cognition)  follows one step commands;75-90% accuracy;increased processing time needed;verbal cues/prompting required  -TS      Recorded by [TS] Yaritza Lopez COTTON/L 10/03/19 1206      Row Name 10/03/19 0915             Verbal Expression Assessment/Intervention    Verbal Expression  severe impairment  (Pended)   -JW      Sentence Formulation  severe impairment  (Pended)   -JW      Recorded by [JW] Georgia Jama, PTA Student 10/03/19 1034      Row Name 10/03/19 0915             Safety Issues, Functional Mobility    Safety Issues Affecting Function (Mobility)  impulsivity;sequencing abilities  (Pended)   -      Impairments Affecting Function (Mobility)  balance;coordination  (Pended)   -      Comment, Safety Issues/Impairments (Mobility)  Pt. at risk for falls due to impulsivity.Pt. presents with decreased ROM RLE  ankle DF; Pt. needed verbal/visual cueing to perform balance activities correctly  (Pended)   -JW      Recorded by [JW] Georgia Jama, PTA Student 10/03/19 1034      Row Name 10/03/19 1005 10/03/19 0915          Functional Mobility    Functional Mobility- Ind. Level  standby assist;contact guard assist  -TS  contact guard assist;supervision required;verbal cues required;1 person  (Pended)   -     Functional Mobility- Safety Issues  --   sequencing ability decreased  (Pended)   -JW     Functional Mobility- Comment  in room, in BR  -TS  --     Recorded by [TS] Yaritza Lopez COTA/KIKO 10/03/19 1206 [JW] Georgia Jama, PTA Student 10/03/19 1034     Row Name 10/03/19 1005 10/03/19 0915          Transfer Assessment/Treatment    Transfer Assessment/Treatment  sit-stand transfer;stand-sit transfer;shower transfer  -TS  sit-stand transfer;stand-sit transfer  (Pended)   -JW     Recorded by [TS] Yaritza Lopez COTA/KIKO 10/03/19 1206 [JW] Georgia Jama, PTA Student 10/03/19 1034     Row Name 10/03/19 1005 10/03/19 0915          Sit-Stand Transfer    Sit-Stand [place] (Transfers)  stand by assist  -TS  contact guard;supervision;1 person assist  (Pended)   -JW     Recorded by [TS] Yaritza Lopez COTA/KIKO 10/03/19 1206 [JW] Georgia Jama, PTA Student 10/03/19 1034     Row Name 10/03/19 1005 10/03/19 0915          Stand-Sit Transfer    Stand-Sit [place] (Transfers)  stand by assist  -TS  supervision  (Pended)   -JW     Recorded by [TS] Yaritza Lopez COTA/KIKO 10/03/19 1206 [JW] Georgia Jama, PTA Student 10/03/19 1034     Row Name 10/03/19 1005             Shower Transfer    Type (Shower Transfer)  sit-stand;stand-sit  -TS      [place] Level (Shower Transfer)  stand by assist  -TS      Assistive Device (Shower Transfer)  shower chair;grab bars/tub rail  -TS      Recorded by [TS] Yaritza Lopez COTA/L 10/03/19 1206      Row Name 10/03/19 0915             Gait/Stairs Assessment/Training    [place] Level (Gait)  contact guard;minimum assist (75% patient effort);1 person assist  (Pended)   -JW      Distance in Feet (Gait)  100'x2  (Pended)   -JW      Pattern (Gait)  swing-through  (Pended)   -JW      Right Sided Gait Deviations  foot drop/toe drag;heel strike decreased  (Pended)   -JW      Recorded by [JW] Georgia Jama, PTA Student 10/03/19 1034      Row Name 10/03/19 1005             ADL  Assessment/Intervention    BADL Assessment/Intervention  upper body dressing;lower body dressing;grooming;bathing  -TS      Recorded by [TS] Yaritza Lopez COTA/L 10/03/19 1206      Row Name 10/03/19 1005             Bathing Assessment/Intervention    Bathing [place] Level  upper body;lower body;set up;supervision;verbal cues  -TS      Assistive Devices (Bathing)  hand-held shower spray hose;grab bars/tub rail;shower chair  -TS      Bathing Position  supported sitting;supported standing  -TS      Recorded by [TS] Yaritza Lopez COTA/L 10/03/19 1206      Row Name 10/03/19 1005             Upper Body Dressing Assessment/Training    Upper Body Dressing [place] Level  don;doff;set up;supervision  -TS      Upper Body Dressing Position  supported sitting  -TS      Recorded by [TS] Yaritza Lopez COTA/L 10/03/19 1206      Row Name 10/03/19 1005             Lower Body Dressing Assessment/Training    Lower Body Dressing [place] Level  don;doff;socks;undergarment;pants/bottoms;contact guard assist;supervision  -TS      Lower Body Dressing Position  supported sitting;supported standing  -TS      Comment (Lower Body Dressing)  pt attempted to stand to don pants, pt demonstrated decreased balance when attempting to stand to don pants and had LOB requiring min A to correct  -TS      Recorded by [TS] Yaritza Lopez COTA/L 10/03/19 1206      Row Name 10/03/19 1005             Grooming Assessment/Training    [place] Level (Grooming)  oral care regimen;hair care, combing/brushing;set up SBA  -TS      Grooming Position  sink side;supported standing  -TS      Recorded by [TS] Yaritza Lopez COTA/L 10/03/19 1206      Row Name 10/03/19 0915             Therapeutic Exercise    Lower Extremity (Therapeutic Exercise)  LAQ (long arc quad), bilateral;marching while seated;marching while standing  (Pended)   -JW      Exercise Type (Therapeutic Exercise)  AROM (active range of motion);AAROM  (active assistive range of motion)  (Pended)   -JW      Position (Therapeutic Exercise)  seated;standing  (Pended)   -JW      Sets/Reps (Therapeutic Exercise)  20  (Pended)   -JW      Comment (Therapeutic Exercise)  AAROM RLE Ankle DF  (Pended)   -JW      Recorded by [JW] Georgia Jama PTA Student 10/03/19 1034      Row Name 10/03/19 0915             Balance    Balance  dynamic balance activity  (Pended)   -JW      Recorded by [JW] Georgia Jama PTA Student 10/03/19 1034      Row Name 10/03/19 0915             Static Sitting Balance    Level of [place] (Unsupported Sitting, Static Balance)  independent  (Pended)   -JW      Sitting Position (Unsupported Sitting, Static Balance)  sitting in chair  (Pended)   -JW      Recorded by [JW] Georgia Jama PTA Student 10/03/19 1034      Row Name 10/03/19 0915             Static Standing Balance    Level of [place] (Supported Standing, Static Balance)  contact guard assist;minimal assist, 75% patient effort;1 person assist  (Pended)   -JW      Recorded by [JW] Georgia Jama PTA Student 10/03/19 1034      Row Name 10/03/19 0915             Standing Balance Activity    Activities Performed (Standing, Balance Training)  standing unsupported  (Pended)   -      Support Needed for Balance (Standing, Balance Training)  CGA;SBA;1 person assist  (Pended)   -JW      Recorded by [JW] Georgia Jama PTA Student 10/03/19 1034      Row Name 10/03/19 0915             Dynamic Balance Activity    Therapeutic Training Performed (Dynamic Balance)  backward walking;side stepping  (Pended)   -      Comment (Dynamic Balance Training)  Marching;forward/backward stepping;mini squats  (Pended)   -JW      Recorded by [JW] Georgia Jama PTA Student 10/03/19 1034      Row Name 10/03/19 1005 10/03/19 0915          Positioning and Restraints    Pre-Treatment Position  sitting in chair/recliner  -TS  sitting in chair/recliner  (Pended)   -JW     Post Treatment  Position  other  -TS  chair  (Pended)   -ZACH     In Chair  --  exit alarm on;call light within reach  -ELISABETH     Other Position  with other staff with transport  -TS  --     Recorded by [TS] Yaritza Lopez COTA/KIKO 10/03/19 1206 [JW] Georgia Jama, [facility] Student 10/03/19 1034  [KJ] Helen Kirby, [facility] 10/03/19 1106     Row Name 10/03/19 0915             Pain Scale: Numbers Pre/Post-Treatment    Pain Scale: Numbers, Pretreatment  0/10 - no pain  (Pended)   -JW      Pain Scale: Numbers, Post-Treatment  0/10 - no pain  (Pended)   -JW      Recorded by [JW] Georgia Jama, [facility] Student 10/03/19 1034      Row Name 10/03/19 1005 10/03/19 0952          Pain Scale: FACES Pre/Post-Treatment    Pain: FACES Scale, Pretreatment  0-->no hurt  -TS  0-->no hurt  -MB     Pain: FACES Scale, Post-Treatment  0-->no hurt  -TS  --     Recorded by [TS] Yaritza Lopez COTA/KIKO 10/03/19 1206 [MB] Georgi Hernandez CCC-SLP 10/03/19 1008     Row Name                Wound 09/27/19 0800 Right posterior finger    Wound - Properties Group Date first assessed: 09/27/19 [NW] Time first assessed: 0800 [NW] Present on Hospital Admission: Y [NW] Side: Right [NW] Orientation: posterior [NW] Location: finger [NW] Recorded by:  [NW] Dunia Multani RN 09/27/19 1624    Row Name 10/03/19 0952             Outcome Summary/Treatment Plan (SLP)    Daily Summary of Progress (SLP)  progress toward functional goals is good  -MB      Barriers to Overall Progress (SLP)  Aphasia  -MB      Plan for Continued Treatment (SLP)  Complete MBS today  -MB      Anticipated Dischage Disposition  inpatient rehabilitation facility  -MB      Recorded by [MB] Georgi Hernandez CCC-SLP 10/03/19 1008        User Key  (r) = Recorded By, (t) = Taken By, (c) = Cosigned By    Initials Name Effective Dates Discipline    Georgi Garcia CCC-SLP 08/02/16 -  SLP    Helen Whitt, PTA 08/02/16 -  PT    TS Yaritza Lopez COTA/L 08/02/16 -  OT    NW  Dunia Multani RN 07/12/18 -  Nurse    Georgia Jean Baptiste, JAIR Student 09/18/19 -  PT        Wound 09/27/19 0800 Right posterior finger (Active)   Dressing Appearance open to air 10/3/2019  7:37 AM   Closure None 10/3/2019  7:37 AM   Edges jagged 10/2/2019  8:35 PM   Drainage Amount none 10/2/2019  8:35 PM   Dressing Care, Wound open to air 10/3/2019  7:37 AM     Rehab Goal Summary     Row Name 10/03/19 0952             Oral Nutrition/Hydration Goal 1 (SLP)    Oral Nutrition/Hydration Goal 1, SLP  LTG: Patient will tolerate LRD without s/s of aspiration.  -MB      Time Frame (Oral Nutrition/Hydration Goal 1, SLP)  by discharge  -MB      Barriers (Oral Nutrition/Hydration Goal 1, SLP)  Aphasia  -MB      Progress/Outcomes (Oral Nutrition/Hydration Goal 1, SLP)  continuing progress toward goal  -MB         Lingual Strengthening Goal 1 (SLP)    Activity (Lingual Strengthening Goal 1, SLP)  increase tongue back strength  -MB      Increase Tongue Back Strength  lingual movement exercises  -MB      [place]/Accuracy (Lingual Strengthening Goal 1, SLP)  independently (over 90% accuracy)  -MB      Time Frame (Lingual Strengthening Goal 1, SLP)  short term goal (STG);by discharge  -MB      Barriers (Lingual Strengthening Goal 1, SLP)  n/a  -MB      Progress/Outcomes (Lingual Strengthening Goal 1, SLP)  good progress toward goal  -MB         Pharyngeal Strengthening Exercise Goal 1 (SLP)    Activity (Pharyngeal Strengthening Goal 1, SLP)  increase epiglottic inversion and retroflexion;increase squeeze/positive pressure generation;increase tongue base retraction  -MB      Increase Epiglottic Inversion and Retroflexion  [place]hn  -MB      Increase Squeeze/Positive Pressure Generation  hard effortful swallow  -MB      Increase Tongue Base Retraction  yoni  -MB      [place]/Accuracy (Pharyngeal Strengthening Goal 1, SLP)  independently (over 90% accuracy)  -MB      Time Frame (Pharyngeal Strengthening Goal 1,  SLP)  short term goal (STG);by discharge  -MB      Barriers (Pharyngeal Strengthening Goal 1, SLP)  cognitive status  -MB      Progress/Outcomes (Pharyngeal Strengthening Goal 1, SLP)  goal ongoing  -MB        User Key  (r) = Recorded By, (t) = Taken By, (c) = Cosigned By    Initials Name Provider Type Discipline    Georgi Garcia CCC-SLP Speech and Language Pathologist SLP        Occupational Therapy Education     Title: PT OT SLP Therapies (Done)     Topic: Occupational Therapy (Done)     Point: ADL training (Done)     Description: Instruct learner(s) on proper safety adaptation and remediation techniques during self care or transfers.   Instruct in proper use of assistive devices.    Learning Progress Summary           Patient Acceptance, E, DU by ZACH at 10/3/2019 11:22 AM    Comment:  Pt. educated on safety awareness/impulsivity performs better with visual cueing    Acceptance, E, VU by YUDI at 10/1/2019 10:40 AM    Comment:  OT POC, adls, t/fs, bed mobility, safety/environmental modifications, processing, d/c planning.                   Point: Home exercise program (Done)     Description: Instruct learner(s) on appropriate technique for monitoring, assisting and/or progressing therapeutic exercises/activities.    Learning Progress Summary           Patient Acceptance, E, DU by ZACH at 10/3/2019 11:22 AM    Comment:  Pt. educated on safety awareness/impulsivity performs better with visual cueing                   Point: Precautions (Done)     Description: Instruct learner(s) on prescribed precautions during self-care and functional transfers.    Learning Progress Summary           Patient Acceptance, E, DU by ZACH at 10/3/2019 11:22 AM    Comment:  Pt. educated on safety awareness/impulsivity performs better with visual cueing                   Point: Body mechanics (Done)     Description: Instruct learner(s) on proper positioning and spine alignment during self-care, functional mobility activities and/or  exercises.    Learning Progress Summary           Patient AcceptanceDOMENICO DU by ZACH at 10/3/2019 11:22 AM    Comment:  Pt. educated on safety awareness/impulsivity performs better with visual cueing    DOMENICO Oakes VU by YUDI at 10/1/2019 10:40 AM    Comment:  OT POC, adls, t/fs, bed mobility, safety/environmental modifications, processing, d/c planning.                               User Key     Initials Effective Dates Name Provider Type Discipline    YUDI 10/12/18 -  Christina Dahl OTR/L Occupational Therapist OT    ZACH 09/18/19 -  Georgia Jama, PTA Student PTA Student PT                OT Recommendation and Plan  Outcome Summary/Treatment Plan (OT)  Daily Summary of Progress (OT): progress towards functional goals is fair  Daily Summary of Progress (OT): progress towards functional goals is fair  Plan of Care Review  Plan of Care Reviewed With: patient  Plan of Care Reviewed With: patient  Outcome Summary: Pt S for bed mobility and came into long sitting from supine in bed then came to EOB. Pt transfers with CGA with verbal cues to stand and increased time to process cue. Pt ambulated to BR with CGA and stood at side sink for grooming tasks with CGA after set up. Pt followed 50-60% of directions provided with initial simple one step command. Pt would benefit from acute rehab at discharge. Continue OT POC   Outcome Measures     Row Name 10/03/19 1200 10/02/19 1200 10/01/19 1000       How much help from another is currently needed...    Putting on and taking off regular lower body clothing?  3  -TS  3  -TS  3  -JJ    Bathing (including washing, rinsing, and drying)  3  -TS  3  -TS  3  -JJ    Toileting (which includes using toilet bed pan or urinal)  4  -TS  3  -TS  3  -JJ    Putting on and taking off regular upper body clothing  4  -TS  3  -TS  3  -JJ    Taking care of personal grooming (such as brushing teeth)  3  -TS  3  -TS  3  -JJ    Eating meals  4  -TS  3 pt currently NPO  -TS  3  -JJ    AM-PAC 6 Clicks  Score (OT)  21  -TS  18  -TS  18  -JJ       Functional Assessment    Outcome Measure Options  AM-PAC 6 Clicks Daily Activity (OT)  -TS  AM-PAC 6 Clicks Daily Activity (OT)  -TS  AM-PAC 6 Clicks Daily Activity (OT)  -JJ      User Key  (r) = Recorded By, (t) = Taken By, (c) = Cosigned By    Initials Name Provider Type    TS Yaritza Lopez COTA/L Occupational Therapy Assistant    Christina Oliver OTR/L Occupational Therapist           Time Calculation:   Time Calculation- OT     Row Name 10/03/19 1207             Time Calculation- OT    OT Start Time  1005  -TS      OT Stop Time  1043  -TS      OT Time Calculation (min)  38 min  -TS      Total Timed Code Minutes- OT  38 minute(s)  -TS      OT Received On  10/03/19  -TS         Timed Charges    14455 - OT Self Care/Mgmt Minutes  38  -TS        User Key  (r) = Recorded By, (t) = Taken By, (c) = Cosigned By    Initials Name Provider Type     Yaritza Lopez COTA/L Occupational Therapy Assistant        Therapy Charges for Today     Code Description Service Date Service Provider Modifiers Qty    65425100234 HC OT SELF CARE/MGMT/TRAIN EA 15 MIN 10/2/2019 Yaritza Lopez COTA/L GO 3    65397089363 HC OT SELF CARE/MGMT/TRAIN EA 15 MIN 10/3/2019 Yaritza Lopez COTA/L GO 3               Yaritza MEANS. BISI Lopez  10/3/2019    Electronically signed by Yaritza Lopez COTA/L at 10/03/19 1208

## 2019-10-03 NOTE — PLAN OF CARE
Problem: Patient Care Overview  Goal: Plan of Care Review  Outcome: Ongoing (interventions implemented as appropriate)   10/03/19 5393   Coping/Psychosocial   Plan of Care Reviewed With patient   Plan of Care Review   Progress improving   OTHER   Outcome Summary Pt found to have superficial thrombus per US in right upper extremity. Already on ASA therapy and warm compress ordered but patient will not leave on. [facility] soft pudding thick diet initiated today, monitor for aspiration. Continues to be impulsive and aphasic. Safety maintained.        Problem: Fall Risk (Adult)  Goal: Absence of Fall  Outcome: Ongoing (interventions implemented as appropriate)      Problem: Stroke (Ischemic) (Adult)  Goal: Signs and Symptoms of Listed Potential Problems Will be Absent, Minimized or Managed (Stroke)  Outcome: Ongoing (interventions implemented as appropriate)      Problem: Skin Injury Risk (Adult)  Goal: Skin Health and Integrity  Outcome: Ongoing (interventions implemented as appropriate)      Problem: VTE, DVT and PE (Adult)  Goal: Signs and Symptoms of Listed Potential Problems Will be Absent, Minimized or Managed (VTE, DVT and PE)  Outcome: Ongoing (interventions implemented as appropriate)

## 2019-10-03 NOTE — PROGRESS NOTES
Continued Stay Note   Duyen     Patient Name: Jarod Ramírez Jr.  MRN: [id number]  Today's Date: 10/3/2019    Admit Date: 9/26/2019    Discharge Plan     Row Name 10/03/19 1254       Plan    Plan Comments  MADE REFERRAL TO [facility]. PER NOTES PT IS STILL ALERT AND DOES NOT NEED A GUARDIAN. PT BROTHER STATES PT CAN LIVE WITH HIM AFTER REHAB AND PT DOES AGREE. WILL FOLLOW FOR ANSWER FROM [facility].         Discharge Codes    No documentation.             JONO Sharif

## 2019-10-03 NOTE — PLAN OF CARE
"Problem: Patient Care Overview  Goal: Plan of Care Review  Outcome: Ongoing (interventions implemented as appropriate)   10/03/19 0352   Coping/Psychosocial   Plan of Care Reviewed With patient   Plan of Care Review   Progress no change   OTHER   Outcome Summary Pt has flat affect. Will sit and stare at the wall. No neuro changes. Impulsive and does not use/understand how to use call light. Will speak some phrases correctly. Had a great difficulty asking for \"ice chips\". Explained his NPO status and aspiration risks. Pt was unhappy but used mouth swab to moisten mouth. Pt's right antecubital area hot, red, hard and painful. Per discussion with RN from previous night, this is new. Will pass on for dayshift MD to assess.        Problem: Fall Risk (Adult)  Goal: Absence of Fall  Outcome: Ongoing (interventions implemented as appropriate)      Problem: Stroke (Ischemic) (Adult)  Goal: Signs and Symptoms of Listed Potential Problems Will be Absent, Minimized or Managed (Stroke)  Outcome: Ongoing (interventions implemented as appropriate)      Problem: Skin Injury Risk (Adult)  Goal: Skin Health and Integrity  Outcome: Ongoing (interventions implemented as appropriate)      Problem: Nutrition, Imbalanced: Inadequate Oral Intake (Adult)  Goal: Improved Oral Intake  Outcome: Ongoing (interventions implemented as appropriate)    Goal: Prevent Further Weight Loss  Outcome: Ongoing (interventions implemented as appropriate)      Problem: Nutrition, Enteral (Adult)  Goal: Signs and Symptoms of Listed Potential Problems Will be Absent, Minimized or Managed (Nutrition, Enteral)  Outcome: Ongoing (interventions implemented as appropriate)        "

## 2019-10-03 NOTE — THERAPY TREATMENT NOTE
Acute Care - Occupational Therapy Treatment Note  [facility]     Patient Name: Jarod Ramírez Jr.  : 1960  MRN: [id number]  Today's Date: 10/3/2019  Onset of Illness/Injury or Date of Surgery: 19  Date of Referral to OT: 19  Referring Physician: Dr. Alas    Admit Date: 2019       ICD-10-CM ICD-9-CM   1. Dysphagia, unspecified type R13.10 787.20   2. Impaired mobility Z74.09 799.89   3. Impaired mobility and ADLs Z74.09 799.89   4. Aphasia R47.01 784.3     Patient Active Problem List   Diagnosis   • Degeneration of cervical intervertebral disc   • Closed fracture of lumbar vertebra (CMS/HCC)   • Degeneration of lumbar or lumbosacral intervertebral disc   • Smoker   • BMI 27.0-27.9,adult   • Acute cerebrovascular accident (CVA) of cerebellum (CMS/HCC)   • Alcoholism /alcohol abuse (CMS/HCC)   • CVA (cerebral vascular accident) (CMS/HCC)   • Hyperlipidemia   • Dysphagia due to recent cerebral infarction     Past Medical History:   Diagnosis Date   • Hypertension      Past Surgical History:   Procedure Laterality Date   • CATARACT EXTRACTION Left    • WRIST FRACTURE SURGERY Left        Therapy Treatment    Rehabilitation Treatment Summary     Row Name 10/03/19 1005 10/03/19 0952 10/03/19 0915       Treatment Time/Intention    Discipline  occupational therapy assistant  -TS  speech language pathologist  -MB  physical therapy assistant  (Pended)   -    Document Type  therapy note (daily note)  -TS  therapy note (daily note)  -MB  therapy note (daily note)  (Pended)   -    Subjective Information  no complaints  -TS  no complaints  -MB  no complaints  (Pended)   -    Mode of Treatment  --  speech-language pathology  -MB  physical therapy  (Pended)   -    Patient/Family Observations  --  No family present  -MB  no family present  (Pended)   -    Patient Effort  good  -TS  good  -MB  good  (Pended)   -    Comment  --  --  RLE foot drop  (Pended)   -    Existing  Precautions/Restrictions  fall  -TS  --  fall  -KJ    Treatment Considerations/Comments  aphasia, decreased safety   -TS  --  aphasia  -KJ    Patient Response to Treatment  --  --  good  (Pended)   -JW    Recorded by [TS] Yaritza Lopez COTA/L 10/03/19 1206 [MB] Georgi Hernandez CCC-SLP 10/03/19 1008 [JW] Georgia Jama, [facility] Student 10/03/19 1034  [KJ] Helen Kirby, [facility] 10/03/19 1106    Row Name 10/03/19 1005             Cognitive Assessment/Intervention- PT/OT    Follows Commands (Cognition)  follows one step commands;75-90% accuracy;increased processing time needed;verbal cues/prompting required  -TS      Recorded by [TS] Yaritza Lopez COTA/L 10/03/19 1206      Row Name 10/03/19 0915             Verbal Expression Assessment/Intervention    Verbal Expression  severe impairment  (Pended)   -      Sentence Formulation  severe impairment  (Pended)   -JW      Recorded by [JW] Georgia Jama, [facility] Student 10/03/19 1034      Row Name 10/03/19 0915             Safety Issues, Functional Mobility    Safety Issues Affecting Function (Mobility)  impulsivity;sequencing abilities  (Pended)   -      Impairments Affecting Function (Mobility)  balance;coordination  (Pended)   -      Comment, Safety Issues/Impairments (Mobility)  Pt. at risk for falls due to impulsivity.Pt. presents with decreased ROM RLE  ankle DF; Pt. needed verbal/visual cueing to perform balance activities correctly  (Pended)   -JW      Recorded by [JW] Georgia Jama, [facility] Student 10/03/19 1034      Row Name 10/03/19 1005 10/03/19 0915          Functional Mobility    Functional Mobility- Ind. Level  standby assist;contact guard assist  -TS  contact guard assist;supervision required;verbal cues required;1 person  (Pended)   -     Functional Mobility- Safety Issues  --  sequencing ability decreased  (Pended)   -     Functional Mobility- Comment  in room, in BR  -TS  --     Recorded by [TS] Yaritza Lopez COTA/KIKO  10/03/19 1206 [JW] Georgia Jama PTA Student 10/03/19 1034     Row Name 10/03/19 1005 10/03/19 0915          Transfer Assessment/Treatment    Transfer Assessment/Treatment  sit-stand transfer;stand-sit transfer;shower transfer  -TS  sit-stand transfer;stand-sit transfer  (Pended)   -JW     Recorded by [TS] Yaritza Lopez COTA/L 10/03/19 1206 [JW] Georgia Jama PTA Student 10/03/19 1034     Row Name 10/03/19 1005 10/03/19 0915          Sit-Stand Transfer    Sit-Stand [place] (Transfers)  stand by assist  -TS  contact guard;supervision;1 person assist  (Pended)   -JW     Recorded by [TS] Yaritza Lopez COTA/L 10/03/19 1206 [JW] Georgia Jama PTA Student 10/03/19 1034     Row Name 10/03/19 1005 10/03/19 0915          Stand-Sit Transfer    Stand-Sit [place] (Transfers)  stand by assist  -TS  supervision  (Pended)   -JW     Recorded by [TS] Yaritza Lopez COTTON/L 10/03/19 1206 [JW] Georgia Jama PTA Student 10/03/19 1034     Row Name 10/03/19 1005             Shower Transfer    Type (Shower Transfer)  sit-stand;stand-sit  -TS      [place] Level (Shower Transfer)  stand by assist  -TS      Assistive Device (Shower Transfer)  shower chair;grab bars/tub rail  -TS      Recorded by [TS] Yaritza Lopez COTTON/L 10/03/19 1206      Row Name 10/03/19 0915             Gait/Stairs Assessment/Training    [place] Level (Gait)  contact guard;minimum assist (75% patient effort);1 person assist  (Pended)   -JW      Distance in Feet (Gait)  100'x2  (Pended)   -JW      Pattern (Gait)  swing-through  (Pended)   -JW      Right Sided Gait Deviations  foot drop/toe drag;heel strike decreased  (Pended)   -JW      Recorded by [JW] Georgia Jama PTA Student 10/03/19 1034      Row Name 10/03/19 1005             ADL Assessment/Intervention    BADL Assessment/Intervention  upper body dressing;lower body dressing;grooming;bathing  -TS      Recorded by [TS] Yaritza Lopez,  COTTON/L 10/03/19 1206      Row Name 10/03/19 1005             Bathing Assessment/Intervention    Bathing [place] Level  upper body;lower body;set up;supervision;verbal cues  -TS      Assistive Devices (Bathing)  hand-held shower spray hose;grab bars/tub rail;shower chair  -TS      Bathing Position  supported sitting;supported standing  -TS      Recorded by [TS] Yaritza Lopez COTA/L 10/03/19 1206      Row Name 10/03/19 1005             Upper Body Dressing Assessment/Training    Upper Body Dressing [place] Level  don;doff;set up;supervision  -TS      Upper Body Dressing Position  supported sitting  -TS      Recorded by [TS] Yaritza Lopez COTA/L 10/03/19 1206      Row Name 10/03/19 1005             Lower Body Dressing Assessment/Training    Lower Body Dressing [place] Level  don;doff;socks;undergarment;pants/bottoms;contact guard assist;supervision  -TS      Lower Body Dressing Position  supported sitting;supported standing  -TS      Comment (Lower Body Dressing)  pt attempted to stand to don pants, pt demonstrated decreased balance when attempting to stand to don pants and had LOB requiring min A to correct  -TS      Recorded by [TS] Yaritza Lopez COTA/L 10/03/19 1206      Row Name 10/03/19 1005             Grooming Assessment/Training    [place] Level (Grooming)  oral care regimen;hair care, combing/brushing;set up SBA  -TS      Grooming Position  sink side;supported standing  -TS      Recorded by [TS] Yaritza Lopez COTA/L 10/03/19 1206      Row Name 10/03/19 0915             Therapeutic Exercise    Lower Extremity (Therapeutic Exercise)  LAQ (long arc quad), bilateral;marching while seated;marching while standing  (Pended)   -      Exercise Type (Therapeutic Exercise)  AROM (active range of motion);AAROM (active assistive range of motion)  (Pended)   -JW      Position (Therapeutic Exercise)  seated;standing  (Pended)   -JW      Sets/Reps (Therapeutic Exercise)  20   (Pended)   -JW      Comment (Therapeutic Exercise)  LENY RLE Ankle DF  (Pended)   -JW      Recorded by [JW] Georgia Jama PTA Student 10/03/19 1034      Row Name 10/03/19 0915             Balance    Balance  dynamic balance activity  (Pended)   -JW      Recorded by [JW] Georgia Jama PTA Student 10/03/19 1034      Row Name 10/03/19 0915             Static Sitting Balance    Level of [place] (Unsupported Sitting, Static Balance)  independent  (Pended)   -JW      Sitting Position (Unsupported Sitting, Static Balance)  sitting in chair  (Pended)   -JW      Recorded by [JW] Georgia Jama PTA Student 10/03/19 1034      Row Name 10/03/19 0915             Static Standing Balance    Level of [place] (Supported Standing, Static Balance)  contact guard assist;minimal assist, 75% patient effort;1 person assist  (Pended)   -JW      Recorded by [JW] Georgia Jama PTA Student 10/03/19 1034      Row Name 10/03/19 0915             Standing Balance Activity    Activities Performed (Standing, Balance Training)  standing unsupported  (Pended)   -JW      Support Needed for Balance (Standing, Balance Training)  CGA;SBA;1 person assist  (Pended)   -JW      Recorded by [JW] Georgia Jama PTA Student 10/03/19 1034      Row Name 10/03/19 0915             Dynamic Balance Activity    Therapeutic Training Performed (Dynamic Balance)  backward walking;side stepping  (Pended)   -JW      Comment (Dynamic Balance Training)  Marching;forward/backward stepping;mini squats  (Pended)   -JW      Recorded by [JW] Georgia Jama PTA Student 10/03/19 1034      Row Name 10/03/19 1005 10/03/19 0915          Positioning and Restraints    Pre-Treatment Position  sitting in chair/recliner  -TS  sitting in chair/recliner  (Pended)   -JW     Post Treatment Position  other  -TS  chair  (Pended)   -JW     In Chair  --  exit alarm on;call light within reach  -KJ     Other Position  with other staff with transport  -TS  --      Recorded by [TS] Yaritza Lopez COTTON/KIKO 10/03/19 1206 [JW] Georgia Jama, PTA Student 10/03/19 1034  [KJ] Helen Kirby, [facility] 10/03/19 1106     Row Name 10/03/19 0915             Pain Scale: Numbers Pre/Post-Treatment    Pain Scale: Numbers, Pretreatment  0/10 - no pain  (Pended)   -JW      Pain Scale: Numbers, Post-Treatment  0/10 - no pain  (Pended)   -JW      Recorded by [JW] Georgia Jama, [facility] Student 10/03/19 1034      Row Name 10/03/19 1005 10/03/19 0952          Pain Scale: FACES Pre/Post-Treatment    Pain: FACES Scale, Pretreatment  0-->no hurt  -TS  0-->no hurt  -MB     Pain: FACES Scale, Post-Treatment  0-->no hurt  -TS  --     Recorded by [TS] Yaritza Lopez COTTON/KIKO 10/03/19 1206 [MB] Georgi Hernandez CCC-SLP 10/03/19 1008     Row Name                Wound 09/27/19 0800 Right posterior finger    Wound - Properties Group Date first assessed: 09/27/19 [NW] Time first assessed: 0800 [NW] Present on Hospital Admission: Y [NW] Side: Right [NW] Orientation: posterior [NW] Location: finger [NW] Recorded by:  [NW] Dunia Multani RN 09/27/19 1624    Row Name 10/03/19 0952             Outcome Summary/Treatment Plan (SLP)    Daily Summary of Progress (SLP)  progress toward functional goals is good  -MB      Barriers to Overall Progress (SLP)  Aphasia  -MB      Plan for Continued Treatment (SLP)  Complete MBS today  -MB      Anticipated Dischage Disposition  inpatient rehabilitation facility  -MB      Recorded by [MB] Georgi Hernandez CCC-SLP 10/03/19 1008        User Key  (r) = Recorded By, (t) = Taken By, (c) = Cosigned By    Initials Name Effective Dates Discipline    MB Georgi Hernandez CCC-SLP 08/02/16 -  SLP    Helen Whitt, PTA 08/02/16 -  PT    TS Yaritza Lopez COTTON/L 08/02/16 -  OT    Dunia Chairez RN 07/12/18 -  Nurse    Georgia Jean Baptiste, PTA Student 09/18/19 -  PT        Wound 09/27/19 0800 Right posterior finger (Active)   Dressing  Appearance open to air 10/3/2019  7:37 AM   Closure None 10/3/2019  7:37 AM   Edges jagged 10/2/2019  8:35 PM   Drainage Amount none 10/2/2019  8:35 PM   Dressing Care, Wound open to air 10/3/2019  7:37 AM     Rehab Goal Summary     Row Name 10/03/19 0952             Oral Nutrition/Hydration Goal 1 (SLP)    Oral Nutrition/Hydration Goal 1, SLP  LTG: Patient will tolerate LRD without s/s of aspiration.  -MB      Time Frame (Oral Nutrition/Hydration Goal 1, SLP)  by discharge  -MB      Barriers (Oral Nutrition/Hydration Goal 1, SLP)  Aphasia  -MB      Progress/Outcomes (Oral Nutrition/Hydration Goal 1, SLP)  continuing progress toward goal  -MB         Lingual Strengthening Goal 1 (SLP)    Activity (Lingual Strengthening Goal 1, SLP)  increase tongue back strength  -MB      Increase Tongue Back Strength  lingual movement exercises  -MB      [place]/Accuracy (Lingual Strengthening Goal 1, SLP)  independently (over 90% accuracy)  -MB      Time Frame (Lingual Strengthening Goal 1, SLP)  short term goal (STG);by discharge  -MB      Barriers (Lingual Strengthening Goal 1, SLP)  n/a  -MB      Progress/Outcomes (Lingual Strengthening Goal 1, SLP)  good progress toward goal  -MB         Pharyngeal Strengthening Exercise Goal 1 (SLP)    Activity (Pharyngeal Strengthening Goal 1, SLP)  increase epiglottic inversion and retroflexion;increase squeeze/positive pressure generation;increase tongue base retraction  -MB      Increase Epiglottic Inversion and Retroflexion  [place]  -MB      Increase Squeeze/Positive Pressure Generation  hard effortful swallow  -MB      Increase Tongue Base Retraction  yoni  -MB      [place]/Accuracy (Pharyngeal Strengthening Goal 1, SLP)  independently (over 90% accuracy)  -MB      Time Frame (Pharyngeal Strengthening Goal 1, SLP)  short term goal (STG);by discharge  -MB      Barriers (Pharyngeal Strengthening Goal 1, SLP)  cognitive status  -MB      Progress/Outcomes (Pharyngeal  Strengthening Goal 1, SLP)  goal ongoing  -MB        User Key  (r) = Recorded By, (t) = Taken By, (c) = Cosigned By    Initials Name Provider Type Discipline    Georgi Garcia CCC-SLP Speech and Language Pathologist SLP        Occupational Therapy Education     Title: PT OT SLP Therapies (Done)     Topic: Occupational Therapy (Done)     Point: ADL training (Done)     Description: Instruct learner(s) on proper safety adaptation and remediation techniques during self care or transfers.   Instruct in proper use of assistive devices.    Learning Progress Summary           Patient Acceptance, E, DU by ZACH at 10/3/2019 11:22 AM    Comment:  Pt. educated on safety awareness/impulsivity performs better with visual cueing    Acceptance, E, VU by YUDI at 10/1/2019 10:40 AM    Comment:  OT POC, adls, t/fs, bed mobility, safety/environmental modifications, processing, d/c planning.                   Point: Home exercise program (Done)     Description: Instruct learner(s) on appropriate technique for monitoring, assisting and/or progressing therapeutic exercises/activities.    Learning Progress Summary           Patient Acceptance, E, DU by ZACH at 10/3/2019 11:22 AM    Comment:  Pt. educated on safety awareness/impulsivity performs better with visual cueing                   Point: Precautions (Done)     Description: Instruct learner(s) on prescribed precautions during self-care and functional transfers.    Learning Progress Summary           Patient Acceptance, E, DU by ZACH at 10/3/2019 11:22 AM    Comment:  Pt. educated on safety awareness/impulsivity performs better with visual cueing                   Point: Body mechanics (Done)     Description: Instruct learner(s) on proper positioning and spine alignment during self-care, functional mobility activities and/or exercises.    Learning Progress Summary           Patient Acceptance, E, DU by ZACH at 10/3/2019 11:22 AM    Comment:  Pt. educated on safety awareness/impulsivity  performs better with visual cueing    Champ, E, VU by YUDI at 10/1/2019 10:40 AM    Comment:  OT POC, adls, t/fs, bed mobility, safety/environmental modifications, processing, d/c planning.                               User Key     Initials Effective Dates Name Provider Type Discipline    YUDI 10/12/18 -  Christina Dahl OTR/L Occupational Therapist OT    JW 09/18/19 -  Georgia Jama PTA Student PTA Student PT                OT Recommendation and Plan  Outcome Summary/Treatment Plan (OT)  Daily Summary of Progress (OT): progress towards functional goals is fair  Daily Summary of Progress (OT): progress towards functional goals is fair  Plan of Care Review  Plan of Care Reviewed With: patient  Plan of Care Reviewed With: patient  Outcome Summary: Pt S for bed mobility and came into long sitting from supine in bed then came to EOB. Pt transfers with CGA with verbal cues to stand and increased time to process cue. Pt ambulated to BR with CGA and stood at side sink for grooming tasks with CGA after set up. Pt followed 50-60% of directions provided with initial simple one step command. Pt would benefit from acute rehab at discharge. Continue OT POC   Outcome Measures     Row Name 10/03/19 1200 10/02/19 1200 10/01/19 1000       How much help from another is currently needed...    Putting on and taking off regular lower body clothing?  3  -TS  3  -TS  3  -JJ    Bathing (including washing, rinsing, and drying)  3  -TS  3  -TS  3  -JJ    Toileting (which includes using toilet bed pan or urinal)  4  -TS  3  -TS  3  -JJ    Putting on and taking off regular upper body clothing  4  -TS  3  -TS  3  -JJ    Taking care of personal grooming (such as brushing teeth)  3  -TS  3  -TS  3  -JJ    Eating meals  4  -TS  3 pt currently NPO  -TS  3  -JJ    AM-PAC 6 Clicks Score (OT)  21  -TS  18  -TS  18  -JJ       Functional Assessment    Outcome Measure Options  AM-PAC 6 Clicks Daily Activity (OT)  -TS  AM-PAC 6 Clicks Daily  Activity (OT)  -TS  AM-PAC 6 Clicks Daily Activity (OT)  -J      User Key  (r) = Recorded By, (t) = Taken By, (c) = Cosigned By    Initials Name Provider Type    TS Yaritza Lopez COTTON/L Occupational Therapy Assistant    Christina Oliver OTR/L Occupational Therapist           Time Calculation:   Time Calculation- OT     Row Name 10/03/19 1207             Time Calculation- OT    OT Start Time  1005  -TS      OT Stop Time  1043  -TS      OT Time Calculation (min)  38 min  -TS      Total Timed Code Minutes- OT  38 minute(s)  -TS      OT Received On  10/03/19  -TS         Timed Charges    44071 - OT Self Care/Mgmt Minutes  38  -TS        User Key  (r) = Recorded By, (t) = Taken By, (c) = Cosigned By    Initials Name Provider Type    RANI Yaritza Lopez COTTON/L Occupational Therapy Assistant        Therapy Charges for Today     Code Description Service Date Service Provider Modifiers Qty    30565022131 HC OT SELF CARE/MGMT/TRAIN EA 15 MIN 10/2/2019 Yaritza Lopez COTTON/L GO 3    63278732966 HC OT SELF CARE/MGMT/TRAIN EA 15 MIN 10/3/2019 Yaritza Lopez COTTON/L GO 3               Yaritza MEANS. LEIGHA LopezA/KIKO  10/3/2019

## 2019-10-03 NOTE — PLAN OF CARE
Problem: Patient Care Overview  Goal: Plan of Care Review  Outcome: Ongoing (interventions implemented as appropriate)   10/03/19 5262   Coping/Psychosocial   Plan of Care Reviewed With patient;other (see comments)  (Nurse, Moon)   OTHER   Outcome Summary Modified barium swallow study completed. Consistencies were presented in the following order: honey, nectar, honey, pudding, mechanical soft, regular solid, honey, pudding x2. He exhibited decreased bolus formation and premature loss to the pyriforms secondary to a delay and decreased back of tongue control with the first honey and nectar thick trials. He had poor hyolaryngeal excursion/elevation and epiglottic inversion with these trials and throughout the rest of the study. He displayed flash penetration with the first honey and nectar trials. He then had severe pharyngeal residue with both as well as residue in the laryngeal vestibule from the nectar thick trial. With the second honey thick trial, he again had flash penetration during the swallow and was cued to complete a chin tuck during multiple swallows, but still had significant oral residue remaining. He had poor bolus formation and premature loss to the vallecula with pudding, mechanical soft, and regular solids. Pharyngeal residue was less with pudding than with honey and nectar and was only mild to moderate with mechanical soft and regular solids. He did appear to have aspiration of nectar thick residue in the laryngeal vestibule during the swallow of pudding thick barium. He did have a delayed cough following this. With the last trial of honey thick barium the pt had premature loss to the vallecula and deep silent laryngeal penetration during the swallow with residue remaining in the laryngeal vestibule afterward. The last two trials of pudding thick resulted in premature loss to the vallecula, but no definite new instances of laryngeal penetration or aspiration. The pt was cued to complete a chin  tuck with multiple swallows of the second trial of honey thick barium and continued to tuck his chin throughout the rest of the study. This did appear to possibly help with residue clearance. Recommend starting a mechanical soft solid diet and pudding thick liquids. Meds crushed with applesauce. Ok for ice chips or small sips of water between meals, following oral care. Strict aspiration precautions. Pt should use chin tuck during swallows and complete multiple swallows with each bite. Encourage occasonal volitional throat clear/cough during PO for airway clearance. Pt should have occasional supervision with meals. Precautions were posted above bed and left on bedside table after reviewing with pt. SLP will continue to follow and treat.

## 2019-10-03 NOTE — THERAPY TREATMENT NOTE
Acute Care - Physical Therapy Treatment Note  [facility]      Patient Name: Jarod Ramírez Jr.  : 1960  MRN: [id number]  Today's Date: 10/3/2019  Onset of Illness/Injury or Date of Surgery: 19     Referring Physician: Dr. Alas    Admit Date: 2019    Visit Dx:    ICD-10-CM ICD-9-CM   1. Dysphagia, unspecified type R13.10 787.20   2. Impaired mobility Z74.09 799.89   3. Impaired mobility and ADLs Z74.09 799.89   4. Aphasia R47.01 784.3     Patient Active Problem List   Diagnosis   • Degeneration of cervical intervertebral disc   • Closed fracture of lumbar vertebra (CMS/HCC)   • Degeneration of lumbar or lumbosacral intervertebral disc   • Smoker   • BMI 27.0-27.9,adult   • Acute cerebrovascular accident (CVA) of cerebellum (CMS/HCC)   • Alcoholism /alcohol abuse (CMS/HCC)   • CVA (cerebral vascular accident) (CMS/HCC)   • Hyperlipidemia   • Dysphagia due to recent cerebral infarction       Therapy Treatment    Rehabilitation Treatment Summary     Row Name 10/03/19 1005 10/03/19 0952 10/03/19 0915       Treatment Time/Intention    Discipline  occupational therapy assistant  -TS  speech language pathologist  -MB  physical therapy assistant  -MSJW,MS2    Document Type  therapy note (daily note)  -TS  therapy note (daily note)  -MB  therapy note (daily note)  -MS,JW,MS2    Subjective Information  no complaints  -TS  no complaints  -MB  no complaints  -MS,JW,MS2    Mode of Treatment  --  speech-language pathology  -MB  physical therapy  -MS,JW,MS2    Patient/Family Observations  --  No family present  -MB  no family present  -MS,JW,MS2    Patient Effort  good  -TS  good  -MB  good  -MS,JW,MS2    Comment  --  --  RLE foot drop  -MS,JW,MS2    Existing Precautions/Restrictions  fall  -TS  --  fall  -KJ    Treatment Considerations/Comments  aphasia, decreased safety   -TS  --  aphasia  -KJ    Patient Response to Treatment  --  --  good  -MS,JW,MS2    Recorded by [TS] Yaritza Lopez COTA/KIKO  10/03/19 1206 [MB] Georgi Hernandez CCC-SLP 10/03/19 1008 [KJ] Helen Kirby, PTA 10/03/19 1106  [MS,JW,MS2] Mayuri Schilling, PT, DPT, NCS (r) Georgia Jama PTA Student (t) Mayuri Schilling, PT, DPT, NCS (c) 10/03/19 1236    Row Name 10/03/19 1005             Cognitive Assessment/Intervention- PT/OT    Follows Commands (Cognition)  follows one step commands;75-90% accuracy;increased processing time needed;verbal cues/prompting required  -TS      Recorded by [TS] Yaritza Lopez COTA/L 10/03/19 1206      Row Name 10/03/19 0915             Verbal Expression Assessment/Intervention    Verbal Expression  severe impairment  -MS,JW,MS2      Sentence Formulation  severe impairment  -MS,JW,MS2      Recorded by [MS,JW,MS2] Mayuri Schilling PT, DPT, NCS (r) Georgia Jama PTA Student (t) Mayuri Schilling, PT, DPT, NCS (c) 10/03/19 1236      Row Name 10/03/19 0915             Safety Issues, Functional Mobility    Safety Issues Affecting Function (Mobility)  impulsivity;sequencing abilities  -MS,JW,MS2      Impairments Affecting Function (Mobility)  balance;coordination  -MS,JW,MS2      Comment, Safety Issues/Impairments (Mobility)  Pt. at risk for falls due to impulsivity.Pt. presents with decreased ROM RLE  ankle DF; Pt. needed verbal/visual cueing to perform balance activities correctly  -MS,JW,MS2      Recorded by [MS,JW,MS2] Mayuri Schilling, PT, DPT, NCS (r) Georgia Jama PTA Student (t) Mayuri Schilling, PT, DPT, NCS (c) 10/03/19 1236      Row Name 10/03/19 1005 10/03/19 0915          Functional Mobility    Functional Mobility- Ind. Level  standby assist;contact guard assist  -TS  contact guard assist;supervision required;verbal cues required;1 person  -MS,JW,MS2     Functional Mobility- Safety Issues  --  sequencing ability decreased  -MS,JW,MS2     Functional Mobility- Comment  in room, in BR  -TS  --     Recorded by [TS] Yaritza Lopez, COTTON/L 10/03/19 1206 [MS,JW,MS2] Mayuri Schilling  R, PT, DPT, NCS (r) Georgia Jama, PTA Student (t) Mayuri Schilling, PT, DPT, NCS (c) 10/03/19 1236     Row Name 10/03/19 1005 10/03/19 0915          Transfer Assessment/Treatment    Transfer Assessment/Treatment  sit-stand transfer;stand-sit transfer;shower transfer  -TS  sit-stand transfer;stand-sit transfer  -MS,JW,MS2     Recorded by [TS] Yaritza Lopez, COTTON/L 10/03/19 1206 [MS,JW,MS2] Mayuri Schilling, PT, DPT, NCS (r) Georgia Jama, PTA Student (t) Mayuri Schilling, PT, DPT, NCS (c) 10/03/19 1236     Row Name 10/03/19 1005 10/03/19 0915          Sit-Stand Transfer    Sit-Stand [place] (Transfers)  stand by assist  -TS  contact guard;supervision;1 person assist  -MS,JW,MS2     Recorded by [TS] Yaritza Lopez, COTTON/L 10/03/19 1206 [MS,JW,MS2] Mayuri Schilling, PT, DPT, NCS (r) Georgia Jama, PTA Student (t) Mayuri Schilling, PT, DPT, NCS (c) 10/03/19 1236     Row Name 10/03/19 1005 10/03/19 0915          Stand-Sit Transfer    Stand-Sit [place] (Transfers)  stand by assist  -TS  supervision  -MS,JW,MS2     Recorded by [TS] Yaritza Lopez, COTTON/L 10/03/19 1206 [MS,JW,MS2] Mayuri Schilling, PT, DPT, NCS (r) Georgia Jama, PTA Student (t) Mayuri Schilling, PT, DPT, NCS (c) 10/03/19 1236     Row Name 10/03/19 1005             Shower Transfer    Type (Shower Transfer)  sit-stand;stand-sit  -TS      [place] Level (Shower Transfer)  stand by assist  -TS      Assistive Device (Shower Transfer)  shower chair;grab bars/tub rail  -TS      Recorded by [TS] Yaritza Lopez, COTTON/L 10/03/19 1206      Row Name 10/03/19 0915             Gait/Stairs Assessment/Training    [place] Level (Gait)  contact guard;minimum assist (75% patient effort);1 person assist  -MS,JW,MS2      Distance in Feet (Gait)  100'x2  -MS,JW,MS2      Pattern (Gait)  swing-through  -MS,JW,MS2      Right Sided Gait Deviations  foot drop/toe drag;heel strike decreased  -MS,JW,MS2      Recorded by  [MS,JW,MS2] Mayuri Schilling, PT, DPT, NCS (r) Georgia Jama PTA Student (t) Mayuri Schilling, PT, DPT, NCS (c) 10/03/19 1236      Row Name 10/03/19 1005             ADL Assessment/Intervention    BADL Assessment/Intervention  upper body dressing;lower body dressing;grooming;bathing  -TS      Recorded by [TS] Yaritza Lopez COTTON/L 10/03/19 1206      Row Name 10/03/19 1005             Bathing Assessment/Intervention    Bathing [place] Level  upper body;lower body;set up;supervision;verbal cues  -TS      Assistive Devices (Bathing)  hand-held shower spray hose;grab bars/tub rail;shower chair  -TS      Bathing Position  supported sitting;supported standing  -TS      Recorded by [TS] Yaritza Lopez COTTON/L 10/03/19 1206      Row Name 10/03/19 1005             Upper Body Dressing Assessment/Training    Upper Body Dressing [place] Level  don;doff;set up;supervision  -TS      Upper Body Dressing Position  supported sitting  -TS      Recorded by [TS] Yaritza Lopez COTTON/L 10/03/19 1206      Row Name 10/03/19 1005             Lower Body Dressing Assessment/Training    Lower Body Dressing [place] Level  don;doff;socks;undergarment;pants/bottoms;contact guard assist;supervision  -TS      Lower Body Dressing Position  supported sitting;supported standing  -TS      Comment (Lower Body Dressing)  pt attempted to stand to don pants, pt demonstrated decreased balance when attempting to stand to don pants and had LOB requiring min A to correct  -TS      Recorded by [TS] Yaritza Lopez COTTON/L 10/03/19 1206      Row Name 10/03/19 1005             Grooming Assessment/Training    [place] Level (Grooming)  oral care regimen;hair care, combing/brushing;set up SBA  -TS      Grooming Position  sink side;supported standing  -TS      Recorded by [TS] Yaritza Lopez COTTON/L 10/03/19 1206      Row Name 10/03/19 0915             Therapeutic Exercise    Lower Extremity (Therapeutic  Exercise)  LAQ (long arc quad), bilateral;marching while seated;marching while standing  -MS,JW,MS2      Exercise Type (Therapeutic Exercise)  AROM (active range of motion);AAROM (active assistive range of motion)  -MS,JW,MS2      Position (Therapeutic Exercise)  seated;standing  -MS,JW,MS2      Sets/Reps (Therapeutic Exercise)  20  -MS,JW,MS2      Comment (Therapeutic Exercise)  AAROM RLE Ankle DF  -MS,JW,MS2      Recorded by [MS,JW,MS2] Mayuri Schilling, PT, DPT, NCS (r) Georgia Jama PTA Student (t) Mayuri Schilling, PT, DPT, NCS (c) 10/03/19 1236      Row Name 10/03/19 0915             Balance    Balance  dynamic balance activity  -MS,JW,MS2      Recorded by [MS,JW,MS2] Mayuri Schilling, PT, DPT, NCS (r) Georgia Jama PTA Student (t) Mayuri Schilling, PT, DPT, NCS (c) 10/03/19 123      Row Name 10/03/19 0915             Static Sitting Balance    Level of [place] (Unsupported Sitting, Static Balance)  independent  -MS,JW,MS2      Sitting Position (Unsupported Sitting, Static Balance)  sitting in chair  -MS,JW,MS2      Recorded by [MS,JW,MS2] Mayuri Schilling, PT, DPT, NCS (r) Georgia Jama, PTA Student (t) Mayuri Schilling, PT, DPT, NCS (c) 10/03/19 123      Row Name 10/03/19 0915             Static Standing Balance    Level of [place] (Supported Standing, Static Balance)  contact guard assist;minimal assist, 75% patient effort;1 person assist  -MS,JW,MS2      Recorded by [MS,JW,MS2] Mayuri Schilling, PT, DPT, NCS (r) Georgia Jama, PTA Student (t) Mayuri Schilling, PT, DPT, NCS (c) 10/03/19 1236      Row Name 10/03/19 0915             Standing Balance Activity    Activities Performed (Standing, Balance Training)  standing unsupported  -MS,JW,MS2      Support Needed for Balance (Standing, Balance Training)  CGA;SBA;1 person assist  -MS,JW,MS2      Recorded by [ZACH MEHTA,MS2] Mayuri Schilling, PT, DPT, NCS (r) Georgia Jama, PTA Student (t) Mayuri Schilling, YONATHAN, DPT, NCS (c) 10/03/19  1236      Row Name 10/03/19 0915             Dynamic Balance Activity    Therapeutic Training Performed (Dynamic Balance)  backward walking;side stepping  -MS,JW,MS2      Comment (Dynamic Balance Training)  Marching;forward/backward stepping;mini squats  -MS,JW,MS2      Recorded by [MS,JW,MS2] Mayuri Schilling, PT, DPT, NCS (r) Georgia Jama, PTA Student (t) Mayuri Schilling, PT, DPT, NCS (c) 10/03/19 1236      Row Name 10/03/19 1005 10/03/19 0915          Positioning and Restraints    Pre-Treatment Position  sitting in chair/recliner  -TS  sitting in chair/recliner  -MS,JW,MS2     Post Treatment Position  other  -TS  chair  -MS,JW,MS2     In Chair  --  exit alarm on;call light within reach  -KJ     Other Position  with other staff with transport  -TS  --     Recorded by [TS] Yaritza Lopez COTTON/L 10/03/19 1206 [KJ] Helen Kirby, PTA 10/03/19 1106  [MS,JW,MS2] Mayuri Schilling, PT, DPT, NCS (r) Georgia Jama, PTA Student (t) Mayuri Schilling, PT, DPT, NCS (c) 10/03/19 1236     Row Name 10/03/19 0915             Pain Scale: Numbers Pre/Post-Treatment    Pain Scale: Numbers, Pretreatment  0/10 - no pain  -MS,JW,MS2      Pain Scale: Numbers, Post-Treatment  0/10 - no pain  -MS,JW,MS2      Recorded by [MS,JW,MS2] Mayuri Schilling, PT, DPT, NCS (r) Georgia Jama, PTA Student (t) Mayuri Schilling, PT, DPT, NCS (c) 10/03/19 1236      Row Name 10/03/19 1005 10/03/19 0952          Pain Scale: FACES Pre/Post-Treatment    Pain: FACES Scale, Pretreatment  0-->no hurt  -TS  0-->no hurt  -MB     Pain: FACES Scale, Post-Treatment  0-->no hurt  -TS  --     Recorded by [TS] Yaritza Lopez COTTON/L 10/03/19 1206 [MB] Georgi Hernandez, CCC-SLP 10/03/19 1008     Row Name                Wound 09/27/19 0800 Right posterior finger    Wound - Properties Group Date first assessed: 09/27/19 [NW] Time first assessed: 0800 [NW] Present on Hospital Admission: Y [NW] Side: Right [NW] Orientation: posterior [NW]  Location: finger [NW] Recorded by:  [NW] Dunia Multani, RN 09/27/19 1624    Row Name 10/03/19 0952             Outcome Summary/Treatment Plan (SLP)    Daily Summary of Progress (SLP)  progress toward functional goals is good  -MB      Barriers to Overall Progress (SLP)  Aphasia  -MB      Plan for Continued Treatment (SLP)  Complete MBS today  -MB      Anticipated Dischage Disposition  inpatient rehabilitation facility  -MB      Recorded by [MB] Georgi Hernandez, CCC-SLP 10/03/19 1008        User Key  (r) = Recorded By, (t) = Taken By, (c) = Cosigned By    Initials Name Effective Dates Discipline    MB Georgi Hernandez, CCC-SLP 08/02/16 -  SLP    Helen Whitt, PTA 08/02/16 -  PT    Yaritza Hand, COTTON/L 08/02/16 -  OT    Mayuri Smith, PT, DPT, NCS 06/19/18 -  PT    NW Dunia Multani RN 07/12/18 -  Nurse    Georgia Jean Baptiste PTA Student 09/18/19 -  PT          Wound 09/27/19 0800 Right posterior finger (Active)   Dressing Appearance open to air 10/3/2019  7:37 AM   Closure None 10/3/2019  7:37 AM   Edges jagged 10/2/2019  8:35 PM   Drainage Amount none 10/2/2019  8:35 PM   Dressing Care, Wound open to air 10/3/2019  7:37 AM       Rehab Goal Summary     Row Name 10/03/19 0952             Oral Nutrition/Hydration Goal 1 (SLP)    Oral Nutrition/Hydration Goal 1, SLP  LTG: Patient will tolerate LRD without s/s of aspiration.  -MB      Time Frame (Oral Nutrition/Hydration Goal 1, SLP)  by discharge  -MB      Barriers (Oral Nutrition/Hydration Goal 1, SLP)  Aphasia  -MB      Progress/Outcomes (Oral Nutrition/Hydration Goal 1, SLP)  continuing progress toward goal  -MB         Lingual Strengthening Goal 1 (SLP)    Activity (Lingual Strengthening Goal 1, SLP)  increase tongue back strength  -MB      Increase Tongue Back Strength  lingual movement exercises  -MB      [place]/Accuracy (Lingual Strengthening Goal 1, SLP)  independently (over 90% accuracy)  -MB      Time Frame  (Lingual Strengthening Goal 1, SLP)  short term goal (STG);by discharge  -MB      Barriers (Lingual Strengthening Goal 1, SLP)  n/a  -MB      Progress/Outcomes (Lingual Strengthening Goal 1, SLP)  good progress toward goal  -MB         Pharyngeal Strengthening Exercise Goal 1 (SLP)    Activity (Pharyngeal Strengthening Goal 1, SLP)  increase epiglottic inversion and retroflexion;increase squeeze/positive pressure generation;increase tongue base retraction  -MB      Increase Epiglottic Inversion and Retroflexion  [place]  -MB      Increase Squeeze/Positive Pressure Generation  hard effortful swallow  -MB      Increase Tongue Base Retraction  yoni  -MB      [place]/Accuracy (Pharyngeal Strengthening Goal 1, SLP)  independently (over 90% accuracy)  -MB      Time Frame (Pharyngeal Strengthening Goal 1, SLP)  short term goal (STG);by discharge  -MB      Barriers (Pharyngeal Strengthening Goal 1, SLP)  cognitive status  -MB      Progress/Outcomes (Pharyngeal Strengthening Goal 1, SLP)  goal ongoing  -MB        User Key  (r) = Recorded By, (t) = Taken By, (c) = Cosigned By    Initials Name Provider Type Discipline    Georgi Garcia CCC-SLP Speech and Language Pathologist SLP          Physical Therapy Education     Title: PT OT SLP Therapies (Done)     Topic: Physical Therapy (Done)     Point: Mobility training (Done)     Learning Progress Summary           Patient Acceptance, E, DU by ZACH at 10/3/2019 11:22 AM    Comment:  Pt. educated on safety awareness/impulsivity performs better with visual cueing    Acceptance, E, VU,NR by SHEILA at 10/1/2019  8:19 AM    Comment:  Educated on benefits of activity and ongoing PT POC.   Family Acceptance, E, VU,NR by AF at 10/1/2019  8:19 AM    Comment:  Educated on benefits of activity and ongoing PT POC.                   Point: Home exercise program (Done)     Learning Progress Summary           Patient Acceptance, E, DU by ZACH at 10/3/2019 11:22 AM    Comment:  Pt.  educated on safety awareness/impulsivity performs better with visual cueing                   Point: Body mechanics (Done)     Learning Progress Summary           Patient Acceptance, E, DU by ZACH at 10/3/2019 11:22 AM    Comment:  Pt. educated on safety awareness/impulsivity performs better with visual cueing                   Point: Precautions (Done)     Learning Progress Summary           Patient Acceptance, E, DU by ZACH at 10/3/2019 11:22 AM    Comment:  Pt. educated on safety awareness/impulsivity performs better with visual cueing                               User Key     Initials Effective Dates Name Provider Type Discipline     08/27/19 -  Arti Haile, PT Student PT Student PT     09/18/19 -  Georgia Jama, PTA Student PTA Student PT                PT Recommendation and Plan     Plan of Care Reviewed With: patient  Progress: improving  Outcome Summary: Pt. in chair before treatment.Pt. performed sit to stand transfer from chair without assistive device CGAx1. Pt. ambulated in hallway 100'x2 Lucie/CGA noted RLE foot drop during gait. Pt. was given verbal cueing to look forward/straight ahead during ambulation. Pt. performed balance activities in standing with Lucie/CGA, verbal/visual and tactile cues were given for patient to perform activities correctly.  Pt. performed stand to sit transfer to chair with CGA and verbal cueing to reach back for chair arm before sitting.TherEx was performed by Pt. AROM seated in chair BLLE 20 reps verbal and tactile cues were needed for Pt. to perform exercises correctly. Recommended inpatient rehab  Outcome Measures     Row Name 10/03/19 1200 10/02/19 1200 10/01/19 1000       How much help from another is currently needed...    Putting on and taking off regular lower body clothing?  3  -TS  3  -TS  3  -JJ    Bathing (including washing, rinsing, and drying)  3  -TS  3  -TS  3  -JJ    Toileting (which includes using toilet bed pan or urinal)  4  -TS  3  -TS  3  -JJ     Putting on and taking off regular upper body clothing  4  -TS  3  -TS  3  -JJ    Taking care of personal grooming (such as brushing teeth)  3  -TS  3  -TS  3  -JJ    Eating meals  4  -TS  3 pt currently NPO  -TS  3  -JJ    AM-PAC 6 Clicks Score (OT)  21  -TS  18  -TS  18  -JJ       Functional Assessment    Outcome Measure Options  AM-PAC 6 Clicks Daily Activity (OT)  -TS  AM-PAC 6 Clicks Daily Activity (OT)  -TS  AM-PAC 6 Clicks Daily Activity (OT)  -JJ      User Key  (r) = Recorded By, (t) = Taken By, (c) = Cosigned By    Initials Name Provider Type    TS Yaritza Lopez, CTOTON/L Occupational Therapy Assistant    Christina Oliver OTR/L Occupational Therapist         Time Calculation:   PT Charges     Row Name 10/03/19 1125             Time Calculation    Start Time  0915  -MS (r) JW (t) MS (c)      Stop Time  0938  -MS (r) JW (t) MS (c)      Time Calculation (min)  23 min  -MS (r) JW (t)      PT Received On  10/03/19  -MS (r) JW (t) MS (c)      PT Goal Re-Cert Due Date  10/11/19  -MS (r) JW (t) MS (c)         Time Calculation- PT    Total Timed Code Minutes- PT  23 minute(s)  -MS (r) JW (t) MS (c)        User Key  (r) = Recorded By, (t) = Taken By, (c) = Cosigned By    Initials Name Provider Type    Mayuri Smith R, PT, DPT, NCS Physical Therapist    [place]ia Jean Baptiste PTA Student PTA Student        Therapy Charges for Today     Code Description Service Date Service Provider Modifiers Qty    00441192499 HC PT THER PROC EA 15 MIN 10/3/2019 Georgia Jama PTA Student GP 1    [id number] HC GAIT TRAINING EA 15 MIN 10/3/2019 Georgia Jama PTA Student GP 1          PT G-Codes  Outcome Measure Options: AM-PAC 6 Clicks Daily Activity (OT)  AM-PAC 6 Clicks Score (PT): 18  AM-PAC 6 Clicks Score (OT): 21    Georgia Jama PTA Student  10/3/2019

## 2019-10-03 NOTE — PLAN OF CARE
Problem: Patient Care Overview  Goal: Plan of Care Review   10/03/19 1127   Coping/Psychosocial   Plan of Care Reviewed With patient   Plan of Care Review   Progress improving   OTHER   Outcome Summary Pt. in chair before treatment.Pt. performed sit to stand transfer from chair without assistive device CGAx1. Pt. ambulated in hallway 100'x2 Lucie/CGA noted RLE foot drop during gait. Pt. was given verbal cueing to look forward/straight ahead during ambulation. Pt. performed balance activities in standing with Lucie/CGA, verbal/visual and tactile cues were given for patient to perform activities correctly. Pt. performed stand to sit transfer to chair with CGA and verbal cueing to reach back for chair arm before sitting.TherEx was performed by Pt. AROM seated in chair BLLE 20 reps verbal and tactile cues were needed for Pt. to perform exercises correctly. Recommended inpatient rehab

## 2019-10-03 NOTE — PROGRESS NOTES
LOS: 7 days   Patient Care Team:  Jose Karimi MD as PCP - General (Family Medicine)    Chief Complaint:  LMCA stroke    Subjective    Sitting in bed with clothes on. Elmer Kowalski, at bedside.     Interval History:   Patient has a known history of chronic alcohol use but last reported was about 1 week prior to presentation. Patient works in construction. Dr. White received a phone call overnight from the emergency room physician at [facility].  reported,   His wife; who he is  from currently, had difficulties  getting a hold of him 9/26/19.  He was found initially thought to be confused although later there was a concern that it instead represented aphasia.  They denied seeing any focal weakness.  He was taken to the ER there.  Head CT did show a left MCA distribution abnormality.  He also was extremely combative.  He was transferred to the hospitalist service at our hospital.  Overnight there were reports that he required multiple sedating medications secondary to agitation. Patient was admitted to CCU and then to the neuroscience unit on 10/2/19.  Last night 10/2/19 he pulled out his IV and tube feeding. He was evaluated again by ST this morning followed by barium swallow study. He has been cleared to take foods/liquids/medications orally mechanical soft with pudding thick liquids.      Patient reports he wakes up frequently during the night, that he may snore, and he wakes up tired.  Upon entering the room we noted he was snoring lightly.     Review of Systems:    - a 14 point review of symptoms is reviewed and positive for expressive aphasia, and dysphagia. He does have prior injury to right lower extremity, right ankle that he never had evaluated and has chronic weakness/impaired gait since.     Objective     Vital Signs  Temp:  [98.1 °F (36.7 °C)-99.5 °F (37.5 °C)] 98.1 °F (36.7 °C)  Heart Rate:  [65-77] 75  Resp:  [16-18] 18  BP: (119-141)/(69-78) 136/74    Physical  Exam:     General Appearance:    Alert, cooperative, in no acute distress   Head:    Normocephalic       Neck:   No adenopathy, supple, no   carotid bruit       Lungs:     Clear to auscultation,respirations regular, even and                  unlabored    Heart:    Regular rhythm and normal rate,  no murmur       Abdomen:      soft  non-tender, non-distended       Extremities:   Moves all extremities well       Skin:   No  rash       Neurologic: Neurologic Exam:    Mental Status:    -Awake, Alert, Oriented X 3. He could tell me he was in hospital when given choices. But stated his name and age, and that he is in [place] and that he lives in [place], [place]. Nephew entered the room and correctly stated nephew name and relation  -does have expressive aphasia. He was not able to describe pictures but was able to read words and sentences  - expressive aphasia  -No dysarthria  -Follows simple and complex commands    CN II:  Visual fields full.  Pupils equally reactive to light  CN III, IV, VI:  Extraocular Muscles full with no signs of nystagmus  CN V:  Facial sensory is symmetric with no asymmetries.  CN VII:  Facial motor symmetric  CN VIII:  Gross hearing intact bilaterally  CN IX:  Palate elevates symmetrically  CN X:  Palate elevates symmetrically  CN XI:  Shoulder shrug symmetric  CN XII:  Tongue protrudes to midline    Motor: (strength out of 5:  1= minimal movement, 2 = movement in plane of gravity, 3 = movement against gravity, 4 = movement against some resistance, 5 = full strength)    -Right Upper Ext: Proximal: 5 Distal: 5  -Left Upper Ext: Proximal: 5 Distal: 5    -Right Lower Ext: Proximal: 5 Distal: 5  -Left Lower Ext: Proximal: 5 Distal: 5  Right plantar and dorsiflexion 4+/5 and reportedly had prior injury that resulted in chronic weakness  Left plantar and dorsiflexion 5/5    DTR:  -Right   Bicep: 2+ Triceps: 2+ Brachioradialis: 2+   Patella: 2+ Ankle: 2+ Neg Babinski  -Left   Bicep: 2+ Triceps:  2+ Brachioradialis: 2+   Patella: 2+ Ankle: 2+ Neg Babinski    Sensory:  -Intact to light touch    Coordination:  -Finger to nose intact  -Heel to shin intact   - no ataxia noted              Results Review:       Lab Results (last 24 hours)     Procedure Component Value Units Date/Time    POC Glucose Once [[id number]]  (Normal) Collected:  10/03/19 1138    Specimen:  Blood Updated:  10/03/19 1150     Glucose 97 mg/dL      Comment: : [id number] Solano ShannonMeter ID: [id number]       Comprehensive Metabolic Panel [[id number]]  (Abnormal) Collected:  10/03/19 0437    Specimen:  Blood Updated:  10/03/19 0528     Glucose 185 mg/dL      BUN 7 mg/dL      Creatinine 0.55 mg/dL      Sodium 139 mmol/L      Potassium 3.9 mmol/L      Chloride 98 mmol/L      CO2 28.0 mmol/L      Calcium 8.3 mg/dL      Total Protein 6.6 g/dL      Albumin 3.30 g/dL      ALT (SGPT) 18 U/L      AST (SGOT) 24 U/L      Alkaline Phosphatase 62 U/L      Total Bilirubin 0.5 mg/dL      eGFR Non African Amer >150 mL/min/1.73      Globulin 3.3 gm/dL      A/G Ratio 1.0 g/dL      BUN/Creatinine Ratio 12.7     Anion Gap 13.0 mmol/L     Narrative:       GFR Normal >60  Chronic Kidney Disease <60  Kidney Failure <15    CBC & Differential [[id number]] Collected:  10/03/19 0437    Specimen:  Blood Updated:  10/03/19 0508    Narrative:       The following orders were created for panel order CBC & Differential.  Procedure                               Abnormality         Status                     ---------                               -----------         ------                     CBC Auto Differential[[id number]]        Abnormal            Final result                 Please view results for these tests on the individual orders.    CBC Auto Differential [[id number]]  (Abnormal) Collected:  10/03/19 0437    Specimen:  Blood Updated:  10/03/19 0508     WBC 9.37 10*3/mm3      RBC 4.13 10*6/mm3      Hemoglobin 13.3 g/dL      Hematocrit 37.9 %      MCV 91.8 fL       MCH 32.2 pg      MCHC 35.1 g/dL      RDW 13.2 %      RDW-SD 45.1 fl      MPV 9.7 fL      Platelets 278 10*3/mm3      Neutrophil % 60.4 %      Lymphocyte % 25.4 %      Monocyte % 11.3 %      Eosinophil % 2.1 %      Basophil % 0.5 %      Immature Grans % 0.3 %      Neutrophils, Absolute 5.65 10*3/mm3      Lymphocytes, Absolute 2.38 10*3/mm3      Monocytes, Absolute 1.06 10*3/mm3      Eosinophils, Absolute 0.20 10*3/mm3      Basophils, Absolute 0.05 10*3/mm3      Immature Grans, Absolute 0.03 10*3/mm3      nRBC 0.0 /100 WBC     POC Glucose Once [[id number]]  (Normal) Collected:  10/03/19 0014    Specimen:  Blood Updated:  10/03/19 0028     Glucose 94 mg/dL      Comment: : [id number] Mares KristaMeter ID: [id number]             Imaging Results (last 24 hours)     Procedure Component Value Units Date/Time    FL Video Swallow With Speech [[id number]] Collected:  10/03/19 1205     Updated:  10/03/19 1209    Narrative:       EXAMINATION:   FL VIDEO SWALLOW W SPEECH-  10/3/2019 12:05 PM CDT     HISTORY: MODIFIED BARIUM SWALLOW     REASON FOR EXAM: dysphagia; R13.10-Dysphagia, unspecified; Z74.09-Other  reduced mobility; Z74.09-Other reduced mobility; R47.01-Aphasia     COMPARISON: NONE      FLUOROSCOPY TIME: 2.9 minutes     Number of images: 1     TECHNIQUE: In conjunction with speech pathology services, video  fluoroscopic swallow examination was performed with oral ingestion of  barium containing substances of various viscosities.      FINDINGS: Medium bolus sizes are well controlled with no spillage into  the oropharynx. Persistent pooling was demonstrated. There is soft  palate elevation and coverage of the posterior nasopharynx with  swallowing. No stasis is noted within the valleculae or piriform  sinuses. Penetration was observed with nectar thick and honey thick  liquid       Impression:       1. Penetration was observed with nectar thick and honey thick liquid.     Please see speech pathologist's report for  further details and  recommendations.         This report was finalized on 10/03/2019 12:06 by Dr. Stanford Alanis MD.    US Venous Doppler Upper Extremity Right (duplex) [[id number]] Updated:  10/03/19 1053    XR Abdomen KUB [[id number]] Collected:  10/02/19 1805     Updated:  10/02/19 1808    Narrative:       SINGLE VIEW ABDOMEN              HISTORY: to check for dobhoff placement; R13.10-Dysphagia, unspecified;  Z74.09-Other reduced mobility; Z74.09-Other reduced mobility;  R47.01-Aphasia     FINDINGS: Weighted feeding tube is seen with the tip located in the  gastric fundus     The visualized intestinal gas pattern is unremarkable without evidence  of obstruction.            Impression:       Feeding tube tip located in the gastric fundus.     This report was finalized on 10/02/2019 18:05 by Dr Ra Burgess, .          ECG/EMG Results (last 24 hours)     ** No results found for the last 24 hours. **        TELEMETRY:  NORMAL SINUS RHYTHM 68-81    Medication Review:    Current Facility-Administered Medications:   •  albuterol (PROVENTIL) nebulizer solution 0.083% 2.5 mg/3mL, 2.5 mg, Nebulization, Once PRN, Galo Bethea MD  •  aspirin chewable tablet 81 mg, 81 mg, Oral, Daily **OR** aspirin suppository 300 mg, 300 mg, Rectal, Daily, Candido Alas MD, 300 mg at 10/03/19 0944  •  atorvastatin (LIPITOR) tablet 80 mg, 80 mg, Oral, Nightly, Candido Alas MD, 80 mg at 09/29/19 2220  •  hypromellose (ISOPTO TEARS) 0.5 % ophthalmic solution 2 drop, 2 drop, Both Eyes, TID PRN, Candido Alas MD, 2 drop at 09/30/19 2152  •  ipratropium-albuterol (DUO-NEB) nebulizer solution 3 mL, 3 mL, Nebulization, 4x Daily - RT, Christina Sharif APRN, 3 mL at 10/03/19 1411  •  labetalol (NORMODYNE,TRANDATE) injection 20 mg, 20 mg, Intravenous, Q4H PRN, Christina Sharif APRN  •  lactated ringers infusion, 50 mL/hr, Intravenous, Continuous, Sharif, YARELI Carey, Last Rate: 50 mL/hr at 10/03/19 0810, 50  mL/hr at 10/03/19 0810  •  LORazepam (ATIVAN) tablet 1 mg, 1 mg, Oral, Q2H PRN **OR** LORazepam (ATIVAN) injection 1 mg, 1 mg, Intravenous, Q2H PRN **OR** LORazepam (ATIVAN) tablet 2 mg, 2 mg, Oral, Q1H PRN **OR** LORazepam (ATIVAN) injection 2 mg, 2 mg, Intravenous, Q1H PRN, 2 mg at 09/29/19 1311 **OR** LORazepam (ATIVAN) injection 2 mg, 2 mg, Intravenous, Q15 Min PRN, 2 mg at 09/27/19 2007 **OR** LORazepam (ATIVAN) injection 2 mg, 2 mg, Intramuscular, Q15 Min PRN, Candido Alas MD  •  nicotine (NICODERM CQ) 14 MG/24HR patch 1 patch, 1 patch, Transdermal, Q24H, Candido Alas MD  •  pantoprazole (PROTONIX) injection 40 mg, 40 mg, Intravenous, Q AM, Jose Johnson MD, 40 mg at 10/03/19 0503  •  potassium chloride (KAYCIEL) 20 MEQ/15ML (10%) solution 40 mEq, 40 mEq, Oral, Once, Candido Alas MD  •  sodium chloride 0.9 % flush 10 mL, 10 mL, Intravenous, Q12H, Candido Alas MD, 10 mL at 10/02/19 2011  •  sodium chloride 0.9 % flush 10 mL, 10 mL, Intravenous, PRN, Candido Alas MD      Personal review of MRI:  DWI      Assessment/Plan       Degeneration of lumbar or lumbosacral intervertebral disc    Smoker    Acute cerebrovascular accident (CVA) of cerebellum (CMS/HCC)    Alcoholism /alcohol abuse (CMS/HCC)    CVA (cerebral vascular accident) (CMS/HCC)    Hyperlipidemia    Dysphagia due to recent cerebral infarction    Assessment:   1. Acute stroke left MCA. Affecting frontal, temporal and parietal lobes  2. Dysphagia, improved  videofluoroscopy showing penetration with nectar thick and honey thick liquid.  3. Superficial thrombus  in the basilic vein at the elbow and the cephalic Vein at distal upper arm to elbow  4. Hyperlipidemia  LDL of 102 and goal is < 70.  5. Possible sleep apnea which is a risk factor for stroke.   6. Smoker    PLAN:  1.Continue ASA 81 mg daily  2. Continue Lipitor 80 mg daily for LDL goal less than 70. LDL was 102.  3. Continue OT/PT/ST  4. Hopefully to be accepted to acute rehab.  Per discussions with stroke team, brother is willing to take patient home with him after discharge from acute rehab.  5. Systolic blood pressure goal less than 140.  6. ETOH withdrawal protocol and patient improved.  7. Overnight pulse oximetry.   8. Magnesium level, B12, and folate in AM  9. Thiamine 100 mg daily.   10. Smoking cessation counseling  11. Mechanical soft with pudding thick and ground meat meds in applesauce  Change to Cardiac diet.    Plan for disposition:Where: rehab and When:  patient and nephew, Elmer who is at bedside.     YARELI Amezquita/Tsering Alvares MD    10/03/19  2:53 PM

## 2019-10-03 NOTE — PROGRESS NOTES
[facility] Medicine Services  INPATIENT PROGRESS NOTE    Length of Stay: 7  Date of Admission: 9/26/2019  Primary Care Physician: Jose Karimi MD    Subjective   Chief Complaint: Respiratory failure/CVA    HPI   Patient is more alert.  Patient answers every question pretty clearly today.  Aphasic at time.  Patient will need feeding.  Patient denies any chest pain or shortness of breath.    Review of Systems   Constitutional: Positive for activity change, appetite change and fatigue. Negative for chills and fever.   HENT: Negative for hearing loss, nosebleeds, tinnitus and trouble swallowing.    Eyes: Negative for visual disturbance.   Respiratory: Positive for shortness of breath. Negative for cough, chest tightness and wheezing.    Cardiovascular: Negative for chest pain, palpitations and leg swelling.   Gastrointestinal: Negative for abdominal distention, abdominal pain, blood in stool, constipation, diarrhea, nausea and vomiting.   Endocrine: Negative for cold intolerance, heat intolerance, polydipsia, polyphagia and polyuria.   Genitourinary: Negative for decreased urine volume, difficulty urinating, dysuria, flank pain, frequency and hematuria.   Musculoskeletal: Positive for arthralgias, gait problem and myalgias. Negative for joint swelling.   Skin: Negative for rash.   Allergic/Immunologic: Negative for immunocompromised state.   Neurological: Positive for weakness. Negative for dizziness, syncope, light-headedness and headaches.   Hematological: Negative for adenopathy. Does not bruise/bleed easily.   Psychiatric/Behavioral: Positive for confusion. Negative for sleep disturbance. The patient is not nervous/anxious.    All pertinent negatives and positives are as above. All other systems have been reviewed and are negative unless otherwise stated.     Objective    Temp:  [98.5 °F (36.9 °C)-99.5 °F (37.5 °C)] 99.5 °F (37.5 °C)  Heart Rate:  [70-83] 76  Resp:   [16-18] 18  BP: (112-141)/(58-78) 121/74  No intake or output data in the 24 hours ending 10/03/19 1014  Physical Exam  Constitutional: He appears well-developed.   HENT:   Head: Normocephalic.   Eyes: Conjunctivae are normal. Pupils are equal, round, and reactive to light.   Neck: Neck supple. No JVD present. No thyromegaly present.   Cardiovascular: Normal rate, regular rhythm, normal heart sounds and intact distal pulses. Exam reveals no gallop and no friction rub.   No murmur heard.  Pulmonary/Chest: Effort normal. No respiratory distress. He has wheezes. He has no rales. He exhibits no tenderness.   Rhonchi bilateral.  Good air movement.   Abdominal: Soft. Bowel sounds are normal. He exhibits no distension. There is no tenderness. There is no rebound and no guarding.   Musculoskeletal: He exhibits no edema, tenderness or deformity.   Lymphadenopathy:     He has no cervical adenopathy.   Neurological: He is alert. He displays normal reflexes. No cranial nerve deficit. He exhibits abnormal muscle tone. Coordination abnormal.   Skin: Skin is warm and dry. Capillary refill takes 2 to 3 seconds.  Right arm from previous IV site erythematous, warm, slightly firm.  Probably superficial thrombus.  Psychiatric: He has a normal mood and affect. His behavior is normal.   Nursing note and vitals reviewed.  Results Review:  Lab Results (last 24 hours)     Procedure Component Value Units Date/Time    Comprehensive Metabolic Panel [[id number]]  (Abnormal) Collected:  10/03/19 0437    Specimen:  Blood Updated:  10/03/19 0528     Glucose 185 mg/dL      BUN 7 mg/dL      Creatinine 0.55 mg/dL      Sodium 139 mmol/L      Potassium 3.9 mmol/L      Chloride 98 mmol/L      CO2 28.0 mmol/L      Calcium 8.3 mg/dL      Total Protein 6.6 g/dL      Albumin 3.30 g/dL      ALT (SGPT) 18 U/L      AST (SGOT) 24 U/L      Alkaline Phosphatase 62 U/L      Total Bilirubin 0.5 mg/dL      eGFR Non African Amer >150 mL/min/1.73      Globulin 3.3  gm/dL      A/G Ratio 1.0 g/dL      BUN/Creatinine Ratio 12.7     Anion Gap 13.0 mmol/L     Narrative:       GFR Normal >60  Chronic Kidney Disease <60  Kidney Failure <15    CBC & Differential [[id number]] Collected:  10/03/19 0437    Specimen:  Blood Updated:  10/03/19 0508    Narrative:       The following orders were created for panel order CBC & Differential.  Procedure                               Abnormality         Status                     ---------                               -----------         ------                     CBC Auto Differential[[id number]]        Abnormal            Final result                 Please view results for these tests on the individual orders.    CBC Auto Differential [[id number]]  (Abnormal) Collected:  10/03/19 0437    Specimen:  Blood Updated:  10/03/19 0508     WBC 9.37 10*3/mm3      RBC 4.13 10*6/mm3      Hemoglobin 13.3 g/dL      Hematocrit 37.9 %      MCV 91.8 fL      MCH 32.2 pg      MCHC 35.1 g/dL      RDW 13.2 %      RDW-SD 45.1 fl      MPV 9.7 fL      Platelets 278 10*3/mm3      Neutrophil % 60.4 %      Lymphocyte % 25.4 %      Monocyte % 11.3 %      Eosinophil % 2.1 %      Basophil % 0.5 %      Immature Grans % 0.3 %      Neutrophils, Absolute 5.65 10*3/mm3      Lymphocytes, Absolute 2.38 10*3/mm3      Monocytes, Absolute 1.06 10*3/mm3      Eosinophils, Absolute 0.20 10*3/mm3      Basophils, Absolute 0.05 10*3/mm3      Immature Grans, Absolute 0.03 10*3/mm3      nRBC 0.0 /100 WBC     POC Glucose Once [[id number]]  (Normal) Collected:  10/03/19 0014    Specimen:  Blood Updated:  10/03/19 0028     Glucose 94 mg/dL      Comment: : [id number] Vishnu KristaMeter ID: [id number]              Cultures:  Respiratory Culture   Date Value Ref Range Status   09/27/2019 Light growth (2+) Normal Respiratory Esther  Final       Radiology Data:    Imaging Results (last 24 hours)     Procedure Component Value Units Date/Time    XR Abdomen KUB [[id number]] Collected:  10/02/19 6237      Updated:  10/02/19 1808    Narrative:       SINGLE VIEW ABDOMEN              HISTORY: to check for dobhoff placement; R13.10-Dysphagia, unspecified;  Z74.09-Other reduced mobility; Z74.09-Other reduced mobility;  R47.01-Aphasia     FINDINGS: Weighted feeding tube is seen with the tip located in the  gastric fundus     The visualized intestinal gas pattern is unremarkable without evidence  of obstruction.            Impression:       Feeding tube tip located in the gastric fundus.     This report was finalized on 10/02/2019 18:05 by Dr Ra Burgess, .          No Known Allergies    Scheduled meds:     aspirin 81 mg Oral Daily   Or      aspirin 300 mg Rectal Daily   atorvastatin 80 mg Oral Nightly   chlorhexidine 15 mL Mouth/Throat Q12H   ipratropium-albuterol 3 mL Nebulization 4x Daily - RT   pantoprazole 40 mg Intravenous Q AM   potassium chloride 40 mEq Oral Once   sodium chloride 10 mL Intravenous Q12H       PRN meds:  •  albuterol  •  hypromellose  •  labetalol  •  LORazepam **OR** LORazepam **OR** LORazepam **OR** LORazepam **OR** LORazepam **OR** LORazepam  •  sodium chloride    Assessment/Plan       Degeneration of lumbar or lumbosacral intervertebral disc    Smoker    Acute cerebrovascular accident (CVA) of cerebellum (CMS/HCC)    Alcoholism /alcohol abuse (CMS/HCC)    CVA (cerebral vascular accident) (CMS/HCC)    Hyperlipidemia    Dysphagia due to recent cerebral infarction      Plan:  Respiratory failure.  Resolved.  Extubated 9/30/19.    Consult ENT-laryngoscopy demonstrates some pressure wounds of the posterior vocal folds bilaterally, with some swelling on the left- this is likely due to the endotracheal tube and will resolve on its own.     CVA. Left MCA stroke. Continue aspirin.  Continue Lipitor. Neurology consult.   CTA of the head and neck- complete occlusion of the entire cervical left ICA, 30% stenosis of the right carotid bulb, mild stenosis at the ostium of the left vertebral artery.  MRI of  the head- acute infarct in the left MCA and watershed territory- no hemorrhagic conversion, abnormal left ICA flow void.  Echocardiogram- ejection fraction 61%, diastolic dysfunction grade 1.    Right arm thrombus.  Warm compress.  Doppler ultrasound of right arm.     Hypokalemia- resolved.     Reflux.  Protonix and Zofran as needed     COPD.  Continue duo nebs.     Alcohol withdrawal?.  Ciwa protocol.  Patient stated last has drink about 2 weeks ago.  Patient has no sign of withdrawal symptoms.     Hyperlipidemia.  Continue Lipitor.     Tobacco abuse.  Tobacco counseling.     Urinary incontinence.    DC Villalobos cath 9/30/2019.     SCD.     Nutrition.  Pending a speech to pass.  Patient keeping pulling off Dobbhoff tube x3 treatment today.     Deconditioning.  PT and OT consult.     Discharge Planning: Plan for rehab placement.  Transfer from [facility].      Candido Alas MD   10/03/19   10:14 AM

## 2019-10-03 NOTE — THERAPY TREATMENT NOTE
Acute Care - Speech Language Pathology   Swallow Treatment Note [facility]     Patient Name: Jarod Ramírez Jr.  : 1960  MRN: [id number]  Today's Date: 10/3/2019  Onset of Illness/Injury or Date of Surgery: 19     Referring Physician: Dr. Alas      Admit Date: 2019  Pt sounds less congested today as compared to when had FEES 10/1/19. He completed trials of honey, nectar, and thin liquids with no immediate overt s/s of aspiration. He had a delayed cough at the end of the session. Pt has continually pulled out Dobhoff tubes. Recommend a modified barium swallow study today to determine if pt can be safely started on a PO diet.   ZEFERINO Rosales 10/3/2019 10:12 AM    Visit Dx:      ICD-10-CM ICD-9-CM   1. Dysphagia, unspecified type R13.10 787.20   2. Impaired mobility Z74.09 799.89   3. Impaired mobility and ADLs Z74.09 799.89   4. Aphasia R47.01 784.3     Patient Active Problem List   Diagnosis   • Degeneration of cervical intervertebral disc   • Closed fracture of lumbar vertebra (CMS/HCC)   • Degeneration of lumbar or lumbosacral intervertebral disc   • Smoker   • BMI 27.0-27.9,adult   • Acute cerebrovascular accident (CVA) of cerebellum (CMS/HCC)   • Alcoholism /alcohol abuse (CMS/HCC)   • CVA (cerebral vascular accident) (CMS/HCC)   • Hyperlipidemia   • Dysphagia due to recent cerebral infarction       Therapy Treatment  Rehabilitation Treatment Summary     Row Name 10/03/19 0952             Treatment Time/Intention    Discipline  speech language pathologist  -MB      Document Type  therapy note (daily note)  -MB      Subjective Information  no complaints  -MB      Mode of Treatment  speech-language pathology  -MB      Patient/Family Observations  No family present  -MB      Patient Effort  good  -MB      Recorded by [MB] Georgi Hernandez CCC-SLP 10/03/19 1008      Row Name 10/03/19 0952             Pain Scale: FACES Pre/Post-Treatment    Pain: FACES Scale, Pretreatment   0-->no hurt  -MB      Recorded by [MB] Georgi Hernandez CCC-SLP 10/03/19 1008      Row Name                Wound 09/27/19 0800 Right posterior finger    Wound - Properties Group Date first assessed: 09/27/19 [NW] Time first assessed: 0800 [NW] Present on Hospital Admission: Y [NW] Side: Right [NW] Orientation: posterior [NW] Location: finger [NW] Recorded by:  [NW] Dunia Multani RN 09/27/19 1624    Row Name 10/03/19 0952             Outcome Summary/Treatment Plan (SLP)    Daily Summary of Progress (SLP)  progress toward functional goals is good  -MB      Barriers to Overall Progress (SLP)  Aphasia  -MB      Plan for Continued Treatment (SLP)  Complete MBS today  -MB      Anticipated Dischage Disposition  inpatient rehabilitation facility  -MB      Recorded by [MB] Georgi Hernandez CCC-SLP 10/03/19 1008        User Key  (r) = Recorded By, (t) = Taken By, (c) = Cosigned By    Initials Name Effective Dates Discipline    MB Georgi Hernandez CCC-SLP 08/02/16 -  SLP    NW Dunia Multani RN 07/12/18 -  Nurse          Outcome Summary  Outcome Summary/Treatment Plan (SLP)  Daily Summary of Progress (SLP): progress toward functional goals is good (10/03/19 0952 : Georgi Hernandez CCC-SLP)  Barriers to Overall Progress (SLP): Aphasia (10/03/19 0952 : Georgi Hernandez CCC-SLP)  Plan for Continued Treatment (SLP): Complete MBS today (10/03/19 0952 : Georgi Hernandez CCC-SLP)  Anticipated Dischage Disposition: inpatient rehabilitation facility (10/03/19 0952 : Georgi Hernandez CCC-SLP)      SLP GOALS     Row Name 10/03/19 0952 10/02/19 0917 10/01/19 1446       Oral Nutrition/Hydration Goal 1 (SLP)    Oral Nutrition/Hydration Goal 1, SLP  LTG: Patient will tolerate LRD without s/s of aspiration.  -MB  LTG: Patient will tolerate LRD without s/s of aspiration.  -MB (r) AS (t) MB (c)  LTG: Patient will tolerate LRD without s/s of aspiration.  -MB    Time Frame (Oral Nutrition/Hydration Goal 1, SLP)   by discharge  -MB  by discharge  -MB (r) AS (t) MB (c)  by discharge  -MB    Barriers (Oral Nutrition/Hydration Goal 1, SLP)  Aphasia  -MB  Aphasia  -MB (r) AS (t) MB (c)  Aphasia  -MB    Progress/Outcomes (Oral Nutrition/Hydration Goal 1, SLP)  continuing progress toward goal  -MB  goal ongoing  -MB (r) AS (t) MB (c)  goal ongoing  -MB       Lingual Strengthening Goal 1 (SLP)    Activity (Lingual Strengthening Goal 1, SLP)  increase tongue back strength  -MB  increase tongue back strength  -MB (r) AS (t) MB (c)  increase tongue back strength  -MB    Increase Tongue Back Strength  lingual movement exercises  -MB  lingual movement exercises  -MB (r) AS (t) MB (c)  lingual movement exercises  -MB    [place]/Accuracy (Lingual Strengthening Goal 1, SLP)  independently (over 90% accuracy)  -MB  independently (over 90% accuracy)  -MB (r) AS (t) MB (c)  independently (over 90% accuracy)  -MB    Time Frame (Lingual Strengthening Goal 1, SLP)  short term goal (STG);by discharge  -MB  short term goal (STG);by discharge  -MB (r) AS (t) MB (c)  short term goal (STG);by discharge  -MB    Barriers (Lingual Strengthening Goal 1, SLP)  n/a  -MB  n/a  -MB (r) AS (t) MB (c)  n/a  -MB    Progress/Outcomes (Lingual Strengthening Goal 1, SLP)  good progress toward goal  -MB  good progress toward goal  -MB (r) AS (t) MB (c)  goal ongoing  -MB       Pharyngeal Strengthening Exercise Goal 1 (SLP)    Activity (Pharyngeal Strengthening Goal 1, SLP)  increase epiglottic inversion and retroflexion;increase squeeze/positive pressure generation;increase tongue base retraction  -MB  increase epiglottic inversion and retroflexion;increase squeeze/positive pressure generation;increase tongue base retraction  -MB (r) AS (t) MB (c)  increase epiglottic inversion and retroflexion;increase squeeze/positive pressure generation;increase tongue base retraction  -MB    Increase Epiglottic Inversion and Retroflexion  M[place]elsohn  -MB  Mendelsohn  -MB  (r) AS (t) MB (c)  Mendelsohn  -MB    Increase Squeeze/Positive Pressure Generation  hard effortful swallow  -MB  hard effortful swallow  -MB (r) AS (t) MB (c)  hard effortful swallow  -MB    Increase Tongue Base Retraction  yoni  -MB  yoni  -MB (r) AS (t) MB (c)  yoni  -MB    [place]/Accuracy (Pharyngeal Strengthening Goal 1, SLP)  independently (over 90% accuracy)  -MB  independently (over 90% accuracy)  -MB (r) AS (t) MB (c)  independently (over 90% accuracy)  -MB    Time Frame (Pharyngeal Strengthening Goal 1, SLP)  short term goal (STG);by discharge  -MB  short term goal (STG);by discharge  -MB (r) AS (t) MB (c)  short term goal (STG);by discharge  -MB    Barriers (Pharyngeal Strengthening Goal 1, SLP)  cognitive status  -MB  cognitive status  -MB (r) AS (t) MB (c)  n/a  -MB    Progress/Outcomes (Pharyngeal Strengthening Goal 1, SLP)  goal ongoing  -MB  goal ongoing  -MB (r) AS (t) MB (c)  goal ongoing  -MB       Words/Phrases/Sentences Goal 1 (SLP)    Improve Ability to Comprehend Words/Phrases/Sentences Through: Goal 1 (SLP)  --  identify objects, field of;identify pictures, field of;90%;independently (over 90% accuracy);other (comment)  -MB (r) AS (t) MB (c)  --    Time Frame (Identify Objects and Pictures Goal 1, SLP)  --  short term goal (STG);by discharge  -MB (r) AS (t) MB (c)  --    Barriers (Identify Objects and Pictures Goal 1, SLP)  --  aphasia  -MB (r) AS (t) MB (c)  --    Progress/Outcomes (Identify Objects and Pictures Goal 1, SLP)  --  goal ongoing  -MB (r) AS (t) MB (c)  --       Comprehend Questions Goal 1 (SLP)    Improve Ability to Comprehend Questions Goal 1 (SLP)  --  simple yes/no questions;complex yes/no questions;90%;independently (over 90% accuracy)  -MB (r) AS (t) MB (c)  --    Time Frame (Comprehend Questions Goal 1, SLP)  --  short term goal (STG);by discharge  -MB (r) AS (t) MB (c)  --    Barriers (Comprehend Questions Goal 1, SLP)  --  aphaisa  -MB (r) AS (t) MB (c)  --     Progress/Outcomes (Comprehend Questions Goal 1, SLP)  --  goal ongoing  -MB (r) AS (t) MB (c)  --       Follow Directions Goal 2 (SLP)    Improve Ability to Follow Directions Goal 1 (SLP)  --  1 step direction with objects;1 step direction without objects;90%;independently (over 90% accuracy)  -MB (r) AS (t) MB (c)  --    Time Frame (Follow Directions Goal 1, SLP)  --  short term goal (STG);by discharge  -MB (r) AS (t) MB (c)  --    Progress (Ability to Follow Directions Goal 1, SLP)  --  50%;with minimal cues (75-90%)  -MB (r) AS (t) MB (c)  --    Progress/Outcomes (Follow Directions Goal 1, SLP)  --  continuing progress toward goal  -MB (r) AS (t) MB (c)  --       Word Retrieval Skills Goal 1 (SLP)    Improve Word Retrieval Skills By Goal 1 (SLP)  --  completing automatic speech task, days of the week;completing automatic speech task, months;confrontational naming task;repeating words;90%;independently (over 90% accuracy)  -MB (r) AS (t) MB (c)  --    Time Frame (Word Retrieval Goal 1, SLP)  --  short term goal (STG);by discharge  -MB (r) AS (t) MB (c)  --    Progress (Word Retrieval Skills Goal 1, SLP)  --  50%  -MB (r) AS (t) MB (c)  --    Progress/Outcomes (Word Retrieval Goal 1, SLP)  --  continuing progress toward goal  -MB (r) AS (t) MB (c)  --       Graphic Expression of Shapes, Letters, Numbers Goal 1 (SLP)    Improve Graphic Expression of Shapes, Letters, and Numbers Goal 1 (SLP)  --  copy shapes, numbers, and letters;writing dictated number and letters;90%;independently (over 90% accuracy)  -MB (r) AS (t) MB (c)  --    Time Frame (Graphic Expression of Shapes, Letters, and Numbers Goal 1, SLP)  --  short term goal (STG);by discharge  -MB (r) AS (t) MB (c)  --    Progress/Outcomes (Graphic Expression of Shapes, Letters, and Numbers Goal 1, SLP)  --  goal ongoing  -MB (r) AS (t) MB (c)  --       Augmentative/Alternative Communication Objectives Goal 1 (SLP    Communication (Augmentative/Alternative  Communication Goal 1, SLP)  --  improve ability to use low tech augmentative/alternative communication device  -MB (r) AS (t) MB (c)  --    Improve Communication by (Augmentative/Alternative Communication Goal 1, SLP)  --  identify picture, field of ____;identify word, field of ____;alphabet/picture board;90%;independently (over 90% accuracy)  -MB (r) AS (t) MB (c)  --    Time Frame (Augmentative/Alternative Communication Goal 1, SLP)  --  short term goal (STG);by discharge  -MB (r) AS (t) MB (c)  --    Progress (Augmentative/Alternative Communication Goal 1, SLP)  --  70%  -MB (r) AS (t) MB (c)  --    Progress/Outcomes (Augmentative/Alternative Communication Goal 1, SLP)  --  good progress toward goal  -MB (r) AS (t) MB (c)  --    Row Name 10/01/19 0939 09/30/19 143          Oral Nutrition/Hydration Goal 1 (SLP)    Oral Nutrition/Hydration Goal 1, SLP  LTG: Patient will tolerate LRD without s/s of aspiration.  -MB  LTG: Patient will tolerate LRD without s/s of aspiration.  -MM     Time Frame (Oral Nutrition/Hydration Goal 1, SLP)  by discharge  -MB  by discharge  -MM     Barriers (Oral Nutrition/Hydration Goal 1, SLP)  Aphasia  -MB  n/a  -MM     Progress/Outcomes (Oral Nutrition/Hydration Goal 1, SLP)  continuing progress toward goal  -MB  goal ongoing  -MM        Words/Phrases/Sentences Goal 1 (SLP)    Improve Ability to Comprehend Words/Phrases/Sentences Through: Goal 1 (SLP)  identify objects, field of;identify pictures, field of;90%;independently (over 90% accuracy);other (comment) 2  -MB  --     Time Frame (Identify Objects and Pictures Goal 1, SLP)  short term goal (STG);by discharge  -MB  --        Comprehend Questions Goal 1 (SLP)    Improve Ability to Comprehend Questions Goal 1 (SLP)  simple yes/no questions;complex yes/no questions;90%;independently (over 90% accuracy)  -MB  --     Time Frame (Comprehend Questions Goal 1, SLP)  short term goal (STG);by discharge  -MB  --        Follow Directions Goal 2  (SLP)    Improve Ability to Follow Directions Goal 1 (SLP)  1 step direction with objects;1 step direction without objects;90%;independently (over 90% accuracy)  -MB  --     Time Frame (Follow Directions Goal 1, SLP)  short term goal (STG);by discharge  -MB  --        Word Retrieval Skills Goal 1 (SLP)    Improve Word Retrieval Skills By Goal 1 (SLP)  completing automatic speech task, days of the week;completing automatic speech task, months;confrontational naming task;repeating words;90%;independently (over 90% accuracy)  -MB  --     Time Frame (Word Retrieval Goal 1, SLP)  short term goal (STG);by discharge  -MB  --        Graphic Expression of Shapes, Letters, Numbers Goal 1 (SLP)    Improve Graphic Expression of Shapes, Letters, and Numbers Goal 1 (SLP)  copy shapes, numbers, and letters;writing dictated number and letters;90%;independently (over 90% accuracy)  -MB  --     Time Frame (Graphic Expression of Shapes, Letters, and Numbers Goal 1, SLP)  short term goal (STG);by discharge  -MB  --        Augmentative/Alternative Communication Objectives Goal 1 (SLP    Communication (Augmentative/Alternative Communication Goal 1, SLP)  improve ability to use low tech augmentative/alternative communication device  -MB  --     Improve Communication by (Augmentative/Alternative Communication Goal 1, SLP)  identify picture, field of ____;identify word, field of ____;alphabet/picture board;90%;independently (over 90% accuracy)  -MB  --       User Key  (r) = Recorded By, (t) = Taken By, (c) = Cosigned By    Initials Name Provider Type    Georgi Garcia, CCC-SLP Speech and Language Pathologist    MM Mayuri Alcazar, MS CCC-SLP Speech and Language Pathologist    AS Eliecer Williamson, Speech Therapy Student Speech Therapy Student          EDUCATION  The patient has been educated in the following areas:   Dysphagia (Swallowing Impairment).    SLP Recommendation and Plan  Daily Summary of Progress (SLP): progress toward  functional goals is good  Barriers to Overall Progress (SLP): Aphasia  Plan for Continued Treatment (SLP): Complete MBS today  Anticipated Dischage Disposition: inpatient rehabilitation facility                    Time Calculation:   Time Calculation- SLP     Row Name 10/03/19 1011             Time Calculation- SLP    SLP Start Time  0952  -MB      SLP Stop Time  1005  -MB      SLP Time Calculation (min)  13 min  -MB      SLP Received On  10/03/19  -MB        User Key  (r) = Recorded By, (t) = Taken By, (c) = Cosigned By    Initials Name Provider Type    Georgi Garcia CCC-SLP Speech and Language Pathologist          Therapy Charges for Today     Code Description Service Date Service Provider Modifiers Qty    [id number] HC ST TREATMENT SWALLOW 1 10/3/2019 Georgi Hernandez CCC-SLP GN 1                 ZEFERINO Rosales  10/3/2019

## 2019-10-04 LAB
ALBUMIN SERPL-MCNC: 3.4 G/DL (ref 3.5–5.2)
ALBUMIN/GLOB SERPL: 1 G/DL
ALP SERPL-CCNC: 66 U/L (ref 39–117)
ALT SERPL W P-5'-P-CCNC: 23 U/L (ref 1–41)
ANION GAP SERPL CALCULATED.3IONS-SCNC: 14 MMOL/L (ref 5–15)
AST SERPL-CCNC: 26 U/L (ref 1–40)
BASOPHILS # BLD AUTO: 0.05 10*3/MM3 (ref 0–0.2)
BASOPHILS NFR BLD AUTO: 0.5 % (ref 0–1.5)
BILIRUB SERPL-MCNC: 0.3 MG/DL (ref 0.2–1.2)
BUN BLD-MCNC: 9 MG/DL (ref 6–20)
BUN/CREAT SERPL: 15 (ref 7–25)
CALCIUM SPEC-SCNC: 8.7 MG/DL (ref 8.6–10.5)
CHLORIDE SERPL-SCNC: 97 MMOL/L (ref 98–107)
CO2 SERPL-SCNC: 28 MMOL/L (ref 22–29)
CREAT BLD-MCNC: 0.6 MG/DL (ref 0.76–1.27)
DEPRECATED RDW RBC AUTO: 45.3 FL (ref 37–54)
EOSINOPHIL # BLD AUTO: 0.22 10*3/MM3 (ref 0–0.4)
EOSINOPHIL NFR BLD AUTO: 2.4 % (ref 0.3–6.2)
ERYTHROCYTE [DISTWIDTH] IN BLOOD BY AUTOMATED COUNT: 13.2 % (ref 12.3–15.4)
FOLATE SERPL-MCNC: 9.88 NG/ML (ref 4.78–24.2)
GFR SERPL CREATININE-BSD FRML MDRD: 139 ML/MIN/1.73
GLOBULIN UR ELPH-MCNC: 3.4 GM/DL
GLUCOSE BLD-MCNC: 97 MG/DL (ref 65–99)
HCT VFR BLD AUTO: 38.9 % (ref 37.5–51)
HGB BLD-MCNC: 13.4 G/DL (ref 13–17.7)
IMM GRANULOCYTES # BLD AUTO: 0.04 10*3/MM3 (ref 0–0.05)
IMM GRANULOCYTES NFR BLD AUTO: 0.4 % (ref 0–0.5)
LYMPHOCYTES # BLD AUTO: 2.35 10*3/MM3 (ref 0.7–3.1)
LYMPHOCYTES NFR BLD AUTO: 25.6 % (ref 19.6–45.3)
MAGNESIUM SERPL-MCNC: 1.7 MG/DL (ref 1.6–2.6)
MCH RBC QN AUTO: 32.2 PG (ref 26.6–33)
MCHC RBC AUTO-ENTMCNC: 34.4 G/DL (ref 31.5–35.7)
MCV RBC AUTO: 93.5 FL (ref 79–97)
MONOCYTES # BLD AUTO: 1.08 10*3/MM3 (ref 0.1–0.9)
MONOCYTES NFR BLD AUTO: 11.8 % (ref 5–12)
NEUTROPHILS # BLD AUTO: 5.44 10*3/MM3 (ref 1.7–7)
NEUTROPHILS NFR BLD AUTO: 59.3 % (ref 42.7–76)
NRBC BLD AUTO-RTO: 0 /100 WBC (ref 0–0.2)
PLATELET # BLD AUTO: 303 10*3/MM3 (ref 140–450)
PMV BLD AUTO: 10.1 FL (ref 6–12)
POTASSIUM BLD-SCNC: 3.4 MMOL/L (ref 3.5–5.2)
PROT SERPL-MCNC: 6.8 G/DL (ref 6–8.5)
RBC # BLD AUTO: 4.16 10*6/MM3 (ref 4.14–5.8)
SODIUM BLD-SCNC: 139 MMOL/L (ref 136–145)
VIT B12 BLD-MCNC: 756 PG/ML (ref 211–946)
WBC NRBC COR # BLD: 9.18 10*3/MM3 (ref 3.4–10.8)

## 2019-10-04 PROCEDURE — 92526 ORAL FUNCTION THERAPY: CPT

## 2019-10-04 PROCEDURE — 82746 ASSAY OF FOLIC ACID SERUM: CPT | Performed by: PSYCHIATRY & NEUROLOGY

## 2019-10-04 PROCEDURE — 94799 UNLISTED PULMONARY SVC/PX: CPT

## 2019-10-04 PROCEDURE — 83735 ASSAY OF MAGNESIUM: CPT | Performed by: PSYCHIATRY & NEUROLOGY

## 2019-10-04 PROCEDURE — 82607 VITAMIN B-12: CPT | Performed by: PSYCHIATRY & NEUROLOGY

## 2019-10-04 PROCEDURE — 85025 COMPLETE CBC W/AUTO DIFF WBC: CPT | Performed by: FAMILY MEDICINE

## 2019-10-04 PROCEDURE — 94760 N-INVAS EAR/PLS OXIMETRY 1: CPT

## 2019-10-04 PROCEDURE — 80053 COMPREHEN METABOLIC PANEL: CPT | Performed by: FAMILY MEDICINE

## 2019-10-04 PROCEDURE — 97116 GAIT TRAINING THERAPY: CPT

## 2019-10-04 PROCEDURE — 99233 SBSQ HOSP IP/OBS HIGH 50: CPT | Performed by: PSYCHIATRY & NEUROLOGY

## 2019-10-04 RX ORDER — POTASSIUM CHLORIDE 1.5 G/1.77G
40 POWDER, FOR SOLUTION ORAL 2 TIMES DAILY
Status: COMPLETED | OUTPATIENT
Start: 2019-10-04 | End: 2019-10-05

## 2019-10-04 RX ORDER — FOLIC ACID 1 MG/1
5 TABLET ORAL ONCE
Status: COMPLETED | OUTPATIENT
Start: 2019-10-04 | End: 2019-10-04

## 2019-10-04 RX ORDER — POTASSIUM CHLORIDE 750 MG/1
40 CAPSULE, EXTENDED RELEASE ORAL 2 TIMES DAILY
Status: DISCONTINUED | OUTPATIENT
Start: 2019-10-04 | End: 2019-10-04 | Stop reason: SDUPTHER

## 2019-10-04 RX ORDER — NICOTINE 21 MG/24HR
1 PATCH, TRANSDERMAL 24 HOURS TRANSDERMAL
Status: DISCONTINUED | OUTPATIENT
Start: 2019-10-05 | End: 2019-10-10 | Stop reason: HOSPADM

## 2019-10-04 RX ADMIN — PANTOPRAZOLE SODIUM 40 MG: 40 INJECTION, POWDER, LYOPHILIZED, FOR SOLUTION INTRAVENOUS at 04:57

## 2019-10-04 RX ADMIN — IPRATROPIUM BROMIDE AND ALBUTEROL SULFATE 3 ML: 2.5; .5 SOLUTION RESPIRATORY (INHALATION) at 11:10

## 2019-10-04 RX ADMIN — ATORVASTATIN CALCIUM 80 MG: 40 TABLET, FILM COATED ORAL at 21:37

## 2019-10-04 RX ADMIN — NICOTINE 1 PATCH: 14 PATCH TRANSDERMAL at 19:05

## 2019-10-04 RX ADMIN — POTASSIUM CHLORIDE 40 MEQ: 1.5 POWDER, FOR SOLUTION ORAL at 21:37

## 2019-10-04 RX ADMIN — IPRATROPIUM BROMIDE AND ALBUTEROL SULFATE 3 ML: 2.5; .5 SOLUTION RESPIRATORY (INHALATION) at 07:39

## 2019-10-04 RX ADMIN — NICOTINE 1 PATCH: 14 PATCH TRANSDERMAL at 09:09

## 2019-10-04 RX ADMIN — FOLIC ACID 5 MG: 1 TABLET ORAL at 18:10

## 2019-10-04 RX ADMIN — ASPIRIN 81 MG: 81 TABLET, CHEWABLE ORAL at 09:09

## 2019-10-04 RX ADMIN — SODIUM CHLORIDE, POTASSIUM CHLORIDE, SODIUM LACTATE AND CALCIUM CHLORIDE 50 ML/HR: 600; 310; 30; 20 INJECTION, SOLUTION INTRAVENOUS at 04:57

## 2019-10-04 RX ADMIN — IPRATROPIUM BROMIDE AND ALBUTEROL SULFATE 3 ML: 2.5; .5 SOLUTION RESPIRATORY (INHALATION) at 15:41

## 2019-10-04 RX ADMIN — IPRATROPIUM BROMIDE AND ALBUTEROL SULFATE 3 ML: 2.5; .5 SOLUTION RESPIRATORY (INHALATION) at 20:20

## 2019-10-04 RX ADMIN — Medication 100 MG: at 09:09

## 2019-10-04 NOTE — THERAPY TREATMENT NOTE
Acute Care - Speech Language Pathology   Swallow Treatment Note [facility]     Patient Name: Jarod Ramírez Jr.  : 1960  MRN: [id number]  Today's Date: 10/4/2019  Onset of Illness/Injury or Date of Surgery: 19     Referring Physician: Dr. Alas      Admit Date: 2019  Pt completed trials of mechanical soft solids and pudding. He exhibited delayed coughing 2x with pudding, even when using a chin tuck strategy. He followed commands to use a chin tuck with mech soft items as well and had no overt s/s of aspiration. Pt is likely having at least laryngeal penetration with pudding thick liquids, but did seem to clear much of the residue from the laryngeal vestibule with coughing during the modified barium swallow study yesterday. Nursing reports pt's lung sounds have not worsened. Pt was unable to follow commands to complete an effortful swallow or Tracie maneuver. Continue mechanical soft solids and pudding thick liquids. Encourage the use of a chin tuck with swallows as well as at least occasional volitional throat clears/coughs for airway clearance.   Georgi Hernandez, CCC-SLP 10/4/2019 2:15 PM    Visit Dx:      ICD-10-CM ICD-9-CM   1. Dysphagia, unspecified type R13.10 787.20   2. Impaired mobility Z74.09 799.89   3. Impaired mobility and ADLs Z74.09 799.89   4. Aphasia R47.01 784.3     Patient Active Problem List   Diagnosis   • Degeneration of cervical intervertebral disc   • Closed fracture of lumbar vertebra (CMS/HCC)   • Degeneration of lumbar or lumbosacral intervertebral disc   • Smoker   • BMI 27.0-27.9,adult   • Acute cerebrovascular accident (CVA) of cerebellum (CMS/HCC)   • Alcoholism /alcohol abuse (CMS/HCC)   • CVA (cerebral vascular accident) (CMS/HCC)   • Hyperlipidemia   • Dysphagia due to recent cerebral infarction       Therapy Treatment  Rehabilitation Treatment Summary     Row Name 10/04/19 1351             Treatment Time/Intention    Discipline  speech language pathologist   -MB      Document Type  therapy note (daily note)  -MB      Subjective Information  no complaints  -MB      Mode of Treatment  speech-language pathology  -MB      Patient/Family Observations  No family present  -MB      Patient Effort  adequate  -MB      Recorded by [MB] Georgi Hernandez CCC-SLP 10/04/19 1410      Row Name 10/04/19 1351             Pain Scale: FACES Pre/Post-Treatment    Pain: FACES Scale, Pretreatment  0-->no hurt  -MB      Recorded by [MB] Georgi Hernandez CCC-SLP 10/04/19 1410      Row Name                Wound 09/27/19 0800 Right posterior finger    Wound - Properties Group Date first assessed: 09/27/19 [NW] Time first assessed: 0800 [NW] Present on Hospital Admission: Y [NW] Side: Right [NW] Orientation: posterior [NW] Location: finger [NW] Recorded by:  [NW] Dunia Multani RN 09/27/19 1624    Row Name 10/04/19 1351             Outcome Summary/Treatment Plan (SLP)    Daily Summary of Progress (SLP)  progress towards functional goals is fair  -MB      Barriers to Overall Progress (SLP)  Aphasia  -MB      Plan for Continued Treatment (SLP)  Continue to follow and treat  -MB      Anticipated Dischage Disposition  inpatient rehabilitation facility  -MB      Recorded by [MB] Georgi Hernandez CCC-SLP 10/04/19 1410        User Key  (r) = Recorded By, (t) = Taken By, (c) = Cosigned By    Initials Name Effective Dates Discipline    MB Georgi Hernandez CCC-SLP 08/02/16 -  SLP    NW Dunia Multani RN 07/12/18 -  Nurse          Outcome Summary  Outcome Summary/Treatment Plan (SLP)  Daily Summary of Progress (SLP): progress towards functional goals is fair (10/04/19 1351 : Georgi Hernandez CCC-SLP)  Barriers to Overall Progress (SLP): Aphasia (10/04/19 1351 : Georgi Hernandez CCC-SLP)  Plan for Continued Treatment (SLP): Continue to follow and treat (10/04/19 1351 : Georgi Hernandez CCC-SLP)  Anticipated Dischage Disposition: inpatient rehabilitation facility (10/04/19  1351 : Georgi Hernandez, CCC-SLP)      SLP GOALS     Row Name 10/04/19 1351 10/03/19 1118 10/03/19 0952       Oral Nutrition/Hydration Goal 1 (SLP)    Oral Nutrition/Hydration Goal 1, SLP  LTG: Patient will tolerate LRD without s/s of aspiration.  -MB  LTG: Patient will tolerate LRD without s/s of aspiration.  -MB  LTG: Patient will tolerate LRD without s/s of aspiration.  -MB    Time Frame (Oral Nutrition/Hydration Goal 1, SLP)  by discharge  -MB  by discharge  -MB  by discharge  -MB    Barriers (Oral Nutrition/Hydration Goal 1, SLP)  Aphasia  -MB  Aphasia  -MB  Aphasia  -MB    Progress/Outcomes (Oral Nutrition/Hydration Goal 1, SLP)  continuing progress toward goal  -MB  goal ongoing  -MB  continuing progress toward goal  -MB       Lingual Strengthening Goal 1 (SLP)    Activity (Lingual Strengthening Goal 1, SLP)  increase tongue back strength  -MB  increase tongue back strength  -MB  increase tongue back strength  -MB    Increase Tongue Back Strength  lingual movement exercises  -MB  lingual movement exercises  -MB  lingual movement exercises  -MB    [place]/Accuracy (Lingual Strengthening Goal 1, SLP)  independently (over 90% accuracy)  -MB  independently (over 90% accuracy)  -MB  independently (over 90% accuracy)  -MB    Time Frame (Lingual Strengthening Goal 1, SLP)  short term goal (STG);by discharge  -MB  short term goal (STG);by discharge  -MB  short term goal (STG);by discharge  -MB    Barriers (Lingual Strengthening Goal 1, SLP)  n/a  -MB  n/a  -MB  n/a  -MB    Progress/Outcomes (Lingual Strengthening Goal 1, SLP)  goal ongoing  -MB  goal ongoing  -MB  good progress toward goal  -MB       Pharyngeal Strengthening Exercise Goal 1 (SLP)    Activity (Pharyngeal Strengthening Goal 1, SLP)  increase timing;increase superior movement of the hyolaryngeal complex;increase anterior movement of the hyolaryngeal complex;increase epiglottic inversion and retroflexion;increase squeeze/positive pressure  generation  -MB  increase timing;increase superior movement of the hyolaryngeal complex;increase anterior movement of the hyolaryngeal complex;increase epiglottic inversion and retroflexion;increase squeeze/positive pressure generation  -MB  increase epiglottic inversion and retroflexion;increase squeeze/positive pressure generation;increase tongue base retraction  -MB    Increase Timing  gustatory stimulation (sour/cold)  -MB  gustatory stimulation (sour/cold)  -MB  --    Increase Superior Movement of the Hyolaryngeal Complex  -- NMES  -MB  -- NMES  -MB  --    Increase Anterior Movement of the Hyolaryngeal Complex  shaker  -MB  shaker  -MB  --    Increase Epiglottic Inversion and Retroflexion  Mendelsohn  -MB  Mendelsohn  -MB  Mendelsohn  -MB    Increase Squeeze/Positive Pressure Generation  hard effortful swallow  -MB  hard effortful swallow  -MB  hard effortful swallow  -MB    Increase Tongue Base Retraction  yoni  -MB  yoni  -MB  yoni  -MB    [place]/Accuracy (Pharyngeal Strengthening Goal 1, SLP)  independently (over 90% accuracy)  -MB  independently (over 90% accuracy)  -MB  independently (over 90% accuracy)  -MB    Time Frame (Pharyngeal Strengthening Goal 1, SLP)  short term goal (STG);by discharge  -MB  short term goal (STG);by discharge  -MB  short term goal (STG);by discharge  -MB    Barriers (Pharyngeal Strengthening Goal 1, SLP)  cognitive status  -MB  cognitive status  -MB  cognitive status  -MB    Progress/Outcomes (Pharyngeal Strengthening Goal 1, SLP)  progress slower than expected  -MB  goal ongoing  -MB  goal ongoing  -MB    Row Name 10/02/19 0917 10/01/19 1446          Oral Nutrition/Hydration Goal 1 (SLP)    Oral Nutrition/Hydration Goal 1, SLP  LTG: Patient will tolerate LRD without s/s of aspiration.  -MB (r) AS (t) MB (c)  LTG: Patient will tolerate LRD without s/s of aspiration.  -MB     Time Frame (Oral Nutrition/Hydration Goal 1, SLP)  by discharge  -MB (r) AS (t) MB (c)  by  discharge  -MB     Barriers (Oral Nutrition/Hydration Goal 1, SLP)  Aphasia  -MB (r) AS (t) MB (c)  Aphasia  -MB     Progress/Outcomes (Oral Nutrition/Hydration Goal 1, SLP)  goal ongoing  -MB (r) AS (t) MB (c)  goal ongoing  -MB        Lingual Strengthening Goal 1 (SLP)    Activity (Lingual Strengthening Goal 1, SLP)  increase tongue back strength  -MB (r) AS (t) MB (c)  increase tongue back strength  -MB     Increase Tongue Back Strength  lingual movement exercises  -MB (r) AS (t) MB (c)  lingual movement exercises  -MB     [place]/Accuracy (Lingual Strengthening Goal 1, SLP)  independently (over 90% accuracy)  -MB (r) AS (t) MB (c)  independently (over 90% accuracy)  -MB     Time Frame (Lingual Strengthening Goal 1, SLP)  short term goal (STG);by discharge  -MB (r) AS (t) MB (c)  short term goal (STG);by discharge  -MB     Barriers (Lingual Strengthening Goal 1, SLP)  n/a  -MB (r) AS (t) MB (c)  n/a  -MB     Progress/Outcomes (Lingual Strengthening Goal 1, SLP)  good progress toward goal  -MB (r) AS (t) MB (c)  goal ongoing  -MB        Pharyngeal Strengthening Exercise Goal 1 (SLP)    Activity (Pharyngeal Strengthening Goal 1, SLP)  increase epiglottic inversion and retroflexion;increase squeeze/positive pressure generation;increase tongue base retraction  -MB (r) AS (t) MB (c)  increase epiglottic inversion and retroflexion;increase squeeze/positive pressure generation;increase tongue base retraction  -MB     Increase Epiglottic Inversion and Retroflexion  Mendelsohn  -MB (r) AS (t) MB (c)  Mendelsohn  -MB     Increase Squeeze/Positive Pressure Generation  hard effortful swallow  -MB (r) AS (t) MB (c)  hard effortful swallow  -MB     Increase Tongue Base Retraction  yoni  -MB (r) AS (t) MB (c)  yoni  -MB     [place]/Accuracy (Pharyngeal Strengthening Goal 1, SLP)  independently (over 90% accuracy)  -MB (r) AS (t) MB (c)  independently (over 90% accuracy)  -MB     Time Frame (Pharyngeal  Strengthening Goal 1, SLP)  short term goal (STG);by discharge  -MB (r) AS (t) MB (c)  short term goal (STG);by discharge  -MB     Barriers (Pharyngeal Strengthening Goal 1, SLP)  cognitive status  -MB (r) AS (t) MB (c)  n/a  -MB     Progress/Outcomes (Pharyngeal Strengthening Goal 1, SLP)  goal ongoing  -MB (r) AS (t) MB (c)  goal ongoing  -MB        Words/Phrases/Sentences Goal 1 (SLP)    Improve Ability to Comprehend Words/Phrases/Sentences Through: Goal 1 (SLP)  identify objects, field of;identify pictures, field of;90%;independently (over 90% accuracy);other (comment)  -MB (r) AS (t) MB (c)  --     Time Frame (Identify Objects and Pictures Goal 1, SLP)  short term goal (STG);by discharge  -MB (r) AS (t) MB (c)  --     Barriers (Identify Objects and Pictures Goal 1, SLP)  aphasia  -MB (r) AS (t) MB (c)  --     Progress/Outcomes (Identify Objects and Pictures Goal 1, SLP)  goal ongoing  -MB (r) AS (t) MB (c)  --        Comprehend Questions Goal 1 (SLP)    Improve Ability to Comprehend Questions Goal 1 (SLP)  simple yes/no questions;complex yes/no questions;90%;independently (over 90% accuracy)  -MB (r) AS (t) MB (c)  --     Time Frame (Comprehend Questions Goal 1, SLP)  short term goal (STG);by discharge  -MB (r) AS (t) MB (c)  --     Barriers (Comprehend Questions Goal 1, SLP)  aphaisa  -MB (r) AS (t) MB (c)  --     Progress/Outcomes (Comprehend Questions Goal 1, SLP)  goal ongoing  -MB (r) AS (t) MB (c)  --        Follow Directions Goal 2 (SLP)    Improve Ability to Follow Directions Goal 1 (SLP)  1 step direction with objects;1 step direction without objects;90%;independently (over 90% accuracy)  -MB (r) AS (t) MB (c)  --     Time Frame (Follow Directions Goal 1, SLP)  short term goal (STG);by discharge  -MB (r) AS (t) MB (c)  --     Progress (Ability to Follow Directions Goal 1, SLP)  50%;with minimal cues (75-90%)  -MB (r) AS (t) MB (c)  --     Progress/Outcomes (Follow Directions Goal 1, SLP)  continuing  progress toward goal  -MB (r) AS (t) MB (c)  --        Word Retrieval Skills Goal 1 (SLP)    Improve Word Retrieval Skills By Goal 1 (SLP)  completing automatic speech task, days of the week;completing automatic speech task, months;confrontational naming task;repeating words;90%;independently (over 90% accuracy)  -MB (r) AS (t) MB (c)  --     Time Frame (Word Retrieval Goal 1, SLP)  short term goal (STG);by discharge  -MB (r) AS (t) MB (c)  --     Progress (Word Retrieval Skills Goal 1, SLP)  50%  -MB (r) AS (t) MB (c)  --     Progress/Outcomes (Word Retrieval Goal 1, SLP)  continuing progress toward goal  -MB (r) AS (t) MB (c)  --        Graphic Expression of Shapes, Letters, Numbers Goal 1 (SLP)    Improve Graphic Expression of Shapes, Letters, and Numbers Goal 1 (SLP)  copy shapes, numbers, and letters;writing dictated number and letters;90%;independently (over 90% accuracy)  -MB (r) AS (t) MB (c)  --     Time Frame (Graphic Expression of Shapes, Letters, and Numbers Goal 1, SLP)  short term goal (STG);by discharge  -MB (r) AS (t) MB (c)  --     Progress/Outcomes (Graphic Expression of Shapes, Letters, and Numbers Goal 1, SLP)  goal ongoing  -MB (r) AS (t) MB (c)  --        Augmentative/Alternative Communication Objectives Goal 1 (SLP    Communication (Augmentative/Alternative Communication Goal 1, SLP)  improve ability to use low tech augmentative/alternative communication device  -MB (r) AS (t) MB (c)  --     Improve Communication by (Augmentative/Alternative Communication Goal 1, SLP)  identify picture, field of ____;identify word, field of ____;alphabet/picture board;90%;independently (over 90% accuracy)  -MB (r) AS (t) MB (c)  --     Time Frame (Augmentative/Alternative Communication Goal 1, SLP)  short term goal (STG);by discharge  -MB (r) AS (t) MB (c)  --     Progress (Augmentative/Alternative Communication Goal 1, SLP)  70%  -MB (r) AS (t) MB (c)  --     Progress/Outcomes (Augmentative/Alternative  Communication Goal 1, SLP)  good progress toward goal  -MB (r) AS (t) MB (c)  --       User Key  (r) = Recorded By, (t) = Taken By, (c) = Cosigned By    Initials Name Provider Type    Georgi Garcia CCC-SLP Speech and Language Pathologist    AS Eliecer Williamson, Speech Therapy Student Speech Therapy Student          EDUCATION  The patient has been educated in the following areas:   Dysphagia (Swallowing Impairment).    SLP Recommendation and Plan  Daily Summary of Progress (SLP): progress towards functional goals is fair  Barriers to Overall Progress (SLP): Aphasia  Plan for Continued Treatment (SLP): Continue to follow and treat  Anticipated Dischage Disposition: inpatient rehabilitation facility                    Time Calculation:   Time Calculation- SLP     Row Name 10/04/19 1414             Time Calculation- SLP    SLP Start Time  1351  -MB      SLP Stop Time  1407  -MB      SLP Time Calculation (min)  16 min  -MB      SLP Received On  10/04/19  -MB        User Key  (r) = Recorded By, (t) = Taken By, (c) = Cosigned By    Initials Name Provider Type    Georgi Garcia CCC-SLP Speech and Language Pathologist          Therapy Charges for Today     Code Description Service Date Service Provider Modifiers Qty    73426148460 HC ST TREATMENT SWALLOW 1 10/3/2019 Georgi Hernandez CCC-SLP GN 1    [id number] HC ST MOTION FLUORO EVAL SWALLOW 7 10/3/2019 Georgi Hernandez CCC-SLP GN 1    [id number] HC ST TREATMENT SWALLOW 1 10/4/2019 Georgi Hernandez CCC-SLP GN 1                 ZEFERINO Rosales  10/4/2019

## 2019-10-04 NOTE — PLAN OF CARE
Problem: Patient Care Overview  Goal: Plan of Care Review  Outcome: Ongoing (interventions implemented as appropriate)   10/04/19 0334   Coping/Psychosocial   Plan of Care Reviewed With patient   Plan of Care Review   Progress improving   OTHER   Outcome Summary Pt is more cooperative tonight. Has been continent of bowel and bladder. Has left overnight pulse ox on. Still disoriented to place and situation. Still has aphasia/dysarthria. Lipitor given crushed in pudding. Pt coughed immediately after.        Problem: Fall Risk (Adult)  Goal: Absence of Fall  Outcome: Ongoing (interventions implemented as appropriate)      Problem: Stroke (Ischemic) (Adult)  Goal: Signs and Symptoms of Listed Potential Problems Will be Absent, Minimized or Managed (Stroke)  Outcome: Ongoing (interventions implemented as appropriate)      Problem: Skin Injury Risk (Adult)  Goal: Skin Health and Integrity  Outcome: Ongoing (interventions implemented as appropriate)      Problem: Nutrition, Imbalanced: Inadequate Oral Intake (Adult)  Goal: Improved Oral Intake  Outcome: Ongoing (interventions implemented as appropriate)    Goal: Prevent Further Weight Loss  Outcome: Ongoing (interventions implemented as appropriate)      Problem: VTE, DVT and PE (Adult)  Goal: Signs and Symptoms of Listed Potential Problems Will be Absent, Minimized or Managed (VTE, DVT and PE)  Outcome: Ongoing (interventions implemented as appropriate)

## 2019-10-04 NOTE — PROGRESS NOTES
[facility]  INPATIENT PROGRESS NOTE    Length of Stay: 8  Date of Admission: 9/26/2019  Primary Care Physician: Jose Karimi MD    Subjective   Chief Complaint: CVA/aphasia    HPI   Patient is currently walking in the hallway with help.  Patient sounds clear every day.  Patient denies any chest pain or shortness of breath.  Patient tolerating soft diet.    Review of Systems   Constitutional: Positive for activity change, appetite change and fatigue. Negative for chills and fever.   HENT: Negative for hearing loss, nosebleeds, tinnitus and trouble swallowing.    Eyes: Negative for visual disturbance.   Respiratory: Positive for shortness of breath. Negative for cough, chest tightness and wheezing.    Cardiovascular: Negative for chest pain, palpitations and leg swelling.   Gastrointestinal: Negative for abdominal distention, abdominal pain, blood in stool, constipation, diarrhea, nausea and vomiting.   Endocrine: Negative for cold intolerance, heat intolerance, polydipsia, polyphagia and polyuria.   Genitourinary: Negative for decreased urine volume, difficulty urinating, dysuria, flank pain, frequency and hematuria.   Musculoskeletal: Positive for arthralgias, gait problem and myalgias. Negative for joint swelling.   Skin: Negative for rash.   Allergic/Immunologic: Negative for immunocompromised state.   Neurological: Positive for weakness. Negative for dizziness, syncope, light-headedness and headaches.   Hematological: Negative for adenopathy. Does not bruise/bleed easily.   Psychiatric/Behavioral: Positive for confusion. Negative for sleep disturbance. The patient is not nervous/anxious.    All pertinent negatives and positives are as above. All other systems have been reviewed and are negative unless otherwise stated.     Objective    Temp:  [98.2 °F (36.8 °C)-99.3 °F (37.4 °C)] 98.9 °F (37.2 °C)  Heart Rate:  [63-80] 80  Resp:  [16-18] 16  BP:  (116147)/(56-77) 116/73    Intake/Output Summary (Last 24 hours) at 10/4/2019 1429  Last data filed at 10/4/2019 1101  Gross per 24 hour   Intake 1050 ml   Output --   Net 1050 ml     Physical Exam  Constitutional: He appears well-developed.   HENT:   Head: Normocephalic.   Eyes: Conjunctivae are normal. Pupils are equal, round, and reactive to light.   Neck: Neck supple. No JVD present. No thyromegaly present.   Cardiovascular: Normal rate, regular rhythm, normal heart sounds and intact distal pulses. Exam reveals no gallop and no friction rub.   No murmur heard.  Pulmonary/Chest: Effort normal. No respiratory distress. He has wheezes. He has no rales. He exhibits no tenderness.     Diminished breath sound.  Rhonchi is improving.  Abdominal: Soft. Bowel sounds are normal. He exhibits no distension. There is no tenderness. There is no rebound and no guarding.   Musculoskeletal: He exhibits no edema, tenderness or deformity.   Lymphadenopathy:     He has no cervical adenopathy.   Neurological: He is alert. He displays normal reflexes. No cranial nerve deficit. He exhibits abnormal muscle tone. Coordination abnormal.   Skin: Skin is warm and dry. Capillary refill takes 2 to 3 seconds.  Right arm from previous IV site erythematous, warm, slightly firm.  Probably superficial thrombus.  Psychiatric: He has a normal mood and affect. His behavior is normal.   Nursing note and vitals reviewed.  Results Review:  Lab Results (last 24 hours)     Procedure Component Value Units Date/Time    Vitamin B12 [[id number]]  (Normal) Collected:  10/04/19 0436    Specimen:  Blood Updated:  10/04/19 1217     Vitamin B-12 756 pg/mL     Folate [[id number]]  (Normal) Collected:  10/04/19 0436    Specimen:  Blood Updated:  10/04/19 1217     Folate 9.88 ng/mL     Comprehensive Metabolic Panel [[id number]]  (Abnormal) Collected:  10/04/19 0436    Specimen:  Blood Updated:  10/04/19 0558     Glucose 97 mg/dL      BUN 9 mg/dL      Creatinine 0.60  mg/dL      Sodium 139 mmol/L      Potassium 3.4 mmol/L      Chloride 97 mmol/L      CO2 28.0 mmol/L      Calcium 8.7 mg/dL      Total Protein 6.8 g/dL      Albumin 3.40 g/dL      ALT (SGPT) 23 U/L      AST (SGOT) 26 U/L      Alkaline Phosphatase 66 U/L      Total Bilirubin 0.3 mg/dL      eGFR Non African Amer 139 mL/min/1.73      Globulin 3.4 gm/dL      A/G Ratio 1.0 g/dL      BUN/Creatinine Ratio 15.0     Anion Gap 14.0 mmol/L     Narrative:       GFR Normal >60  Chronic Kidney Disease <60  Kidney Failure <15    Magnesium [[id number]]  (Normal) Collected:  10/04/19 0436    Specimen:  Blood Updated:  10/04/19 0558     Magnesium 1.7 mg/dL     CBC & Differential [[id number]] Collected:  10/04/19 0436    Specimen:  Blood Updated:  10/04/19 0533    Narrative:       The following orders were created for panel order CBC & Differential.  Procedure                               Abnormality         Status                     ---------                               -----------         ------                     CBC Auto Differential[[id number]]        Abnormal            Final result                 Please view results for these tests on the individual orders.    CBC Auto Differential [[id number]]  (Abnormal) Collected:  10/04/19 0436    Specimen:  Blood Updated:  10/04/19 0533     WBC 9.18 10*3/mm3      RBC 4.16 10*6/mm3      Hemoglobin 13.4 g/dL      Hematocrit 38.9 %      MCV 93.5 fL      MCH 32.2 pg      MCHC 34.4 g/dL      RDW 13.2 %      RDW-SD 45.3 fl      MPV 10.1 fL      Platelets 303 10*3/mm3      Neutrophil % 59.3 %      Lymphocyte % 25.6 %      Monocyte % 11.8 %      Eosinophil % 2.4 %      Basophil % 0.5 %      Immature Grans % 0.4 %      Neutrophils, Absolute 5.44 10*3/mm3      Lymphocytes, Absolute 2.35 10*3/mm3      Monocytes, Absolute 1.08 10*3/mm3      Eosinophils, Absolute 0.22 10*3/mm3      Basophils, Absolute 0.05 10*3/mm3      Immature Grans, Absolute 0.04 10*3/mm3      nRBC 0.0 /100 WBC             Cultures:  Respiratory Culture   Date Value Ref Range Status   09/27/2019 Light growth (2+) Normal Respiratory Esther  Final       Radiology Data:    Imaging Results (last 24 hours)     Procedure Component Value Units Date/Time    US Venous Doppler Upper Extremity Right (duplex) [[id number]] Collected:  10/03/19 1624     Updated:  10/03/19 1628    Narrative:       History: Pain and swelling       Impression:       Impression:  1. There is no evidence of deep venous thrombosis of the right upper  extremity.  2. There is evidence of superficial thrombus in the basilic vein at the  elbow and in the cephalic vein from the elbow to the distal upper arm.     Comments: Right upper extremity venous duplex exam was performed using  color Doppler flow, Doppler wave form analysis, and grayscale imaging,  with and without compression. There is no evidence of deep venous  thrombosis of the internal jugular, subclavian, axillary, and brachial  veins. There is evidence of superficial thrombus in the basilic vein at  the elbow and in the cephalic vein from the elbow to the distal upper  arm. There is no evidence of clot in the left subclavian vein.     This report was finalized on 10/03/2019 16:25 by Dr. Gaetano Teran MD.          No Known Allergies    Scheduled meds:     aspirin 81 mg Oral Daily   Or      aspirin 300 mg Rectal Daily   atorvastatin 80 mg Oral Nightly   folic acid 5 mg Oral Once   ipratropium-albuterol 3 mL Nebulization 4x Daily - RT   nicotine 1 patch Transdermal Q24H   pantoprazole 40 mg Intravenous Q AM   potassium chloride 40 mEq Oral Once   sodium chloride 10 mL Intravenous Q12H   thiamine 100 mg Oral Daily       PRN meds:  •  albuterol  •  hypromellose  •  labetalol  •  LORazepam **OR** LORazepam **OR** LORazepam **OR** LORazepam **OR** LORazepam **OR** LORazepam  •  sodium chloride    Assessment/Plan       Degeneration of lumbar or lumbosacral intervertebral disc    Smoker    Acute cerebrovascular  accident (CVA) of cerebellum (CMS/HCC)    Alcoholism /alcohol abuse (CMS/HCC)    CVA (cerebral vascular accident) (CMS/HCC)    Hyperlipidemia    Dysphagia due to recent cerebral infarction      Plan:  Respiratory failure.  Resolved.  Extubated 9/30/19.    Consult ENT-laryngoscopy demonstrates some pressure wounds of the posterior vocal folds bilaterally, with some swelling on the left- this is likely due to the endotracheal tube and will resolve on its own.     CVA. Left MCA stroke. Continue aspirin.  Continue Lipitor. Neurology consult.   CTA of the head and neck- complete occlusion of the entire cervical left ICA, 30% stenosis of the right carotid bulb, mild stenosis at the ostium of the left vertebral artery.  MRI of the head- acute infarct in the left MCA and watershed territory- no hemorrhagic conversion, abnormal left ICA flow void.  Echocardiogram- ejection fraction 61%, diastolic dysfunction grade 1.  Follow-up with neurologist in 4 weeks.  Patient been instructed not to drive.     Right arm thrombus.  Warm compress.  Doppler ultrasound of right arm.     Hypokalemia-p.o. potassium.     Reflux.  Protonix and Zofran as needed     COPD.  Continue duo nebs.     Alcohol withdrawal?.  Ciwa protocol.  Patient stated last has drink about 2 weeks ago.  Patient has no sign of withdrawal symptoms.     Hyperlipidemia.  Continue Lipitor.     Tobacco abuse.  Tobacco counseling.  Patient refused nicotine patch.     Urinary incontinence.    DC Villalobos cath 9/30/2019.     SCD.     Nutrition.  Soft texture diet.     Deconditioning.  PT and OT consult.     Discharge Planning: Plan for rehab placement at [facility].    Candido Alas MD   10/04/19   2:29 PM

## 2019-10-04 NOTE — PLAN OF CARE
Problem: Patient Care Overview  Goal: Plan of Care Review  Outcome: Ongoing (interventions implemented as appropriate)   10/04/19 1411   Coping/Psychosocial   Plan of Care Reviewed With patient   Plan of Care Review   Progress no change   OTHER   Outcome Summary Pt completed trials of mechanical soft solids and pudding. He exhibited delayed coughing 2x with pudding, even when using a chin tuck strategy. He followed commands to use a chin tuck with mech soft items as well and had no overt s/s of aspiration. Pt is likely having at least laryngeal penetration with pudding thick liquids, but did seem to clear much of the residue from the laryngeal vestibule with coughing during the modified barium swallow study yesterday. Nursing reports pt's lung sounds have not worsened. Pt was unable to follow commands to complete an effortful swallow or Tracie maneuver. Continue mechanical soft solids and pudding thick liquids. Encourage the use of a chin tuck with swallows as well as at least occasional volitional throat clears/coughs for airway clearance.

## 2019-10-04 NOTE — PLAN OF CARE
Problem: Patient Care Overview  Goal: Plan of Care Review  Outcome: Ongoing (interventions implemented as appropriate)   10/04/19 1501   Coping/Psychosocial   Plan of Care Reviewed With patient   Plan of Care Review   Progress improving   OTHER   Outcome Summary Pt. was performed sit to stand transfer with supervision; Pt. ambulated 100'x2 with CGA/SBA without assistive device needed. Pt. was given verbal cueing to widen JUSTA during ambulation with turns. Pt. had decreased heel strike during gait due to RLE ankle DF foot drop. Pt. performed standing therapeutic activities with CGA/supervision with verbal and tactile cues needed for correct sequencing. Pt. had LOB with LLE ABD/ADD in standing. Pt. performed seated exercises for 20 reps with verbal and tactlie cues needed to perform exercises correctly. Recommended inpatient rehab      10/04/19 1501   Coping/Psychosocial   Plan of Care Reviewed With patient   Plan of Care Review   Progress improving   OTHER   Outcome Summary Pt. was performed sit to stand transfer with supervision; Pt. ambulated 100'x2 with CGA/SBA without assistive device needed. Pt. was given verbal cueing to widen JUSTA during ambulation with turns. Pt. had decreased heel strike during gait due to RLE ankle DF foot drop. Pt. performed standing therapeutic activities with CGA/supervision with verbal and tactile cues needed for correct sequencing. Pt. had LOB with LLE ABD/ADD in standing. Pt. performed seated exercises for 20 reps with verbal and tactlie cues needed to perform exercises correctly. Recommended inpatient rehab

## 2019-10-04 NOTE — PLAN OF CARE
Problem: Patient Care Overview  Goal: Plan of Care Review  Outcome: Ongoing (interventions implemented as appropriate)   10/04/19 7705   Coping/Psychosocial   Plan of Care Reviewed With patient   Plan of Care Review   Progress improving   OTHER   Outcome Summary Pt A&O to self, time and location. Pt denies pain this shift. Up standby assist. IV fluids infusing. BM today. VSS. Med crushed in pudding. Continue to monitor for aspiration. Bed or chair alarm on. Safety maintained.        Problem: Skin Injury Risk (Adult)  Goal: Skin Health and Integrity  Outcome: Ongoing (interventions implemented as appropriate)      Problem: Nutrition, Imbalanced: Inadequate Oral Intake (Adult)  Goal: Improved Oral Intake  Outcome: Ongoing (interventions implemented as appropriate)      Problem: VTE, DVT and PE (Adult)  Goal: Signs and Symptoms of Listed Potential Problems Will be Absent, Minimized or Managed (VTE, DVT and PE)  Outcome: Ongoing (interventions implemented as appropriate)

## 2019-10-04 NOTE — PROGRESS NOTES
Continued Stay Note   [place]     Patient Name: Jarod Ramírez Jr.  MRN: [id number]  Today's Date: 10/4/2019    Admit Date: 9/26/2019    Discharge Plan     Row Name 10/04/19 1016       Plan    Plan Comments  SPOKE TO [facility] AT [facility] ([phone]) AND THEY ARE REVIEWING REFERRAL TODAY. IF APPROVED PRECERT WILL BE STARTED TODAY.         Discharge Codes    No documentation.             JONO Sharif

## 2019-10-04 NOTE — PROGRESS NOTES
Neurology Progress Note      Chief Complaint: LMCA stroke    Subjective     Subjective: sitting on side of bed. No family in room. His voice remains hoarse and he has new onset hiccups but no other new findings.  Has ulceration on vocal cords due to intubation and his combativeness at the time.       Medications:  Current Facility-Administered Medications   Medication Dose Route Frequency Provider Last Rate Last Dose   • albuterol (PROVENTIL) nebulizer solution 0.083% 2.5 mg/3mL  2.5 mg Nebulization Once PRN Galo Bethea MD       • aspirin chewable tablet 81 mg  81 mg Oral Daily Candido Alas MD   81 mg at 10/04/19 0909    Or   • aspirin suppository 300 mg  300 mg Rectal Daily Candido Alas MD   300 mg at 10/03/19 0944   • atorvastatin (LIPITOR) tablet 80 mg  80 mg Oral Nightly Candido Alas MD   80 mg at 10/03/19 2223   • hypromellose (ISOPTO TEARS) 0.5 % ophthalmic solution 2 drop  2 drop Both Eyes TID PRN Candido Alas MD   2 drop at 09/30/19 2152   • ipratropium-albuterol (DUO-NEB) nebulizer solution 3 mL  3 mL Nebulization 4x Daily - RT Christina Sharif APRN   3 mL at 10/04/19 1110   • labetalol (NORMODYNE,TRANDATE) injection 20 mg  20 mg Intravenous Q4H PRN Christina Sharif APRN       • lactated ringers infusion  50 mL/hr Intravenous Continuous Christina Sharif APRN 50 mL/hr at 10/04/19 0457 50 mL/hr at 10/04/19 0457   • LORazepam (ATIVAN) tablet 1 mg  1 mg Oral Q2H PRN Candido Alas MD        Or   • LORazepam (ATIVAN) injection 1 mg  1 mg Intravenous Q2H PRN Candido Alas MD        Or   • LORazepam (ATIVAN) tablet 2 mg  2 mg Oral Q1H PRN Candido Alas MD        Or   • LORazepam (ATIVAN) injection 2 mg  2 mg Intravenous Q1H PRN Candido Alas MD   2 mg at 09/29/19 1311    Or   • LORazepam (ATIVAN) injection 2 mg  2 mg Intravenous Q15 Min PRN Candido Alas MD   2 mg at 09/27/19 2007    Or   • LORazepam (ATIVAN) injection 2 mg  2 mg Intramuscular Q15 Min PRN  Candido Alas MD       • nicotine (NICODERM CQ) 14 MG/24HR patch 1 patch  1 patch Transdermal Q24H Candido Alas MD   1 patch at 10/04/19 0909   • pantoprazole (PROTONIX) injection 40 mg  40 mg Intravenous Q AM Jose Johnson MD   40 mg at 10/04/19 0457   • potassium chloride (KAYCIEL) 20 MEQ/15ML (10%) solution 40 mEq  40 mEq Oral Once Candido Alas MD       • sodium chloride 0.9 % flush 10 mL  10 mL Intravenous Q12H Candido Alas MD   10 mL at 10/02/19 2011   • sodium chloride 0.9 % flush 10 mL  10 mL Intravenous PRN Candido Alas MD       • thiamine (VITAMIN B-1) tablet 100 mg  100 mg Oral Daily Tsering Norwood MD   100 mg at 10/04/19 0909       Review of Systems:   -A 14 point review of systems is completed and is negative except for expressive aphasia and hiccups.       Objective      Vital Signs  Temp:  [98.1 °F (36.7 °C)-99.3 °F (37.4 °C)] 98.2 °F (36.8 °C)  Heart Rate:  [63-77] 71  Resp:  [16-18] 16  BP: (122-147)/(56-77) 122/67      TELEMETRY: last night sinus 70-82 with couplets and PAC, this morning sinus 66-81 with couplets  Physical Exam:    Awake, alert. Expressive aphasia, has difficulty with describing pictures, could read sentences. Named 2/2 objects  no dysarthria  Completes simple commands    CN II:  Visual fields full.  Pupils equally reactive to light  CN III, IV, VI:  Extraocular Muscles full with no signs of nystagmus  CN V:  Facial sensory is symmetric with no asymmetries.  CN VII:  Facial motor symmetric  CN VIII:  Gross hearing intact bilaterally  CN IX:  Palate elevates symmetrically  CN X:  Palate elevates symmetrically  CN XI:  Shoulder shrug symmetric  CN XII:  Tongue is midline on protrusion    Motor: (strength out of 5:  1= minimal movement, 2 = movement in plane of gravity, 3 = movement against gravity, 4 = movement against some resistance, 5 = full strength)     -Right Upper Ext: Proximal: 5         Distal: 5  -Left Upper Ext: Proximal: 5           Distal:  5     -Right Lower Ext: Proximal: 5         Distal: 5  -Left Lower Ext: Proximal: 5           Distal: 5  Right plantar and dorsiflexion 4+/5 and reportedly had prior injury that resulted in chronic weakness  Left plantar and dorsiflexion 5/5     DTR:  -Right              Bicep: 2+       Triceps: 2+      Brachioradialis: 2+              Patella: 2+       Ankle: 2+       Neg Babinski  -Left              Bicep: 2+       Triceps: 2+      Brachioradialis: 2+              Patella: 2+       Ankle: 2+       Neg Babinski     Sensory:  -Intact to light touch     Coordination:  -Finger to nose intact  -Heel to shin intact   - no ataxia noted       Results Review:    I reviewed the patient's new clinical results.    Results from last 7 days   Lab Units 10/04/19  0436 10/03/19  0437 10/02/19  0655   WBC 10*3/mm3 9.18 9.37 11.03*   HEMOGLOBIN g/dL 13.4 13.3 14.0   HEMATOCRIT % 38.9 37.9 41.1   PLATELETS 10*3/mm3 303 278 301        Results from last 7 days   Lab Units 10/04/19  0436 10/03/19  0437 10/02/19  0655   SODIUM mmol/L 139 139 139   POTASSIUM mmol/L 3.4* 3.9 3.6   CHLORIDE mmol/L 97* 98 97*   CO2 mmol/L 28.0 28.0 30.0*   BUN mg/dL 9 7 5*   CREATININE mg/dL 0.60* 0.55* 0.60*   CALCIUM mg/dL 8.7 8.3* 8.9   BILIRUBIN mg/dL 0.3 0.5 0.6   ALK PHOS U/L 66 62 70   ALT (SGPT) U/L 23 18 20   AST (SGOT) U/L 26 24 26   GLUCOSE mg/dL 97 185* 161*        Lab Results   Component Value Date    MG 1.7 10/04/2019     No components found for: POCGLUC  No components found for: A1C  Lab Results   Component Value Date    HDL 51 09/27/2019     (H) 09/27/2019     No components found for: B12  Lab Results   Component Value Date    TSH 1.760 09/27/2019       Assessment/Plan     Hospital Problem List      Degeneration of lumbar or lumbosacral intervertebral disc    Smoker    Acute cerebrovascular accident (CVA) of cerebellum (CMS/HCC)    Alcoholism /alcohol abuse (CMS/HCC)    CVA (cerebral vascular accident) (CMS/HCC)    Hyperlipidemia     Dysphagia due to recent cerebral infarction    Impression:  1.  Acute stroke left MCA  2. Dysphagia improved and continue with recommendations of ST with nectar and honey thick liquids.  3. Possible SAFIA, overnight pulse ox no significant hypoxia but did have 73 desaturation events recorded.   4. CMP showed mild hypokalemia and will leave for hospitalist  5. History of ETOH abuse. Oral thiamine. Has normal mag and folate and B12 but would benefit from one time dose of folate  6. BP improved and systolic goal less than 140.  7. Smoker  8. Being evaluated for acute rehab at [facility].  9. Superficial thrombus in basilic vein at the elbow and cephalic vein at distal upper arm to the elbow.  10. Hyperlipidemia    Plan:  1. Continue ASA 81 mg daily  2. Continue Lipitor 80 mg daily for LDL goal less than 70.  3. Continue OT/ST/PT  4. Possible SAFIA.  Will need polysomnogram and patient would like to do in [place], [place]  5. CMP showed mild hypokalemia and will leave for hospitalist  6. BP improved and systolic goal less than 140.  7. Smoking cessation counseling  8. NO DRIVING SECONDARY TO APHASIA AND DIFFICULTY DESCRIBING PICTURES AND OBJECTS.  9. Continue thiamin 100 mg daily for history of ETOH.  10. Being evaluated for rehab at [facility].   11. Ok to discharge once accepted.   12. Follow up in neurology clinic in 4 weeks with Jeannie DOWNS.         YARELI Amezquita/Tsering Alvares MD    10/04/19  11:39 AM

## 2019-10-04 NOTE — DISCHARGE PLACEMENT REQUEST
"StellaJarod fairbanks Jr. (59 y.o. Male)     Date of Birth Social Security Number Address Home Phone MRN    1960  [address] [place] [address] [phone] [id number]    Mandaen Marital Status          Other Single       Admission Date Admission Type Admitting Provider Attending Provider Department, Room/Bed    9/26/19 Urgent Candido Alas MD Truong, Khai C, MD [facility] 3A, 355/1    Discharge Date Discharge Disposition Discharge Destination                       Attending Provider:  Candido Alas MD    Allergies:  No Known Allergies    Isolation:  None   Infection:  None   Code Status:  CPR    Ht:  162.6 cm (64\")   Wt:  78 kg (172 lb)    Admission Cmt:  None   Principal Problem:  None                Active Insurance as of 9/26/2019     Primary Coverage     Payor Plan Insurance Group Employer/Plan Group    [employer] [place] [employer] [place]ICA[place]      Payor Plan Address Payor Plan Phone Number Payor Plan Fax Number Effective Dates    [address] [address] [phone]  6/22/2017 - None Entered    [facility] [id number]       Subscriber Name Subscriber Birth Date Member ID       DANIELJAROD MEANS JR. 1960 [id number]                 Emergency Contacts      (Rel.) Home Phone Work Phone Mobile Phone    Lázaro Ramírez (Brother) [phone] -- --            Insurance Information                [facility] MEDICAID Phone: [phone]    Subscriber: Jarod Ramírez Jr. Subscriber#: [id number]    Group#:  Precert#:              History & Physical      Candido Alas MD at 09/26/19 2157              [facility] Medicine Services  HISTORY AND PHYSICAL    Date of Admission: 9/26/2019  Primary Care Physician: Jose Karimi MD    Subjective     Chief Complaint: CVA/alcohol abuse    History of Present Illness  Patient is a 59-year-old  male transferred from [facility] for evaluation altered mental status/confusion.  " "Patient has been drinking alcohol at work.  Patient came to ER confused disoriented with decreased responsiveness.  No history of trauma.  Unable to get the answer for the patient this time.  Most information is from the chart.    Review of Systems   Unable to obtain due to altered mental status.  Otherwise complete ROS reviewed and negative except as mentioned in the HPI.      Past Medical History:   Past Medical History:   Diagnosis Date   • Hypertension        Past Surgical History:  Past Surgical History:   Procedure Laterality Date   • CATARACT EXTRACTION Left    • WRIST FRACTURE SURGERY Left        Family History: family history includes Cancer in his sister; Diabetes in his father; No Known Problems in his brother and mother.    Social History:  reports that he has been smoking cigarettes.  He has never used smokeless tobacco. He reports that he drinks alcohol. He reports that he does not use drugs.    Medications:  Prior to Admission medications    Medication Sig Start Date End Date Taking? Authorizing Provider   cyclobenzaprine (FLEXERIL) 10 MG tablet Take 1 tablet by mouth 3 (Three) Times a Day As Needed for Muscle Spasms. 6/22/17   Galo Aguilera APRN   lisinopril (PRINIVIL,ZESTRIL) 10 MG tablet  5/10/17   ProviderDahiana MD   meloxicam (MOBIC) 15 MG tablet  5/10/17   Dahiana Hall MD   MethylPREDNISolone (MEDROL, ALBA,) 4 MG tablet Take as directed on package instructions. 6/22/17   Galo Aguilera APRN   traMADol (ULTRAM) 50 MG tablet  5/30/17   Dahiana Hall MD     Allergies:  No Known Allergies    Objective     Vital Signs: /86 (BP Location: Right arm, Patient Position: Sitting)   Pulse 74   Temp 98 °F (36.7 °C) (Oral)   Resp 16   Ht 163.8 cm (64.5\")   Wt 79.8 kg (176 lb)   SpO2 99%   BMI 29.74 kg/m²    Physical Exam   Constitutional: He appears well-developed.   HENT:   Head: Normocephalic and atraumatic.   Eyes: Conjunctivae are normal. Pupils are equal, " round, and reactive to light.   Neck: Neck supple. No JVD present. No thyromegaly present.   Cardiovascular: Normal rate, regular rhythm, normal heart sounds and intact distal pulses. Exam reveals no gallop and no friction rub.   No murmur heard.  Pulmonary/Chest: Effort normal and breath sounds normal. No respiratory distress. He has no wheezes. He has no rales. He exhibits no tenderness.   Decrease in breath sound bilateral, clear.   Abdominal: Soft. Bowel sounds are normal. He exhibits no distension. There is no tenderness. There is no rebound and no guarding.   Musculoskeletal: He exhibits no edema, tenderness or deformity.   Lymphadenopathy:     He has no cervical adenopathy.   Neurological: He is alert. He displays normal reflexes. No cranial nerve deficit. He exhibits abnormal muscle tone. Coordination abnormal.   Unable exam patient is this patient's unable to answer any question follow commands.  Patient is awake alert though appears   Skin: Skin is warm and dry. Capillary refill takes 2 to 3 seconds. No rash noted.   Nursing note and vitals reviewed.          Results Reviewed:  Laboratory- white blood cells 10, hemoglobin 15, hematocrit 46, platelets 400, ammonia level less than 10, sodium 137, potassium 3.6, chloride is 97, CO2 is 26, BUN 11, creatinine 0.82, total protein 8.1, albumin 3.8, bilirubin 0.69, alkaline phosphatase 80, AST 35, calcium 8.8, glucose 87, ALT is 29, GFR is greater than 60, alcohol level less than 3, acetaminophen level is 0, salicylate level is 5.3, urine drug screen is negative.    urinalysis shows moderate bilirubin, negative blood, nitrite negative, leukocyte neck negative.    Radiology Data:    Imaging Results (last 24 hours)     ** No results found for the last 24 hours. **          I have personally reviewed and interpreted the radiology studies and ECG obtained at time of admission.     Assessment / Plan      Assessment & Plan  Active Hospital Problems    Diagnosis   •  Acute cerebrovascular accident (CVA) of cerebellum (CMS/HCC)   • Alcoholism /alcohol abuse (CMS/HCC)   • Smoker     Plans    CVA.  Stroke protocol.  Consult neurology.  MRI of the head.  Carotid duplex.  Echocardiogram.  [facility] at [facility]- low attenuation within the left posterior temporal and right lower lobe concerning for evolving infarct, chronic ischemic deep white matter changes.    Alcohol abuse.  Ciwa.  Banana bag.    Smoker.  Consult patient once patient more alert.    Code Status: full code     I discussed the patient's findings and my recommendations with: patient    Estimated length of stay:2-5 days.     Candido Alas MD   09/26/19   9:58 PM              Electronically signed by Candido Alas MD at 09/26/19 9913          Operative/Procedure Notes (last 7 days) (Notes from 09/27/19 0918 through 10/04/19 0918)      Jose Johnson MD at 10/02/19 1307        Preprocedure diagnosis: Posterior vocal fold abnormality, dysphasia, recent stroke    Post procedure diagnosis: Pressure erosion of the vocal folds due to intubation, dysphagia, recent stroke    Procedure performed: Flexible fiberoptic laryngoscopy     Surgeon: Jose Johnson M.D.    Anesthesia: None    Details: After patient verification and consent was obtained, the flexible fiberoptic laryngoscope was passed into the oral cavity.  The following is a summary of findings:    Oropharynx: Healthy without mass or lesion    Hypopharynx: Healthy without mass or lesion    Larynx: Posteriorly, there is whitish discoloration and slight ulceration of the posterior aspects of the vocal folds where the endotracheal tube would have been.  There is some asymmetric swelling left greater than right.  There is full vocal fold mobility bilaterally.  No masses or evidence of arytenoid subluxation are seen.    The scope was withdrawn.  The patient tolerated the procedure without any complications.    Electronically signed by Jose Johnson MD  at 10/02/19 1308          Physician Progress Notes (last 24 hours) (Notes from 10/03/19 0918 through 10/04/19 0918)      Tsering Norwood MD at 10/03/19 1453               LOS: 7 days   Patient Care Team:  Jose Karimi MD as PCP - General (Family Medicine)    Chief Complaint:  LMCA stroke    Subjective    Sitting in bed with clothes on. Nephew, Elmer, at bedside.     Interval History:   Patient has a known history of chronic alcohol use but last reported was about 1 week prior to presentation. Patient works in construction. Dr. White received a phone call overnight from the emergency room physician at [facility].   reported,   His wife; who he is  from currently, had difficulties  getting a hold of him 9/26/19.  He was found initially thought to be confused although later there was a concern that it instead represented aphasia.  They denied seeing any focal weakness.  He was taken to the ER there.  Head CT did show a left MCA distribution abnormality.  He also was extremely combative.  He was transferred to the hospitalist service at our hospital.  Overnight there were reports that he required multiple sedating medications secondary to agitation. Patient was admitted to CCU and then to the neuroscience unit on 10/2/19.  Last night 10/2/19 he pulled out his IV and tube feeding. He was evaluated again by ST this morning followed by barium swallow study. He has been cleared to take foods/liquids/medications orally mechanical soft with pudding thick liquids.      Patient reports he wakes up frequently during the night, that he may snore, and he wakes up tired.  Upon entering the room we noted he was snoring lightly.     Review of Systems:    - a 14 point review of symptoms is reviewed and positive for expressive aphasia, and dysphagia. He does have prior injury to right lower extremity, right ankle that he never had evaluated and has chronic weakness/impaired gait since.      Objective     Vital Signs  Temp:  [98.1 °F (36.7 °C)-99.5 °F (37.5 °C)] 98.1 °F (36.7 °C)  Heart Rate:  [65-77] 75  Resp:  [16-18] 18  BP: (119-141)/(69-78) 136/74    Physical Exam:     General Appearance:    Alert, cooperative, in no acute distress   Head:    Normocephalic       Neck:   No adenopathy, supple, no   carotid bruit       Lungs:     Clear to auscultation,respirations regular, even and                  unlabored    Heart:    Regular rhythm and normal rate,  no murmur       Abdomen:      soft  non-tender, non-distended       Extremities:   Moves all extremities well       Skin:   No  rash       Neurologic: Neurologic Exam:    Mental Status:    -Awake, Alert, Oriented X 3. He could tell me he was in hospital when given choices. But stated his name and age, and that he is in [place] and that he lives in [place], [place]. Nephew entered the room and correctly stated nephew name and relation  -does have expressive aphasia. He was not able to describe pictures but was able to read words and sentences  - expressive aphasia  -No dysarthria  -Follows simple and complex commands    CN II:  Visual fields full.  Pupils equally reactive to light  CN III, IV, VI:  Extraocular Muscles full with no signs of nystagmus  CN V:  Facial sensory is symmetric with no asymmetries.  CN VII:  Facial motor symmetric  CN VIII:  Gross hearing intact bilaterally  CN IX:  Palate elevates symmetrically  CN X:  Palate elevates symmetrically  CN XI:  Shoulder shrug symmetric  CN XII:  Tongue protrudes to midline    Motor: (strength out of 5:  1= minimal movement, 2 = movement in plane of gravity, 3 = movement against gravity, 4 = movement against some resistance, 5 = full strength)    -Right Upper Ext: Proximal: 5 Distal: 5  -Left Upper Ext: Proximal: 5 Distal: 5    -Right Lower Ext: Proximal: 5 Distal: 5  -Left Lower Ext: Proximal: 5 Distal: 5  Right plantar and dorsiflexion 4+/5 and reportedly had prior injury that resulted in chronic  weakness  Left plantar and dorsiflexion 5/5    DTR:  -Right   Bicep: 2+ Triceps: 2+ Brachioradialis: 2+   Patella: 2+ Ankle: 2+ Neg Babinski  -Left   Bicep: 2+ Triceps: 2+ Brachioradialis: 2+   Patella: 2+ Ankle: 2+ Neg Babinski    Sensory:  -Intact to light touch    Coordination:  -Finger to nose intact  -Heel to shin intact   - no ataxia noted              Results Review:       Lab Results (last 24 hours)     Procedure Component Value Units Date/Time    POC Glucose Once [[id number]]  (Normal) Collected:  10/03/19 1138    Specimen:  Blood Updated:  10/03/19 1150     Glucose 97 mg/dL      Comment: : [id number] Alinto ShannonMeter ID: [id number]       Comprehensive Metabolic Panel [[id number]]  (Abnormal) Collected:  10/03/19 0437    Specimen:  Blood Updated:  10/03/19 0528     Glucose 185 mg/dL      BUN 7 mg/dL      Creatinine 0.55 mg/dL      Sodium 139 mmol/L      Potassium 3.9 mmol/L      Chloride 98 mmol/L      CO2 28.0 mmol/L      Calcium 8.3 mg/dL      Total Protein 6.6 g/dL      Albumin 3.30 g/dL      ALT (SGPT) 18 U/L      AST (SGOT) 24 U/L      Alkaline Phosphatase 62 U/L      Total Bilirubin 0.5 mg/dL      eGFR Non African Amer >150 mL/min/1.73      Globulin 3.3 gm/dL      A/G Ratio 1.0 g/dL      BUN/Creatinine Ratio 12.7     Anion Gap 13.0 mmol/L     Narrative:       GFR Normal >60  Chronic Kidney Disease <60  Kidney Failure <15    CBC & Differential [[id number]] Collected:  10/03/19 0437    Specimen:  Blood Updated:  10/03/19 0508    Narrative:       The following orders were created for panel order CBC & Differential.  Procedure                               Abnormality         Status                     ---------                               -----------         ------                     CBC Auto Differential[[id number]]        Abnormal            Final result                 Please view results for these tests on the individual orders.    CBC Auto Differential [[id number]]  (Abnormal) Collected:   10/03/19 0437    Specimen:  Blood Updated:  10/03/19 0508     WBC 9.37 10*3/mm3      RBC 4.13 10*6/mm3      Hemoglobin 13.3 g/dL      Hematocrit 37.9 %      MCV 91.8 fL      MCH 32.2 pg      MCHC 35.1 g/dL      RDW 13.2 %      RDW-SD 45.1 fl      MPV 9.7 fL      Platelets 278 10*3/mm3      Neutrophil % 60.4 %      Lymphocyte % 25.4 %      Monocyte % 11.3 %      Eosinophil % 2.1 %      Basophil % 0.5 %      Immature Grans % 0.3 %      Neutrophils, Absolute 5.65 10*3/mm3      Lymphocytes, Absolute 2.38 10*3/mm3      Monocytes, Absolute 1.06 10*3/mm3      Eosinophils, Absolute 0.20 10*3/mm3      Basophils, Absolute 0.05 10*3/mm3      Immature Grans, Absolute 0.03 10*3/mm3      nRBC 0.0 /100 WBC     POC Glucose Once [[id number]]  (Normal) Collected:  10/03/19 0014    Specimen:  Blood Updated:  10/03/19 0028     Glucose 94 mg/dL      Comment: : [id number] Mares KristaMeter ID: [id number]             Imaging Results (last 24 hours)     Procedure Component Value Units Date/Time    FL Video Swallow With Speech [[id number]] Collected:  10/03/19 1205     Updated:  10/03/19 1209    Narrative:       EXAMINATION:   FL VIDEO SWALLOW W SPEECH-  10/3/2019 12:05 PM CDT     HISTORY: MODIFIED BARIUM SWALLOW     REASON FOR EXAM: dysphagia; R13.10-Dysphagia, unspecified; Z74.09-Other  reduced mobility; Z74.09-Other reduced mobility; R47.01-Aphasia     COMPARISON: NONE      FLUOROSCOPY TIME: 2.9 minutes     Number of images: 1     TECHNIQUE: In conjunction with speech pathology services, video  fluoroscopic swallow examination was performed with oral ingestion of  barium containing substances of various viscosities.      FINDINGS: Medium bolus sizes are well controlled with no spillage into  the oropharynx. Persistent pooling was demonstrated. There is soft  palate elevation and coverage of the posterior nasopharynx with  swallowing. No stasis is noted within the valleculae or piriform  sinuses. Penetration was observed with nectar  thick and honey thick  liquid       Impression:       1. Penetration was observed with nectar thick and honey thick liquid.     Please see speech pathologist's report for further details and  recommendations.         This report was finalized on 10/03/2019 12:06 by Dr. Stanford Alanis MD.    US Venous Doppler Upper Extremity Right (duplex) [[id number]] Updated:  10/03/19 1053    XR Abdomen KUB [[id number]] Collected:  10/02/19 1805     Updated:  10/02/19 1808    Narrative:       SINGLE VIEW ABDOMEN              HISTORY: to check for dobhoff placement; R13.10-Dysphagia, unspecified;  Z74.09-Other reduced mobility; Z74.09-Other reduced mobility;  R47.01-Aphasia     FINDINGS: Weighted feeding tube is seen with the tip located in the  gastric fundus     The visualized intestinal gas pattern is unremarkable without evidence  of obstruction.            Impression:       Feeding tube tip located in the gastric fundus.     This report was finalized on 10/02/2019 18:05 by Dr Ra Burgess, .          ECG/EMG Results (last 24 hours)     ** No results found for the last 24 hours. **        TELEMETRY:  NORMAL SINUS RHYTHM 68-81    Medication Review:    Current Facility-Administered Medications:   •  albuterol (PROVENTIL) nebulizer solution 0.083% 2.5 mg/3mL, 2.5 mg, Nebulization, Once PRN, Glao Bethea MD  •  aspirin chewable tablet 81 mg, 81 mg, Oral, Daily **OR** aspirin suppository 300 mg, 300 mg, Rectal, Daily, Candido Alas MD, 300 mg at 10/03/19 0944  •  atorvastatin (LIPITOR) tablet 80 mg, 80 mg, Oral, Nightly, Candido Alas MD, 80 mg at 09/29/19 2220  •  hypromellose (ISOPTO TEARS) 0.5 % ophthalmic solution 2 drop, 2 drop, Both Eyes, TID PRN, Candido Alas MD, 2 drop at 09/30/19 2152  •  ipratropium-albuterol (DUO-NEB) nebulizer solution 3 mL, 3 mL, Nebulization, 4x Daily - RT, Christina Sharif APRN, 3 mL at 10/03/19 1411  •  labetalol (NORMODYNE,TRANDATE) injection 20 mg, 20 mg, Intravenous, Q4H  PRN, Christina Sharif APRN  •  lactated ringers infusion, 50 mL/hr, Intravenous, Continuous, Christina Sharif APRN, Last Rate: 50 mL/hr at 10/03/19 0810, 50 mL/hr at 10/03/19 0810  •  LORazepam (ATIVAN) tablet 1 mg, 1 mg, Oral, Q2H PRN **OR** LORazepam (ATIVAN) injection 1 mg, 1 mg, Intravenous, Q2H PRN **OR** LORazepam (ATIVAN) tablet 2 mg, 2 mg, Oral, Q1H PRN **OR** LORazepam (ATIVAN) injection 2 mg, 2 mg, Intravenous, Q1H PRN, 2 mg at 09/29/19 1311 **OR** LORazepam (ATIVAN) injection 2 mg, 2 mg, Intravenous, Q15 Min PRN, 2 mg at 09/27/19 2007 **OR** LORazepam (ATIVAN) injection 2 mg, 2 mg, Intramuscular, Q15 Min PRN, Candido Alas MD  •  nicotine (NICODERM CQ) 14 MG/24HR patch 1 patch, 1 patch, Transdermal, Q24H, Candido Alas MD  •  pantoprazole (PROTONIX) injection 40 mg, 40 mg, Intravenous, Q AM, Jose Johnson MD, 40 mg at 10/03/19 0503  •  potassium chloride (KAYCIEL) 20 MEQ/15ML (10%) solution 40 mEq, 40 mEq, Oral, Once, Candido Alas MD  •  sodium chloride 0.9 % flush 10 mL, 10 mL, Intravenous, Q12H, Candido Alas MD, 10 mL at 10/02/19 2011  •  sodium chloride 0.9 % flush 10 mL, 10 mL, Intravenous, PRN, Candido Alas MD      Personal review of MRI:  DWI      Assessment/Plan       Degeneration of lumbar or lumbosacral intervertebral disc    Smoker    Acute cerebrovascular accident (CVA) of cerebellum (CMS/HCC)    Alcoholism /alcohol abuse (CMS/HCC)    CVA (cerebral vascular accident) (CMS/HCC)    Hyperlipidemia    Dysphagia due to recent cerebral infarction    Assessment:   1. Acute stroke left MCA. Affecting frontal, temporal and parietal lobes  2. Dysphagia, improved  videofluoroscopy showing penetration with nectar thick and honey thick liquid.  3. Superficial thrombus  in the basilic vein at the elbow and the cephalic Vein at distal upper arm to elbow  4. Hyperlipidemia  LDL of 102 and goal is < 70.  5. Possible sleep apnea which is a risk factor for stroke.   6.  Smoker    PLAN:  1.Continue ASA 81 mg daily  2. Continue Lipitor 80 mg daily for LDL goal less than 70. LDL was 102.  3. Continue OT/PT/ST  4. Hopefully to be accepted to acute rehab. Per discussions with stroke team, brother is willing to take patient home with him after discharge from acute rehab.  5. Systolic blood pressure goal less than 140.  6. ETOH withdrawal protocol and patient improved.  7. Overnight pulse oximetry.   8. Magnesium level, B12, and folate in AM  9. Thiamine 100 mg daily.   10. Smoking cessation counseling  11. Mechanical soft with pudding thick and ground meat meds in applesauce  Change to Cardiac diet.    Plan for disposition:Where: rehab and When:  patient and nephew, Elmer who is at bedside.     YARELI Amezquita/Tsering Alvares MD    10/03/19  2:53 PM          Electronically signed by Tsering Norwood MD at 10/03/19 1607     Candido Alas MD at 10/03/19 1014              [facility] Medicine Services  INPATIENT PROGRESS NOTE    Length of Stay: 7  Date of Admission: 9/26/2019  Primary Care Physician: Jose Karimi MD    Subjective   Chief Complaint: Respiratory failure/CVA    HPI   Patient is more alert.  Patient answers every question pretty clearly today.  Aphasic at time.  Patient will need feeding.  Patient denies any chest pain or shortness of breath.    Review of Systems   Constitutional: Positive for activity change, appetite change and fatigue. Negative for chills and fever.   HENT: Negative for hearing loss, nosebleeds, tinnitus and trouble swallowing.    Eyes: Negative for visual disturbance.   Respiratory: Positive for shortness of breath. Negative for cough, chest tightness and wheezing.    Cardiovascular: Negative for chest pain, palpitations and leg swelling.   Gastrointestinal: Negative for abdominal distention, abdominal pain, blood in stool, constipation, diarrhea, nausea and vomiting.   Endocrine: Negative  for cold intolerance, heat intolerance, polydipsia, polyphagia and polyuria.   Genitourinary: Negative for decreased urine volume, difficulty urinating, dysuria, flank pain, frequency and hematuria.   Musculoskeletal: Positive for arthralgias, gait problem and myalgias. Negative for joint swelling.   Skin: Negative for rash.   Allergic/Immunologic: Negative for immunocompromised state.   Neurological: Positive for weakness. Negative for dizziness, syncope, light-headedness and headaches.   Hematological: Negative for adenopathy. Does not bruise/bleed easily.   Psychiatric/Behavioral: Positive for confusion. Negative for sleep disturbance. The patient is not nervous/anxious.    All pertinent negatives and positives are as above. All other systems have been reviewed and are negative unless otherwise stated.     Objective    Temp:  [98.5 °F (36.9 °C)-99.5 °F (37.5 °C)] 99.5 °F (37.5 °C)  Heart Rate:  [70-83] 76  Resp:  [16-18] 18  BP: (112-141)/(58-78) 121/74  No intake or output data in the 24 hours ending 10/03/19 1014  Physical Exam  Constitutional: He appears well-developed.   HENT:   Head: Normocephalic.   Eyes: Conjunctivae are normal. Pupils are equal, round, and reactive to light.   Neck: Neck supple. No JVD present. No thyromegaly present.   Cardiovascular: Normal rate, regular rhythm, normal heart sounds and intact distal pulses. Exam reveals no gallop and no friction rub.   No murmur heard.  Pulmonary/Chest: Effort normal. No respiratory distress. He has wheezes. He has no rales. He exhibits no tenderness.   Rhonchi bilateral.  Good air movement.   Abdominal: Soft. Bowel sounds are normal. He exhibits no distension. There is no tenderness. There is no rebound and no guarding.   Musculoskeletal: He exhibits no edema, tenderness or deformity.   Lymphadenopathy:     He has no cervical adenopathy.   Neurological: He is alert. He displays normal reflexes. No cranial nerve deficit. He exhibits abnormal muscle  tone. Coordination abnormal.   Skin: Skin is warm and dry. Capillary refill takes 2 to 3 seconds.  Right arm from previous IV site erythematous, warm, slightly firm.  Probably superficial thrombus.  Psychiatric: He has a normal mood and affect. His behavior is normal.   Nursing note and vitals reviewed.  Results Review:  Lab Results (last 24 hours)     Procedure Component Value Units Date/Time    Comprehensive Metabolic Panel [[id number]]  (Abnormal) Collected:  10/03/19 0437    Specimen:  Blood Updated:  10/03/19 0528     Glucose 185 mg/dL      BUN 7 mg/dL      Creatinine 0.55 mg/dL      Sodium 139 mmol/L      Potassium 3.9 mmol/L      Chloride 98 mmol/L      CO2 28.0 mmol/L      Calcium 8.3 mg/dL      Total Protein 6.6 g/dL      Albumin 3.30 g/dL      ALT (SGPT) 18 U/L      AST (SGOT) 24 U/L      Alkaline Phosphatase 62 U/L      Total Bilirubin 0.5 mg/dL      eGFR Non African Amer >150 mL/min/1.73      Globulin 3.3 gm/dL      A/G Ratio 1.0 g/dL      BUN/Creatinine Ratio 12.7     Anion Gap 13.0 mmol/L     Narrative:       GFR Normal >60  Chronic Kidney Disease <60  Kidney Failure <15    CBC & Differential [[id number]] Collected:  10/03/19 0437    Specimen:  Blood Updated:  10/03/19 0508    Narrative:       The following orders were created for panel order CBC & Differential.  Procedure                               Abnormality         Status                     ---------                               -----------         ------                     CBC Auto Differential[[id number]]        Abnormal            Final result                 Please view results for these tests on the individual orders.    CBC Auto Differential [[id number]]  (Abnormal) Collected:  10/03/19 0437    Specimen:  Blood Updated:  10/03/19 0508     WBC 9.37 10*3/mm3      RBC 4.13 10*6/mm3      Hemoglobin 13.3 g/dL      Hematocrit 37.9 %      MCV 91.8 fL      MCH 32.2 pg      MCHC 35.1 g/dL      RDW 13.2 %      RDW-SD 45.1 fl      MPV 9.7 fL       Platelets 278 10*3/mm3      Neutrophil % 60.4 %      Lymphocyte % 25.4 %      Monocyte % 11.3 %      Eosinophil % 2.1 %      Basophil % 0.5 %      Immature Grans % 0.3 %      Neutrophils, Absolute 5.65 10*3/mm3      Lymphocytes, Absolute 2.38 10*3/mm3      Monocytes, Absolute 1.06 10*3/mm3      Eosinophils, Absolute 0.20 10*3/mm3      Basophils, Absolute 0.05 10*3/mm3      Immature Grans, Absolute 0.03 10*3/mm3      nRBC 0.0 /100 WBC     POC Glucose Once [[id number]]  (Normal) Collected:  10/03/19 0014    Specimen:  Blood Updated:  10/03/19 0028     Glucose 94 mg/dL      Comment: : [id number] Mares KristaMeter ID: [id number]              Cultures:  Respiratory Culture   Date Value Ref Range Status   09/27/2019 Light growth (2+) Normal Respiratory Esther  Final       Radiology Data:    Imaging Results (last 24 hours)     Procedure Component Value Units Date/Time    XR Abdomen KUB [[id number]] Collected:  10/02/19 1805     Updated:  10/02/19 1808    Narrative:       SINGLE VIEW ABDOMEN              HISTORY: to check for dobhoff placement; R13.10-Dysphagia, unspecified;  Z74.09-Other reduced mobility; Z74.09-Other reduced mobility;  R47.01-Aphasia     FINDINGS: Weighted feeding tube is seen with the tip located in the  gastric fundus     The visualized intestinal gas pattern is unremarkable without evidence  of obstruction.            Impression:       Feeding tube tip located in the gastric fundus.     This report was finalized on 10/02/2019 18:05 by Dr Ra Burgess, .          No Known Allergies    Scheduled meds:     aspirin 81 mg Oral Daily   Or      aspirin 300 mg Rectal Daily   atorvastatin 80 mg Oral Nightly   chlorhexidine 15 mL Mouth/Throat Q12H   ipratropium-albuterol 3 mL Nebulization 4x Daily - RT   pantoprazole 40 mg Intravenous Q AM   potassium chloride 40 mEq Oral Once   sodium chloride 10 mL Intravenous Q12H       PRN meds:  •  albuterol  •  hypromellose  •  labetalol  •  LORazepam **OR** LORazepam  **OR** LORazepam **OR** LORazepam **OR** LORazepam **OR** LORazepam  •  sodium chloride    Assessment/Plan       Degeneration of lumbar or lumbosacral intervertebral disc    Smoker    Acute cerebrovascular accident (CVA) of cerebellum (CMS/HCC)    Alcoholism /alcohol abuse (CMS/HCC)    CVA (cerebral vascular accident) (CMS/HCC)    Hyperlipidemia    Dysphagia due to recent cerebral infarction      Plan:  Respiratory failure.  Resolved.  Extubated 9/30/19.    Consult ENT-laryngoscopy demonstrates some pressure wounds of the posterior vocal folds bilaterally, with some swelling on the left- this is likely due to the endotracheal tube and will resolve on its own.     CVA. Left MCA stroke. Continue aspirin.  Continue Lipitor. Neurology consult.   CTA of the head and neck- complete occlusion of the entire cervical left ICA, 30% stenosis of the right carotid bulb, mild stenosis at the ostium of the left vertebral artery.  MRI of the head- acute infarct in the left MCA and watershed territory- no hemorrhagic conversion, abnormal left ICA flow void.  Echocardiogram- ejection fraction 61%, diastolic dysfunction grade 1.    Right arm thrombus.  Warm compress.  Doppler ultrasound of right arm.     Hypokalemia- resolved.     Reflux.  Protonix and Zofran as needed     COPD.  Continue duo nebs.     Alcohol withdrawal?.  Ciwa protocol.  Patient stated last has drink about 2 weeks ago.  Patient has no sign of withdrawal symptoms.     Hyperlipidemia.  Continue Lipitor.     Tobacco abuse.  Tobacco counseling.     Urinary incontinence.    DC Villalobos cath 9/30/2019.     SCD.     Nutrition.  Pending a speech to pass.  Patient keeping pulling off Dobbhoff tube x3 treatment today.     Deconditioning.  PT and OT consult.     Discharge Planning: Plan for rehab placement.  Transfer from [facility].      Candido Alas MD   10/03/19   10:14 AM                    Electronically signed by Candido Alas MD at 10/03/19 1020        Consult Notes (last 24 hours) (Notes from 10/03/19 0918 through 10/04/19 0918)     No notes of this type exist for this encounter.        Nutrition Notes (last 24 hours) (Notes from 10/03/19 0918 through 10/04/19 0918)     No notes of this type exist for this encounter.           Physical Therapy Notes (last 24 hours) (Notes from 10/03/19 0918 through 10/04/19 0918)      Helen Kirby, PTA at 10/03/19 1608  Version 1 of 1         Acute Care - Physical Therapy Treatment Note  [facility]     Patient Name: Jarod Ramírez Jr.  : 1960  MRN: [id number]  Today's Date: 10/3/2019  Onset of Illness/Injury or Date of Surgery: 19     Referring Physician: Dr. Alas    Admit Date: 2019    Visit Dx:    ICD-10-CM ICD-9-CM   1. Dysphagia, unspecified type R13.10 787.20   2. Impaired mobility Z74.09 799.89   3. Impaired mobility and ADLs Z74.09 799.89   4. Aphasia R47.01 784.3     Patient Active Problem List   Diagnosis   • Degeneration of cervical intervertebral disc   • Closed fracture of lumbar vertebra (CMS/HCC)   • Degeneration of lumbar or lumbosacral intervertebral disc   • Smoker   • BMI 27.0-27.9,adult   • Acute cerebrovascular accident (CVA) of cerebellum (CMS/HCC)   • Alcoholism /alcohol abuse (CMS/HCC)   • CVA (cerebral vascular accident) (CMS/HCC)   • Hyperlipidemia   • Dysphagia due to recent cerebral infarction       Therapy Treatment    Rehabilitation Treatment Summary     Row Name 10/03/19 1555 10/03/19 1005 10/03/19 0952       Treatment Time/Intention    Discipline  physical therapy assistant  -KJ  occupational therapy assistant  -TS  speech language pathologist  -MB    Document Type  therapy note (daily note)  -KJ  therapy note (daily note)  -TS  therapy note (daily note)  -MB    Subjective Information  no complaints  -KJ  no complaints  -TS  no complaints  -MB    Mode of Treatment  physical therapy  -KJ  --  speech-language pathology  -MB    Patient/Family Observations  --  --  No  family present  -MB    Patient Effort  good  -KJ  good  -TS  good  -MB    Existing Precautions/Restrictions  fall  -KJ  fall  -TS  --    Treatment Considerations/Comments  apashic, R foot drop(improving)  -KJ  aphasia, decreased safety   -TS  --    Recorded by [KJ] Helen Kirby PTA 10/03/19 1607 [TS] Yaritza Lopez COTTON/L 10/03/19 1206 [MB] Georgi Hernandez CCC-SLP 10/03/19 1008    Row Name 10/03/19 0915             Treatment Time/Intention    Discipline  physical therapy assistant  -MS,JW,MS2      Document Type  therapy note (daily note)  -MS,JW,MS2      Subjective Information  no complaints  -MS,JW,MS2      Mode of Treatment  physical therapy  -MS,JW,MS2      Patient/Family Observations  no family present  -MS,JW,MS2      Patient Effort  good  -MS,JW,MS2      Comment  RLE foot drop  -MS,JW,MS2      Existing Precautions/Restrictions  fall  -KJ      Treatment Considerations/Comments  aphasia  -KJ      Patient Response to Treatment  good  -MS,JW,MS2      Recorded by [KJ] Helen Kirby PTA 10/03/19 1106  [MS,JW,MS2] Mayuri Schilling, PT, DPT, NCS (r) Georgia Jama PTA Student (t) Mayuri Schilling, PT, DPT, NCS (c) 10/03/19 1236      Row Name 10/03/19 1005             Cognitive Assessment/Intervention- PT/OT    Follows Commands (Cognition)  follows one step commands;75-90% accuracy;increased processing time needed;verbal cues/prompting required  -TS      Recorded by [TS] Yaritza Lopez COTTON/L 10/03/19 1206      Row Name 10/03/19 0915             Verbal Expression Assessment/Intervention    Verbal Expression  severe impairment  -MS,JW,MS2      Sentence Formulation  severe impairment  -MS,JW,MS2      Recorded by [MS,JW,MS2] Mayuri Schilling, PT, DPT, NCS (r) Georgia Jama PTA Student (t) Mayuri Schilling, PT, DPT, NCS (c) 10/03/19 1236      Row Name 10/03/19 0915             Safety Issues, Functional Mobility    Safety Issues Affecting Function (Mobility)  impulsivity;sequencing  abilities  -MS,JW,MS2      Impairments Affecting Function (Mobility)  balance;coordination  -MS,JW,MS2      Comment, Safety Issues/Impairments (Mobility)  Pt. at risk for falls due to impulsivity.Pt. presents with decreased ROM RLE  ankle DF; Pt. needed verbal/visual cueing to perform balance activities correctly  -MS,JW,MS2      Recorded by [MS,JW,MS2] Mayuri Schilling, PT, DPT, NCS (r) Georgia Jama PTA Student (t) Mayuri Schilling, PT, DPT, NCS (c) 10/03/19 1236      Row Name 10/03/19 1555             Bed Mobility Assessment/Treatment    Supine-Sit [place] (Bed Mobility)  supervision  -KJ      Bed Mobility, Safety Issues  cognitive deficits limit understanding  -KJ      Assistive Device (Bed Mobility)  head of bed elevated  -KJ      Recorded by [KJ] Helen Kirby PTA 10/03/19 1607      Row Name 10/03/19 1005 10/03/19 0915          Functional Mobility    Functional Mobility- Ind. Level  standby assist;contact guard assist  -TS  contact guard assist;supervision required;verbal cues required;1 person  -MS,JW,MS2     Functional Mobility- Safety Issues  --  sequencing ability decreased  -MS,JW,MS2     Functional Mobility- Comment  in room, in BR  -TS  --     Recorded by [TS] Yaritza Lopez COTA/L 10/03/19 1206 [MS,JW,MS2] Mayuri Schilling, PT, DPT, NCS (r) Georgia Jama, JAIR Student (t) Mayuri Schilling, PT, DPT, NCS (c) 10/03/19 1236     Row Name 10/03/19 1005 10/03/19 0915          Transfer Assessment/Treatment    Transfer Assessment/Treatment  sit-stand transfer;stand-sit transfer;shower transfer  -TS  sit-stand transfer;stand-sit transfer  -MS,JW,MS2     Recorded by [TS] Yaritza Lopez COTA/L 10/03/19 1206 [MS,JW,MS2] Mayuri Schilling, PT, DPT, NCS (r) eGorgia Jama, PTA Student (t) Mayuri Schilling, PT, DPT, NCS (c) 10/03/19 1236     Row Name 10/03/19 1555 10/03/19 1005 10/03/19 0915       Sit-Stand Transfer    Sit-Stand Y[place] (Transfers)  contact guard;verbal cues  -KJ   stand by assist  -TS  contact guard;supervision;1 person assist  -MS,JW,MS2    Recorded by [KJ] Helen Kirby, JAIR 10/03/19 1607 [TS] Yaritza Lopez, COTTON/L 10/03/19 1206 [MS,JW,MS2] Mayuri Schilling, PT, DPT, NCS (r) Georgia Jama PTA Student (t) Mayuri Schilling, PT, DPT, NCS (c) 10/03/19 1236    Row Name 10/03/19 1555 10/03/19 1005 10/03/19 0915       Stand-Sit Transfer    Stand-Sit [place] (Transfers)  supervision  -KJ  stand by assist  -TS  supervision  -MS,JW,MS2    Recorded by [KJ] Helen Kirby, JAIR 10/03/19 1607 [TS] Yaritza Lopez, COTTON/L 10/03/19 1206 [MS,JW,MS2] Mayuri Schilling, PT, DPT, NCS (r) Georgia Jama, JAIR Student (t) Mayuri Schilling, PT, DPT, NCS (c) 10/03/19 1236    Row Name 10/03/19 1005             Shower Transfer    Type (Shower Transfer)  sit-stand;stand-sit  -TS      [place] Level (Shower Transfer)  stand by assist  -TS      Assistive Device (Shower Transfer)  shower chair;grab bars/tub rail  -TS      Recorded by [TS] Yaritza Lopez, COTTON/L 10/03/19 1206      Row Name 10/03/19 1555 10/03/19 0915          Gait/Stairs Assessment/Training    [place] Level (Gait)  contact guard  -KJ  contact guard;minimum assist (75% patient effort);1 person assist  -MS,JW,MS2     Distance in Feet (Gait)  100' x 2  -KJ  100'x2  -MS,JW,MS2     Pattern (Gait)  swing-through  -KJ  swing-through  -MS,JW,MS2     Right Sided Gait Deviations  heel strike decreased;foot drop/toe drag  -KJ  foot drop/toe drag;heel strike decreased  -MS,JW,MS2     Recorded by [KJ] Helen Kirby, PTA 10/03/19 1607 [MS,JW,MS2] Shakir, Mayuri R, PT, DPT, NCS (r) Georgia Jama, PTA Student (t) Mayuri Schilling R, PT, DPT, NCS (c) 10/03/19 1236     Row Name 10/03/19 1005             ADL Assessment/Intervention    BADL Assessment/Intervention  upper body dressing;lower body dressing;grooming;bathing  -TS      Recorded by [TS] Yaritza Lopez COTA/L 10/03/19 1206      Row Name 10/03/19  1005             Bathing Assessment/Intervention    Bathing [place] Level  upper body;lower body;set up;supervision;verbal cues  -TS      Assistive Devices (Bathing)  hand-held shower spray hose;grab bars/tub rail;shower chair  -TS      Bathing Position  supported sitting;supported standing  -TS      Recorded by [TS] Yaritza Lopez COTA/L 10/03/19 1206      Row Name 10/03/19 1005             Upper Body Dressing Assessment/Training    Upper Body Dressing [place] Level  don;doff;set up;supervision  -TS      Upper Body Dressing Position  supported sitting  -TS      Recorded by [TS] Yaritza Lopez COTTON/L 10/03/19 1206      Row Name 10/03/19 1005             Lower Body Dressing Assessment/Training    Lower Body Dressing [place] Level  don;doff;socks;undergarment;pants/bottoms;contact guard assist;supervision  -TS      Lower Body Dressing Position  supported sitting;supported standing  -TS      Comment (Lower Body Dressing)  pt attempted to stand to don pants, pt demonstrated decreased balance when attempting to stand to don pants and had LOB requiring min A to correct  -TS      Recorded by [TS] Yaritza Lopez COTTON/L 10/03/19 1206      Row Name 10/03/19 1005             Grooming Assessment/Training    [place] Level (Grooming)  oral care regimen;hair care, combing/brushing;set up SBA  -TS      Grooming Position  sink side;supported standing  -TS      Recorded by [TS] Yaritza Lopez COTTON/L 10/03/19 1206      Row Name 10/03/19 0915             Therapeutic Exercise    Lower Extremity (Therapeutic Exercise)  LAQ (long arc quad), bilateral;marching while seated;marching while standing  -MS,JW,MS2      Exercise Type (Therapeutic Exercise)  AROM (active range of motion);AAROM (active assistive range of motion)  -MS,JW,MS2      Position (Therapeutic Exercise)  seated;standing  -MS,JW,MS2      Sets/Reps (Therapeutic Exercise)  20  -MS,JW,MS2      Comment (Therapeutic Exercise)  AAROM  RLE Ankle DF  -MS,JW,MS2      Recorded by [MS,JW,MS2] Mayuri Schilling R, PT, DPT, NCS (r) Georgia Jama PTA Student (t) Mayuri Schilling R, PT, DPT, NCS (c) 10/03/19 1236      Row Name 10/03/19 0915             Balance    Balance  dynamic balance activity  -MS,JW,MS2      Recorded by [MS,JW,MS2] Mayuri Schilling R, PT, DPT, NCS (r) Georgia Jama PTA Student (t) Mayuri Schilling R, PT, DPT, NCS (c) 10/03/19 1236      Row Name 10/03/19 0915             Static Sitting Balance    Level of [place] (Unsupported Sitting, Static Balance)  independent  -MS,JW,MS2      Sitting Position (Unsupported Sitting, Static Balance)  sitting in chair  -MS,JW,MS2      Recorded by [MS,JW,MS2] Mayuri Schilling R, PT, DPT, NCS (r) Georgia Jama PTA Student (t) Mayuri Schilling, PT, DPT, NCS (c) 10/03/19 1236      Row Name 10/03/19 0915             Static Standing Balance    Level of [place] (Supported Standing, Static Balance)  contact guard assist;minimal assist, 75% patient effort;1 person assist  -MS,JW,MS2      Recorded by [MS,JW,MS2] Mayuri Schilling R, PT, DPT, NCS (r) Georgia Jama PTA Student (t) Mayuri Schilling, PT, DPT, NCS (c) 10/03/19 1236      Row Name 10/03/19 0915             Standing Balance Activity    Activities Performed (Standing, Balance Training)  standing unsupported  -MS,JW,MS2      Support Needed for Balance (Standing, Balance Training)  CGA;SBA;1 person assist  -MS,JW,MS2      Recorded by [MS,JW,MS2] Mayuri Schilling R, PT, DPT, NCS (r) Georgia Jama PTA Student (t) Mayuri Schilling, PT, DPT, NCS (c) 10/03/19 1236      Row Name 10/03/19 0915             Dynamic Balance Activity    Therapeutic Training Performed (Dynamic Balance)  backward walking;side stepping  -MS,JW,MS2      Comment (Dynamic Balance Training)  Marching;forward/backward stepping;mini squats  -MS,JW,MS2      Recorded by [MS,JW,MS2] Mayuri Schilling, PT, DPT, NCS (r) Georgia Jama PTA Student (t) Mayuri Schilling, PT,  DPT, NCS (c) 10/03/19 1236      Row Name 10/03/19 1555 10/03/19 1005 10/03/19 0915       Positioning and Restraints    Pre-Treatment Position  in bed  -KJ  sitting in chair/recliner  -TS  sitting in chair/recliner  -MS,JW,MS2    Post Treatment Position  bed  -KJ  other  -TS  chair  -MS,JW,MS2    In Bed  exit alarm on;call light within reach  -KJ  --  --    In Chair  --  --  exit alarm on;call light within reach  -KJ    Other Position  --  with other staff with transport  -TS  --    Recorded by [KJ] Helen Kirby, PTA 10/03/19 1607 [TS] Yaritza Lopez COTA/KIKO 10/03/19 1206 [KJ] Helen Kirby, PTA 10/03/19 1106  [MS,JW,MS2] Mayuri Schilling, PT, DPT, NCS (r) Georgia Jama, PTA Student (t) Mayuri Schilling, PT, DPT, NCS (c) 10/03/19 1236    Row Name 10/03/19 0915             Pain Scale: Numbers Pre/Post-Treatment    Pain Scale: Numbers, Pretreatment  0/10 - no pain  -MS,JW,MS2      Pain Scale: Numbers, Post-Treatment  0/10 - no pain  -MS,JW,MS2      Recorded by [MS,JW,MS2] Mayuri Schilling, PT, DPT, NCS (r) Georgia Jama, PTA Student (t) Mayuri Schilling, PT, DPT, NCS (c) 10/03/19 1236      Row Name 10/03/19 1005 10/03/19 0952          Pain Scale: FACES Pre/Post-Treatment    Pain: FACES Scale, Pretreatment  0-->no hurt  -TS  0-->no hurt  -MB     Pain: FACES Scale, Post-Treatment  0-->no hurt  -TS  --     Recorded by [TS] Yaritza Lopez COTTON/L 10/03/19 1206 [MB] Georgi Hernandez CCC-SLP 10/03/19 1008     Row Name                Wound 09/27/19 0800 Right posterior finger    Wound - Properties Group Date first assessed: 09/27/19 [NW] Time first assessed: 0800 [NW] Present on Hospital Admission: Y [NW] Side: Right [NW] Orientation: posterior [NW] Location: finger [NW] Recorded by:  [NW] Dunia Multani RN 09/27/19 1624    Row Name 10/03/19 0952             Outcome Summary/Treatment Plan (SLP)    Daily Summary of Progress (SLP)  progress toward functional goals is good  -MB      Barriers to  Overall Progress (SLP)  Aphasia  -MB      Plan for Continued Treatment (SLP)  Complete MBS today  -MB      Anticipated Dischage Disposition  inpatient rehabilitation facility  -MB      Recorded by [MB] Georgi Hernandez, CCC-SLP 10/03/19 1008        User Key  (r) = Recorded By, (t) = Taken By, (c) = Cosigned By    Initials Name Effective Dates Discipline    MB Georgi Hernandez, CCC-SLP 08/02/16 -  SLP    Helen Whitt, PTA 08/02/16 -  PT    TS Yaritza Lopez, COTTON/L 08/02/16 -  OT    Mayuri Smith, PT, DPT, NCS 06/19/18 -  PT    NW Dunia Multani RN 07/12/18 -  Nurse    Georgia Jean Baptiste PTA Student 09/18/19 -  PT          Wound 09/27/19 0800 Right posterior finger (Active)   Dressing Appearance open to air 10/3/2019  7:37 AM   Closure None 10/3/2019  7:37 AM   Edges jagged 10/2/2019  8:35 PM   Drainage Amount none 10/2/2019  8:35 PM   Dressing Care, Wound open to air 10/3/2019  7:37 AM       Rehab Goal Summary     Row Name 10/03/19 1118 10/03/19 0952          Oral Nutrition/Hydration Goal 1 (SLP)    Oral Nutrition/Hydration Goal 1, SLP  LTG: Patient will tolerate LRD without s/s of aspiration.  -MB  LTG: Patient will tolerate LRD without s/s of aspiration.  -MB     Time Frame (Oral Nutrition/Hydration Goal 1, SLP)  by discharge  -MB  by discharge  -MB     Barriers (Oral Nutrition/Hydration Goal 1, SLP)  Aphasia  -MB  Aphasia  -MB     Progress/Outcomes (Oral Nutrition/Hydration Goal 1, SLP)  goal ongoing  -MB  continuing progress toward goal  -MB        Lingual Strengthening Goal 1 (SLP)    Activity (Lingual Strengthening Goal 1, SLP)  increase tongue back strength  -MB  increase tongue back strength  -MB     Increase Tongue Back Strength  lingual movement exercises  -MB  lingual movement exercises  -MB     Bartholomew/Accuracy (Lingual Strengthening Goal 1, SLP)  independently (over 90% accuracy)  -MB  independently (over 90% accuracy)  -MB     Time Frame (Lingual Strengthening Goal  1, SLP)  short term goal (STG);by discharge  -MB  short term goal (STG);by discharge  -MB     Barriers (Lingual Strengthening Goal 1, SLP)  n/a  -MB  n/a  -MB     Progress/Outcomes (Lingual Strengthening Goal 1, SLP)  goal ongoing  -MB  good progress toward goal  -MB        Pharyngeal Strengthening Exercise Goal 1 (SLP)    Activity (Pharyngeal Strengthening Goal 1, SLP)  increase timing;increase superior movement of the hyolaryngeal complex;increase anterior movement of the hyolaryngeal complex;increase epiglottic inversion and retroflexion;increase squeeze/positive pressure generation  -MB  increase epiglottic inversion and retroflexion;increase squeeze/positive pressure generation;increase tongue base retraction  -MB     Increase Timing  gustatory stimulation (sour/cold)  -MB  --     Increase Superior Movement of the Hyolaryngeal Complex  -- NMES  -MB  --     Increase Anterior Movement of the Hyolaryngeal Complex  shaker  -MB  --     Increase Epiglottic Inversion and Retroflexion  Mendelsohn  -MB  Mendelsohn  -MB     Increase Squeeze/Positive Pressure Generation  hard effortful swallow  -MB  hard effortful swallow  -MB     Increase Tongue Base Retraction  yoni  -MB  yoni  -MB     [place]/Accuracy (Pharyngeal Strengthening Goal 1, SLP)  independently (over 90% accuracy)  -MB  independently (over 90% accuracy)  -MB     Time Frame (Pharyngeal Strengthening Goal 1, SLP)  short term goal (STG);by discharge  -MB  short term goal (STG);by discharge  -MB     Barriers (Pharyngeal Strengthening Goal 1, SLP)  cognitive status  -MB  cognitive status  -MB     Progress/Outcomes (Pharyngeal Strengthening Goal 1, SLP)  goal ongoing  -MB  goal ongoing  -MB       User Key  (r) = Recorded By, (t) = Taken By, (c) = Cosigned By    Initials Name Provider Type Discipline    Georgi Garcia CCC-SLP Speech and Language Pathologist SLP          Physical Therapy Education     Title: PT OT SLP Therapies (Done)     Topic:  Physical Therapy (Done)     Point: Mobility training (Done)     Learning Progress Summary           Patient Acceptance, E, DU by  at 10/3/2019 11:22 AM    Comment:  Pt. educated on safety awareness/impulsivity performs better with visual cueing    Acceptance, E, VU,NR by  at 10/1/2019  8:19 AM    Comment:  Educated on benefits of activity and ongoing PT POC.   Family Acceptance, E, VU,NR by  at 10/1/2019  8:19 AM    Comment:  Educated on benefits of activity and ongoing PT POC.                   Point: Home exercise program (Done)     Learning Progress Summary           Patient Acceptance, E, DU by  at 10/3/2019 11:22 AM    Comment:  Pt. educated on safety awareness/impulsivity performs better with visual cueing                   Point: Body mechanics (Done)     Learning Progress Summary           Patient Acceptance, E, DU by  at 10/3/2019 11:22 AM    Comment:  Pt. educated on safety awareness/impulsivity performs better with visual cueing                   Point: Precautions (Done)     Learning Progress Summary           Patient Acceptance, E, DU by  at 10/3/2019 11:22 AM    Comment:  Pt. educated on safety awareness/impulsivity performs better with visual cueing                               User Key     Initials Effective Dates Name Provider Type Discipline     08/27/19 -  Arti Haile, PT Student PT Student PT     09/18/19 -  Georgia Jama, PTA Student PTA Student PT                PT Recommendation and Plan     Plan of Care Reviewed With: patient  Progress: improving  Outcome Summary: Pt. was sitting in chair and performed sit to stand transfer with Min A/CGAx1 with no use of assistive device; Pt. ambulated 50'x2 Lucie/CGA with verbal directional cueing. Noted decreased balance with turning pt. had a narrow JUSTA. Pt. performed standing activities for balance. Pt. had decreased sequencing ability and impulsiveness with stepping before therapy was ready. During gait Pt. had decreased RLE ankle  DF and decreased heel strike. Pt. able to perform one step commands. Pt. performed sitting AROM exercises 20 reps . Recommend inpatient rehab for PT/OT/SLP.  Outcome Measures     Row Name 10/03/19 1200 10/02/19 1200 10/01/19 1000       How much help from another is currently needed...    Putting on and taking off regular lower body clothing?  3  -TS  3  -TS  3  -JJ    Bathing (including washing, rinsing, and drying)  3  -TS  3  -TS  3  -JJ    Toileting (which includes using toilet bed pan or urinal)  4  -TS  3  -TS  3  -JJ    Putting on and taking off regular upper body clothing  4  -TS  3  -TS  3  -JJ    Taking care of personal grooming (such as brushing teeth)  3  -TS  3  -TS  3  -JJ    Eating meals  4  -TS  3 pt currently NPO  -TS  3  -JJ    AM-PAC 6 Clicks Score (OT)  21  -TS  18  -TS  18  -JJ       Functional Assessment    Outcome Measure Options  AM-PAC 6 Clicks Daily Activity (OT)  -TS  AM-PAC 6 Clicks Daily Activity (OT)  -TS  AM-PAC 6 Clicks Daily Activity (OT)  -JJ      User Key  (r) = Recorded By, (t) = Taken By, (c) = Cosigned By    Initials Name Provider Type    TS Yaritza Lopez COTTON/L Occupational Therapy Assistant    Christina Oliver OTR/L Occupational Therapist         Time Calculation:   PT Charges     Row Name 10/03/19 1607 10/03/19 1125          Time Calculation    Start Time  1555  -KJ  0915  -MS (r) JW (t) MS (c)     Stop Time  1607  -KJ  0938  -MS (r) JW (t) MS (c)     Time Calculation (min)  12 min  -KJ  23 min  -MS (r) JW (t)     PT Received On  --  10/03/19  -MS (r) JW (t) MS (c)     PT Goal Re-Cert Due Date  --  10/11/19  -MS (r) JW (t) MS (c)        Time Calculation- PT    Total Timed Code Minutes- PT  12 minute(s)  -KJ  23 minute(s)  -MS (r) JW (t) MS (c)       User Key  (r) = Recorded By, (t) = Taken By, (c) = Cosigned By    Initials Name Provider Type    Helen Whitt, PTA Physical Therapy Assistant    Mayuri Smith, PT, DPT, NCS Physical Therapist    JW  Georgia Jama PTA Student PTA Student        Therapy Charges for Today     Code Description Service Date Service Provider Modifiers Qty    27535787060 HC GAIT TRAINING EA 15 MIN 10/2/2019 Helen Kirby, PTA GP 1    32497408370 HC PT THER PROC EA 15 MIN 10/2/2019 Helen Kirby, JAIR GP 1    09588204089 HC GAIT TRAINING EA 15 MIN 10/3/2019 Helen Kirby, JAIR GP 1          PT G-Codes  Outcome Measure Options: AM-PAC 6 Clicks Daily Activity (OT)  AM-PAC 6 Clicks Score (PT): 18  AM-PAC 6 Clicks Score (OT): 21    Helen Kirby PTA  10/3/2019         Electronically signed by Helen Kirby PTA at 10/03/19 1608          Occupational Therapy Notes (last 24 hours) (Notes from 10/03/19 0918 through 10/04/19 0918)      Yaritza Lopez COTA/L at 10/03/19 1207          Acute Care - Occupational Therapy Treatment Note  [facility]     Patient Name: Jarod Ramírez JrTomasa  : 1960  MRN: [id number]  Today's Date: 10/3/2019  Onset of Illness/Injury or Date of Surgery: 19  Date of Referral to OT: 19  Referring Physician: Dr. Alas    Admit Date: 2019       ICD-10-CM ICD-9-CM   1. Dysphagia, unspecified type R13.10 787.20   2. Impaired mobility Z74.09 799.89   3. Impaired mobility and ADLs Z74.09 799.89   4. Aphasia R47.01 784.3     Patient Active Problem List   Diagnosis   • Degeneration of cervical intervertebral disc   • Closed fracture of lumbar vertebra (CMS/HCC)   • Degeneration of lumbar or lumbosacral intervertebral disc   • Smoker   • BMI 27.0-27.9,adult   • Acute cerebrovascular accident (CVA) of cerebellum (CMS/HCC)   • Alcoholism /alcohol abuse (CMS/HCC)   • CVA (cerebral vascular accident) (CMS/HCC)   • Hyperlipidemia   • Dysphagia due to recent cerebral infarction     Past Medical History:   Diagnosis Date   • Hypertension      Past Surgical History:   Procedure Laterality Date   • CATARACT EXTRACTION Left    • WRIST FRACTURE SURGERY Left        Therapy  Treatment    Rehabilitation Treatment Summary     Row Name 10/03/19 1005 10/03/19 0952 10/03/19 0915       Treatment Time/Intention    Discipline  occupational therapy assistant  -TS  speech language pathologist  -MB  physical therapy assistant  (Pended)   -JW    Document Type  therapy note (daily note)  -TS  therapy note (daily note)  -MB  therapy note (daily note)  (Pended)   -JW    Subjective Information  no complaints  -TS  no complaints  -MB  no complaints  (Pended)   -JW    Mode of Treatment  --  speech-language pathology  -MB  physical therapy  (Pended)   -JW    Patient/Family Observations  --  No family present  -MB  no family present  (Pended)   -JW    Patient Effort  good  -TS  good  -MB  good  (Pended)   -JW    Comment  --  --  RLE foot drop  (Pended)   -JW    Existing Precautions/Restrictions  fall  -TS  --  fall  -KJ    Treatment Considerations/Comments  aphasia, decreased safety   -TS  --  aphasia  -KJ    Patient Response to Treatment  --  --  good  (Pended)   -JW    Recorded by [TS] Yaritza Lopez COTA/KIKO 10/03/19 1206 [MB] Georgi Hernandez CCC-SLP 10/03/19 1008 [JW] Georgia Jama, PTA Student 10/03/19 1034  [KJ] Helen Kirby, PTA 10/03/19 1106    Row Name 10/03/19 1005             Cognitive Assessment/Intervention- PT/OT    Follows Commands (Cognition)  follows one step commands;75-90% accuracy;increased processing time needed;verbal cues/prompting required  -TS      Recorded by [TS] Yaritza Lopez COTA/L 10/03/19 1206      Row Name 10/03/19 0915             Verbal Expression Assessment/Intervention    Verbal Expression  severe impairment  (Pended)   -JW      Sentence Formulation  severe impairment  (Pended)   -JW      Recorded by [JW] Georgia Jama, PTA Student 10/03/19 1034      Row Name 10/03/19 0915             Safety Issues, Functional Mobility    Safety Issues Affecting Function (Mobility)  impulsivity;sequencing abilities  (Pended)   -JW      Impairments Affecting  Function (Mobility)  balance;coordination  (Pended)   -      Comment, Safety Issues/Impairments (Mobility)  Pt. at risk for falls due to impulsivity.Pt. presents with decreased ROM RLE  ankle DF; Pt. needed verbal/visual cueing to perform balance activities correctly  (Pended)   -JW      Recorded by [JW] Georgia Jama PTA Student 10/03/19 1034      Row Name 10/03/19 1005 10/03/19 0915          Functional Mobility    Functional Mobility- Ind. Level  standby assist;contact guard assist  -TS  contact guard assist;supervision required;verbal cues required;1 person  (Pended)   -     Functional Mobility- Safety Issues  --  sequencing ability decreased  (Pended)   -     Functional Mobility- Comment  in room, in BR  -TS  --     Recorded by [TS] Yaritza Lopez COTA/L 10/03/19 1206 [JW] Georgia Jama PTA Student 10/03/19 1034     Row Name 10/03/19 1005 10/03/19 0915          Transfer Assessment/Treatment    Transfer Assessment/Treatment  sit-stand transfer;stand-sit transfer;shower transfer  -TS  sit-stand transfer;stand-sit transfer  (Pended)   -JW     Recorded by [TS] Yaritza Lopez COTA/L 10/03/19 1206 [JW] Georgia Jama PTA Student 10/03/19 1034     Row Name 10/03/19 1005 10/03/19 0915          Sit-Stand Transfer    Sit-Stand [place] (Transfers)  stand by assist  -TS  contact guard;supervision;1 person assist  (Pended)   -JW     Recorded by [TS] Yaritza Lopez COTA/L 10/03/19 1206 [JW] Georiga Jama PTA Student 10/03/19 1034     Row Name 10/03/19 1005 10/03/19 0915          Stand-Sit Transfer    Stand-Sit Emanuel (Transfers)  stand by assist  -TS  supervision  (Pended)   -JW     Recorded by [TS] Yaritza Lopez COTTON/L 10/03/19 1206 [JW] Georgia Jama PTA Student 10/03/19 1034     Row Name 10/03/19 1005             Shower Transfer    Type (Shower Transfer)  sit-stand;stand-sit  -TS      [place] Level (Shower Transfer)  stand by assist  -TS       Assistive Device (Shower Transfer)  shower chair;grab bars/tub rail  -TS      Recorded by [TS] Yaritza Lopez COTA/L 10/03/19 1206      Row Name 10/03/19 0915             Gait/Stairs Assessment/Training    [place] Level (Gait)  contact guard;minimum assist (75% patient effort);1 person assist  (Pended)   -JW      Distance in Feet (Gait)  100'x2  (Pended)   -JW      Pattern (Gait)  swing-through  (Pended)   -JW      Right Sided Gait Deviations  foot drop/toe drag;heel strike decreased  (Pended)   -JW      Recorded by [JW] Georgia Jama, PTA Student 10/03/19 1034      Row Name 10/03/19 1005             ADL Assessment/Intervention    BADL Assessment/Intervention  upper body dressing;lower body dressing;grooming;bathing  -TS      Recorded by [TS] Yaritza Lopez COTA/L 10/03/19 1206      Row Name 10/03/19 1005             Bathing Assessment/Intervention    Bathing [place] Level  upper body;lower body;set up;supervision;verbal cues  -TS      Assistive Devices (Bathing)  hand-held shower spray hose;grab bars/tub rail;shower chair  -TS      Bathing Position  supported sitting;supported standing  -TS      Recorded by [TS] Yaritza Lopez COTA/L 10/03/19 1206      Row Name 10/03/19 1005             Upper Body Dressing Assessment/Training    Upper Body Dressing [place] Level  don;doff;set up;supervision  -TS      Upper Body Dressing Position  supported sitting  -TS      Recorded by [TS] Yaritza Lopez COTA/L 10/03/19 1206      Row Name 10/03/19 1005             Lower Body Dressing Assessment/Training    Lower Body Dressing [place] Level  don;doff;socks;undergarment;pants/bottoms;contact guard assist;supervision  -TS      Lower Body Dressing Position  supported sitting;supported standing  -TS      Comment (Lower Body Dressing)  pt attempted to stand to don pants, pt demonstrated decreased balance when attempting to stand to don pants and had LOB requiring min A to correct  -TS       Recorded by [TS] Yaritza Lopez COTA/L 10/03/19 1206      Row Name 10/03/19 1005             Grooming Assessment/Training    [place] Level (Grooming)  oral care regimen;hair care, combing/brushing;set up SBA  -TS      Grooming Position  sink side;supported standing  -TS      Recorded by [TS] Yaritza Lopez COTA/L 10/03/19 1206      Row Name 10/03/19 0915             Therapeutic Exercise    Lower Extremity (Therapeutic Exercise)  LAQ (long arc quad), bilateral;marching while seated;marching while standing  (Pended)   -JW      Exercise Type (Therapeutic Exercise)  AROM (active range of motion);AAROM (active assistive range of motion)  (Pended)   -JW      Position (Therapeutic Exercise)  seated;standing  (Pended)   -JW      Sets/Reps (Therapeutic Exercise)  20  (Pended)   -JW      Comment (Therapeutic Exercise)  AAROM RLE Ankle DF  (Pended)   -JW      Recorded by [JW] Georgia Jama PTA Student 10/03/19 1034      Row Name 10/03/19 0915             Balance    Balance  dynamic balance activity  (Pended)   -JW      Recorded by [JW] Georgia Jama PTA Student 10/03/19 1034      Row Name 10/03/19 0915             Static Sitting Balance    Level of [place] (Unsupported Sitting, Static Balance)  independent  (Pended)   -JW      Sitting Position (Unsupported Sitting, Static Balance)  sitting in chair  (Pended)   -JW      Recorded by [JW] Georgia Jama PTA Student 10/03/19 1034      Row Name 10/03/19 0915             Static Standing Balance    Level of [place] (Supported Standing, Static Balance)  contact guard assist;minimal assist, 75% patient effort;1 person assist  (Pended)   -JW      Recorded by [JW] Georgia Jama PTA Student 10/03/19 1034      Row Name 10/03/19 0915             Standing Balance Activity    Activities Performed (Standing, Balance Training)  standing unsupported  (Pended)   -JW      Support Needed for Balance (Standing, Balance Training)  CGA;SBA;1 person  assist  (Pended)   -      Recorded by [JW] Georgia Jama, [facility] Student 10/03/19 1034      Row Name 10/03/19 0915             Dynamic Balance Activity    Therapeutic Training Performed (Dynamic Balance)  backward walking;side stepping  (Pended)   -JW      Comment (Dynamic Balance Training)  Marching;forward/backward stepping;mini squats  (Pended)   -      Recorded by [JW] Georgia Jama, [facility] Student 10/03/19 1034      Row Name 10/03/19 1005 10/03/19 0915          Positioning and Restraints    Pre-Treatment Position  sitting in chair/recliner  -TS  sitting in chair/recliner  (Pended)   -JW     Post Treatment Position  other  -TS  chair  (Pended)   -JW     In Chair  --  exit alarm on;call light within reach  -KJ     Other Position  with other staff with transport  -TS  --     Recorded by [TS] Yaritza Lopez COTA/L 10/03/19 1206 [JW] Georgia Jama, [facility] Student 10/03/19 1034  [KJ] Helen Kirby, [facility] 10/03/19 1106     Row Name 10/03/19 0915             Pain Scale: Numbers Pre/Post-Treatment    Pain Scale: Numbers, Pretreatment  0/10 - no pain  (Pended)   -      Pain Scale: Numbers, Post-Treatment  0/10 - no pain  (Pended)   -      Recorded by [JW] Georgia Jama, [facility] Student 10/03/19 1034      Row Name 10/03/19 1005 10/03/19 0952          Pain Scale: FACES Pre/Post-Treatment    Pain: FACES Scale, Pretreatment  0-->no hurt  -TS  0-->no hurt  -MB     Pain: FACES Scale, Post-Treatment  0-->no hurt  -TS  --     Recorded by [TS] Yaritza Lopez COTTON/L 10/03/19 1206 [MB] Georgi Hernandez CCC-SLP 10/03/19 1008     Row Name                Wound 09/27/19 0800 Right posterior finger    Wound - Properties Group Date first assessed: 09/27/19 [NW] Time first assessed: 0800 [NW] Present on Hospital Admission: Y [NW] Side: Right [NW] Orientation: posterior [NW] Location: finger [NW] Recorded by:  [NW] Dunia Multani RN 09/27/19 1624    Row Name 10/03/19 0952             Outcome  Summary/Treatment Plan (SLP)    Daily Summary of Progress (SLP)  progress toward functional goals is good  -MB      Barriers to Overall Progress (SLP)  Aphasia  -MB      Plan for Continued Treatment (SLP)  Complete MBS today  -MB      Anticipated Dischage Disposition  inpatient rehabilitation facility  -MB      Recorded by [MB] Georgi Hernandez, CCC-SLP 10/03/19 1008        User Key  (r) = Recorded By, (t) = Taken By, (c) = Cosigned By    Initials Name Effective Dates Discipline    Georgi Garcia, CCC-SLP 08/02/16 -  SLP    Helen Whitt, PTA 08/02/16 -  PT    TS Yaritza Lopez, COTTON/L 08/02/16 -  OT    Dunia Chairez RN 07/12/18 -  Nurse    Georgia Jean Baptiste PTA Student 09/18/19 -  PT        Wound 09/27/19 0800 Right posterior finger (Active)   Dressing Appearance open to air 10/3/2019  7:37 AM   Closure None 10/3/2019  7:37 AM   Edges jagged 10/2/2019  8:35 PM   Drainage Amount none 10/2/2019  8:35 PM   Dressing Care, Wound open to air 10/3/2019  7:37 AM     Rehab Goal Summary     Row Name 10/03/19 0952             Oral Nutrition/Hydration Goal 1 (SLP)    Oral Nutrition/Hydration Goal 1, SLP  LTG: Patient will tolerate LRD without s/s of aspiration.  -MB      Time Frame (Oral Nutrition/Hydration Goal 1, SLP)  by discharge  -MB      Barriers (Oral Nutrition/Hydration Goal 1, SLP)  Aphasia  -MB      Progress/Outcomes (Oral Nutrition/Hydration Goal 1, SLP)  continuing progress toward goal  -MB         Lingual Strengthening Goal 1 (SLP)    Activity (Lingual Strengthening Goal 1, SLP)  increase tongue back strength  -MB      Increase Tongue Back Strength  lingual movement exercises  -MB      [place]/Accuracy (Lingual Strengthening Goal 1, SLP)  independently (over 90% accuracy)  -MB      Time Frame (Lingual Strengthening Goal 1, SLP)  short term goal (STG);by discharge  -MB      Barriers (Lingual Strengthening Goal 1, SLP)  n/a  -MB      Progress/Outcomes (Lingual Strengthening  Goal 1, SLP)  good progress toward goal  -MB         Pharyngeal Strengthening Exercise Goal 1 (SLP)    Activity (Pharyngeal Strengthening Goal 1, SLP)  increase epiglottic inversion and retroflexion;increase squeeze/positive pressure generation;increase tongue base retraction  -MB      Increase Epiglottic Inversion and Retroflexion  [place]  -MB      Increase Squeeze/Positive Pressure Generation  hard effortful swallow  -MB      Increase Tongue Base Retraction  yoni  -MB      [place]/Accuracy (Pharyngeal Strengthening Goal 1, SLP)  independently (over 90% accuracy)  -MB      Time Frame (Pharyngeal Strengthening Goal 1, SLP)  short term goal (STG);by discharge  -MB      Barriers (Pharyngeal Strengthening Goal 1, SLP)  cognitive status  -MB      Progress/Outcomes (Pharyngeal Strengthening Goal 1, SLP)  goal ongoing  -MB        User Key  (r) = Recorded By, (t) = Taken By, (c) = Cosigned By    Initials Name Provider Type Discipline    Georgi Garcia CCC-SLP Speech and Language Pathologist SLP        Occupational Therapy Education     Title: PT OT SLP Therapies (Done)     Topic: Occupational Therapy (Done)     Point: ADL training (Done)     Description: Instruct learner(s) on proper safety adaptation and remediation techniques during self care or transfers.   Instruct in proper use of assistive devices.    Learning Progress Summary           Patient Acceptance, EKELVIN by ZACH at 10/3/2019 11:22 AM    Comment:  Pt. educated on safety awareness/impulsivity performs better with visual cueing    DOMENICO Oakes VU by YUDI at 10/1/2019 10:40 AM    Comment:  OT POC, adls, t/fs, bed mobility, safety/environmental modifications, processing, d/c planning.                   Point: Home exercise program (Done)     Description: Instruct learner(s) on appropriate technique for monitoring, assisting and/or progressing therapeutic exercises/activities.    Learning Progress Summary           Patient Acceptance, E DU by ZACH  at 10/3/2019 11:22 AM    Comment:  Pt. educated on safety awareness/impulsivity performs better with visual cueing                   Point: Precautions (Done)     Description: Instruct learner(s) on prescribed precautions during self-care and functional transfers.    Learning Progress Summary           Patient Acceptance, E, DU by ZACH at 10/3/2019 11:22 AM    Comment:  Pt. educated on safety awareness/impulsivity performs better with visual cueing                   Point: Body mechanics (Done)     Description: Instruct learner(s) on proper positioning and spine alignment during self-care, functional mobility activities and/or exercises.    Learning Progress Summary           Patient Acceptance, E, DU by ZACH at 10/3/2019 11:22 AM    Comment:  Pt. educated on safety awareness/impulsivity performs better with visual cueing    Acceptance, E, VU by YUDI at 10/1/2019 10:40 AM    Comment:  OT POC, adls, t/fs, bed mobility, safety/environmental modifications, processing, d/c planning.                               User Key     Initials Effective Dates Name Provider Type Discipline    YUDI 10/12/18 -  Christina Dahl OTR/L Occupational Therapist OT     09/18/19 -  Georgia Jama PTA Student PTA Student PT                OT Recommendation and Plan  Outcome Summary/Treatment Plan (OT)  Daily Summary of Progress (OT): progress towards functional goals is fair  Daily Summary of Progress (OT): progress towards functional goals is fair  Plan of Care Review  Plan of Care Reviewed With: patient  Plan of Care Reviewed With: patient  Outcome Summary: Pt S for bed mobility and came into long sitting from supine in bed then came to EOB. Pt transfers with CGA with verbal cues to stand and increased time to process cue. Pt ambulated to BR with CGA and stood at side sink for grooming tasks with CGA after set up. Pt followed 50-60% of directions provided with initial simple one step command. Pt would benefit from acute rehab at  discharge. Continue OT POC   Outcome Measures     Row Name 10/03/19 1200 10/02/19 1200 10/01/19 1000       How much help from another is currently needed...    Putting on and taking off regular lower body clothing?  3  -TS  3  -TS  3  -JJ    Bathing (including washing, rinsing, and drying)  3  -TS  3  -TS  3  -JJ    Toileting (which includes using toilet bed pan or urinal)  4  -TS  3  -TS  3  -JJ    Putting on and taking off regular upper body clothing  4  -TS  3  -TS  3  -JJ    Taking care of personal grooming (such as brushing teeth)  3  -TS  3  -TS  3  -JJ    Eating meals  4  -TS  3 pt currently NPO  -TS  3  -JJ    AM-PAC 6 Clicks Score (OT)  21  -TS  18  -TS  18  -JJ       Functional Assessment    Outcome Measure Options  AM-PAC 6 Clicks Daily Activity (OT)  -TS  AM-PAC 6 Clicks Daily Activity (OT)  -TS  AM-PAC 6 Clicks Daily Activity (OT)  -JJ      User Key  (r) = Recorded By, (t) = Taken By, (c) = Cosigned By    Initials Name Provider Type    TS Yaritza Lopez COTTON/L Occupational Therapy Assistant    Christina Oliver OTR/L Occupational Therapist           Time Calculation:   Time Calculation- OT     Row Name 10/03/19 1207             Time Calculation- OT    OT Start Time  1005  -TS      OT Stop Time  1043  -TS      OT Time Calculation (min)  38 min  -TS      Total Timed Code Minutes- OT  38 minute(s)  -TS      OT Received On  10/03/19  -TS         Timed Charges    17060 - OT Self Care/Mgmt Minutes  38  -TS        User Key  (r) = Recorded By, (t) = Taken By, (c) = Cosigned By    Initials Name Provider Type    TS Yaritza Lopez COTTON/L Occupational Therapy Assistant        Therapy Charges for Today     Code Description Service Date Service Provider Modifiers Qty    70020682295 HC OT SELF CARE/MGMT/TRAIN EA 15 MIN 10/2/2019 Yaritza Lopez COTA/L GO 3    60935254782 HC OT SELF CARE/MGMT/TRAIN EA 15 MIN 10/3/2019 Yaritza Lopez COTTON/L GO 3               Yaritza MEANS. John  YURY/L  10/3/2019    Electronically signed by Yaritza Lopez COTA/L at 10/03/19 5552

## 2019-10-04 NOTE — THERAPY TREATMENT NOTE
Acute Care - Physical Therapy Treatment Note  [facility]     Patient Name: Jarod Ramírez Jr.  : 1960  MRN: [id number]  Today's Date: 10/4/2019  Onset of Illness/Injury or Date of Surgery: 19     Referring Physician: Dr. Alas    Admit Date: 2019    Visit Dx:    ICD-10-CM ICD-9-CM   1. Dysphagia, unspecified type R13.10 787.20   2. Impaired mobility Z74.09 799.89   3. Impaired mobility and ADLs Z74.09 799.89   4. Aphasia R47.01 784.3     Patient Active Problem List   Diagnosis   • Degeneration of cervical intervertebral disc   • Closed fracture of lumbar vertebra (CMS/HCC)   • Degeneration of lumbar or lumbosacral intervertebral disc   • Smoker   • BMI 27.0-27.9,adult   • Acute cerebrovascular accident (CVA) of cerebellum (CMS/HCC)   • Alcoholism /alcohol abuse (CMS/HCC)   • CVA (cerebral vascular accident) (CMS/HCC)   • Hyperlipidemia   • Dysphagia due to recent cerebral infarction       Therapy Treatment    Rehabilitation Treatment Summary     Row Name 10/04/19 1411 10/04/19 1351          Treatment Time/Intention    Discipline  physical therapy assistant  -KJ  speech language pathologist  -MB     Document Type  therapy note (daily note)  -KJ  therapy note (daily note)  -MB     Subjective Information  no complaints  -KJ  no complaints  -MB     Mode of Treatment  physical therapy  -KJ  speech-language pathology  -MB     Patient/Family Observations  no family present  -KJ  No family present  -MB     Patient Effort  good  -KJ  adequate  -MB     Comment  RLE foot drop  -KJ  --     Existing Precautions/Restrictions  fall  -KJ  --     Treatment Considerations/Comments  Aphasia  -KJ  --     Patient Response to Treatment  good  -KJ  --     Recorded by [KJ] Helen Kirby, PTA 10/04/19 1455 [MB] Georgi Hernandez CCC-SLP 10/04/19 1410     Row Name 10/04/19 1411             Cognitive Assessment/Intervention- PT/OT    Follows Commands (Cognition)  follows one step commands;delayed  response/completion;verbal cues/prompting required;physical/tactile prompts required  -KJ      Safety Deficit (Cognitive)  impulsivity  -KJ      Recorded by [KJ] Helen Kirby, PTA 10/04/19 1455      Row Name 10/04/19 1411             Verbal Expression Assessment/Intervention    Verbal Expression  severe impairment  -KJ      Recorded by [KJ] Helen Kirby, PTA 10/04/19 1455      Row Name 10/04/19 1411             Bed Mobility Assessment/Treatment    Bed Mobility Assessment/Treatment  supine-sit  -KJ      Supine-Sit [place] (Bed Mobility)  supervision  -KJ      Bed Mobility, Safety Issues  cognitive deficits limit understanding  -KJ      Recorded by [KJ] Helen Kirby, PTA 10/04/19 1455      Row Name 10/04/19 1411             Functional Mobility    Functional Mobility- Ind. Level  supervision required;1 person  -KJ      Functional Mobility- Safety Issues  sequencing ability decreased  -KJ      Functional Mobility- Comment  In rehab room during Standing activities Pt. had LOB with LLE ABD/ADD  -KJ      Recorded by [KJ] Helen Kirby, PTA 10/04/19 1455      Row Name 10/04/19 1411             Sit-Stand Transfer    Sit-Stand [place] (Transfers)  supervision;1 person assist  -KJ      Recorded by [KJ] Helen Kirby, PTA 10/04/19 1455      Row Name 10/04/19 1411             Stand-Sit Transfer    Stand-Sit [place] (Transfers)  supervision;1 person assist  -KJ      Recorded by [KJ] Helen Kirby, PTA 10/04/19 1455      Row Name 10/04/19 1411             Gait/Stairs Assessment/Training    [place] Level (Gait)  contact guard;supervision  -KJ      Distance in Feet (Gait)  100'x2  -KJ      Pattern (Gait)  swing-through  -KJ      Right Sided Gait Deviations  foot drop/toe drag;heel strike decreased  -KJ      Comment (Gait/Stairs)  RLE Ankle DF limited during gait due to drop foot  -KJ      Recorded by [KJ] Helen Kirby, PTA 10/04/19 1455      Row Name 10/04/19 1411             Therapeutic  Exercise    Lower Extremity (Therapeutic Exercise)  LAQ (long arc quad), bilateral;marching while seated;marching while standing  -KJ      Lower Extremity Range of Motion (Therapeutic Exercise)  hip abduction/adduction, bilateral;ankle dorsiflexion/plantar flexion, bilateral  -KJ      Position (Therapeutic Exercise)  seated;standing  -KJ      Sets/Reps (Therapeutic Exercise)  20  -KJ      Comment (Therapeutic Exercise)  AROM/AAROM RLE   -KJ      Recorded by [KJ] Helen Kirby, [facility] [place] 10/04/19 1455      Row Name 10/04/19 1411             Standing Balance Activity    Activities Performed (Standing, Balance Training)  standing unsupported  -KJ      Support Needed for Balance (Standing, Balance Training)  SBA;CGA;1 person assist  -KJ      Comment (Standing, Balance Training)  LOB with RLE ABD/ADD  -KJ      Recorded by [KJ] Helen Kirby, [facility] [place] 10/04/19 1455      Row Name 10/04/19 1411             Positioning and Restraints    Pre-Treatment Position  in bed  -KJ      Post Treatment Position  bed  -KJ      In Bed  sitting EOB;call light within reach  -KJ      Recorded by [KJ] Helen Kirby, [facility] [place] 10/04/19 1455      Row Name 10/04/19 1411             Pain Scale: Numbers Pre/Post-Treatment    Pain Scale: Numbers, Pretreatment  0/10 - no pain  -KJ      Pain Scale: Numbers, Post-Treatment  0/10 - no pain  -KJ      Recorded by [KJ] Helen Kirby, [facility] [place] 10/04/19 1455      Row Name 10/04/19 1351             Pain Scale: FACES Pre/Post-Treatment    Pain: FACES Scale, Pretreatment  0-->no hurt  -MB      Recorded by [MB] Georgi Hernandez CCC-SLP 10/04/19 1410      Row Name                Wound 09/27/19 0800 Right posterior finger    Wound - Properties Group Date first assessed: 09/27/19 [NW] Time first assessed: 0800 [NW] Present on Hospital Admission: Y [NW] Side: Right [NW] Orientation: posterior [NW] Location: finger [NW] Recorded by:  [NW] Dunia Multani RN 09/27/19 1624    Row Name 10/04/19 1351             Outcome  Summary/Treatment Plan (SLP)    Daily Summary of Progress (SLP)  progress towards functional goals is fair  -MB      Barriers to Overall Progress (SLP)  Aphasia  -MB      Plan for Continued Treatment (SLP)  Continue to follow and treat  -MB      Anticipated Dischage Disposition  inpatient rehabilitation facility  -MB      Recorded by [MB] Georgi Hernandez CCC-SLP 10/04/19 1410        User Key  (r) = Recorded By, (t) = Taken By, (c) = Cosigned By    Initials Name Effective Dates Discipline    Georgi Garcia CCC-SLP 08/02/16 -  SLP    Helen Whitt PTA 08/02/16 -  PT    Dunia Chairez RN 07/12/18 -  Nurse               Rehab Goal Summary     Row Name 10/04/19 1351             Oral Nutrition/Hydration Goal 1 (SLP)    Oral Nutrition/Hydration Goal 1, SLP  LTG: Patient will tolerate LRD without s/s of aspiration.  -MB      Time Frame (Oral Nutrition/Hydration Goal 1, SLP)  by discharge  -MB      Barriers (Oral Nutrition/Hydration Goal 1, SLP)  Aphasia  -MB      Progress/Outcomes (Oral Nutrition/Hydration Goal 1, SLP)  continuing progress toward goal  -MB         Lingual Strengthening Goal 1 (SLP)    Activity (Lingual Strengthening Goal 1, SLP)  increase tongue back strength  -MB      Increase Tongue Back Strength  lingual movement exercises  -MB      [place]/Accuracy (Lingual Strengthening Goal 1, SLP)  independently (over 90% accuracy)  -MB      Time Frame (Lingual Strengthening Goal 1, SLP)  short term goal (STG);by discharge  -MB      Barriers (Lingual Strengthening Goal 1, SLP)  n/a  -MB      Progress/Outcomes (Lingual Strengthening Goal 1, SLP)  goal ongoing  -MB         Pharyngeal Strengthening Exercise Goal 1 (SLP)    Activity (Pharyngeal Strengthening Goal 1, SLP)  increase timing;increase superior movement of the hyolaryngeal complex;increase anterior movement of the hyolaryngeal complex;increase epiglottic inversion and retroflexion;increase squeeze/positive pressure generation   -MB      Increase Timing  gustatory stimulation (sour/cold)  -MB      Increase Superior Movement of the Hyolaryngeal Complex  -- NMES  -MB      Increase Anterior Movement of the Hyolaryngeal Complex  shaker  -MB      Increase Epiglottic Inversion and Retroflexion  [place]el[place]hn  -MB      Increase Squeeze/Positive Pressure Generation  hard effortful swallow  -MB      Increase Tongue Base Retraction  yoni  -MB      [place]/Accuracy (Pharyngeal Strengthening Goal 1, SLP)  independently (over 90% accuracy)  -MB      Time Frame (Pharyngeal Strengthening Goal 1, SLP)  short term goal (STG);by discharge  -MB      Barriers (Pharyngeal Strengthening Goal 1, SLP)  cognitive status  -MB      Progress/Outcomes (Pharyngeal Strengthening Goal 1, SLP)  progress slower than expected  -MB        User Key  (r) = Recorded By, (t) = Taken By, (c) = Cosigned By    Initials Name Provider Type Discipline    Georgi Garcia, CCC-SLP Speech and Language Pathologist SLP          Physical Therapy Education     Title: PT OT SLP Therapies (Done)     Topic: Physical Therapy (Done)     Point: Mobility training (Done)     Learning Progress Summary           Patient Acceptance, D,TB, DU by ELISABETH at 10/4/2019  2:55 PM    Comment:  Pt. was educated on widening JUSTA with turnig during gait; Pt. educated on inportance of performing RLE ankle DF throughout the day to increase ROM needed for gait.    Acceptance, E, DU by ZACH at 10/3/2019 11:22 AM    Comment:  Pt. educated on safety awareness/impulsivity performs better with visual cueing    Acceptance, E, VU,NR by SHEILA at 10/1/2019  8:19 AM    Comment:  Educated on benefits of activity and ongoing PT POC.   Family Acceptance, E, VU,NR by SHEILA at 10/1/2019  8:19 AM    Comment:  Educated on benefits of activity and ongoing PT POC.                   Point: Home exercise program (Done)     Learning Progress Summary           Patient Acceptance, D,TB, DU by ELISABETH at 10/4/2019  2:55 PM    Comment:  Pt. was  educated on widening JUSTA with turnig during gait; Pt. educated on inportance of performing RLE ankle DF throughout the day to increase ROM needed for gait.    Acceptance, E, DU by ZACH at 10/3/2019 11:22 AM    Comment:  Pt. educated on safety awareness/impulsivity performs better with visual cueing                   Point: Body mechanics (Done)     Learning Progress Summary           Patient Acceptance, D,TB, DU by ELISABETH at 10/4/2019  2:55 PM    Comment:  Pt. was educated on widening JUSTA with turnig during gait; Pt. educated on inportance of performing RLE ankle DF throughout the day to increase ROM needed for gait.    Acceptance, E, DU by ZACH at 10/3/2019 11:22 AM    Comment:  Pt. educated on safety awareness/impulsivity performs better with visual cueing                   Point: Precautions (Done)     Learning Progress Summary           Patient Acceptance, D,TB, DU by ELISABETH at 10/4/2019  2:55 PM    Comment:  Pt. was educated on widening JUSTA with turnig during gait; Pt. educated on inportance of performing RLE ankle DF throughout the day to increase ROM needed for gait.    Acceptance, E, DU by ZACH at 10/3/2019 11:22 AM    Comment:  Pt. educated on safety awareness/impulsivity performs better with visual cueing                               User Key     Initials Effective Dates Name Provider Type Discipline     08/02/16 -  Helen Kirby PTA Physical Therapy Assistant PT    AF 08/27/19 -  Arti Haile, PT Student PT Student PT     09/18/19 -  Georgia Jama PTA Student PTA Student PT                PT Recommendation and Plan     Plan of Care Reviewed With: patient  Progress: improving  Outcome Summary: Pt. was performed sit to stand transfer with supervision; Pt. ambulated 100'x2 with CGA/SBA without assistive device needed. Pt. was given verbal cueing to widen JUSTA during ambulation with turns. Pt. had decreased heel strike during gait due to RLE ankle DF foot drop. Pt. performed standing therapeutic activities  with CGA/supervision with verbal and tactile cues needed for correct sequencing. Pt. had LOB with LLE ABD/ADD in standing. Pt. performed seated exercises for 20 reps with verbal and tactlie cues needed to perform exercises correctly. Recommended inpatient rehab  Outcome Measures     Row Name 10/03/19 1200 10/02/19 1200          How much help from another is currently needed...    Putting on and taking off regular lower body clothing?  3  -TS  3  -TS     Bathing (including washing, rinsing, and drying)  3  -TS  3  -TS     Toileting (which includes using toilet bed pan or urinal)  4  -TS  3  -TS     Putting on and taking off regular upper body clothing  4  -TS  3  -TS     Taking care of personal grooming (such as brushing teeth)  3  -TS  3  -TS     Eating meals  4  -TS  3 pt currently NPO  -TS     AM-PAC 6 Clicks Score (OT)  21  -TS  18  -TS        Functional Assessment    Outcome Measure Options  AM-PAC 6 Clicks Daily Activity (OT)  -TS  AM-PAC 6 Clicks Daily Activity (OT)  -TS       User Key  (r) = Recorded By, (t) = Taken By, (c) = Cosigned By    Initials Name Provider Type    TS Yaritza Lopez, YURY/L Occupational Therapy Assistant         Time Calculation:   PT Charges     Row Name 10/04/19 1458             Time Calculation    Start Time  1411  -KJ      Stop Time  1431  -KJ      Time Calculation (min)  20 min  -KJ      PT Received On  10/04/19  -KJ      PT Goal Re-Cert Due Date  10/11/19  -KJ         Time Calculation- PT    Total Timed Code Minutes- PT  20 minute(s)  -KJ        User Key  (r) = Recorded By, (t) = Taken By, (c) = Cosigned By    Initials Name Provider Type    Helen Whitt PTA Physical Therapy Assistant        Therapy Charges for Today     Code Description Service Date Service Provider Modifiers Qty    16223451901 HC GAIT TRAINING EA 15 MIN 10/3/2019 Helen Kirby PTA GP 1    [id number] HC GAIT TRAINING EA 15 MIN 10/4/2019 Helen Kirby PTA GP 1          PT G-Codes  Outcome  Measure Options: AM-PAC 6 Clicks Daily Activity (OT)  AM-PAC 6 Clicks Score (PT): 18  AM-PAC 6 Clicks Score (OT): 21    Helen Kirby, PTA  10/4/2019

## 2019-10-05 LAB
ALBUMIN SERPL-MCNC: 3.5 G/DL (ref 3.5–5.2)
ALBUMIN/GLOB SERPL: 1 G/DL
ALP SERPL-CCNC: 62 U/L (ref 39–117)
ALT SERPL W P-5'-P-CCNC: 24 U/L (ref 1–41)
ANION GAP SERPL CALCULATED.3IONS-SCNC: 12 MMOL/L (ref 5–15)
AST SERPL-CCNC: 24 U/L (ref 1–40)
BASOPHILS # BLD AUTO: 0.05 10*3/MM3 (ref 0–0.2)
BASOPHILS NFR BLD AUTO: 0.5 % (ref 0–1.5)
BILIRUB SERPL-MCNC: 0.3 MG/DL (ref 0.2–1.2)
BUN BLD-MCNC: 8 MG/DL (ref 6–20)
BUN/CREAT SERPL: 14.8 (ref 7–25)
CALCIUM SPEC-SCNC: 8.8 MG/DL (ref 8.6–10.5)
CHLORIDE SERPL-SCNC: 98 MMOL/L (ref 98–107)
CO2 SERPL-SCNC: 28 MMOL/L (ref 22–29)
CREAT BLD-MCNC: 0.54 MG/DL (ref 0.76–1.27)
DEPRECATED RDW RBC AUTO: 45.2 FL (ref 37–54)
EOSINOPHIL # BLD AUTO: 0.23 10*3/MM3 (ref 0–0.4)
EOSINOPHIL NFR BLD AUTO: 2.4 % (ref 0.3–6.2)
ERYTHROCYTE [DISTWIDTH] IN BLOOD BY AUTOMATED COUNT: 13.2 % (ref 12.3–15.4)
GFR SERPL CREATININE-BSD FRML MDRD: >150 ML/MIN/1.73
GLOBULIN UR ELPH-MCNC: 3.4 GM/DL
GLUCOSE BLD-MCNC: 98 MG/DL (ref 65–99)
HCT VFR BLD AUTO: 38.2 % (ref 37.5–51)
HGB BLD-MCNC: 13.1 G/DL (ref 13–17.7)
IMM GRANULOCYTES # BLD AUTO: 0.02 10*3/MM3 (ref 0–0.05)
IMM GRANULOCYTES NFR BLD AUTO: 0.2 % (ref 0–0.5)
LYMPHOCYTES # BLD AUTO: 2.44 10*3/MM3 (ref 0.7–3.1)
LYMPHOCYTES NFR BLD AUTO: 25.7 % (ref 19.6–45.3)
MCH RBC QN AUTO: 31.8 PG (ref 26.6–33)
MCHC RBC AUTO-ENTMCNC: 34.3 G/DL (ref 31.5–35.7)
MCV RBC AUTO: 92.7 FL (ref 79–97)
MONOCYTES # BLD AUTO: 1.17 10*3/MM3 (ref 0.1–0.9)
MONOCYTES NFR BLD AUTO: 12.3 % (ref 5–12)
NEUTROPHILS # BLD AUTO: 5.57 10*3/MM3 (ref 1.7–7)
NEUTROPHILS NFR BLD AUTO: 58.9 % (ref 42.7–76)
NRBC BLD AUTO-RTO: 0 /100 WBC (ref 0–0.2)
PLATELET # BLD AUTO: 320 10*3/MM3 (ref 140–450)
PMV BLD AUTO: 10.1 FL (ref 6–12)
POTASSIUM BLD-SCNC: 3.8 MMOL/L (ref 3.5–5.2)
PROT SERPL-MCNC: 6.9 G/DL (ref 6–8.5)
RBC # BLD AUTO: 4.12 10*6/MM3 (ref 4.14–5.8)
SODIUM BLD-SCNC: 138 MMOL/L (ref 136–145)
WBC NRBC COR # BLD: 9.48 10*3/MM3 (ref 3.4–10.8)

## 2019-10-05 PROCEDURE — 94799 UNLISTED PULMONARY SVC/PX: CPT

## 2019-10-05 PROCEDURE — 99231 SBSQ HOSP IP/OBS SF/LOW 25: CPT | Performed by: PSYCHIATRY & NEUROLOGY

## 2019-10-05 PROCEDURE — 94760 N-INVAS EAR/PLS OXIMETRY 1: CPT

## 2019-10-05 PROCEDURE — 97110 THERAPEUTIC EXERCISES: CPT

## 2019-10-05 PROCEDURE — 97116 GAIT TRAINING THERAPY: CPT

## 2019-10-05 PROCEDURE — 85025 COMPLETE CBC W/AUTO DIFF WBC: CPT | Performed by: FAMILY MEDICINE

## 2019-10-05 PROCEDURE — 80053 COMPREHEN METABOLIC PANEL: CPT | Performed by: FAMILY MEDICINE

## 2019-10-05 RX ADMIN — IPRATROPIUM BROMIDE AND ALBUTEROL SULFATE 3 ML: 2.5; .5 SOLUTION RESPIRATORY (INHALATION) at 07:55

## 2019-10-05 RX ADMIN — SODIUM CHLORIDE, PRESERVATIVE FREE 10 ML: 5 INJECTION INTRAVENOUS at 20:56

## 2019-10-05 RX ADMIN — NICOTINE 1 PATCH: 14 PATCH TRANSDERMAL at 09:04

## 2019-10-05 RX ADMIN — SODIUM CHLORIDE, POTASSIUM CHLORIDE, SODIUM LACTATE AND CALCIUM CHLORIDE 50 ML/HR: 600; 310; 30; 20 INJECTION, SOLUTION INTRAVENOUS at 00:21

## 2019-10-05 RX ADMIN — ASPIRIN 81 MG: 81 TABLET, CHEWABLE ORAL at 09:03

## 2019-10-05 RX ADMIN — PANTOPRAZOLE SODIUM 40 MG: 40 INJECTION, POWDER, LYOPHILIZED, FOR SOLUTION INTRAVENOUS at 05:08

## 2019-10-05 RX ADMIN — POTASSIUM CHLORIDE 40 MEQ: 1.5 POWDER, FOR SOLUTION ORAL at 09:03

## 2019-10-05 RX ADMIN — ATORVASTATIN CALCIUM 80 MG: 40 TABLET, FILM COATED ORAL at 20:55

## 2019-10-05 RX ADMIN — IPRATROPIUM BROMIDE AND ALBUTEROL SULFATE 3 ML: 2.5; .5 SOLUTION RESPIRATORY (INHALATION) at 11:01

## 2019-10-05 RX ADMIN — IPRATROPIUM BROMIDE AND ALBUTEROL SULFATE 3 ML: 2.5; .5 SOLUTION RESPIRATORY (INHALATION) at 22:51

## 2019-10-05 RX ADMIN — Medication 100 MG: at 09:03

## 2019-10-05 RX ADMIN — IPRATROPIUM BROMIDE AND ALBUTEROL SULFATE 3 ML: 2.5; .5 SOLUTION RESPIRATORY (INHALATION) at 14:38

## 2019-10-05 NOTE — PROGRESS NOTES
[facility] Medicine Services  INPATIENT PROGRESS NOTE    Length of Stay: 9  Date of Admission: 9/26/2019  Primary Care Physician: Jose Karimi MD    Subjective   Chief Complaint: CVA/aphasia    HPI   Patient is doing better.  Patient denies any chest pain or shortness of breath.  Patient tolerated diet.    Review of Systems   Constitutional: Positive for activity change, appetite change and fatigue. Negative for chills and fever.   HENT: Negative for hearing loss, nosebleeds, tinnitus and trouble swallowing.    Eyes: Negative for visual disturbance.   Respiratory: Positive for shortness of breath. Negative for cough, chest tightness and wheezing.    Cardiovascular: Negative for chest pain, palpitations and leg swelling.   Gastrointestinal: Negative for abdominal distention, abdominal pain, blood in stool, constipation, diarrhea, nausea and vomiting.   Endocrine: Negative for cold intolerance, heat intolerance, polydipsia, polyphagia and polyuria.   Genitourinary: Negative for decreased urine volume, difficulty urinating, dysuria, flank pain, frequency and hematuria.   Musculoskeletal: Positive for arthralgias, gait problem and myalgias. Negative for joint swelling.   Skin: Negative for rash.   Allergic/Immunologic: Negative for immunocompromised state.   Neurological: Positive for weakness. Negative for dizziness, syncope, light-headedness and headaches.   Hematological: Negative for adenopathy. Does not bruise/bleed easily.   Psychiatric/Behavioral: Positive for confusion. Negative for sleep disturbance. The patient is not nervous/anxious.       All pertinent negatives and positives are as above. All other systems have been reviewed and are negative unless otherwise stated.     Objective    Temp:  [97.9 °F (36.6 °C)-99.6 °F (37.6 °C)] 98.2 °F (36.8 °C)  Heart Rate:  [65-81] 76  Resp:  [16-20] 18  BP: ()/(46-78) 91/46    Intake/Output Summary (Last 24 hours) at  10/5/2019 1539  Last data filed at 10/5/2019 0956  Gross per 24 hour   Intake 1360 ml   Output --   Net 1360 ml     Physical Exam  Constitutional: He appears well-developed.   HENT:   Head: Normocephalic.   Eyes: Conjunctivae are normal. Pupils are equal, round, and reactive to light.   Neck: Neck supple. No JVD present. No thyromegaly present.   Cardiovascular: Normal rate, regular rhythm, normal heart sounds and intact distal pulses. Exam reveals no gallop and no friction rub.   No murmur heard.  Pulmonary/Chest: Effort normal. No respiratory distress. He has wheezes. He has no rales. He exhibits no tenderness.     Diminished breath sound.  Rhonchi is improving.  Abdominal: Soft. Bowel sounds are normal. He exhibits no distension. There is no tenderness. There is no rebound and no guarding.   Musculoskeletal: He exhibits no edema, tenderness or deformity.   Lymphadenopathy:     He has no cervical adenopathy.   Neurological: He is alert. He displays normal reflexes. No cranial nerve deficit. He exhibits abnormal muscle tone. Coordination abnormal.   Skin: Skin is warm and dry. Capillary refill takes 2 to 3 seconds.  Right arm from previous IV site erythematous, warm, slightly firm.  Probably superficial thrombus.  Psychiatric: He has a normal mood and affect. His behavior is normal.   Nursing note and vitals reviewed.  Results Review:  Lab Results (last 24 hours)     Procedure Component Value Units Date/Time    Comprehensive Metabolic Panel [[id number]]  (Abnormal) Collected:  10/05/19 0411    Specimen:  Blood Updated:  10/05/19 0514     Glucose 98 mg/dL      BUN 8 mg/dL      Creatinine 0.54 mg/dL      Sodium 138 mmol/L      Potassium 3.8 mmol/L      Chloride 98 mmol/L      CO2 28.0 mmol/L      Calcium 8.8 mg/dL      Total Protein 6.9 g/dL      Albumin 3.50 g/dL      ALT (SGPT) 24 U/L      AST (SGOT) 24 U/L      Alkaline Phosphatase 62 U/L      Total Bilirubin 0.3 mg/dL      eGFR Non African Amer >150 mL/min/1.73       Globulin 3.4 gm/dL      A/G Ratio 1.0 g/dL      BUN/Creatinine Ratio 14.8     Anion Gap 12.0 mmol/L     Narrative:       GFR Normal >60  Chronic Kidney Disease <60  Kidney Failure <15    CBC & Differential [[id number]] Collected:  10/05/19 0411    Specimen:  Blood Updated:  10/05/19 0458    Narrative:       The following orders were created for panel order CBC & Differential.  Procedure                               Abnormality         Status                     ---------                               -----------         ------                     CBC Auto Differential[[id number]]        Abnormal            Final result                 Please view results for these tests on the individual orders.    CBC Auto Differential [[id number]]  (Abnormal) Collected:  10/05/19 0411    Specimen:  Blood Updated:  10/05/19 0458     WBC 9.48 10*3/mm3      RBC 4.12 10*6/mm3      Hemoglobin 13.1 g/dL      Hematocrit 38.2 %      MCV 92.7 fL      MCH 31.8 pg      MCHC 34.3 g/dL      RDW 13.2 %      RDW-SD 45.2 fl      MPV 10.1 fL      Platelets 320 10*3/mm3      Neutrophil % 58.9 %      Lymphocyte % 25.7 %      Monocyte % 12.3 %      Eosinophil % 2.4 %      Basophil % 0.5 %      Immature Grans % 0.2 %      Neutrophils, Absolute 5.57 10*3/mm3      Lymphocytes, Absolute 2.44 10*3/mm3      Monocytes, Absolute 1.17 10*3/mm3      Eosinophils, Absolute 0.23 10*3/mm3      Basophils, Absolute 0.05 10*3/mm3      Immature Grans, Absolute 0.02 10*3/mm3      nRBC 0.0 /100 WBC            Cultures:       Radiology Data:    Imaging Results (last 24 hours)     ** No results found for the last 24 hours. **          No Known Allergies    Scheduled meds:     aspirin 81 mg Oral Daily   Or      aspirin 300 mg Rectal Daily   atorvastatin 80 mg Oral Nightly   ipratropium-albuterol 3 mL Nebulization 4x Daily - RT   nicotine 1 patch Transdermal Q24H   pantoprazole 40 mg Intravenous Q AM   sodium chloride 10 mL Intravenous Q12H   thiamine 100 mg Oral Daily        PRN meds:  •  albuterol  •  hypromellose  •  labetalol  •  sodium chloride    Assessment/Plan       Degeneration of lumbar or lumbosacral intervertebral disc    Smoker    Acute cerebrovascular accident (CVA) of cerebellum (CMS/HCC)    Alcoholism /alcohol abuse (CMS/HCC)    CVA (cerebral vascular accident) (CMS/HCC)    Hyperlipidemia    Dysphagia due to recent cerebral infarction      Plan:  Respiratory failure.  Resolved.  Extubated 9/30/19.    Consult ENT-laryngoscopy demonstrates some pressure wounds of the posterior vocal folds bilaterally, with some swelling on the left- this is likely due to the endotracheal tube and will resolve on its own.     CVA. Left MCA stroke. Continue aspirin.  Continue Lipitor. Neurology consult.   CTA of the head and neck- complete occlusion of the entire cervical left ICA, 30% stenosis of the right carotid bulb, mild stenosis at the ostium of the left vertebral artery.  MRI of the head- acute infarct in the left MCA and watershed territory- no hemorrhagic conversion, abnormal left ICA flow void.  Echocardiogram- ejection fraction 61%, diastolic dysfunction grade 1.  Follow-up with neurologist in 4 weeks.  Patient been instructed not to drive.     Right arm thrombus.  Warm compress.    Doppler ultrasound of right arm-  no evidence of deep venous thrombosis of the right upper  Extremity, evidence of superficial thrombus in the basilic vein at the elbow and in the cephalic vein from the elbow to the distal upper arm.     Hypokalemia-resolved.     Reflux.  Protonix and Zofran as needed     COPD.  Continue duo nebs.     Alcohol withdrawal?. Patient stated last has drink about 2 weeks ago.  Patient has no sign of withdrawal symptoms.     Hyperlipidemia.  Continue Lipitor.     Tobacco abuse.  Tobacco counseling.  Patient refused nicotine patch.     Urinary incontinence.    DC Villalobos cath 9/30/2019.     SCD.     Nutrition.  Soft texture diet.     Deconditioning.  PT and OT  consult.     Discharge Planning: Plan for rehab placement at [facility].    Candido Alas MD   10/05/19   3:39 PM

## 2019-10-05 NOTE — PLAN OF CARE
Problem: Patient Care Overview  Goal: Plan of Care Review  Outcome: Ongoing (interventions implemented as appropriate)   10/05/19 1146   Coping/Psychosocial   Plan of Care Reviewed With patient   Plan of Care Review   Progress improving   OTHER   Outcome Summary Pt cont to have problems w/ speech. diffiuclty understanding at times. Bed mobility and gait is independent. Pt amb 525' independently pt does have foot drop on RLE but able to compensate. Pt tolerated balance ex's w/ no LOB and up/down 10 steps cues supervision. however pt would benfit from rehab for cognition and speech.

## 2019-10-05 NOTE — PLAN OF CARE
Problem: Patient Care Overview  Goal: Plan of Care Review  Outcome: Ongoing (interventions implemented as appropriate)   10/05/19 0431   Coping/Psychosocial   Plan of Care Reviewed With patient   Plan of Care Review   Progress improving   OTHER   Outcome Summary Pt seems more cognitively aware of his surroundings. No neuro changes. Still disoriented to place and time. Walking better, though still a bit unsteady. Still with frequent cough, though not noticed to increase when giving meds. Nicotene patch left shoulder.        Problem: Fall Risk (Adult)  Goal: Absence of Fall  Outcome: Ongoing (interventions implemented as appropriate)      Problem: Stroke (Ischemic) (Adult)  Goal: Signs and Symptoms of Listed Potential Problems Will be Absent, Minimized or Managed (Stroke)  Outcome: Ongoing (interventions implemented as appropriate)      Problem: Skin Injury Risk (Adult)  Goal: Skin Health and Integrity  Outcome: Ongoing (interventions implemented as appropriate)      Problem: Nutrition, Imbalanced: Inadequate Oral Intake (Adult)  Goal: Improved Oral Intake  Outcome: Ongoing (interventions implemented as appropriate)    Goal: Prevent Further Weight Loss  Outcome: Ongoing (interventions implemented as appropriate)      Problem: VTE, DVT and PE (Adult)  Goal: Signs and Symptoms of Listed Potential Problems Will be Absent, Minimized or Managed (VTE, DVT and PE)  Outcome: Ongoing (interventions implemented as appropriate)

## 2019-10-05 NOTE — PLAN OF CARE
Problem: Patient Care Overview  Goal: Plan of Care Review  Outcome: Ongoing (interventions implemented as appropriate)   10/05/19 6220   Coping/Psychosocial   Plan of Care Reviewed With patient   Plan of Care Review   Progress improving   OTHER   Outcome Summary Pt A&OX2. Pt denies any pain. Tolerating diet. Encouraged use of incentive spirometer. Up stand by assist. VSS. Refusing SCDS. NS on tele. Continue to monitor. Safety maintained.        Problem: Stroke (Ischemic) (Adult)  Goal: Signs and Symptoms of Listed Potential Problems Will be Absent, Minimized or Managed (Stroke)  Outcome: Ongoing (interventions implemented as appropriate)      Problem: Skin Injury Risk (Adult)  Goal: Skin Health and Integrity  Outcome: Ongoing (interventions implemented as appropriate)      Problem: Nutrition, Imbalanced: Inadequate Oral Intake (Adult)  Goal: Improved Oral Intake  Outcome: Ongoing (interventions implemented as appropriate)      Problem: VTE, DVT and PE (Adult)  Goal: Signs and Symptoms of Listed Potential Problems Will be Absent, Minimized or Managed (VTE, DVT and PE)  Outcome: Ongoing (interventions implemented as appropriate)

## 2019-10-06 LAB
ALBUMIN SERPL-MCNC: 3.6 G/DL (ref 3.5–5.2)
ALBUMIN/GLOB SERPL: 1 G/DL
ALP SERPL-CCNC: 60 U/L (ref 39–117)
ALT SERPL W P-5'-P-CCNC: 31 U/L (ref 1–41)
ANION GAP SERPL CALCULATED.3IONS-SCNC: 12 MMOL/L (ref 5–15)
AST SERPL-CCNC: 29 U/L (ref 1–40)
BASOPHILS # BLD AUTO: 0.06 10*3/MM3 (ref 0–0.2)
BASOPHILS NFR BLD AUTO: 0.6 % (ref 0–1.5)
BILIRUB SERPL-MCNC: 0.2 MG/DL (ref 0.2–1.2)
BUN BLD-MCNC: 9 MG/DL (ref 6–20)
BUN/CREAT SERPL: 15.3 (ref 7–25)
CALCIUM SPEC-SCNC: 9 MG/DL (ref 8.6–10.5)
CHLORIDE SERPL-SCNC: 97 MMOL/L (ref 98–107)
CO2 SERPL-SCNC: 28 MMOL/L (ref 22–29)
CREAT BLD-MCNC: 0.59 MG/DL (ref 0.76–1.27)
DEPRECATED RDW RBC AUTO: 45.8 FL (ref 37–54)
EOSINOPHIL # BLD AUTO: 0.24 10*3/MM3 (ref 0–0.4)
EOSINOPHIL NFR BLD AUTO: 2.3 % (ref 0.3–6.2)
ERYTHROCYTE [DISTWIDTH] IN BLOOD BY AUTOMATED COUNT: 13.5 % (ref 12.3–15.4)
GFR SERPL CREATININE-BSD FRML MDRD: 142 ML/MIN/1.73
GLOBULIN UR ELPH-MCNC: 3.5 GM/DL
GLUCOSE BLD-MCNC: 129 MG/DL (ref 65–99)
HCT VFR BLD AUTO: 38.7 % (ref 37.5–51)
HGB BLD-MCNC: 13.3 G/DL (ref 13–17.7)
IMM GRANULOCYTES # BLD AUTO: 0.03 10*3/MM3 (ref 0–0.05)
IMM GRANULOCYTES NFR BLD AUTO: 0.3 % (ref 0–0.5)
LYMPHOCYTES # BLD AUTO: 2.62 10*3/MM3 (ref 0.7–3.1)
LYMPHOCYTES NFR BLD AUTO: 24.9 % (ref 19.6–45.3)
MCH RBC QN AUTO: 32 PG (ref 26.6–33)
MCHC RBC AUTO-ENTMCNC: 34.4 G/DL (ref 31.5–35.7)
MCV RBC AUTO: 93.3 FL (ref 79–97)
MONOCYTES # BLD AUTO: 1.07 10*3/MM3 (ref 0.1–0.9)
MONOCYTES NFR BLD AUTO: 10.2 % (ref 5–12)
NEUTROPHILS # BLD AUTO: 6.52 10*3/MM3 (ref 1.7–7)
NEUTROPHILS NFR BLD AUTO: 61.7 % (ref 42.7–76)
NRBC BLD AUTO-RTO: 0 /100 WBC (ref 0–0.2)
PLATELET # BLD AUTO: 333 10*3/MM3 (ref 140–450)
PMV BLD AUTO: 10 FL (ref 6–12)
POTASSIUM BLD-SCNC: 3.9 MMOL/L (ref 3.5–5.2)
PROT SERPL-MCNC: 7.1 G/DL (ref 6–8.5)
RBC # BLD AUTO: 4.15 10*6/MM3 (ref 4.14–5.8)
SODIUM BLD-SCNC: 137 MMOL/L (ref 136–145)
WBC NRBC COR # BLD: 10.54 10*3/MM3 (ref 3.4–10.8)

## 2019-10-06 PROCEDURE — 94799 UNLISTED PULMONARY SVC/PX: CPT

## 2019-10-06 PROCEDURE — 94760 N-INVAS EAR/PLS OXIMETRY 1: CPT

## 2019-10-06 PROCEDURE — 80053 COMPREHEN METABOLIC PANEL: CPT | Performed by: FAMILY MEDICINE

## 2019-10-06 PROCEDURE — 85025 COMPLETE CBC W/AUTO DIFF WBC: CPT | Performed by: FAMILY MEDICINE

## 2019-10-06 RX ADMIN — NICOTINE 1 PATCH: 14 PATCH TRANSDERMAL at 08:56

## 2019-10-06 RX ADMIN — IPRATROPIUM BROMIDE AND ALBUTEROL SULFATE 3 ML: 2.5; .5 SOLUTION RESPIRATORY (INHALATION) at 22:00

## 2019-10-06 RX ADMIN — SODIUM CHLORIDE, PRESERVATIVE FREE 10 ML: 5 INJECTION INTRAVENOUS at 21:45

## 2019-10-06 RX ADMIN — IPRATROPIUM BROMIDE AND ALBUTEROL SULFATE 3 ML: 2.5; .5 SOLUTION RESPIRATORY (INHALATION) at 07:33

## 2019-10-06 RX ADMIN — IPRATROPIUM BROMIDE AND ALBUTEROL SULFATE 3 ML: 2.5; .5 SOLUTION RESPIRATORY (INHALATION) at 15:39

## 2019-10-06 RX ADMIN — ASPIRIN 81 MG: 81 TABLET, CHEWABLE ORAL at 08:55

## 2019-10-06 RX ADMIN — Medication 100 MG: at 08:56

## 2019-10-06 RX ADMIN — PANTOPRAZOLE SODIUM 40 MG: 40 INJECTION, POWDER, LYOPHILIZED, FOR SOLUTION INTRAVENOUS at 05:33

## 2019-10-06 RX ADMIN — SODIUM CHLORIDE, PRESERVATIVE FREE 10 ML: 5 INJECTION INTRAVENOUS at 08:55

## 2019-10-06 RX ADMIN — IPRATROPIUM BROMIDE AND ALBUTEROL SULFATE 3 ML: 2.5; .5 SOLUTION RESPIRATORY (INHALATION) at 11:33

## 2019-10-06 RX ADMIN — ATORVASTATIN CALCIUM 80 MG: 40 TABLET, FILM COATED ORAL at 21:45

## 2019-10-06 NOTE — PROGRESS NOTES
Neurology Progress Note      Date of admission: 9/26/2019  8:38 PM  Date of visit: 10/5/2019    Chief Complaint:  F/u left MCA stroke    Subjective     Subjective:    No complaints  Medications:  Current Facility-Administered Medications   Medication Dose Route Frequency Provider Last Rate Last Dose   • albuterol (PROVENTIL) nebulizer solution 0.083% 2.5 mg/3mL  2.5 mg Nebulization Once PRN Galo Bethea MD       • aspirin chewable tablet 81 mg  81 mg Oral Daily Candido Alas MD   81 mg at 10/05/19 0903    Or   • aspirin suppository 300 mg  300 mg Rectal Daily Candido Alas MD   300 mg at 10/03/19 0944   • atorvastatin (LIPITOR) tablet 80 mg  80 mg Oral Nightly Candido Alas MD   80 mg at 10/05/19 2055   • hypromellose (ISOPTO TEARS) 0.5 % ophthalmic solution 2 drop  2 drop Both Eyes TID PRN Candido Alas MD   2 drop at 09/30/19 2152   • ipratropium-albuterol (DUO-NEB) nebulizer solution 3 mL  3 mL Nebulization 4x Daily - RT Christina Sharif APRN   3 mL at 10/05/19 1438   • labetalol (NORMODYNE,TRANDATE) injection 20 mg  20 mg Intravenous Q4H PRN Christina Sharif APRN       • lactated ringers infusion  50 mL/hr Intravenous Continuous Christina Sharif APRN 50 mL/hr at 10/05/19 0021 50 mL/hr at 10/05/19 0021   • nicotine (NICODERM CQ) 14 MG/24HR patch 1 patch  1 patch Transdermal Q24H Candido Alas MD   1 patch at 10/05/19 0904   • pantoprazole (PROTONIX) injection 40 mg  40 mg Intravenous Q AM Jose Johnson MD   40 mg at 10/05/19 0508   • sodium chloride 0.9 % flush 10 mL  10 mL Intravenous Q12H Candido Alas MD   10 mL at 10/05/19 2056   • sodium chloride 0.9 % flush 10 mL  10 mL Intravenous PRN Candido Alas MD       • thiamine (VITAMIN B-1) tablet 100 mg  100 mg Oral Daily Tsering Norwood MD   100 mg at 10/05/19 0903       Review of Systems:   -A 14 point review of systems is completed and is negative  Objective     Objective      Vital Signs  Temp:   [97.9 °F (36.6 °C)-98.9 °F (37.2 °C)] 98.9 °F (37.2 °C)  Heart Rate:  [66-81] 66  Resp:  [18-20] 18  BP: ()/(46-78) 116/60    Physical Exam:    HEENT:  Neck supple  CVS:  Regular rate and rhythm.  No murmurs  Carotid Examination:  No bruits  Lungs:  Clear to auscultation  Abdomen:  Non-tender, Non-distended  Extremities:  No signs of peripheral edema    Neurologic Exam:    -Awake, Alert, Oriented to person but does not know date  - Word finding difficulties--I.e. Naming --He also has some comprehension issues (some commands had to be given more than once for him to process them   -Some dysarthria  -Follows simple  commands    Cranial nerves II through XII intact.    CN III, IV, VI:  Extraocular Muscles full with no signs of nystagmus  CN V:  Facial sensory is symmetric  CN VII:  Facial motor asymmetric with decrease right naso labial fold and subtle lag with smile on the right  CN VIII:  Gross hearing intact bilaterally  CN IX/X:  Palate elevates symmetrically  CN XI:  Shoulder shrug symmetric  CN XII:  Tongue is midline on protrusion    Motor: (strength out of 5:  1= minimal movement, 2 = movement in plane of gravity, 3 = movement against gravity, 4 = movement against some resistance, 5 = full strength)    -Right Upper Ext: Proximal: 4+ Distal: 4+  -Left Upper Ext: Proximal: 5 Distal: 5    -Right Lower Ext: Proximal: 5 Distal: 4  -Left Lower Ext: Proximal: 5 Distal: 5    DTR:  2+ throughout in all four extremities    Sensory:  -Intact to light touch, pinprick--or at least just kept answering yes    Coordination/Gait:  -No ataxia     Results Review:    I reviewed the patient's new clinical results.    Lab Results (last 24 hours)     Procedure Component Value Units Date/Time    Comprehensive Metabolic Panel [[id number]]  (Abnormal) Collected:  10/05/19 0411    Specimen:  Blood Updated:  10/05/19 0514     Glucose 98 mg/dL      BUN 8 mg/dL      Creatinine 0.54 mg/dL      Sodium 138 mmol/L      Potassium 3.8 mmol/L       Chloride 98 mmol/L      CO2 28.0 mmol/L      Calcium 8.8 mg/dL      Total Protein 6.9 g/dL      Albumin 3.50 g/dL      ALT (SGPT) 24 U/L      AST (SGOT) 24 U/L      Alkaline Phosphatase 62 U/L      Total Bilirubin 0.3 mg/dL      eGFR Non African Amer >150 mL/min/1.73      Globulin 3.4 gm/dL      A/G Ratio 1.0 g/dL      BUN/Creatinine Ratio 14.8     Anion Gap 12.0 mmol/L     Narrative:       GFR Normal >60  Chronic Kidney Disease <60  Kidney Failure <15    CBC & Differential [[id number]] Collected:  10/05/19 0411    Specimen:  Blood Updated:  10/05/19 0458    Narrative:       The following orders were created for panel order CBC & Differential.  Procedure                               Abnormality         Status                     ---------                               -----------         ------                     CBC Auto Differential[[id number]]        Abnormal            Final result                 Please view results for these tests on the individual orders.    CBC Auto Differential [[id number]]  (Abnormal) Collected:  10/05/19 0411    Specimen:  Blood Updated:  10/05/19 0458     WBC 9.48 10*3/mm3      RBC 4.12 10*6/mm3      Hemoglobin 13.1 g/dL      Hematocrit 38.2 %      MCV 92.7 fL      MCH 31.8 pg      MCHC 34.3 g/dL      RDW 13.2 %      RDW-SD 45.2 fl      MPV 10.1 fL      Platelets 320 10*3/mm3      Neutrophil % 58.9 %      Lymphocyte % 25.7 %      Monocyte % 12.3 %      Eosinophil % 2.4 %      Basophil % 0.5 %      Immature Grans % 0.2 %      Neutrophils, Absolute 5.57 10*3/mm3      Lymphocytes, Absolute 2.44 10*3/mm3      Monocytes, Absolute 1.17 10*3/mm3      Eosinophils, Absolute 0.23 10*3/mm3      Basophils, Absolute 0.05 10*3/mm3      Immature Grans, Absolute 0.02 10*3/mm3      nRBC 0.0 /100 WBC         Imaging Results (last 24 hours)     ** No results found for the last 24 hours. **        Results for orders placed during the hospital encounter of 09/26/19   Adult Transthoracic Echo Complete W/  Cont if Necessary Per Protocol (With Agitated Saline)    Narrative · Left ventricular systolic function is normal.  · Left ventricular diastolic dysfunction (grade I) consistent with   impaired relaxation.  · Normal size and function of the right ventricle.  · No significant valvular pathology.  · Poor quality bubble study, but most likely negative.            Assessment/Plan     Hospital Problem List      Degeneration of lumbar or lumbosacral intervertebral disc    Smoker    Acute cerebrovascular accident (CVA) of cerebellum (CMS/HCC)    Alcoholism /alcohol abuse (CMS/HCC)    CVA (cerebral vascular accident) (CMS/HCC)    Hyperlipidemia    Dysphagia due to recent cerebral infarction    Impression:  1.  Acute stroke left MCA   2. Dysphagia improved and continue with recommendations of ST with mechanical soft solids and pudding thick liquids  3. Possible SAFIA, overnight pulse ox no significant hypoxia but did have 73 desaturation events recorded.   4.  History of ETOH abuse. Oral thiamine.   5. Hypertension BP improved and systolic goal less than 140.  6. . Smoker  8.. Superficial thrombus in basilic vein at the elbow and cephalic vein at distal upper arm to the elbow.  9. Hyperlipidemia with LDL of 102 and goal of < 70.     Plan:    1. Continue ASA 81 mg daily  2. Continue Lipitor 80 mg daily for LDL goal less than 70.  3. Continue OT/ST/PT  He will benefit from inpatient [facility]. [facility].  Await precert  4. Possible SAFIA.  Will need polysomnogram and patient would like to do in [place], [place]  5. Smoking cessation counseling  6. NO DRIVING SECONDARY TO APHASIA AND DIFFICULTY DESCRIBING PICTURES AND OBJECTS.  7.. Continue thiamin 100 mg daily for history of ETOH.  8. Ok to discharge once accepted.   9. Follow up in neurology clinic in 4 weeks with Jeannie DOWNS.     Neurology will sign off   Please call if any questions      Tsering Alvares MD  10/05/19  10:13 PM

## 2019-10-06 NOTE — PLAN OF CARE
Problem: Patient Care Overview  Goal: Plan of Care Review  Outcome: Ongoing (interventions implemented as appropriate)   10/06/19 2446   Coping/Psychosocial   Plan of Care Reviewed With patient   Plan of Care Review   Progress no change   OTHER   Outcome Summary Pt is alert, but is confused. VSS. Spoke with SLP regarding difficulty swallowing liquids. Pt denies pain. NIH=4. CIWA=2. Pt turns bed alarm off in order to get up without assistance. Will cont to monitor.     Goal: Individualization and Mutuality  Outcome: Ongoing (interventions implemented as appropriate)    Goal: Interprofessional Rounds/Family Conf  Outcome: Ongoing (interventions implemented as appropriate)      Problem: Nutrition, Imbalanced: Inadequate Oral Intake (Adult)  Goal: Improved Oral Intake  Outcome: Outcome(s) achieved Date Met: 10/06/19    Goal: Prevent Further Weight Loss  Outcome: Ongoing (interventions implemented as appropriate)      Problem: VTE, DVT and PE (Adult)  Goal: Signs and Symptoms of Listed Potential Problems Will be Absent, Minimized or Managed (VTE, DVT and PE)  Outcome: Ongoing (interventions implemented as appropriate)

## 2019-10-06 NOTE — PLAN OF CARE
Problem: Patient Care Overview  Goal: Plan of Care Review  Outcome: Ongoing (interventions implemented as appropriate)   10/06/19 0517   Coping/Psychosocial   Plan of Care Reviewed With patient   Plan of Care Review   Progress no change   OTHER   Outcome Summary VSS. No change in neuro status, NIH-4. No c/o pain voiced. Pt is tolerating diet, yet still noticing pt coughing at time w/meds and when eating in room. Up ambulating w/sba. Refused SCD. Safety maintained.       Problem: Fall Risk (Adult)  Goal: Absence of Fall  Outcome: Ongoing (interventions implemented as appropriate)      Problem: Stroke (Ischemic) (Adult)  Goal: Signs and Symptoms of Listed Potential Problems Will be Absent, Minimized or Managed (Stroke)  Outcome: Ongoing (interventions implemented as appropriate)      Problem: Skin Injury Risk (Adult)  Goal: Skin Health and Integrity  Outcome: Ongoing (interventions implemented as appropriate)      Problem: Nutrition, Imbalanced: Inadequate Oral Intake (Adult)  Goal: Improved Oral Intake  Outcome: Ongoing (interventions implemented as appropriate)    Goal: Prevent Further Weight Loss  Outcome: Ongoing (interventions implemented as appropriate)      Problem: VTE, DVT and PE (Adult)  Goal: Signs and Symptoms of Listed Potential Problems Will be Absent, Minimized or Managed (VTE, DVT and PE)  Outcome: Ongoing (interventions implemented as appropriate)

## 2019-10-06 NOTE — PLAN OF CARE
Problem: Patient Care Overview  Goal: Plan of Care Review  Outcome: Ongoing (interventions implemented as appropriate)   10/06/19 1311   Coping/Psychosocial   Plan of Care Reviewed With other (see comments)  (LONI Sandy )   Plan of Care Review   Progress no change   OTHER   Outcome Summary LONI Sandy spoke with SLP RE: concerns for toleration of medication and pudding thick trials. SLP educated RN RE: use of chin tuck, multiple swallow, and cue for volitional throat clear during medication administration. Previous SLP has posted swallow precautions in room. SLP will follow up with patient tomorrow to assess toleration.

## 2019-10-06 NOTE — PROGRESS NOTES
[facility] Medicine Services  INPATIENT PROGRESS NOTE    Length of Stay: 10  Date of Admission: 9/26/2019  Primary Care Physician: Jose Karimi MD    Subjective   Chief Complaint: CVA/aphasia    HPI   Patient's aphasia is improving slowly every day.  Patient denies any chest pain or shortness of breath.  Patient is tolerating diet.    Review of Systems   Constitutional: Positive for activity change, appetite change and fatigue. Negative for chills and fever.   HENT: Negative for hearing loss, nosebleeds, tinnitus and trouble swallowing.    Eyes: Negative for visual disturbance.   Respiratory: Positive for shortness of breath. Negative for cough, chest tightness and wheezing.    Cardiovascular: Negative for chest pain, palpitations and leg swelling.   Gastrointestinal: Negative for abdominal distention, abdominal pain, blood in stool, constipation, diarrhea, nausea and vomiting.   Endocrine: Negative for cold intolerance, heat intolerance, polydipsia, polyphagia and polyuria.   Genitourinary: Negative for decreased urine volume, difficulty urinating, dysuria, flank pain, frequency and hematuria.   Musculoskeletal: Positive for arthralgias, gait problem and myalgias. Negative for joint swelling.   Skin: Negative for rash.   Allergic/Immunologic: Negative for immunocompromised state.   Neurological: Positive for weakness. Negative for dizziness, syncope, light-headedness and headaches.   Hematological: Negative for adenopathy. Does not bruise/bleed easily.   Psychiatric/Behavioral: Positive for confusion. Negative for sleep disturbance. The patient is not nervous/anxious.    All pertinent negatives and positives are as above. All other systems have been reviewed and are negative unless otherwise stated.     Objective    Temp:  [97.5 °F (36.4 °C)-99.8 °F (37.7 °C)] 97.5 °F (36.4 °C)  Heart Rate:  [62-84] 70  Resp:  [14-18] 16  BP: (116-120)/(60-90) 119/89    Intake/Output  Summary (Last 24 hours) at 10/6/2019 1701  Last data filed at 10/5/2019 2347  Gross per 24 hour   Intake 120 ml   Output --   Net 120 ml     Physical Exam  Constitutional: He appears well-developed.   HENT:   Head: Normocephalic.   Eyes: Conjunctivae are normal. Pupils are equal, round, and reactive to light.   Neck: Neck supple. No JVD present. No thyromegaly present.   Cardiovascular: Normal rate, regular rhythm, normal heart sounds and intact distal pulses. Exam reveals no gallop and no friction rub.   No murmur heard.  Pulmonary/Chest: Effort normal. No respiratory distress. He has wheezes. He has no rales. He exhibits no tenderness.     Diminished breath sound.  Rhonchi is improving.  Abdominal: Soft. Bowel sounds are normal. He exhibits no distension. There is no tenderness. There is no rebound and no guarding.   Musculoskeletal: He exhibits no edema, tenderness or deformity.   Lymphadenopathy:     He has no cervical adenopathy.   Neurological: He is alert. He displays normal reflexes. No cranial nerve deficit. He exhibits abnormal muscle tone. Coordination abnormal.   Skin: Skin is warm and dry. Capillary refill takes 2 to 3 seconds.  Right arm from previous IV site erythematous, warm, slightly firm.  Probably superficial thrombus.  Psychiatric: He has a normal mood and affect. His behavior is normal.   Nursing note and vitals reviewed.  Results Review:  Lab Results (last 24 hours)     Procedure Component Value Units Date/Time    Comprehensive Metabolic Panel [[id number]]  (Abnormal) Collected:  10/06/19 0411    Specimen:  Blood Updated:  10/06/19 0507     Glucose 129 mg/dL      BUN 9 mg/dL      Creatinine 0.59 mg/dL      Sodium 137 mmol/L      Potassium 3.9 mmol/L      Chloride 97 mmol/L      CO2 28.0 mmol/L      Calcium 9.0 mg/dL      Total Protein 7.1 g/dL      Albumin 3.60 g/dL      ALT (SGPT) 31 U/L      AST (SGOT) 29 U/L      Alkaline Phosphatase 60 U/L      Total Bilirubin 0.2 mg/dL      eGFR Non   Amer 142 mL/min/1.73      Globulin 3.5 gm/dL      A/G Ratio 1.0 g/dL      BUN/Creatinine Ratio 15.3     Anion Gap 12.0 mmol/L     Narrative:       GFR Normal >60  Chronic Kidney Disease <60  Kidney Failure <15    CBC & Differential [[id number]] Collected:  10/06/19 0411    Specimen:  Blood Updated:  10/06/19 0449    Narrative:       The following orders were created for panel order CBC & Differential.  Procedure                               Abnormality         Status                     ---------                               -----------         ------                     CBC Auto Differential[[id number]]        Abnormal            Final result                 Please view results for these tests on the individual orders.    CBC Auto Differential [[id number]]  (Abnormal) Collected:  10/06/19 0411    Specimen:  Blood Updated:  10/06/19 0449     WBC 10.54 10*3/mm3      RBC 4.15 10*6/mm3      Hemoglobin 13.3 g/dL      Hematocrit 38.7 %      MCV 93.3 fL      MCH 32.0 pg      MCHC 34.4 g/dL      RDW 13.5 %      RDW-SD 45.8 fl      MPV 10.0 fL      Platelets 333 10*3/mm3      Neutrophil % 61.7 %      Lymphocyte % 24.9 %      Monocyte % 10.2 %      Eosinophil % 2.3 %      Basophil % 0.6 %      Immature Grans % 0.3 %      Neutrophils, Absolute 6.52 10*3/mm3      Lymphocytes, Absolute 2.62 10*3/mm3      Monocytes, Absolute 1.07 10*3/mm3      Eosinophils, Absolute 0.24 10*3/mm3      Basophils, Absolute 0.06 10*3/mm3      Immature Grans, Absolute 0.03 10*3/mm3      nRBC 0.0 /100 WBC            Cultures:       Radiology Data:    Imaging Results (last 24 hours)     ** No results found for the last 24 hours. **          No Known Allergies    Scheduled meds:     aspirin 81 mg Oral Daily   Or      aspirin 300 mg Rectal Daily   atorvastatin 80 mg Oral Nightly   ipratropium-albuterol 3 mL Nebulization 4x Daily - RT   nicotine 1 patch Transdermal Q24H   pantoprazole 40 mg Intravenous Q AM   sodium chloride 10 mL Intravenous Q12H    thiamine 100 mg Oral Daily       PRN meds:  •  albuterol  •  hypromellose  •  labetalol  •  sodium chloride    Assessment/Plan       Degeneration of lumbar or lumbosacral intervertebral disc    Smoker    Acute cerebrovascular accident (CVA) of cerebellum (CMS/HCC)    Alcoholism /alcohol abuse (CMS/HCC)    CVA (cerebral vascular accident) (CMS/HCC)    Hyperlipidemia    Dysphagia due to recent cerebral infarction      Plan:  Respiratory failure.  Resolved.  Extubated 9/30/19.    Consult ENT-laryngoscopy demonstrates some pressure wounds of the posterior vocal folds bilaterally, with some swelling on the left- this is likely due to the endotracheal tube and will resolve on its own.     CVA. Left MCA stroke. Continue aspirin.  Continue Lipitor. Neurology consult.   CTA of the head and neck- complete occlusion of the entire cervical left ICA, 30% stenosis of the right carotid bulb, mild stenosis at the ostium of the left vertebral artery.  MRI of the head- acute infarct in the left MCA and watershed territory- no hemorrhagic conversion, abnormal left ICA flow void.  Echocardiogram- ejection fraction 61%, diastolic dysfunction grade 1.  Follow-up with neurologist in 4 weeks.  Patient been instructed not to drive.     Right arm thrombus.  Warm compress.    Doppler ultrasound of right arm-  no evidence of deep venous thrombosis of the right upper  Extremity, evidence of superficial thrombus in the basilic vein at the elbow and in the cephalic vein from the elbow to the distal upper arm.     Hypokalemia-resolved.     Reflux.  Protonix and Zofran as needed     COPD.  Continue duo nebs.     Alcohol withdrawal?. Patient stated last has drink about 2 weeks ago.  Patient has no sign of withdrawal symptoms.     Hyperlipidemia.  Continue Lipitor.     Tobacco abuse.  Tobacco counseling.  Patient refused nicotine patch.     Urinary incontinence.    DC Villalobos cath 9/30/2019.     SCD.     Nutrition.  Soft texture  diet.     Deconditioning.  PT and OT consult.     Discharge Planning: Plan for rehab placement at [facility].    Candido Alas MD   10/06/19   5:01 PM

## 2019-10-07 LAB
ALBUMIN SERPL-MCNC: 3.4 G/DL (ref 3.5–5.2)
ALBUMIN/GLOB SERPL: 1 G/DL
ALP SERPL-CCNC: 66 U/L (ref 39–117)
ALT SERPL W P-5'-P-CCNC: 35 U/L (ref 1–41)
ANION GAP SERPL CALCULATED.3IONS-SCNC: 12 MMOL/L (ref 5–15)
AST SERPL-CCNC: 31 U/L (ref 1–40)
BASOPHILS # BLD AUTO: 0.07 10*3/MM3 (ref 0–0.2)
BASOPHILS NFR BLD AUTO: 0.6 % (ref 0–1.5)
BILIRUB SERPL-MCNC: 0.2 MG/DL (ref 0.2–1.2)
BUN BLD-MCNC: 12 MG/DL (ref 6–20)
BUN/CREAT SERPL: 18.8 (ref 7–25)
CALCIUM SPEC-SCNC: 9 MG/DL (ref 8.6–10.5)
CHLORIDE SERPL-SCNC: 95 MMOL/L (ref 98–107)
CO2 SERPL-SCNC: 30 MMOL/L (ref 22–29)
CREAT BLD-MCNC: 0.64 MG/DL (ref 0.76–1.27)
DEPRECATED RDW RBC AUTO: 46.5 FL (ref 37–54)
EOSINOPHIL # BLD AUTO: 0.31 10*3/MM3 (ref 0–0.4)
EOSINOPHIL NFR BLD AUTO: 2.5 % (ref 0.3–6.2)
ERYTHROCYTE [DISTWIDTH] IN BLOOD BY AUTOMATED COUNT: 13.6 % (ref 12.3–15.4)
GFR SERPL CREATININE-BSD FRML MDRD: 129 ML/MIN/1.73
GLOBULIN UR ELPH-MCNC: 3.5 GM/DL
GLUCOSE BLD-MCNC: 100 MG/DL (ref 65–99)
HCT VFR BLD AUTO: 39.9 % (ref 37.5–51)
HGB BLD-MCNC: 13.5 G/DL (ref 13–17.7)
IMM GRANULOCYTES # BLD AUTO: 0.05 10*3/MM3 (ref 0–0.05)
IMM GRANULOCYTES NFR BLD AUTO: 0.4 % (ref 0–0.5)
LYMPHOCYTES # BLD AUTO: 2.92 10*3/MM3 (ref 0.7–3.1)
LYMPHOCYTES NFR BLD AUTO: 23.3 % (ref 19.6–45.3)
MCH RBC QN AUTO: 31.7 PG (ref 26.6–33)
MCHC RBC AUTO-ENTMCNC: 33.8 G/DL (ref 31.5–35.7)
MCV RBC AUTO: 93.7 FL (ref 79–97)
MONOCYTES # BLD AUTO: 1.25 10*3/MM3 (ref 0.1–0.9)
MONOCYTES NFR BLD AUTO: 10 % (ref 5–12)
NEUTROPHILS # BLD AUTO: 7.95 10*3/MM3 (ref 1.7–7)
NEUTROPHILS NFR BLD AUTO: 63.2 % (ref 42.7–76)
NRBC BLD AUTO-RTO: 0 /100 WBC (ref 0–0.2)
PLATELET # BLD AUTO: 358 10*3/MM3 (ref 140–450)
PMV BLD AUTO: 9.8 FL (ref 6–12)
POTASSIUM BLD-SCNC: 4.5 MMOL/L (ref 3.5–5.2)
PROT SERPL-MCNC: 6.9 G/DL (ref 6–8.5)
RBC # BLD AUTO: 4.26 10*6/MM3 (ref 4.14–5.8)
SODIUM BLD-SCNC: 137 MMOL/L (ref 136–145)
WBC NRBC COR # BLD: 12.55 10*3/MM3 (ref 3.4–10.8)

## 2019-10-07 PROCEDURE — 94799 UNLISTED PULMONARY SVC/PX: CPT

## 2019-10-07 PROCEDURE — 92526 ORAL FUNCTION THERAPY: CPT

## 2019-10-07 PROCEDURE — 97116 GAIT TRAINING THERAPY: CPT

## 2019-10-07 PROCEDURE — 92507 TX SP LANG VOICE COMM INDIV: CPT

## 2019-10-07 PROCEDURE — 97535 SELF CARE MNGMENT TRAINING: CPT

## 2019-10-07 PROCEDURE — 85025 COMPLETE CBC W/AUTO DIFF WBC: CPT | Performed by: FAMILY MEDICINE

## 2019-10-07 PROCEDURE — 94760 N-INVAS EAR/PLS OXIMETRY 1: CPT

## 2019-10-07 PROCEDURE — 80053 COMPREHEN METABOLIC PANEL: CPT | Performed by: FAMILY MEDICINE

## 2019-10-07 RX ADMIN — IPRATROPIUM BROMIDE AND ALBUTEROL SULFATE 3 ML: 2.5; .5 SOLUTION RESPIRATORY (INHALATION) at 07:28

## 2019-10-07 RX ADMIN — NICOTINE 1 PATCH: 14 PATCH TRANSDERMAL at 08:47

## 2019-10-07 RX ADMIN — IPRATROPIUM BROMIDE AND ALBUTEROL SULFATE 3 ML: 2.5; .5 SOLUTION RESPIRATORY (INHALATION) at 18:57

## 2019-10-07 RX ADMIN — PANTOPRAZOLE SODIUM 40 MG: 40 INJECTION, POWDER, LYOPHILIZED, FOR SOLUTION INTRAVENOUS at 05:24

## 2019-10-07 RX ADMIN — IPRATROPIUM BROMIDE AND ALBUTEROL SULFATE 3 ML: 2.5; .5 SOLUTION RESPIRATORY (INHALATION) at 15:19

## 2019-10-07 RX ADMIN — SODIUM CHLORIDE, PRESERVATIVE FREE 10 ML: 5 INJECTION INTRAVENOUS at 08:48

## 2019-10-07 RX ADMIN — SODIUM CHLORIDE, PRESERVATIVE FREE 10 ML: 5 INJECTION INTRAVENOUS at 21:02

## 2019-10-07 RX ADMIN — ASPIRIN 81 MG: 81 TABLET, CHEWABLE ORAL at 08:47

## 2019-10-07 RX ADMIN — IPRATROPIUM BROMIDE AND ALBUTEROL SULFATE 3 ML: 2.5; .5 SOLUTION RESPIRATORY (INHALATION) at 11:44

## 2019-10-07 RX ADMIN — ATORVASTATIN CALCIUM 80 MG: 40 TABLET, FILM COATED ORAL at 21:01

## 2019-10-07 RX ADMIN — Medication 100 MG: at 08:47

## 2019-10-07 NOTE — DISCHARGE PLACEMENT REQUEST
"Morgan Sanchez Jr. (59 y.o. Male)     Date of Birth Social Security Number Address Home Phone MRN    1960  [id number] [place] [address] [phone] [id number]    [employer] Marital Status          Other Single       Admission Date Admission Type Admitting Provider Attending Provider Department, Room/Bed    9/26/19 Urgent Galo Bethea MD Moore, Jonathan Scott, MD Psychiatric 3A, 355/1    Discharge Date Discharge Disposition Discharge Destination                       Attending Provider:  Galo Bethea MD    Allergies:  No Known Allergies    Isolation:  None   Infection:  None   Code Status:  CPR    Ht:  162.6 cm (64\")   Wt:  78 kg (172 lb)    Admission Cmt:  None   Principal Problem:  None                Active Insurance as of 9/26/2019     Primary Coverage     Payor Plan Insurance Group Employer/Plan Group    [employer] [place] [employer] [place]      Payor Plan Address Payor Plan Phone Number Payor Plan Fax Number Effective Dates    [address] [address] [phone]  6/22/2017 - None Entered    [facility] [address]       Subscriber Name Subscriber Birth Date Member ID       MORGAN SANCHEZ JR. 1960 [id number]                 Emergency Contacts      (Rel.) Home Phone Work Phone Mobile Phone    Lázaro Sanchez (Brother) [phone] -- --               Physician Progress Notes (last 24 hours) (Notes from 10/06/19 1001 through 10/07/19 1001)      Galo Bethea MD at 10/07/19 0925              [facility] Medicine Services  INPATIENT PROGRESS NOTE    Patient Name: Morgan Sanchez Jr.  Date of Admission: 9/26/2019  Today's Date: 10/07/19  Length of Stay: 11  Primary Care Physician: Jose Karimi MD    Subjective   Chief Complaint: cough  HPI   Doing ok.  Doing well with therapy.  Walking the halls this AM.  Has a pretty persistent cough.  Supposed to be on thickened liquids, but not using thickener  Speech/aphasia " has greatly improved      Review of Systems   Constitutional: Negative for fatigue and fever.   HENT: Negative for congestion and ear pain.    Eyes: Negative for redness and visual disturbance.   Respiratory: Positive for cough. Negative for shortness of breath and wheezing.    Cardiovascular: Negative for chest pain and palpitations.   Gastrointestinal: Negative for abdominal pain, diarrhea, nausea and vomiting.   Endocrine: Negative for cold intolerance and heat intolerance.   Genitourinary: Negative for dysuria and frequency.   Musculoskeletal: Negative for arthralgias and back pain.   Skin: Negative for rash and wound.   Neurological: Negative for dizziness and headaches.   Psychiatric/Behavioral: Negative for confusion. The patient is not nervous/anxious.       All pertinent negatives and positives are as above. All other systems have been reviewed and are negative unless otherwise stated.     Objective    Temp:  [97.5 °F (36.4 °C)-99.4 °F (37.4 °C)] 98.1 °F (36.7 °C)  Heart Rate:  [58-84] 66  Resp:  [16-20] 18  BP: (108-143)/(58-89) 108/63  Physical Exam        Results Review:  I have reviewed the labs, radiology results, and diagnostic studies.    Laboratory Data:   Results from last 7 days   Lab Units 10/07/19  0714 10/06/19  0411 10/05/19  0411   WBC 10*3/mm3 12.55* 10.54 9.48   HEMOGLOBIN g/dL 13.5 13.3 13.1   HEMATOCRIT % 39.9 38.7 38.2   PLATELETS 10*3/mm3 358 333 320        Results from last 7 days   Lab Units 10/07/19  0714 10/06/19  0411 10/05/19  0411   SODIUM mmol/L 137 137 138   POTASSIUM mmol/L 4.5 3.9 3.8   CHLORIDE mmol/L 95* 97* 98   CO2 mmol/L 30.0* 28.0 28.0   BUN mg/dL 12 9 8   CREATININE mg/dL 0.64* 0.59* 0.54*   CALCIUM mg/dL 9.0 9.0 8.8   BILIRUBIN mg/dL 0.2 0.2 0.3   ALK PHOS U/L 66 60 62   ALT (SGPT) U/L 35 31 24   AST (SGOT) U/L 31 29 24   GLUCOSE mg/dL 100* 129* 98       Culture Data:        Radiology Data:   Imaging Results (last 24 hours)     ** No results found for the last 24  hours. **          I have reviewed the patient's current medications.     Assessment/Plan     Active Hospital Problems    Diagnosis   • Dysphagia due to recent cerebral infarction   • Hyperlipidemia   • Acute cerebrovascular accident (CVA) of cerebellum (CMS/HCC)   • Alcoholism /alcohol abuse (CMS/HCC)   • CVA (cerebral vascular accident) (CMS/HCC)   • Smoker   • Degeneration of lumbar or lumbosacral intervertebral disc       Continue current care              Discharge Planning: I expect the patient to be discharged to rehab in ? days    Galo Bethea MD   10/07/19   9:25 AM      Electronically signed by Galo Bethea MD at 10/07/19 0928     Candido Alas MD at 10/06/19 1702              [facility] Medicine Services  INPATIENT PROGRESS NOTE    Length of Stay: 10  Date of Admission: 9/26/2019  Primary Care Physician: Jose Karimi MD    Subjective   Chief Complaint: CVA/aphasia    HPI   Patient's aphasia is improving slowly every day.  Patient denies any chest pain or shortness of breath.  Patient is tolerating diet.    Review of Systems   Constitutional: Positive for activity change, appetite change and fatigue. Negative for chills and fever.   HENT: Negative for hearing loss, nosebleeds, tinnitus and trouble swallowing.    Eyes: Negative for visual disturbance.   Respiratory: Positive for shortness of breath. Negative for cough, chest tightness and wheezing.    Cardiovascular: Negative for chest pain, palpitations and leg swelling.   Gastrointestinal: Negative for abdominal distention, abdominal pain, blood in stool, constipation, diarrhea, nausea and vomiting.   Endocrine: Negative for cold intolerance, heat intolerance, polydipsia, polyphagia and polyuria.   Genitourinary: Negative for decreased urine volume, difficulty urinating, dysuria, flank pain, frequency and hematuria.   Musculoskeletal: Positive for arthralgias, gait problem and myalgias. Negative  for joint swelling.   Skin: Negative for rash.   Allergic/Immunologic: Negative for immunocompromised state.   Neurological: Positive for weakness. Negative for dizziness, syncope, light-headedness and headaches.   Hematological: Negative for adenopathy. Does not bruise/bleed easily.   Psychiatric/Behavioral: Positive for confusion. Negative for sleep disturbance. The patient is not nervous/anxious.    All pertinent negatives and positives are as above. All other systems have been reviewed and are negative unless otherwise stated.     Objective    Temp:  [97.5 °F (36.4 °C)-99.8 °F (37.7 °C)] 97.5 °F (36.4 °C)  Heart Rate:  [62-84] 70  Resp:  [14-18] 16  BP: (116-120)/(60-90) 119/89    Intake/Output Summary (Last 24 hours) at 10/6/2019 1701  Last data filed at 10/5/2019 2347  Gross per 24 hour   Intake 120 ml   Output --   Net 120 ml     Physical Exam  Constitutional: He appears well-developed.   HENT:   Head: Normocephalic.   Eyes: Conjunctivae are normal. Pupils are equal, round, and reactive to light.   Neck: Neck supple. No JVD present. No thyromegaly present.   Cardiovascular: Normal rate, regular rhythm, normal heart sounds and intact distal pulses. Exam reveals no gallop and no friction rub.   No murmur heard.  Pulmonary/Chest: Effort normal. No respiratory distress. He has wheezes. He has no rales. He exhibits no tenderness.     Diminished breath sound.  Rhonchi is improving.  Abdominal: Soft. Bowel sounds are normal. He exhibits no distension. There is no tenderness. There is no rebound and no guarding.   Musculoskeletal: He exhibits no edema, tenderness or deformity.   Lymphadenopathy:     He has no cervical adenopathy.   Neurological: He is alert. He displays normal reflexes. No cranial nerve deficit. He exhibits abnormal muscle tone. Coordination abnormal.   Skin: Skin is warm and dry. Capillary refill takes 2 to 3 seconds.  Right arm from previous IV site erythematous, warm, slightly firm.  Probably  superficial thrombus.  Psychiatric: He has a normal mood and affect. His behavior is normal.   Nursing note and vitals reviewed.  Results Review:  Lab Results (last 24 hours)     Procedure Component Value Units Date/Time    Comprehensive Metabolic Panel [[id number]]  (Abnormal) Collected:  10/06/19 0411    Specimen:  Blood Updated:  10/06/19 0507     Glucose 129 mg/dL      BUN 9 mg/dL      Creatinine 0.59 mg/dL      Sodium 137 mmol/L      Potassium 3.9 mmol/L      Chloride 97 mmol/L      CO2 28.0 mmol/L      Calcium 9.0 mg/dL      Total Protein 7.1 g/dL      Albumin 3.60 g/dL      ALT (SGPT) 31 U/L      AST (SGOT) 29 U/L      Alkaline Phosphatase 60 U/L      Total Bilirubin 0.2 mg/dL      eGFR Non African Amer 142 mL/min/1.73      Globulin 3.5 gm/dL      A/G Ratio 1.0 g/dL      BUN/Creatinine Ratio 15.3     Anion Gap 12.0 mmol/L     Narrative:       GFR Normal >60  Chronic Kidney Disease <60  Kidney Failure <15    CBC & Differential [[id number]] Collected:  10/06/19 0411    Specimen:  Blood Updated:  10/06/19 0449    Narrative:       The following orders were created for panel order CBC & Differential.  Procedure                               Abnormality         Status                     ---------                               -----------         ------                     CBC Auto Differential[[id number]]        Abnormal            Final result                 Please view results for these tests on the individual orders.    CBC Auto Differential [[id number]]  (Abnormal) Collected:  10/06/19 0411    Specimen:  Blood Updated:  10/06/19 0449     WBC 10.54 10*3/mm3      RBC 4.15 10*6/mm3      Hemoglobin 13.3 g/dL      Hematocrit 38.7 %      MCV 93.3 fL      MCH 32.0 pg      MCHC 34.4 g/dL      RDW 13.5 %      RDW-SD 45.8 fl      MPV 10.0 fL      Platelets 333 10*3/mm3      Neutrophil % 61.7 %      Lymphocyte % 24.9 %      Monocyte % 10.2 %      Eosinophil % 2.3 %      Basophil % 0.6 %      Immature Grans % 0.3 %       Neutrophils, Absolute 6.52 10*3/mm3      Lymphocytes, Absolute 2.62 10*3/mm3      Monocytes, Absolute 1.07 10*3/mm3      Eosinophils, Absolute 0.24 10*3/mm3      Basophils, Absolute 0.06 10*3/mm3      Immature Grans, Absolute 0.03 10*3/mm3      nRBC 0.0 /100 WBC            Cultures:       Radiology Data:    Imaging Results (last 24 hours)     ** No results found for the last 24 hours. **          No Known Allergies    Scheduled meds:     aspirin 81 mg Oral Daily   Or      aspirin 300 mg Rectal Daily   atorvastatin 80 mg Oral Nightly   ipratropium-albuterol 3 mL Nebulization 4x Daily - RT   nicotine 1 patch Transdermal Q24H   pantoprazole 40 mg Intravenous Q AM   sodium chloride 10 mL Intravenous Q12H   thiamine 100 mg Oral Daily       PRN meds:  •  albuterol  •  hypromellose  •  labetalol  •  sodium chloride    Assessment/Plan       Degeneration of lumbar or lumbosacral intervertebral disc    Smoker    Acute cerebrovascular accident (CVA) of cerebellum (CMS/HCC)    Alcoholism /alcohol abuse (CMS/HCC)    CVA (cerebral vascular accident) (CMS/HCC)    Hyperlipidemia    Dysphagia due to recent cerebral infarction      Plan:  Respiratory failure.  Resolved.  Extubated 9/30/19.    Consult ENT-laryngoscopy demonstrates some pressure wounds of the posterior vocal folds bilaterally, with some swelling on the left- this is likely due to the endotracheal tube and will resolve on its own.     CVA. Left MCA stroke. Continue aspirin.  Continue Lipitor. Neurology consult.   CTA of the head and neck- complete occlusion of the entire cervical left ICA, 30% stenosis of the right carotid bulb, mild stenosis at the ostium of the left vertebral artery.  MRI of the head- acute infarct in the left MCA and watershed territory- no hemorrhagic conversion, abnormal left ICA flow void.  Echocardiogram- ejection fraction 61%, diastolic dysfunction grade 1.  Follow-up with neurologist in 4 weeks.  Patient been instructed not to drive.     Right  arm thrombus.  Warm compress.    Doppler ultrasound of right arm-  no evidence of deep venous thrombosis of the right upper  Extremity, evidence of superficial thrombus in the basilic vein at the elbow and in the cephalic vein from the elbow to the distal upper arm.     Hypokalemia-resolved.     Reflux.  Protonix and Zofran as needed     COPD.  Continue duo nebs.     Alcohol withdrawal?. Patient stated last has drink about 2 weeks ago.  Patient has no sign of withdrawal symptoms.     Hyperlipidemia.  Continue Lipitor.     Tobacco abuse.  Tobacco counseling.  Patient refused nicotine patch.     Urinary incontinence.    DC Villalobos cath 9/30/2019.     SCD.     Nutrition.  Soft texture diet.     Deconditioning.  PT and OT consult.     Discharge Planning: Plan for rehab placement at [facility].    Candido Alas MD   10/06/19   5:01 PM                    Electronically signed by Candido Alas MD at 10/06/19 1703       Consult Notes (last 24 hours) (Notes from 10/06/19 1001 through 10/07/19 1001)     No notes of this type exist for this encounter.           Physical Therapy Notes (last 48 hours) (Notes from 10/05/19 1001 through 10/07/19 1001)      Gladys Ortega PTA at 10/05/19 1141  Version 1 of 1         Problem: Patient Care Overview  Goal: Plan of Care Review  Outcome: Ongoing (interventions implemented as appropriate)   10/05/19 1146   Coping/Psychosocial   Plan of Care Reviewed With patient   Plan of Care Review   Progress improving   OTHER   Outcome Summary Pt cont to have problems w/ speech. diffiuclty understanding at times. Bed mobility and gait is independent. Pt amb 525' independently pt does have foot drop on RLE but able to compensate. Pt tolerated balance ex's w/ no LOB and up/down 10 steps cues supervision. however pt would benfit from rehab for cognition and speech.           Electronically signed by Gladys Ortega PTA at 10/05/19 0529       Occupational Therapy Notes (last 72 hours)  (Notes from 10/04/19 1001 through 10/07/19 1001)     No notes of this type exist for this encounter.

## 2019-10-07 NOTE — PAYOR COMM NOTE
"10/4 CLINICAL UPDATE  UR  [id number]    Jarod Ramírez Jr. (59 y.o. Male)     Date of Birth Social Security Number Address Home Phone MRN    1960  [id number] [place] [address] [phone] [id number]    Christian Marital Status          Other Single       Admission Date Admission Type Admitting Provider Attending Provider Department, Room/Bed    9/26/19 Urgent Candido Alas MD Truong, Khai C, MD [facility] 3A, 355/1    Discharge Date Discharge Disposition Discharge Destination                       Attending Provider:  Candido Alas MD    Allergies:  No Known Allergies    Isolation:  None   Infection:  None   Code Status:  CPR    Ht:  162.6 cm (64\")   Wt:  78 kg (172 lb)    Admission Cmt:  None   Principal Problem:  None                Active Insurance as of 9/26/2019     Primary Coverage     Payor Plan Insurance Group Employer/Plan Group    [employer] [place] [employer] [place]      Payor Plan Address Payor Plan Phone Number Payor Plan Fax Number Effective Dates    [address] [address] [phone]  6/22/2017 - None Entered    [facility] [id number]       Subscriber Name Subscriber Birth Date Member ID       JAROD RAMÍREZ JR. 1960 [id number]                 Emergency Contacts      (Rel.) Home Phone Work Phone Mobile Phone    Lázaro Ramírez (Brother) [phone] -- --              Vital Signs (last 2 days)     Date/Time   Temp   Temp src   Pulse   Resp   BP   Patient Position   SpO2    10/06/19 1545   --   --   70   16   --   --   99    10/06/19 1539   --   --   69   16   --   --   98    10/06/19 1519   97.5 (36.4)   Oral   67   18   119/89   Sitting   98    10/06/19 1141   --   --   84   16   --   --   98    10/06/19 1133   --   --   79   16   --   --   99    10/06/19 0841   98.1 (36.7)   Axillary   73   14   120/67   Lying   94    10/06/19 0741   --   --   70   16   --   --   94    10/06/19 0733   --   --   68   18   --   --   94    10/06/19 0458   99.8 (37.7)   " Oral   62   18   118/70   Lying   96    10/05/19 2347   99.6 (37.6)   Oral   75   18   116/90   Sitting   97    10/05/19 2259   --   --   80   --   --   --   --    10/05/19 2251   --   --   69   18   --   --   97    10/05/19 2038   98.9 (37.2)   --   66   18   116/60   Lying   98    10/05/19 1446   --   --   76   18   --   --   96    10/05/19 1445   --   --   77   18   --   --   95    10/05/19 1438   --   --   74   18   --   --   94    10/05/19 1158   98.2 (36.8)   Oral   81   18   91/46   Sitting   92    10/05/19 1110   --   --   71   18   --   --   96    10/05/19 1109   --   --   71   18   --   --   96    10/05/19 1101   --   --   68   18   --   --   98    10/05/19 0805   --   --   72   20   --   --   95    10/05/19 0804   --   --   71   20   --   --   95    10/05/19 0755   --   --   68   18   --   --   96    10/05/19 0750   97.9 (36.6)   Oral   69   18   121/71   Sitting   95    10/05/19 0507   98.1 (36.7)   Oral   76   20   124/78   Sitting   95    10/04/19 2306   98.2 (36.8)   Oral   72   18   150/76   Sitting   98    10/04/19 2027   --   --   69   18   --   --   97    10/04/19 2020   --   --   67   18   --   --   98    10/04/19 2006   99.6 (37.6)   Oral   65   16   144/72   Lying   95    10/04/19 1628   98 (36.7)   Axillary   79   16   127/72   Lying   96    10/04/19 1547   --   --   72   16   --   --   97    10/04/19 1541   --   --   78   16   --   --   95    10/04/19 1145   98.9 (37.2)   Oral   80   16   116/73   Lying   96    10/04/19 1116   --   --   71   16   --   --   99    10/04/19 1110   --   --   77   16   --   --   96    10/04/19 0755   98.2 (36.8)   Oral   77   16   122/67   Lying   95    10/04/19 0746   --   --   75   16   --   --   97    10/04/19 0739   --   --   66   16   --   --   95    10/04/19 0455   99.3 (37.4)   Oral   71   18   132/70   Lying   96    10/04/19 0009   98.3 (36.8)   Oral   72   18   147/77   Lying   93            Oxygen Therapy (last 2 days)     Date/Time   SpO2   Device  (Oxygen Therapy)   Flow (L/min)   Oxygen Concentration (%)   ETCO2 (mmHg)    10/06/19 1545   99   room air   --   --   --    10/06/19 1539   98   room air   --   --   --    10/06/19 1519   98   room air   --   --   --    10/06/19 1141   98   room air   --   --   --    10/06/19 1133   99   room air   --   --   --    10/06/19 0855   --   room air   --   --   --    10/06/19 0841   94   room air   --   --   --    10/06/19 0741   94   room air   --   --   --    10/06/19 0733   94   room air   --   --   --    10/06/19 0458   96   room air   --   --   --    10/05/19 2347   97   room air   --   --   --    10/05/19 2251   97   room air   --   --   --    10/05/19 2038   98   room air   --   --   --    10/05/19 1446   96   room air   --   --   --    10/05/19 1445   95   room air   --   --   --    10/05/19 1438   94   room air   --   --   --    10/05/19 1158   92   room air   --   --   --    10/05/19 1110   96   room air   --   --   --    10/05/19 1109   96   room air   --   --   --    10/05/19 1101   98   room air   --   --   --    10/05/19 0855   --   room air   --   --   --    10/05/19 0805   95   room air   --   --   --    10/05/19 0804   95   room air   --   --   --    10/05/19 0755   96   room air   --   --   --    10/05/19 0750   95   room air   --   --   --    10/05/19 0507   95   room air   --   --   --    10/04/19 2306   98   room air   --   --   --    10/04/19 2027   97   room air   --   --   --    10/04/19 2020   98   room air   --   --   --    10/04/19 2006   95   room air   --   --   --    10/04/19 1628   96   room air   --   --   --    10/04/19 1547   97   room air   --   --   --    10/04/19 1541   95   room air   --   --   --    10/04/19 1145   96   room air   --   --   --    10/04/19 1116   99   room air   --   --   --    10/04/19 1110   96   room air   --   --   --    10/04/19 0909   --   room air   --   --   --    10/04/19 0755   95   room air   --   --   --    10/04/19 0746   97   room air   --   --   --     10/04/19 0739   95   room air   --   --   --    10/04/19 0455   96   --   --   --   --    10/04/19 0009   93   room air   --   --   --               Intake and Output (last 48 hours)      Intake/Output     Row Name 10/06/19 0855 10/06/19 0600 10/06/19 0200 10/05/19 2347 10/05/19 2020       Intake    P.O.  --  --  --  120 mL  --    Intake (%)  Breakfast;100%  --  --  --  --       Urine Output    Urine Unmeasured Occurrence  --  1  1  1  1    Urinary Incontinence  --  No  No  No  No    Urine Amount  --  --  --  Large  --       Wound 09/27/19 0800 Right posterior finger    Wound - Properties Group Date first assessed: 09/27/19 Time first assessed: 0800 Present on Hospital Admission: Yes Side: Right Orientation: posterior Location: finger    Dressing Appearance  open to air  --  --  --  open to air    Closure  None  --  --  --  --    Base  dry;white  --  --  --  --    Drainage Amount  none  --  --  --  --    Row Name 10/05/19 0956 10/05/19 0600 10/05/19 0410 10/05/19 0200 10/05/19 0021       Intake    P.O.  100 mL  --  --  --  --    Intake (%)  Breakfast;75%  --  --  --  --       lactated ringers infusion - Peripheral IV 10/02/19 1350 Anterior;Left Forearm     Start: 09/26/19 2300    Rate  --  --  --  --  50 mL/hr       Urine Output    Urine Unmeasured Occurrence  --  1  1  1  --    Urinary Incontinence  --  No  No  No  --       Stool Output    Stool Unmeasured Occurrence  --  --  --  1  --    Bowel Incontinence  --  --  --  No  --       Wound 09/27/19 0800 Right posterior finger    Wound - Properties Group Date first assessed: 09/27/19 Time first assessed: 0800 Present on Hospital Admission: Yes Side: Right Orientation: posterior Location: finger    Row Name 10/05/19 0019 10/04/19 2306 10/04/19 2130             lactated ringers infusion - Peripheral IV 10/02/19 1350 Anterior;Left Forearm     Start: 09/26/19 2300    Rate  0 mL/hr  --  --      Volume (mL)  900 mL  --  --         Urine Output    Urine Unmeasured  "Occurrence  --  1  --      Urinary Incontinence  --  No  --         Stool Output    Stool Unmeasured Occurrence  --  --  1      Bowel Incontinence  --  --  No         Wound 09/27/19 0800 Right posterior finger    Wound - Properties Group Date first assessed: 09/27/19 Time first assessed: 0800 Present on Hospital Admission: Yes Side: Right Orientation: posterior Location: finger        Lines, Drains & Airways    Active LDAs     Name:   Placement date:   Placement time:   Site:   Days:    Peripheral IV 10/02/19 1350 Anterior;Left Forearm   10/02/19    1350    Forearm   4                Hospital Medications (active)       Dose Frequency Start End    albuterol (PROVENTIL) nebulizer solution 0.083% 2.5 mg/3mL 2.5 mg Once As Needed 9/27/2019     Sig - Route: Take 2.5 mg by nebulization 1 (One) Time As Needed for Shortness of Air (Sputum Induction). - Nebulization    Cosign for Ordering: Accepted by Galo Bethea MD on 9/28/2019  8:22 AM    aspirin chewable tablet 81 mg 81 mg Daily 9/27/2019     Sig - Route: Chew 1 tablet Daily. - Oral    Linked Group 1:  \"Or\" Linked Group Details        aspirin suppository 300 mg 300 mg Daily 9/27/2019     Sig - Route: Insert 1 suppository into the rectum Daily. - Rectal    Linked Group 1:  \"Or\" Linked Group Details        atorvastatin (LIPITOR) tablet 80 mg 80 mg Nightly 9/26/2019     Sig - Route: Take 2 tablets by mouth Every Night. - Oral    hypromellose (ISOPTO TEARS) 0.5 % ophthalmic solution 2 drop 2 drop 3 Times Daily PRN 9/30/2019     Sig - Route: Administer 2 drops to both eyes 3 (Three) Times a Day As Needed for Dry Eyes. - Both Eyes    ipratropium-albuterol (DUO-NEB) nebulizer solution 3 mL 3 mL 4 Times Daily - RT 9/27/2019     Sig - Route: Take 3 mL by nebulization 4 (Four) Times a Day. - Nebulization    labetalol (NORMODYNE,TRANDATE) injection 20 mg 20 mg Every 4 Hours PRN 9/27/2019     Sig - Route: Infuse 4 mL into a venous catheter Every 4 (Four) Hours As Needed " for High Blood Pressure. - Intravenous    lactated ringers infusion 50 mL/hr Continuous 9/26/2019     Sig - Route: Infuse 50 mL/hr into a venous catheter Continuous. - Intravenous    nicotine (NICODERM CQ) 14 MG/24HR patch 1 patch 1 patch Every 24 Hours Scheduled 10/5/2019     Sig - Route: Place 1 patch on the skin as directed by provider Daily. - Transdermal    pantoprazole (PROTONIX) injection 40 mg 40 mg Every Early Morning 10/3/2019     Sig - Route: Infuse 10 mL into a venous catheter Every Morning. - Intravenous    sodium chloride 0.9 % flush 10 mL 10 mL Every 12 Hours Scheduled 9/26/2019     Sig - Route: Infuse 10 mL into a venous catheter Every 12 (Twelve) Hours. - Intravenous    sodium chloride 0.9 % flush 10 mL 10 mL As Needed 9/26/2019     Sig - Route: Infuse 10 mL into a venous catheter As Needed for Line Care. - Intravenous    thiamine (VITAMIN B-1) tablet 100 mg 100 mg Daily 10/4/2019     Sig - Route: Take 1 tablet by mouth Daily. - Oral          Lab Results (last 48 hours)     Procedure Component Value Units Date/Time    Comprehensive Metabolic Panel [[id number]]  (Abnormal) Collected:  10/06/19 0411    Specimen:  Blood Updated:  10/06/19 0507     Glucose 129 mg/dL      BUN 9 mg/dL      Creatinine 0.59 mg/dL      Sodium 137 mmol/L      Potassium 3.9 mmol/L      Chloride 97 mmol/L      CO2 28.0 mmol/L      Calcium 9.0 mg/dL      Total Protein 7.1 g/dL      Albumin 3.60 g/dL      ALT (SGPT) 31 U/L      AST (SGOT) 29 U/L      Alkaline Phosphatase 60 U/L      Total Bilirubin 0.2 mg/dL      eGFR Non African Amer 142 mL/min/1.73      Globulin 3.5 gm/dL      A/G Ratio 1.0 g/dL      BUN/Creatinine Ratio 15.3     Anion Gap 12.0 mmol/L     Narrative:       GFR Normal >60  Chronic Kidney Disease <60  Kidney Failure <15    CBC & Differential [[id number]] Collected:  10/06/19 0411    Specimen:  Blood Updated:  10/06/19 0449    Narrative:       The following orders were created for panel order CBC &  Differential.  Procedure                               Abnormality         Status                     ---------                               -----------         ------                     CBC Auto Differential[[id number]]        Abnormal            Final result                 Please view results for these tests on the individual orders.    CBC Auto Differential [[id number]]  (Abnormal) Collected:  10/06/19 0411    Specimen:  Blood Updated:  10/06/19 0449     WBC 10.54 10*3/mm3      RBC 4.15 10*6/mm3      Hemoglobin 13.3 g/dL      Hematocrit 38.7 %      MCV 93.3 fL      MCH 32.0 pg      MCHC 34.4 g/dL      RDW 13.5 %      RDW-SD 45.8 fl      MPV 10.0 fL      Platelets 333 10*3/mm3      Neutrophil % 61.7 %      Lymphocyte % 24.9 %      Monocyte % 10.2 %      Eosinophil % 2.3 %      Basophil % 0.6 %      Immature Grans % 0.3 %      Neutrophils, Absolute 6.52 10*3/mm3      Lymphocytes, Absolute 2.62 10*3/mm3      Monocytes, Absolute 1.07 10*3/mm3      Eosinophils, Absolute 0.24 10*3/mm3      Basophils, Absolute 0.06 10*3/mm3      Immature Grans, Absolute 0.03 10*3/mm3      nRBC 0.0 /100 WBC     Comprehensive Metabolic Panel [[id number]]  (Abnormal) Collected:  10/05/19 0411    Specimen:  Blood Updated:  10/05/19 0514     Glucose 98 mg/dL      BUN 8 mg/dL      Creatinine 0.54 mg/dL      Sodium 138 mmol/L      Potassium 3.8 mmol/L      Chloride 98 mmol/L      CO2 28.0 mmol/L      Calcium 8.8 mg/dL      Total Protein 6.9 g/dL      Albumin 3.50 g/dL      ALT (SGPT) 24 U/L      AST (SGOT) 24 U/L      Alkaline Phosphatase 62 U/L      Total Bilirubin 0.3 mg/dL      eGFR Non African Amer >150 mL/min/1.73      Globulin 3.4 gm/dL      A/G Ratio 1.0 g/dL      BUN/Creatinine Ratio 14.8     Anion Gap 12.0 mmol/L     Narrative:       GFR Normal >60  Chronic Kidney Disease <60  Kidney Failure <15    CBC & Differential [[id number]] Collected:  10/05/19 0411    Specimen:  Blood Updated:  10/05/19 0458    Narrative:       The following  orders were created for panel order CBC & Differential.  Procedure                               Abnormality         Status                     ---------                               -----------         ------                     CBC Auto Differential[[id number]]        Abnormal            Final result                 Please view results for these tests on the individual orders.    CBC Auto Differential [[id number]]  (Abnormal) Collected:  10/05/19 0411    Specimen:  Blood Updated:  10/05/19 0458     WBC 9.48 10*3/mm3      RBC 4.12 10*6/mm3      Hemoglobin 13.1 g/dL      Hematocrit 38.2 %      MCV 92.7 fL      MCH 31.8 pg      MCHC 34.3 g/dL      RDW 13.2 %      RDW-SD 45.2 fl      MPV 10.1 fL      Platelets 320 10*3/mm3      Neutrophil % 58.9 %      Lymphocyte % 25.7 %      Monocyte % 12.3 %      Eosinophil % 2.4 %      Basophil % 0.5 %      Immature Grans % 0.2 %      Neutrophils, Absolute 5.57 10*3/mm3      Lymphocytes, Absolute 2.44 10*3/mm3      Monocytes, Absolute 1.17 10*3/mm3      Eosinophils, Absolute 0.23 10*3/mm3      Basophils, Absolute 0.05 10*3/mm3      Immature Grans, Absolute 0.02 10*3/mm3      nRBC 0.0 /100 WBC           Lab Results (last 48 hours)     Procedure Component Value Units Date/Time    Comprehensive Metabolic Panel [[id number]]  (Abnormal) Collected:  10/06/19 0411    Specimen:  Blood Updated:  10/06/19 0507     Glucose 129 mg/dL      BUN 9 mg/dL      Creatinine 0.59 mg/dL      Sodium 137 mmol/L      Potassium 3.9 mmol/L      Chloride 97 mmol/L      CO2 28.0 mmol/L      Calcium 9.0 mg/dL      Total Protein 7.1 g/dL      Albumin 3.60 g/dL      ALT (SGPT) 31 U/L      AST (SGOT) 29 U/L      Alkaline Phosphatase 60 U/L      Total Bilirubin 0.2 mg/dL      eGFR Non African Amer 142 mL/min/1.73      Globulin 3.5 gm/dL      A/G Ratio 1.0 g/dL      BUN/Creatinine Ratio 15.3     Anion Gap 12.0 mmol/L     Narrative:       GFR Normal >60  Chronic Kidney Disease <60  Kidney Failure <15    CBC &  Differential [[id number]] Collected:  10/06/19 0411    Specimen:  Blood Updated:  10/06/19 0449    Narrative:       The following orders were created for panel order CBC & Differential.  Procedure                               Abnormality         Status                     ---------                               -----------         ------                     CBC Auto Differential[[id number]]        Abnormal            Final result                 Please view results for these tests on the individual orders.    CBC Auto Differential [[id number]]  (Abnormal) Collected:  10/06/19 0411    Specimen:  Blood Updated:  10/06/19 0449     WBC 10.54 10*3/mm3      RBC 4.15 10*6/mm3      Hemoglobin 13.3 g/dL      Hematocrit 38.7 %      MCV 93.3 fL      MCH 32.0 pg      MCHC 34.4 g/dL      RDW 13.5 %      RDW-SD 45.8 fl      MPV 10.0 fL      Platelets 333 10*3/mm3      Neutrophil % 61.7 %      Lymphocyte % 24.9 %      Monocyte % 10.2 %      Eosinophil % 2.3 %      Basophil % 0.6 %      Immature Grans % 0.3 %      Neutrophils, Absolute 6.52 10*3/mm3      Lymphocytes, Absolute 2.62 10*3/mm3      Monocytes, Absolute 1.07 10*3/mm3      Eosinophils, Absolute 0.24 10*3/mm3      Basophils, Absolute 0.06 10*3/mm3      Immature Grans, Absolute 0.03 10*3/mm3      nRBC 0.0 /100 WBC     Comprehensive Metabolic Panel [[id number]]  (Abnormal) Collected:  10/05/19 0411    Specimen:  Blood Updated:  10/05/19 0514     Glucose 98 mg/dL      BUN 8 mg/dL      Creatinine 0.54 mg/dL      Sodium 138 mmol/L      Potassium 3.8 mmol/L      Chloride 98 mmol/L      CO2 28.0 mmol/L      Calcium 8.8 mg/dL      Total Protein 6.9 g/dL      Albumin 3.50 g/dL      ALT (SGPT) 24 U/L      AST (SGOT) 24 U/L      Alkaline Phosphatase 62 U/L      Total Bilirubin 0.3 mg/dL      eGFR Non African Amer >150 mL/min/1.73      Globulin 3.4 gm/dL      A/G Ratio 1.0 g/dL      BUN/Creatinine Ratio 14.8     Anion Gap 12.0 mmol/L     Narrative:       GFR Normal >60  Chronic Kidney  Disease <60  Kidney Failure <15    CBC & Differential [[id number]] Collected:  10/05/19 0411    Specimen:  Blood Updated:  10/05/19 0458    Narrative:       The following orders were created for panel order CBC & Differential.  Procedure                               Abnormality         Status                     ---------                               -----------         ------                     CBC Auto Differential[[id number]]        Abnormal            Final result                 Please view results for these tests on the individual orders.    CBC Auto Differential [[id number]]  (Abnormal) Collected:  10/05/19 0411    Specimen:  Blood Updated:  10/05/19 0458     WBC 9.48 10*3/mm3      RBC 4.12 10*6/mm3      Hemoglobin 13.1 g/dL      Hematocrit 38.2 %      MCV 92.7 fL      MCH 31.8 pg      MCHC 34.3 g/dL      RDW 13.2 %      RDW-SD 45.2 fl      MPV 10.1 fL      Platelets 320 10*3/mm3      Neutrophil % 58.9 %      Lymphocyte % 25.7 %      Monocyte % 12.3 %      Eosinophil % 2.4 %      Basophil % 0.5 %      Immature Grans % 0.2 %      Neutrophils, Absolute 5.57 10*3/mm3      Lymphocytes, Absolute 2.44 10*3/mm3      Monocytes, Absolute 1.17 10*3/mm3      Eosinophils, Absolute 0.23 10*3/mm3      Basophils, Absolute 0.05 10*3/mm3      Immature Grans, Absolute 0.02 10*3/mm3      nRBC 0.0 /100 WBC         Orders (last 48 hrs)     Start     Ordered    10/06/19 2130  Oscillating Positive Expiratory Pressure (OPEP)  2 Times Daily - RT     Comments:  BID with neb tx and PRN    10/06/19 1150    10/06/19 0600  CBC Auto Differential  PROCEDURE ONCE      10/06/19 0001    10/05/19 0900  nicotine (NICODERM CQ) 14 MG/24HR patch 1 patch  Every 24 Hours Scheduled      10/04/19 1441    10/05/19 0600  CBC Auto Differential  PROCEDURE ONCE      10/05/19 0001    10/04/19 2200  Dietary Nutrition Supplements Magic Cup  3 Times Daily     Comments:  Alternate flavors    10/04/19 1635    10/04/19 2130  potassium chloride (KLOR-CON) packet 40  mEq  2 Times Daily      10/04/19 2032    10/04/19 2100  potassium chloride (MICRO-K) CR capsule 40 mEq  2 Times Daily,   Status:  Discontinued      10/04/19 1431    10/04/19 0900  thiamine (VITAMIN B-1) tablet 100 mg  Daily      10/03/19 1543    10/03/19 0600  pantoprazole (PROTONIX) injection 40 mg  Every Early Morning      10/02/19 1311    10/01/19 1230  Oscillating Positive Expiratory Pressure (OPEP)  4 Times Daily - RT,   Status:  Canceled      10/01/19 0844    10/01/19 0600  Comprehensive Metabolic Panel  Daily      09/30/19 0818    10/01/19 0600  CBC & Differential  Daily      09/30/19 0818    09/30/19 1922  hypromellose (ISOPTO TEARS) 0.5 % ophthalmic solution 2 drop  3 Times Daily PRN      09/30/19 1923    09/27/19 2028  albuterol (PROVENTIL) nebulizer solution 0.083% 2.5 mg/3mL  Once As Needed      09/27/19 2028    09/27/19 1618  labetalol (NORMODYNE,TRANDATE) injection 20 mg  Every 4 Hours PRN      09/27/19 1618    09/27/19 1500  ipratropium-albuterol (DUO-NEB) nebulizer solution 3 mL  4 Times Daily - RT      09/27/19 1332    09/27/19 0900  aspirin chewable tablet 81 mg  Daily      09/26/19 2213    09/27/19 0900  aspirin suppository 300 mg  Daily      09/26/19 2213 09/26/19 2300  sodium chloride 0.9 % flush 10 mL  Every 12 Hours Scheduled      09/26/19 2213 09/26/19 2300  atorvastatin (LIPITOR) tablet 80 mg  Nightly      09/26/19 2213 09/26/19 2300  lactated ringers infusion  Continuous      09/26/19 2213 09/26/19 2208  sodium chloride 0.9 % flush 10 mL  As Needed      09/26/19 2213    Unscheduled  Order CT Head Without Contrast for Neurological Decline  As Needed      09/26/19 2213    Unscheduled  Sputum Induction if Necessary  As Needed      09/27/19 2028    --  SCANNED - TELEMETRY        09/26/19 0000    --  SCANNED - TELEMETRY        09/26/19 0000           Physician Progress Notes (last 48 hours) (Notes from 10/04/19 2022 through 10/06/19 2022)      Candido Alas MD at 10/06/19 1701               [facility] Medicine Services  INPATIENT PROGRESS NOTE    Length of Stay: 10  Date of Admission: 9/26/2019  Primary Care Physician: Jose Karimi MD    Subjective   Chief Complaint: CVA/aphasia    HPI   Patient's aphasia is improving slowly every day.  Patient denies any chest pain or shortness of breath.  Patient is tolerating diet.    Review of Systems   Constitutional: Positive for activity change, appetite change and fatigue. Negative for chills and fever.   HENT: Negative for hearing loss, nosebleeds, tinnitus and trouble swallowing.    Eyes: Negative for visual disturbance.   Respiratory: Positive for shortness of breath. Negative for cough, chest tightness and wheezing.    Cardiovascular: Negative for chest pain, palpitations and leg swelling.   Gastrointestinal: Negative for abdominal distention, abdominal pain, blood in stool, constipation, diarrhea, nausea and vomiting.   Endocrine: Negative for cold intolerance, heat intolerance, polydipsia, polyphagia and polyuria.   Genitourinary: Negative for decreased urine volume, difficulty urinating, dysuria, flank pain, frequency and hematuria.   Musculoskeletal: Positive for arthralgias, gait problem and myalgias. Negative for joint swelling.   Skin: Negative for rash.   Allergic/Immunologic: Negative for immunocompromised state.   Neurological: Positive for weakness. Negative for dizziness, syncope, light-headedness and headaches.   Hematological: Negative for adenopathy. Does not bruise/bleed easily.   Psychiatric/Behavioral: Positive for confusion. Negative for sleep disturbance. The patient is not nervous/anxious.    All pertinent negatives and positives are as above. All other systems have been reviewed and are negative unless otherwise stated.     Objective    Temp:  [97.5 °F (36.4 °C)-99.8 °F (37.7 °C)] 97.5 °F (36.4 °C)  Heart Rate:  [62-84] 70  Resp:  [14-18] 16  BP: (116-120)/(60-90) 119/89    Intake/Output  Summary (Last 24 hours) at 10/6/2019 1701  Last data filed at 10/5/2019 2347  Gross per 24 hour   Intake 120 ml   Output --   Net 120 ml     Physical Exam  Constitutional: He appears well-developed.   HENT:   Head: Normocephalic.   Eyes: Conjunctivae are normal. Pupils are equal, round, and reactive to light.   Neck: Neck supple. No JVD present. No thyromegaly present.   Cardiovascular: Normal rate, regular rhythm, normal heart sounds and intact distal pulses. Exam reveals no gallop and no friction rub.   No murmur heard.  Pulmonary/Chest: Effort normal. No respiratory distress. He has wheezes. He has no rales. He exhibits no tenderness.     Diminished breath sound.  Rhonchi is improving.  Abdominal: Soft. Bowel sounds are normal. He exhibits no distension. There is no tenderness. There is no rebound and no guarding.   Musculoskeletal: He exhibits no edema, tenderness or deformity.   Lymphadenopathy:     He has no cervical adenopathy.   Neurological: He is alert. He displays normal reflexes. No cranial nerve deficit. He exhibits abnormal muscle tone. Coordination abnormal.   Skin: Skin is warm and dry. Capillary refill takes 2 to 3 seconds.  Right arm from previous IV site erythematous, warm, slightly firm.  Probably superficial thrombus.  Psychiatric: He has a normal mood and affect. His behavior is normal.   Nursing note and vitals reviewed.  Results Review:  Lab Results (last 24 hours)     Procedure Component Value Units Date/Time    Comprehensive Metabolic Panel [[id number]]  (Abnormal) Collected:  10/06/19 0411    Specimen:  Blood Updated:  10/06/19 0507     Glucose 129 mg/dL      BUN 9 mg/dL      Creatinine 0.59 mg/dL      Sodium 137 mmol/L      Potassium 3.9 mmol/L      Chloride 97 mmol/L      CO2 28.0 mmol/L      Calcium 9.0 mg/dL      Total Protein 7.1 g/dL      Albumin 3.60 g/dL      ALT (SGPT) 31 U/L      AST (SGOT) 29 U/L      Alkaline Phosphatase 60 U/L      Total Bilirubin 0.2 mg/dL      eGFR Non   Amer 142 mL/min/1.73      Globulin 3.5 gm/dL      A/G Ratio 1.0 g/dL      BUN/Creatinine Ratio 15.3     Anion Gap 12.0 mmol/L     Narrative:       GFR Normal >60  Chronic Kidney Disease <60  Kidney Failure <15    CBC & Differential [[id number]] Collected:  10/06/19 0411    Specimen:  Blood Updated:  10/06/19 0449    Narrative:       The following orders were created for panel order CBC & Differential.  Procedure                               Abnormality         Status                     ---------                               -----------         ------                     CBC Auto Differential[[id number]]        Abnormal            Final result                 Please view results for these tests on the individual orders.    CBC Auto Differential [[id number]]  (Abnormal) Collected:  10/06/19 0411    Specimen:  Blood Updated:  10/06/19 0449     WBC 10.54 10*3/mm3      RBC 4.15 10*6/mm3      Hemoglobin 13.3 g/dL      Hematocrit 38.7 %      MCV 93.3 fL      MCH 32.0 pg      MCHC 34.4 g/dL      RDW 13.5 %      RDW-SD 45.8 fl      MPV 10.0 fL      Platelets 333 10*3/mm3      Neutrophil % 61.7 %      Lymphocyte % 24.9 %      Monocyte % 10.2 %      Eosinophil % 2.3 %      Basophil % 0.6 %      Immature Grans % 0.3 %      Neutrophils, Absolute 6.52 10*3/mm3      Lymphocytes, Absolute 2.62 10*3/mm3      Monocytes, Absolute 1.07 10*3/mm3      Eosinophils, Absolute 0.24 10*3/mm3      Basophils, Absolute 0.06 10*3/mm3      Immature Grans, Absolute 0.03 10*3/mm3      nRBC 0.0 /100 WBC            Cultures:       Radiology Data:    Imaging Results (last 24 hours)     ** No results found for the last 24 hours. **          No Known Allergies    Scheduled meds:     aspirin 81 mg Oral Daily   Or      aspirin 300 mg Rectal Daily   atorvastatin 80 mg Oral Nightly   ipratropium-albuterol 3 mL Nebulization 4x Daily - RT   nicotine 1 patch Transdermal Q24H   pantoprazole 40 mg Intravenous Q AM   sodium chloride 10 mL Intravenous Q12H    thiamine 100 mg Oral Daily       PRN meds:  •  albuterol  •  hypromellose  •  labetalol  •  sodium chloride    Assessment/Plan       Degeneration of lumbar or lumbosacral intervertebral disc    Smoker    Acute cerebrovascular accident (CVA) of cerebellum (CMS/HCC)    Alcoholism /alcohol abuse (CMS/HCC)    CVA (cerebral vascular accident) (CMS/HCC)    Hyperlipidemia    Dysphagia due to recent cerebral infarction      Plan:  Respiratory failure.  Resolved.  Extubated 9/30/19.    Consult ENT-laryngoscopy demonstrates some pressure wounds of the posterior vocal folds bilaterally, with some swelling on the left- this is likely due to the endotracheal tube and will resolve on its own.     CVA. Left MCA stroke. Continue aspirin.  Continue Lipitor. Neurology consult.   CTA of the head and neck- complete occlusion of the entire cervical left ICA, 30% stenosis of the right carotid bulb, mild stenosis at the ostium of the left vertebral artery.  MRI of the head- acute infarct in the left MCA and watershed territory- no hemorrhagic conversion, abnormal left ICA flow void.  Echocardiogram- ejection fraction 61%, diastolic dysfunction grade 1.  Follow-up with neurologist in 4 weeks.  Patient been instructed not to drive.     Right arm thrombus.  Warm compress.    Doppler ultrasound of right arm-  no evidence of deep venous thrombosis of the right upper  Extremity, evidence of superficial thrombus in the basilic vein at the elbow and in the cephalic vein from the elbow to the distal upper arm.     Hypokalemia-resolved.     Reflux.  Protonix and Zofran as needed     COPD.  Continue duo nebs.     Alcohol withdrawal?. Patient stated last has drink about 2 weeks ago.  Patient has no sign of withdrawal symptoms.     Hyperlipidemia.  Continue Lipitor.     Tobacco abuse.  Tobacco counseling.  Patient refused nicotine patch.     Urinary incontinence.    DC Villalobos cath 9/30/2019.     SCD.     Nutrition.  Soft texture  diet.     Deconditioning.  PT and OT consult.     Discharge Planning: Plan for rehab placement at [facility].    Candido Alas MD   10/06/19   5:01 PM                    Electronically signed by Candido Alas MD at 10/06/19 1703     Tsering Norwood MD at 10/05/19 2213            Neurology Progress Note      Date of admission: 9/26/2019  8:38 PM  Date of visit: 10/5/2019    Chief Complaint:  F/u left MCA stroke    Subjective     Subjective:    No complaints  Medications:  Current Facility-Administered Medications   Medication Dose Route Frequency Provider Last Rate Last Dose   • albuterol (PROVENTIL) nebulizer solution 0.083% 2.5 mg/3mL  2.5 mg Nebulization Once PRN Galo Bethea MD       • aspirin chewable tablet 81 mg  81 mg Oral Daily Candido Alas MD   81 mg at 10/05/19 0903    Or   • aspirin suppository 300 mg  300 mg Rectal Daily Candido Alas MD   300 mg at 10/03/19 0944   • atorvastatin (LIPITOR) tablet 80 mg  80 mg Oral Nightly Candido Alas MD   80 mg at 10/05/19 2055   • hypromellose (ISOPTO TEARS) 0.5 % ophthalmic solution 2 drop  2 drop Both Eyes TID PRN Candido Alas MD   2 drop at 09/30/19 2152   • ipratropium-albuterol (DUO-NEB) nebulizer solution 3 mL  3 mL Nebulization 4x Daily - RT Christina Sharif APRN   3 mL at 10/05/19 1438   • labetalol (NORMODYNE,TRANDATE) injection 20 mg  20 mg Intravenous Q4H PRN Christina Sharif APRN       • lactated ringers infusion  50 mL/hr Intravenous Continuous Christina Sharif APRN 50 mL/hr at 10/05/19 0021 50 mL/hr at 10/05/19 0021   • nicotine (NICODERM CQ) 14 MG/24HR patch 1 patch  1 patch Transdermal Q24H Candido Alas MD   1 patch at 10/05/19 0904   • pantoprazole (PROTONIX) injection 40 mg  40 mg Intravenous Q AM Jose Johnson MD   40 mg at 10/05/19 0508   • sodium chloride 0.9 % flush 10 mL  10 mL Intravenous Q12H Candido Alas MD   10 mL at 10/05/19 2056   • sodium chloride 0.9 % flush  10 mL  10 mL Intravenous PRN Candido Alas MD       • thiamine (VITAMIN B-1) tablet 100 mg  100 mg Oral Daily Tsering Norwood MD   100 mg at 10/05/19 0903       Review of Systems:   -A 14 point review of systems is completed and is negative  Objective     Objective      Vital Signs  Temp:  [97.9 °F (36.6 °C)-98.9 °F (37.2 °C)] 98.9 °F (37.2 °C)  Heart Rate:  [66-81] 66  Resp:  [18-20] 18  BP: ()/(46-78) 116/60    Physical Exam:    HEENT:  Neck supple  CVS:  Regular rate and rhythm.  No murmurs  Carotid Examination:  No bruits  Lungs:  Clear to auscultation  Abdomen:  Non-tender, Non-distended  Extremities:  No signs of peripheral edema    Neurologic Exam:    -Awake, Alert, Oriented to person but does not know date  - Word finding difficulties--I.e. Naming --He also has some comprehension issues (some commands had to be given more than once for him to process them   -Some dysarthria  -Follows simple  commands    Cranial nerves II through XII intact.    CN III, IV, VI:  Extraocular Muscles full with no signs of nystagmus  CN V:  Facial sensory is symmetric  CN VII:  Facial motor asymmetric with decrease right naso labial fold and subtle lag with smile on the right  CN VIII:  Gross hearing intact bilaterally  CN IX/X:  Palate elevates symmetrically  CN XI:  Shoulder shrug symmetric  CN XII:  Tongue is midline on protrusion    Motor: (strength out of 5:  1= minimal movement, 2 = movement in plane of gravity, 3 = movement against gravity, 4 = movement against some resistance, 5 = full strength)    -Right Upper Ext: Proximal: 4+ Distal: 4+  -Left Upper Ext: Proximal: 5 Distal: 5    -Right Lower Ext: Proximal: 5 Distal: 4  -Left Lower Ext: Proximal: 5 Distal: 5    DTR:  2+ throughout in all four extremities    Sensory:  -Intact to light touch, pinprick--or at least just kept answering yes    Coordination/Gait:  -No ataxia     Results Review:    I reviewed the patient's new clinical results.    Lab Results  (last 24 hours)     Procedure Component Value Units Date/Time    Comprehensive Metabolic Panel [[id number]]  (Abnormal) Collected:  10/05/19 0411    Specimen:  Blood Updated:  10/05/19 0514     Glucose 98 mg/dL      BUN 8 mg/dL      Creatinine 0.54 mg/dL      Sodium 138 mmol/L      Potassium 3.8 mmol/L      Chloride 98 mmol/L      CO2 28.0 mmol/L      Calcium 8.8 mg/dL      Total Protein 6.9 g/dL      Albumin 3.50 g/dL      ALT (SGPT) 24 U/L      AST (SGOT) 24 U/L      Alkaline Phosphatase 62 U/L      Total Bilirubin 0.3 mg/dL      eGFR Non African Amer >150 mL/min/1.73      Globulin 3.4 gm/dL      A/G Ratio 1.0 g/dL      BUN/Creatinine Ratio 14.8     Anion Gap 12.0 mmol/L     Narrative:       GFR Normal >60  Chronic Kidney Disease <60  Kidney Failure <15    CBC & Differential [[id number]] Collected:  10/05/19 0411    Specimen:  Blood Updated:  10/05/19 0458    Narrative:       The following orders were created for panel order CBC & Differential.  Procedure                               Abnormality         Status                     ---------                               -----------         ------                     CBC Auto Differential[[id number]]        Abnormal            Final result                 Please view results for these tests on the individual orders.    CBC Auto Differential [[id number]]  (Abnormal) Collected:  10/05/19 0411    Specimen:  Blood Updated:  10/05/19 0458     WBC 9.48 10*3/mm3      RBC 4.12 10*6/mm3      Hemoglobin 13.1 g/dL      Hematocrit 38.2 %      MCV 92.7 fL      MCH 31.8 pg      MCHC 34.3 g/dL      RDW 13.2 %      RDW-SD 45.2 fl      MPV 10.1 fL      Platelets 320 10*3/mm3      Neutrophil % 58.9 %      Lymphocyte % 25.7 %      Monocyte % 12.3 %      Eosinophil % 2.4 %      Basophil % 0.5 %      Immature Grans % 0.2 %      Neutrophils, Absolute 5.57 10*3/mm3      Lymphocytes, Absolute 2.44 10*3/mm3      Monocytes, Absolute 1.17 10*3/mm3      Eosinophils, Absolute 0.23 10*3/mm3       Basophils, Absolute 0.05 10*3/mm3      Immature Grans, Absolute 0.02 10*3/mm3      nRBC 0.0 /100 WBC         Imaging Results (last 24 hours)     ** No results found for the last 24 hours. **        Results for orders placed during the hospital encounter of 09/26/19   Adult Transthoracic Echo Complete W/ Cont if Necessary Per Protocol (With Agitated Saline)    Narrative · Left ventricular systolic function is normal.  · Left ventricular diastolic dysfunction (grade I) consistent with   impaired relaxation.  · Normal size and function of the right ventricle.  · No significant valvular pathology.  · Poor quality bubble study, but most likely negative.            Assessment/Plan     Hospital Problem List      Degeneration of lumbar or lumbosacral intervertebral disc    Smoker    Acute cerebrovascular accident (CVA) of cerebellum (CMS/HCC)    Alcoholism /alcohol abuse (CMS/HCC)    CVA (cerebral vascular accident) (CMS/HCC)    Hyperlipidemia    Dysphagia due to recent cerebral infarction    Impression:  1.  Acute stroke left MCA   2. Dysphagia improved and continue with recommendations of ST with  mechanical soft solids and pudding thick liquids  3. Possible SAFIA, overnight pulse ox no significant hypoxia but did have 73 desaturation events recorded.   4.  History of ETOH abuse. Oral thiamine.   5. Hypertension BP improved and systolic goal less than 140.  6. . Smoker  8.. Superficial thrombus in basilic vein at the elbow and cephalic vein at distal upper arm to the elbow.  9. Hyperlipidemia with LDL of 102 and goal of < 70.     Plan:    1. Continue ASA 81 mg daily  2. Continue Lipitor 80 mg daily for LDL goal less than 70.  3. Continue OT/ST/PT  He will benefit from inpatient rehab. [facility] is planned.  Await precert  4. Possible SAFIA.  Will need polysomnogram and patient would like to do in [place]  5. Smoking cessation counseling  6. NO DRIVING SECONDARY TO APHASIA AND DIFFICULTY DESCRIBING PICTURES AND  OBJECTS.  7.. Continue thiamin 100 mg daily for history of ETOH.  8. Ok to discharge once accepted.   9. Follow up in neurology clinic in 4 weeks with Jeannie DOWNS.     Neurology will sign off   Please call if any questions      Tsering Alvares MD  10/05/19  10:13 PM        Electronically signed by Tsering Norwood MD at 10/05/19 5989     Candido Alas MD at 10/05/19 2318              [facility] Medicine Services  INPATIENT PROGRESS NOTE    Length of Stay: 9  Date of Admission: 9/26/2019  Primary Care Physician: Jose Karimi MD    Subjective   Chief Complaint: CVA/aphasia    HPI   Patient is doing better.  Patient denies any chest pain or shortness of breath.  Patient tolerated diet.    Review of Systems   Constitutional: Positive for activity change, appetite change and fatigue. Negative for chills and fever.   HENT: Negative for hearing loss, nosebleeds, tinnitus and trouble swallowing.    Eyes: Negative for visual disturbance.   Respiratory: Positive for shortness of breath. Negative for cough, chest tightness and wheezing.    Cardiovascular: Negative for chest pain, palpitations and leg swelling.   Gastrointestinal: Negative for abdominal distention, abdominal pain, blood in stool, constipation, diarrhea, nausea and vomiting.   Endocrine: Negative for cold intolerance, heat intolerance, polydipsia, polyphagia and polyuria.   Genitourinary: Negative for decreased urine volume, difficulty urinating, dysuria, flank pain, frequency and hematuria.   Musculoskeletal: Positive for arthralgias, gait problem and myalgias. Negative for joint swelling.   Skin: Negative for rash.   Allergic/Immunologic: Negative for immunocompromised state.   Neurological: Positive for weakness. Negative for dizziness, syncope, light-headedness and headaches.   Hematological: Negative for adenopathy. Does not bruise/bleed easily.   Psychiatric/Behavioral: Positive  for confusion. Negative for sleep disturbance. The patient is not nervous/anxious.       All pertinent negatives and positives are as above. All other systems have been reviewed and are negative unless otherwise stated.     Objective    Temp:  [97.9 °F (36.6 °C)-99.6 °F (37.6 °C)] 98.2 °F (36.8 °C)  Heart Rate:  [65-81] 76  Resp:  [16-20] 18  BP: ()/(46-78) 91/46    Intake/Output Summary (Last 24 hours) at 10/5/2019 1539  Last data filed at 10/5/2019 0956  Gross per 24 hour   Intake 1360 ml   Output --   Net 1360 ml     Physical Exam  Constitutional: He appears well-developed.   HENT:   Head: Normocephalic.   Eyes: Conjunctivae are normal. Pupils are equal, round, and reactive to light.   Neck: Neck supple. No JVD present. No thyromegaly present.   Cardiovascular: Normal rate, regular rhythm, normal heart sounds and intact distal pulses. Exam reveals no gallop and no friction rub.   No murmur heard.  Pulmonary/Chest: Effort normal. No respiratory distress. He has wheezes. He has no rales. He exhibits no tenderness.     Diminished breath sound.  Rhonchi is improving.  Abdominal: Soft. Bowel sounds are normal. He exhibits no distension. There is no tenderness. There is no rebound and no guarding.   Musculoskeletal: He exhibits no edema, tenderness or deformity.   Lymphadenopathy:     He has no cervical adenopathy.   Neurological: He is alert. He displays normal reflexes. No cranial nerve deficit. He exhibits abnormal muscle tone. Coordination abnormal.   Skin: Skin is warm and dry. Capillary refill takes 2 to 3 seconds.  Right arm from previous IV site erythematous, warm, slightly firm.  Probably superficial thrombus.  Psychiatric: He has a normal mood and affect. His behavior is normal.   Nursing note and vitals reviewed.  Results Review:  Lab Results (last 24 hours)     Procedure Component Value Units Date/Time    Comprehensive Metabolic Panel [[id number]]  (Abnormal) Collected:  10/05/19 0411    Specimen:   Blood Updated:  10/05/19 0514     Glucose 98 mg/dL      BUN 8 mg/dL      Creatinine 0.54 mg/dL      Sodium 138 mmol/L      Potassium 3.8 mmol/L      Chloride 98 mmol/L      CO2 28.0 mmol/L      Calcium 8.8 mg/dL      Total Protein 6.9 g/dL      Albumin 3.50 g/dL      ALT (SGPT) 24 U/L      AST (SGOT) 24 U/L      Alkaline Phosphatase 62 U/L      Total Bilirubin 0.3 mg/dL      eGFR Non African Amer >150 mL/min/1.73      Globulin 3.4 gm/dL      A/G Ratio 1.0 g/dL      BUN/Creatinine Ratio 14.8     Anion Gap 12.0 mmol/L     Narrative:       GFR Normal >60  Chronic Kidney Disease <60  Kidney Failure <15    CBC & Differential [[id number]] Collected:  10/05/19 0411    Specimen:  Blood Updated:  10/05/19 0458    Narrative:       The following orders were created for panel order CBC & Differential.  Procedure                               Abnormality         Status                     ---------                               -----------         ------                     CBC Auto Differential[[id number]]        Abnormal            Final result                 Please view results for these tests on the individual orders.    CBC Auto Differential [[id number]]  (Abnormal) Collected:  10/05/19 0411    Specimen:  Blood Updated:  10/05/19 0458     WBC 9.48 10*3/mm3      RBC 4.12 10*6/mm3      Hemoglobin 13.1 g/dL      Hematocrit 38.2 %      MCV 92.7 fL      MCH 31.8 pg      MCHC 34.3 g/dL      RDW 13.2 %      RDW-SD 45.2 fl      MPV 10.1 fL      Platelets 320 10*3/mm3      Neutrophil % 58.9 %      Lymphocyte % 25.7 %      Monocyte % 12.3 %      Eosinophil % 2.4 %      Basophil % 0.5 %      Immature Grans % 0.2 %      Neutrophils, Absolute 5.57 10*3/mm3      Lymphocytes, Absolute 2.44 10*3/mm3      Monocytes, Absolute 1.17 10*3/mm3      Eosinophils, Absolute 0.23 10*3/mm3      Basophils, Absolute 0.05 10*3/mm3      Immature Grans, Absolute 0.02 10*3/mm3      nRBC 0.0 /100 WBC            Cultures:       Radiology Data:    Imaging  Results (last 24 hours)     ** No results found for the last 24 hours. **          No Known Allergies    Scheduled meds:     aspirin 81 mg Oral Daily   Or      aspirin 300 mg Rectal Daily   atorvastatin 80 mg Oral Nightly   ipratropium-albuterol 3 mL Nebulization 4x Daily - RT   nicotine 1 patch Transdermal Q24H   pantoprazole 40 mg Intravenous Q AM   sodium chloride 10 mL Intravenous Q12H   thiamine 100 mg Oral Daily       PRN meds:  •  albuterol  •  hypromellose  •  labetalol  •  sodium chloride    Assessment/Plan       Degeneration of lumbar or lumbosacral intervertebral disc    Smoker    Acute cerebrovascular accident (CVA) of cerebellum (CMS/HCC)    Alcoholism /alcohol abuse (CMS/HCC)    CVA (cerebral vascular accident) (CMS/HCC)    Hyperlipidemia    Dysphagia due to recent cerebral infarction      Plan:  Respiratory failure.  Resolved.  Extubated 9/30/19.    Consult ENT-laryngoscopy demonstrates some pressure wounds of the posterior vocal folds bilaterally, with some swelling on the left- this is likely due to the endotracheal tube and will resolve on its own.     CVA. Left MCA stroke. Continue aspirin.  Continue Lipitor. Neurology consult.   CTA of the head and neck- complete occlusion of the entire cervical left ICA, 30% stenosis of the right carotid bulb, mild stenosis at the ostium of the left vertebral artery.  MRI of the head- acute infarct in the left MCA and watershed territory- no hemorrhagic conversion, abnormal left ICA flow void.  Echocardiogram- ejection fraction 61%, diastolic dysfunction grade 1.  Follow-up with neurologist in 4 weeks.  Patient been instructed not to drive.     Right arm thrombus.  Warm compress.    Doppler ultrasound of right arm-  no evidence of deep venous thrombosis of the right upper  Extremity, evidence of superficial thrombus in the basilic vein at the elbow and in the cephalic vein from the elbow to the distal upper  arm.     Hypokalemia-resolved.     Reflux.  Protonix and Zofran as needed     COPD.  Continue duo nebs.     Alcohol withdrawal?. Patient stated last has drink about 2 weeks ago.  Patient has no sign of withdrawal symptoms.     Hyperlipidemia.  Continue Lipitor.     Tobacco abuse.  Tobacco counseling.  Patient refused nicotine patch.     Urinary incontinence.    DC Villalobos cath 9/30/2019.     SCD.     Nutrition.  Soft texture diet.     Deconditioning.  PT and OT consult.     Discharge Planning: Plan for rehab placement at [facility].    Candido Alas MD   10/05/19   3:39 PM                    Electronically signed by Candido Alas MD at 10/05/19 1545       Consult Notes (last 48 hours) (Notes from 10/04/19 2022 through 10/06/19 2022)     No notes of this type exist for this encounter.        Candido Alas MD   Physician   Medicine   Progress Notes   Signed   Date of Service:  10/04/19 1429   Creation Time:  10/04/19 1429            Signed             Show:Clear all  [x]Manual[x]Template[x]Copied    Added by:  [x]Candido Alas MD    []Indra for details       [facility] Medicine Services  INPATIENT PROGRESS NOTE     Length of Stay: 8  Date of Admission: 9/26/2019  Primary Care Physician: Jose Karimi MD     Subjective   Chief Complaint: CVA/aphasia     HPI   Patient is currently walking in the hallway with help.  Patient sounds clear every day.  Patient denies any chest pain or shortness of breath.  Patient tolerating soft diet.     Review of Systems   Constitutional: Positive for activity change, appetite change and fatigue. Negative for chills and fever.   HENT: Negative for hearing loss, nosebleeds, tinnitus and trouble swallowing.    Eyes: Negative for visual disturbance.   Respiratory: Positive for shortness of breath. Negative for cough, chest tightness and wheezing.    Cardiovascular: Negative for chest pain, palpitations and leg swelling.    Gastrointestinal: Negative for abdominal distention, abdominal pain, blood in stool, constipation, diarrhea, nausea and vomiting.   Endocrine: Negative for cold intolerance, heat intolerance, polydipsia, polyphagia and polyuria.   Genitourinary: Negative for decreased urine volume, difficulty urinating, dysuria, flank pain, frequency and hematuria.   Musculoskeletal: Positive for arthralgias, gait problem and myalgias. Negative for joint swelling.   Skin: Negative for rash.   Allergic/Immunologic: Negative for immunocompromised state.   Neurological: Positive for weakness. Negative for dizziness, syncope, light-headedness and headaches.   Hematological: Negative for adenopathy. Does not bruise/bleed easily.   Psychiatric/Behavioral: Positive for confusion. Negative for sleep disturbance. The patient is not nervous/anxious.     All pertinent negatives and positives are as above. All other systems have been reviewed and are negative unless otherwise stated.      Objective    Temp:  [98.2 °F (36.8 °C)-99.3 °F (37.4 °C)] 98.9 °F (37.2 °C)  Heart Rate:  [63-80] 80  Resp:  [16-18] 16  BP: (116-147)/(56-77) 116/73     Intake/Output Summary (Last 24 hours) at 10/4/2019 1429  Last data filed at 10/4/2019 1101      Gross per 24 hour   Intake 1050 ml   Output --   Net 1050 ml      Physical Exam  Constitutional: He appears well-developed.   HENT:   Head: Normocephalic.   Eyes: Conjunctivae are normal. Pupils are equal, round, and reactive to light.   Neck: Neck supple. No JVD present. No thyromegaly present.   Cardiovascular: Normal rate, regular rhythm, normal heart sounds and intact distal pulses. Exam reveals no gallop and no friction rub.   No murmur heard.  Pulmonary/Chest: Effort normal. No respiratory distress. He has wheezes. He has no rales. He exhibits no tenderness.     Diminished breath sound.  Rhonchi is improving.  Abdominal: Soft. Bowel sounds are normal. He exhibits no distension. There is no tenderness. There  is no rebound and no guarding.   Musculoskeletal: He exhibits no edema, tenderness or deformity.   Lymphadenopathy:     He has no cervical adenopathy.   Neurological: He is alert. He displays normal reflexes. No cranial nerve deficit. He exhibits abnormal muscle tone. Coordination abnormal.   Skin: Skin is warm and dry. Capillary refill takes 2 to 3 seconds.  Right arm from previous IV site erythematous, warm, slightly firm.  Probably superficial thrombus.  Psychiatric: He has a normal mood and affect. His behavior is normal.   Nursing note and vitals reviewed.  Results Review:           Lab Results (last 24 hours)      Procedure Component Value Units Date/Time     Vitamin B12 [[id number]]  (Normal) Collected:  10/04/19 0436     Specimen:  Blood Updated:  10/04/19 1217       Vitamin B-12 756 pg/mL       Folate [[id number]]  (Normal) Collected:  10/04/19 0436     Specimen:  Blood Updated:  10/04/19 1217       Folate 9.88 ng/mL       Comprehensive Metabolic Panel [[id number]]  (Abnormal) Collected:  10/04/19 0436     Specimen:  Blood Updated:  10/04/19 0558       Glucose 97 mg/dL         BUN 9 mg/dL         Creatinine 0.60 mg/dL         Sodium 139 mmol/L         Potassium 3.4 mmol/L         Chloride 97 mmol/L         CO2 28.0 mmol/L         Calcium 8.7 mg/dL         Total Protein 6.8 g/dL         Albumin 3.40 g/dL         ALT (SGPT) 23 U/L         AST (SGOT) 26 U/L         Alkaline Phosphatase 66 U/L         Total Bilirubin 0.3 mg/dL         eGFR Non African Amer 139 mL/min/1.73         Globulin 3.4 gm/dL         A/G Ratio 1.0 g/dL         BUN/Creatinine Ratio 15.0       Anion Gap 14.0 mmol/L       Narrative:        GFR Normal >60  Chronic Kidney Disease <60  Kidney Failure <15     Magnesium [[id number]]  (Normal) Collected:  10/04/19 0436     Specimen:  Blood Updated:  10/04/19 0558       Magnesium 1.7 mg/dL       CBC & Differential [[id number]] Collected:  10/04/19 0436     Specimen:  Blood Updated:  10/04/19 0533      Narrative:        The following orders were created for panel order CBC & Differential.  Procedure                               Abnormality         Status                     ---------                               -----------         ------                     CBC Auto Differential[[id number]]        Abnormal            Final result                  Please view results for these tests on the individual orders.     CBC Auto Differential [[id number]]  (Abnormal) Collected:  10/04/19 0436     Specimen:  Blood Updated:  10/04/19 0533       WBC 9.18 10*3/mm3         RBC 4.16 10*6/mm3         Hemoglobin 13.4 g/dL         Hematocrit 38.9 %         MCV 93.5 fL         MCH 32.2 pg         MCHC 34.4 g/dL         RDW 13.2 %         RDW-SD 45.3 fl         MPV 10.1 fL         Platelets 303 10*3/mm3         Neutrophil % 59.3 %         Lymphocyte % 25.6 %         Monocyte % 11.8 %         Eosinophil % 2.4 %         Basophil % 0.5 %         Immature Grans % 0.4 %         Neutrophils, Absolute 5.44 10*3/mm3         Lymphocytes, Absolute 2.35 10*3/mm3         Monocytes, Absolute 1.08 10*3/mm3         Eosinophils, Absolute 0.22 10*3/mm3         Basophils, Absolute 0.05 10*3/mm3         Immature Grans, Absolute 0.04 10*3/mm3         nRBC 0.0 /100 WBC               Cultures:        Respiratory Culture   Date Value Ref Range Status   09/27/2019 Light growth (2+) Normal Respiratory Esther   Final         Radiology Data:             Imaging Results (last 24 hours)      Procedure Component Value Units Date/Time     US Venous Doppler Upper Extremity Right (duplex) [[id number]] Collected:  10/03/19 1624       Updated:  10/03/19 1628     Narrative:        History: Pain and swelling        Impression:        Impression:  1. There is no evidence of deep venous thrombosis of the right upper  extremity.  2. There is evidence of superficial thrombus in the basilic vein at the  elbow and in the cephalic vein from the elbow to the distal upper arm.      Comments: Right upper extremity venous duplex exam was performed using  color Doppler flow, Doppler wave form analysis, and grayscale imaging,  with and without compression. There is no evidence of deep venous  thrombosis of the internal jugular, subclavian, axillary, and brachial  veins. There is evidence of superficial thrombus in the basilic vein at  the elbow and in the cephalic vein from the elbow to the distal upper  arm. There is no evidence of clot in the left subclavian vein.     This report was finalized on 10/03/2019 16:25 by Dr. Gaetano Teran MD.             No Known Allergies     Scheduled meds:      aspirin 81 mg Oral Daily   Or         aspirin 300 mg Rectal Daily   atorvastatin 80 mg Oral Nightly   folic acid 5 mg Oral Once   ipratropium-albuterol 3 mL Nebulization 4x Daily - RT   nicotine 1 patch Transdermal Q24H   pantoprazole 40 mg Intravenous Q AM   potassium chloride 40 mEq Oral Once   sodium chloride 10 mL Intravenous Q12H   thiamine 100 mg Oral Daily         PRN meds:  •  albuterol  •  hypromellose  •  labetalol  •  LORazepam **OR** LORazepam **OR** LORazepam **OR** LORazepam **OR** LORazepam **OR** LORazepam  •  sodium chloride     Assessment/Plan        Degeneration of lumbar or lumbosacral intervertebral disc    Smoker    Acute cerebrovascular accident (CVA) of cerebellum (CMS/HCC)    Alcoholism /alcohol abuse (CMS/HCC)    CVA (cerebral vascular accident) (CMS/HCC)    Hyperlipidemia    Dysphagia due to recent cerebral infarction        Plan:  Respiratory failure.  Resolved.  Extubated 9/30/19.    Consult ENT-laryngoscopy demonstrates some pressure wounds of the posterior vocal folds bilaterally, with some swelling on the left- this is likely due to the endotracheal tube and will resolve on its own.     CVA. Left MCA stroke. Continue aspirin.  Continue Lipitor. Neurology consult.   CTA of the head and neck- complete occlusion of the entire cervical left ICA, 30% stenosis of the right  carotid bulb, mild stenosis at the ostium of the left vertebral artery.  MRI of the head- acute infarct in the left MCA and watershed territory- no hemorrhagic conversion, abnormal left ICA flow void.  Echocardiogram- ejection fraction 61%, diastolic dysfunction grade 1.  Follow-up with neurologist in 4 weeks.  Patient been instructed not to drive.     Right arm thrombus.  Warm compress.  Doppler ultrasound of right arm.     Hypokalemia-p.o. potassium.     Reflux.  Protonix and Zofran as needed     COPD.  Continue duo nebs.     Alcohol withdrawal?.  Ciwa protocol.  Patient stated last has drink about 2 weeks ago.  Patient has no sign of withdrawal symptoms.     Hyperlipidemia.  Continue Lipitor.     Tobacco abuse.  Tobacco counseling.  Patient refused nicotine patch.     Urinary incontinence.    DC Villalobos cath 9/30/2019.     SCD.     Nutrition.  Soft texture diet.     Deconditioning.  PT and OT consult.     Discharge Planning: Plan for rehab placement at [facility].     Candido Alas MD   10/04/19   2:29 PM

## 2019-10-07 NOTE — PLAN OF CARE
"Problem: Patient Care Overview  Goal: Plan of Care Review  Outcome: Ongoing (interventions implemented as appropriate)   10/07/19 0961   Coping/Psychosocial   Plan of Care Reviewed With patient;sibling   Plan of Care Review   Progress improving   OTHER   Outcome Summary Swallow and cognitive therapy completed. Swallowing: Pt was observed with breakfast tray. Pt had several immediate coughs after drinking pudding thick coffee. However, no overt s/s of aspiration were noted with food intake. Pt attempted to complete swallow exercises but was unable to follow necessary directions to do so. Cognition: pt was able to answer 8/13 simple yes/no questions when provided with a visual \"yes/no\" choice board. Pt was then asked to sort items into groups with yes/no per sibling request. However, he was unable to sort items into proper categories despite the use of max cues. Pt did identify 4/8 pictures of objects without visual word prompts. He correctly identified the remaining 4/8 when given a field of 2 graphic name of the item. Pt additionally identified 3/5 numbers correctly when shown a graphic representation. Pt's brother is highly inquisitive as to when swallow/cognitive function will \"snap back.\" Pt and brother were educated on the benefits of therapy and continued effort towards rehabilitation. Will continue to follow and treat for swallow and cognitive enhancement.          "

## 2019-10-07 NOTE — PROGRESS NOTES
[facility] Medicine Services  INPATIENT PROGRESS NOTE    Patient Name: Jarod Ramírez Jr.  Date of Admission: 9/26/2019  Today's Date: 10/07/19  Length of Stay: 11  Primary Care Physician: Jose Karimi MD    Subjective   Chief Complaint: cough  HPI   Doing ok.  Doing well with therapy.  Walking the halls this AM.  Has a pretty persistent cough.  Supposed to be on thickened liquids, but not using thickener  Speech/aphasia has greatly improved      Review of Systems   Constitutional: Negative for fatigue and fever.   HENT: Negative for congestion and ear pain.    Eyes: Negative for redness and visual disturbance.   Respiratory: Positive for cough. Negative for shortness of breath and wheezing.    Cardiovascular: Negative for chest pain and palpitations.   Gastrointestinal: Negative for abdominal pain, diarrhea, nausea and vomiting.   Endocrine: Negative for cold intolerance and heat intolerance.   Genitourinary: Negative for dysuria and frequency.   Musculoskeletal: Negative for arthralgias and back pain.   Skin: Negative for rash and wound.   Neurological: Negative for dizziness and headaches.   Psychiatric/Behavioral: Negative for confusion. The patient is not nervous/anxious.       All pertinent negatives and positives are as above. All other systems have been reviewed and are negative unless otherwise stated.     Objective    Temp:  [97.5 °F (36.4 °C)-99.4 °F (37.4 °C)] 98.1 °F (36.7 °C)  Heart Rate:  [58-84] 66  Resp:  [16-20] 18  BP: (108-143)/(58-89) 108/63  Physical Exam        Results Review:  I have reviewed the labs, radiology results, and diagnostic studies.    Laboratory Data:   Results from last 7 days   Lab Units 10/07/19  0714 10/06/19  0411 10/05/19  0411   WBC 10*3/mm3 12.55* 10.54 9.48   HEMOGLOBIN g/dL 13.5 13.3 13.1   HEMATOCRIT % 39.9 38.7 38.2   PLATELETS 10*3/mm3 358 333 320        Results from last 7 days   Lab Units 10/07/19  0714 10/06/19  0411  10/05/19  0411   SODIUM mmol/L 137 137 138   POTASSIUM mmol/L 4.5 3.9 3.8   CHLORIDE mmol/L 95* 97* 98   CO2 mmol/L 30.0* 28.0 28.0   BUN mg/dL 12 9 8   CREATININE mg/dL 0.64* 0.59* 0.54*   CALCIUM mg/dL 9.0 9.0 8.8   BILIRUBIN mg/dL 0.2 0.2 0.3   ALK PHOS U/L 66 60 62   ALT (SGPT) U/L 35 31 24   AST (SGOT) U/L 31 29 24   GLUCOSE mg/dL 100* 129* 98       Culture Data:        Radiology Data:   Imaging Results (last 24 hours)     ** No results found for the last 24 hours. **          I have reviewed the patient's current medications.     Assessment/Plan     Active Hospital Problems    Diagnosis   • Dysphagia due to recent cerebral infarction   • Hyperlipidemia   • Acute cerebrovascular accident (CVA) of cerebellum (CMS/HCC)   • Alcoholism /alcohol abuse (CMS/HCC)   • CVA (cerebral vascular accident) (CMS/HCC)   • Smoker   • Degeneration of lumbar or lumbosacral intervertebral disc       Continue current care              Discharge Planning: I expect the patient to be discharged to rehab in ? days    Galo Bethea MD   10/07/19   9:25 AM

## 2019-10-07 NOTE — THERAPY TREATMENT NOTE
Acute Care - Occupational Therapy Treatment Note  [facility]     Patient Name: Jarod Ramírez Jr.  : 1960  MRN: [id number]  Today's Date: 10/7/2019  Onset of Illness/Injury or Date of Surgery: 19  Date of Referral to OT: 19  Referring Physician: Dr. lAas    Admit Date: 2019       ICD-10-CM ICD-9-CM   1. Dysphagia, unspecified type R13.10 787.20   2. Impaired mobility Z74.09 799.89   3. Impaired mobility and ADLs Z74.09 799.89   4. Aphasia R47.01 784.3     Patient Active Problem List   Diagnosis   • Degeneration of cervical intervertebral disc   • Closed fracture of lumbar vertebra (CMS/HCC)   • Degeneration of lumbar or lumbosacral intervertebral disc   • Smoker   • BMI 27.0-27.9,adult   • Acute cerebrovascular accident (CVA) of cerebellum (CMS/HCC)   • Alcoholism /alcohol abuse (CMS/HCC)   • CVA (cerebral vascular accident) (CMS/HCC)   • Hyperlipidemia   • Dysphagia due to recent cerebral infarction     Past Medical History:   Diagnosis Date   • Hypertension      Past Surgical History:   Procedure Laterality Date   • CATARACT EXTRACTION Left    • WRIST FRACTURE SURGERY Left        Therapy Treatment    Rehabilitation Treatment Summary     Row Name 10/07/19 1020 10/07/19 0846 10/07/19 0749       Treatment Time/Intention    Discipline  physical therapy assistant  (Pended)   -ZACH  speech language pathologist  (Pended)   -AS  occupational therapy assistant  -TS    Document Type  therapy note (daily note)  (Pended)   -  therapy note (daily note)  (Pended)   -AS  therapy note (daily note)  -TS    Subjective Information  complains of  (Pended)  Pt. c/o pain level 6/10 from hip to knee RLE  -  no complaints  (Pended)   -AS  no complaints  -TS2    Mode of Treatment  physical therapy  (Pended)   -JW  speech-language pathology  (Pended)   -AS  --    Patient/Family Observations  brother present  (Pended)   -JW  brother present  (Pended)   -AS  --    Patient Effort  good  (Pended)   -JW2  good   (Pended)   -AS  good  -TS2    Existing Precautions/Restrictions  fall  (Pended)   -JW2  --  fall  -TS2    Treatment Considerations/Comments  --  --  aphasia. decreased safety   -TS2    Patient Response to Treatment  good  (Pended)   -JW2  --  --    Recorded by [JW] Georgia Jama PTA Student 10/07/19 1110  [JW2] Georgia Jama PTA Student 10/07/19 1134 [AS] Eliecer iWlliamson, Speech Therapy Student 10/07/19 0945 [TS] Yaritza Lopez, COTTON/L 10/07/19 0750  [TS2] Yaritza Lopez COTTON/L 10/07/19 1335    Row Name 10/07/19 0749             Cognitive Assessment/Intervention- PT/OT    Follows Commands (Cognition)  follows one step commands;75-90% accuracy;visual queue  -TS      Personal Safety Interventions  fall prevention program maintained;gait belt;nonskid shoes/slippers when out of bed  -TS      Recorded by [TS] Yaritza Lopez COTTON/L 10/07/19 1335      Row Name 10/07/19 1020             Cognitive Assessment Intervention- SLP    Cognition, Comment  Pt. was able to count exercise repitiitions correctly with verbal cueing/Pt. able to follow one step commands  (Pended)   -JW      Recorded by [JW] Georgia Jama PTA Student 10/07/19 1143      Row Name 10/07/19 1020             Verbal Expression Assessment/Intervention    Verbal Expression  severe impairment  (Pended)   -      Sentence Formulation  severe impairment  (Pended)   -      Verbal Expression, Comment  Aphasic  (Pended)   -JW      Recorded by [JW] Georgia Jama PTA Student 10/07/19 1143      Row Name 10/07/19 1020             Safety Issues, Functional Mobility    Safety Issues Affecting Function (Mobility)  sequencing abilities;impulsivity  (Pended)   -JW      Recorded by [JW] Georgia Jama PTA Student 10/07/19 1143      Row Name 10/07/19 1020             Bed Mobility Assessment/Treatment    Comment (Bed Mobility)  in chair  -KJ      Recorded by [KJ] Helen Kirby PTA 10/07/19 1216      Row Name 10/07/19 1020 10/07/19  0749          Functional Mobility    Functional Mobility- Ind. Level  contact guard assist;supervision required;1 person  (Pended)   -  supervision required;conditional independence  -TS     Functional Mobility- Safety Issues  sequencing ability decreased  (Pended)   -  --     Functional Mobility- Comment  --  in room, in hallway, in rehab room  -TS     Recorded by [JW] Georgia Jama PTA Student 10/07/19 1143 [TS] Yaritza Lopez COTTON/L 10/07/19 1335     Row Name 10/07/19 0749             Transfer Assessment/Treatment    Transfer Assessment/Treatment  sit-stand transfer;stand-sit transfer  -TS      Recorded by [TS] Yaritza Lopez COTTON/L 10/07/19 1335      Row Name 10/07/19 1020 10/07/19 0749          Sit-Stand Transfer    Sit-Stand [place] (Transfers)  contact guard;1 person assist  (Pended)   -  independent  -TS     Recorded by [JW] Georgia Jama PTA Student 10/07/19 1143 [TS] Yaritza Lopez COTTON/L 10/07/19 1335     Row Name 10/07/19 1020 10/07/19 0749          Stand-Sit Transfer    Stand-Sit [place] (Transfers)  contact guard;1 person assist  (Pended)   -  independent  -TS     Recorded by [JW] Georgia Jama PTA Student 10/07/19 1143 [TS] Yaritza Lopez COTTON/L 10/07/19 1335     Row Name 10/07/19 1020             Gait/Stairs Assessment/Training    Gait/Stairs Assessment/Training  gait pattern  (Pended)   -      [place] Level (Gait)  contact guard;verbal cues  (Pended)   -      Distance in Feet (Gait)  150'x2  (Pended)   -      Pattern (Gait)  swing-through  (Pended)   -      Deviations/Abnormal Patterns (Gait)  steppage  (Pended)   -      Comment (Gait/Stairs)  Pt. had decreased heel strike due to RLE ankle DF  (Pended)   -JW      Recorded by [JW] Georgia Jama PTA Student 10/07/19 1143      Row Name 10/07/19 0749             ADL Assessment/Intervention    BADL Assessment/Intervention  upper body dressing  -TS      Comment, IADL  Assessment/Training  Pt was taken to nutrition room to prepare his own coffee. Pt was provided instruction and visual demonstration and was able to complete task.   -TS      Additional Documentation  Comment, IADL Assessment/Training (Row)  -TS      Recorded by [TS] Yaritza Lopez COTA/L 10/07/19 1335      Row Name 10/07/19 0749             Upper Body Dressing Assessment/Training    Upper Body Dressing [place] Level  don;set up;pull-over garment  -TS      Upper Body Dressing Position  unsupported standing  -TS      Recorded by [TS] Yaritza Lopez COTA/L 10/07/19 1335      Row Name 10/07/19 1020             Therapeutic Exercise    Lower Extremity (Therapeutic Exercise)  LAQ (long arc quad), bilateral;marching while seated;marching while standing  (Pended)   -JW      Lower Extremity Range of Motion (Therapeutic Exercise)  ankle dorsiflexion/plantar flexion, bilateral;hip internal/external rotation, bilateral;hip abduction/adduction, bilateral  (Pended)   -JW      Exercise Type (Therapeutic Exercise)  AROM (active range of motion)  (Pended)   -JW      Position (Therapeutic Exercise)  seated;standing  (Pended)   -JW      Sets/Reps (Therapeutic Exercise)  20  (Pended)   -JW      Comment (Therapeutic Exercise)  Limited RLE ankle DF  (Pended)   -JW      Recorded by [JW] Georgia Jama PTA Student 10/07/19 0850      Row Name 10/07/19 1020             Gross Motor Coordination    Gross Motor Impairments  coordination  (Pended)   -JW      Recorded by [JW] Georgia Jama PTA Student 10/07/19 1157      Row Name 10/07/19 1020             Standing Balance Activity    Activities Performed (Standing, Balance Training)  perturbations in stance;feet together in stance  (Pended)   -JW      Support Needed for Balance (Standing, Balance Training)  CGA;minimal external support for balance, 75% patient effort;1 person assist  (Pended)   -JW      Recorded by [JW] Georgia Jama PTA Student 10/07/19 4490       Row Name 10/07/19 0749             Dynamic Balance Activity    Therapeutic Training Performed (Dynamic Balance)  other (see comments)  -TS      Support Needed for Balance (Dynamic Balance Training)  SBA;balances without upper extremity support  -TS      Comment (Dynamic Balance Training)  pt was able to work on gross motor coordination/BUE integration while completing dynamic standing balance exercise  -TS      Recorded by [TS] Yaritza Lopez COTA/L 10/07/19 1335      Row Name 10/07/19 1020 10/07/19 0749          Positioning and Restraints    Pre-Treatment Position  sitting in chair/recliner  (Pended)   -JW  standing in room  -TS     Post Treatment Position  chair  (Pended)   -  bed  -TS     In Bed  --  sitting EOB;call light within reach;encouraged to call for assist;side rails up x2  -TS     Recorded by [JW] Georgia Jama, PTA Student 10/07/19 1157 [TS] Yaritza Lopez COTTON/L 10/07/19 1335     Row Name 10/07/19 1020 10/07/19 0749          Pain Scale: Numbers Pre/Post-Treatment    Pain Scale: Numbers, Pretreatment  6/10  (Pended)   -JW  0/10 - no pain  -TS     Pain Scale: Numbers, Post-Treatment  6/10  (Pended)   -JW  0/10 - no pain  -TS     Recorded by [JW] Georgia Jama, PTA Student 10/07/19 1157 [TS] Yaritza Lopez COTA/L 10/07/19 1335     Row Name 10/07/19 0846             Pain Scale: FACES Pre/Post-Treatment    Pain: FACES Scale, Pretreatment  0-->no hurt  (Pended)   -AS      Pain: FACES Scale, Post-Treatment  0-->no hurt  (Pended)   -AS      Recorded by [AS] Eliecer Williamson, Speech Therapy Student 10/07/19 0945      Row Name                Wound 09/27/19 0800 Right posterior finger    Wound - Properties Group Date first assessed: 09/27/19 [NW] Time first assessed: 0800 [NW] Present on Hospital Admission: Y [NW] Side: Right [NW] Orientation: posterior [NW] Location: finger [NW] Recorded by:  [NW] Dunia Multani RN 09/27/19 1624    Row Name 10/07/19 0846             Outcome  Summary/Treatment Plan (SLP)    Daily Summary of Progress (SLP)  progress towards functional goals is fair  (Pended)   -AS      Barriers to Overall Progress (SLP)  aphasia  (Pended)   -AS      Plan for Continued Treatment (SLP)  continue to follow and treat  (Pended)   -AS      Anticipated Dischage Disposition  inpatient rehabilitation facility  (Pended)   -AS      Recorded by [AS] Eliecer Williamson, Speech Therapy Student 10/07/19 0945        User Key  (r) = Recorded By, (t) = Taken By, (c) = Cosigned By    Initials Name Effective Dates Discipline    KJ Helen Kirby, PTA 08/02/16 -  PT    TS Yaritza Lopez, COTTON/L 08/02/16 -  OT    NW Dunia Multani RN 07/12/18 -  Nurse    AS Eliecer Williamson, Speech Therapy Student 09/03/19 -  SLP    Georgia Jean Baptiste PTA Student 09/18/19 -  PT        Wound 09/27/19 0800 Right posterior finger (Active)   Dressing Appearance open to air 10/7/2019  8:15 AM   Closure None 10/7/2019  8:15 AM   Base dry;white 10/7/2019  8:15 AM   Drainage Amount none 10/7/2019  8:15 AM     Rehab Goal Summary     Row Name 10/07/19 1300 10/07/19 0846          Toileting Goal 1 (OT)    Activity/Device (Toileting Goal 1, OT)  toileting skills, all;commode  -TS  --     [place] Level/Cues Needed (Toileting Goal 1, OT)  independent  -TS  --     Time Frame (Toileting Goal 1, OT)  long term goal (LTG);by discharge  -TS  --     Progress/Outcome (Toileting Goal 1, OT)  goal met per pt  -TS  --        Balance Goal 1 (OT)    Activity/Assistive Device (Balance Goal 1, OT)  sitting, dynamic;standing, dynamic;standing, static  -TS  --     [place] Level/Cues Needed (Balance Goal 1, OT)  standby assist  -TS  --     Time Frame (Balance Goal 1, OT)  long term goal (LTG);by discharge  -TS  --     Progress/Outcomes (Balance Goal 1, OT)  goal met  -TS  --        Swallow Goals (SLP)    Oral Nutrition/Hydration Goal Selection (SLP)  --  oral nutrition/hydration, SLP goal 1  (Pended)   -AS      Lingual Strengthening Goal Selection (SLP)  --  lingual strengthening, SLP goal 1  (Pended)   -AS     Pharyngeal Strengthening Exercise Goal Selection (SLP)  --  pharyngeal strengthening exercise, SLP goal 1  (Pended)   -AS     Additional Documentation  --  lingual strengthening goal selection (SLP);pharyngeal strengthening exercise goal selection (SLP)  (Pended)   -AS        Oral Nutrition/Hydration Goal 1 (SLP)    Oral Nutrition/Hydration Goal 1, SLP  --  LTG: Patient will tolerate LRD without s/s of aspiration.  (Pended)   -AS     Time Frame (Oral Nutrition/Hydration Goal 1, SLP)  --  by discharge  (Pended)   -AS     Barriers (Oral Nutrition/Hydration Goal 1, SLP)  --  Aphasia  (Pended)   -AS     Progress/Outcomes (Oral Nutrition/Hydration Goal 1, SLP)  --  continuing progress toward goal  (Pended)   -AS        Lingual Strengthening Goal 1 (SLP)    Activity (Lingual Strengthening Goal 1, SLP)  --  increase tongue back strength  (Pended)   -AS     Increase Tongue Back Strength  --  lingual movement exercises  (Pended)   -AS     Wetzel/Accuracy (Lingual Strengthening Goal 1, SLP)  --  independently (over 90% accuracy)  (Pended)   -AS     Time Frame (Lingual Strengthening Goal 1, SLP)  --  short term goal (STG);by discharge  (Pended)   -AS     Barriers (Lingual Strengthening Goal 1, SLP)  --  n/a  (Pended)   -AS     Progress/Outcomes (Lingual Strengthening Goal 1, SLP)  --  goal ongoing  (Pended)   -AS        Pharyngeal Strengthening Exercise Goal 1 (SLP)    Activity (Pharyngeal Strengthening Goal 1, SLP)  --  increase timing;increase superior movement of the hyolaryngeal complex;increase anterior movement of the hyolaryngeal complex;increase epiglottic inversion and retroflexion;increase squeeze/positive pressure generation  (Pended)   -AS     Increase Timing  --  gustatory stimulation (sour/cold)  (Pended)   -AS     Increase Anterior Movement of the Hyolaryngeal Complex  --  shaker  (Pended)   -AS      Increase Epiglottic Inversion and Retroflexion  --  Mendelsohn  (Pended)   -AS     Increase Squeeze/Positive Pressure Generation  --  hard effortful swallow  (Pended)   -AS     Increase Tongue Base Retraction  --  yoni  (Pended)   -AS     Gl[place]/Accuracy (Pharyngeal Strengthening Goal 1, SLP)  --  independently (over 90% accuracy)  (Pended)   -AS     Time Frame (Pharyngeal Strengthening Goal 1, SLP)  --  short term goal (STG);by discharge  (Pended)   -AS     Barriers (Pharyngeal Strengthening Goal 1, SLP)  --  cognitive status  (Pended)   -AS     Progress/Outcomes (Pharyngeal Strengthening Goal 1, SLP)  --  progress slower than expected  (Pended)   -AS        Communication Treatment Objective and Progress Goals (SLP)    Auditory Comprehension Treatment Objectives  --  Auditory Comprehension Treatment Objectives (Group)  (Pended)   -AS     Verbal Expression Treatment Objectives  --  Verbal Expression Treatment Objectives (Group)  (Pended)   -AS     Graphic Expression Treatment Objectives  --  Graphic Expression Treatment Objectives (Group)  (Pended)   -AS     Augmentative/Alternative Communication Objectives  --  Augmentative/Alternative Communication Objectives (Group)  (Pended)   -AS        Auditory Comprehension Treatment Objectives    Words/Phrases/Sentences Selection  --  words/phrases/sentences, SLP goal 1  (Pended)   -AS     Comprehend Questions Selection  --  comprehend questions, SLP goal 1  (Pended)   -AS     Follow Directions Selection  --  follow directions, SLP goal 1  (Pended)   -AS        Words/Phrases/Sentences Goal 1 (SLP)    Improve Ability to Comprehend Words/Phrases/Sentences Through: Goal 1 (SLP)  --  identify objects, field of;identify pictures, field of;90%;independently (over 90% accuracy);other (comment)  (Pended)   -AS     Time Frame (Identify Objects and Pictures Goal 1, SLP)  --  short term goal (STG);by discharge  (Pended)   -AS     Barriers (Identify Objects and Pictures Goal 1,  SLP)  --  aphasia  (Pended)   -AS     Progress (Ability to Contruct Words/Phrases/Sentences Goal 1, SLP)  --  70%;independently (over 90% accuracy)  (Pended)   -AS     Progress/Outcomes (Identify Objects and Pictures Goal 1, SLP)  --  continuing progress toward goal  (Pended)   -AS        Comprehend Questions Goal 1 (SLP)    Improve Ability to Comprehend Questions Goal 1 (SLP)  --  simple yes/no questions;complex yes/no questions;90%;independently (over 90% accuracy)  (Pended)   -AS     Time Frame (Comprehend Questions Goal 1, SLP)  --  short term goal (STG);by discharge  (Pended)   -AS     Barriers (Comprehend Questions Goal 1, SLP)  --  aphaisa  (Pended)   -AS     Progress (Ability to Comprehend Questions Goal 1, SLP)  --  60%;with minimal cues (75-90%)  (Pended)   -AS     Progress/Outcomes (Comprehend Questions Goal 1, SLP)  --  continuing progress toward goal  (Pended)   -AS        Follow Directions Goal 2 (SLP)    Improve Ability to Follow Directions Goal 1 (SLP)  --  1 step direction with objects;1 step direction without objects;90%;independently (over 90% accuracy)  (Pended)   -AS     Time Frame (Follow Directions Goal 1, SLP)  --  short term goal (STG);by discharge  (Pended)   -AS     Progress/Outcomes (Follow Directions Goal 1, SLP)  --  goal ongoing  (Pended)   -AS        Verbal Expression Treatment Objectives    Word Retrieval Skills Selection  --  word retrieval, SLP goal 1  (Pended)   -AS     Phrase and Sentence Level Response Selection  --  phrase and sentence level response, SLP goal 1  (Pended)   -AS        Word Retrieval Skills Goal 1 (SLP)    Improve Word Retrieval Skills By Goal 1 (SLP)  --  completing automatic speech task, days of the week;completing automatic speech task, months;confrontational naming task;repeating words;90%;independently (over 90% accuracy)  (Pended)   -AS     Time Frame (Word Retrieval Goal 1, SLP)  --  short term goal (STG);by discharge  (Pended)   -AS     Progress/Outcomes  (Word Retrieval Goal 1, SLP)  --  goal ongoing  (Pended)   -AS        Graphic Expression Treatment Objectives    Graphic Expression of Shapes, Letters and Numbers Selection  --  graphic expression of shapes, letters, and numbers, SLP goal 1  (Pended)   -AS        Graphic Expression of Shapes, Letters, Numbers Goal 1 (SLP)    Improve Graphic Expression of Shapes, Letters, and Numbers Goal 1 (SLP)  --  copy shapes, numbers, and letters;writing dictated number and letters;90%;independently (over 90% accuracy)  (Pended)   -AS     Time Frame (Graphic Expression of Shapes, Letters, and Numbers Goal 1, SLP)  --  short term goal (STG);by discharge  (Pended)   -AS     Progress/Outcomes (Graphic Expression of Shapes, Letters, and Numbers Goal 1, SLP)  --  goal ongoing  (Pended)   -AS        Augmentative/Alternative Communication Objectives    Augmentative/Alternative Communication Selection  --  augmentative/alternative communication, SLP goal 1  (Pended)   -AS        Augmentative/Alternative Communication Objectives Goal 1 (SLP    Communication (Augmentative/Alternative Communication Goal 1, SLP)  --  improve ability to use low tech augmentative/alternative communication device  (Pended)   -AS     Improve Communication by (Augmentative/Alternative Communication Goal 1, SLP)  --  identify picture, field of ____;identify word, field of ____;alphabet/picture board;90%;independently (over 90% accuracy)  (Pended)   -AS     Time Frame (Augmentative/Alternative Communication Goal 1, SLP)  --  short term goal (STG);by discharge  (Pended)   -AS     Progress (Augmentative/Alternative Communication Goal 1, SLP)  --  70%;with minimal cues (75-90%)  (Pended)   -AS     Progress/Outcomes (Augmentative/Alternative Communication Goal 1, SLP)  --  good progress toward goal  (Pended)   -AS       User Key  (r) = Recorded By, (t) = Taken By, (c) = Cosigned By    Initials Name Provider Type Discipline    Yaritza Hand COTA/KIKO  Occupational Therapy Assistant OT    AS Eliecer Williamson, Speech Therapy Student Speech Therapy Student SLP        Occupational Therapy Education     Title: PT OT SLP Therapies (In Progress)     Topic: Occupational Therapy (Done)     Point: ADL training (Done)     Description: Instruct learner(s) on proper safety adaptation and remediation techniques during self care or transfers.   Instruct in proper use of assistive devices.    Learning Progress Summary           Patient Acceptance, E, VU by ZACH at 10/7/2019 11:57 AM    Comment:  Pt. was educated on focusing on gait pattern and widening JUSTA    Acceptance, D,TB, DU by ELISABETH at 10/4/2019  2:55 PM    Comment:  Pt. was educated on widening JUSTA with turnig during gait; Pt. educated on inportance of performing RLE ankle DF throughout the day to increase ROM needed for gait.    Acceptance, E, DU by ZACH at 10/3/2019 11:22 AM    Comment:  Pt. educated on safety awareness/impulsivity performs better with visual cueing    Acceptance, E, VU by YUDI at 10/1/2019 10:40 AM    Comment:  OT POC, adls, t/fs, bed mobility, safety/environmental modifications, processing, d/c planning.                   Point: Home exercise program (Done)     Description: Instruct learner(s) on appropriate technique for monitoring, assisting and/or progressing therapeutic exercises/activities.    Learning Progress Summary           Patient Acceptance, E, VU by ZACH at 10/7/2019 11:57 AM    Comment:  Pt. was educated on focusing on gait pattern and widening JUSTA    Acceptance, D,TB, DU by ELISABETH at 10/4/2019  2:55 PM    Comment:  Pt. was educated on widening JUSTA with turnig during gait; Pt. educated on inportance of performing RLE ankle DF throughout the day to increase ROM needed for gait.    Acceptance, E, DU by ZACH at 10/3/2019 11:22 AM    Comment:  Pt. educated on safety awareness/impulsivity performs better with visual cueing                   Point: Precautions (Done)     Description: Instruct learner(s) on  prescribed precautions during self-care and functional transfers.    Learning Progress Summary           Patient Acceptance, E, VU by ZACH at 10/7/2019 11:57 AM    Comment:  Pt. was educated on focusing on gait pattern and widening JUSTA    Acceptance, D,TB, DU by ELISABETH at 10/4/2019  2:55 PM    Comment:  Pt. was educated on widening JUSTA with turnig during gait; Pt. educated on inportance of performing RLE ankle DF throughout the day to increase ROM needed for gait.    Acceptance, E, DU by ZACH at 10/3/2019 11:22 AM    Comment:  Pt. educated on safety awareness/impulsivity performs better with visual cueing                   Point: Body mechanics (Done)     Description: Instruct learner(s) on proper positioning and spine alignment during self-care, functional mobility activities and/or exercises.    Learning Progress Summary           Patient Acceptance, E, VU by ZACH at 10/7/2019 11:57 AM    Comment:  Pt. was educated on focusing on gait pattern and widening JUSTA    Acceptance, D,TB, DU by ELISABETH at 10/4/2019  2:55 PM    Comment:  Pt. was educated on widening JUSTA with turnig during gait; Pt. educated on inportance of performing RLE ankle DF throughout the day to increase ROM needed for gait.    Acceptance, E, DU by ZACH at 10/3/2019 11:22 AM    Comment:  Pt. educated on safety awareness/impulsivity performs better with visual cueing    Acceptance, E, VU by YUDI at 10/1/2019 10:40 AM    Comment:  OT POC, adls, t/fs, bed mobility, safety/environmental modifications, processing, d/c planning.                               User Key     Initials Effective Dates Name Provider Type Discipline     08/02/16 -  Helen Kirby, PTA Physical Therapy Assistant PT    YUDI 10/12/18 -  Christina Dahl OTR/L Occupational Therapist OT     09/18/19 -  Georgia Jama PTA Student PTA Student PT                OT Recommendation and Plan  Outcome Summary/Treatment Plan (OT)  Daily Summary of Progress (OT): progress towards functional goals is  fair  Daily Summary of Progress (OT): progress towards functional goals is fair  Plan of Care Review  Plan of Care Reviewed With: patient  Plan of Care Reviewed With: patient  Outcome Summary: Pt S for bed mobility and came into long sitting from supine in bed then came to EOB. Pt transfers with CGA with verbal cues to stand and increased time to process cue. Pt ambulated to BR with CGA and stood at side sink for grooming tasks with CGA after set up. Pt followed 50-60% of directions provided with initial simple one step command. Pt would benefit from acute rehab at discharge. Continue OT POC   Outcome Measures     Row Name 10/07/19 1300 10/05/19 1100          How much help from another person do you currently need...    Turning from your back to your side while in flat bed without using bedrails?  --  4  -NW     Moving from lying on back to sitting on the side of a flat bed without bedrails?  --  4  -NW     Moving to and from a bed to a chair (including a wheelchair)?  --  4  -NW     Standing up from a chair using your arms (e.g., wheelchair, bedside chair)?  --  4  -NW     Climbing 3-5 steps with a railing?  --  4  -NW     To walk in hospital room?  --  4  -NW     AM-PAC 6 Clicks Score (PT)  --  24  -NW        How much help from another is currently needed...    Putting on and taking off regular lower body clothing?  3  -TS  --     Bathing (including washing, rinsing, and drying)  3  -TS  --     Toileting (which includes using toilet bed pan or urinal)  4  -TS  --     Putting on and taking off regular upper body clothing  4  -TS  --     Taking care of personal grooming (such as brushing teeth)  4  -TS  --     Eating meals  4  -TS  --     AM-PAC 6 Clicks Score (OT)  22  -TS  --        Functional Assessment    Outcome Measure Options  AM-PAC 6 Clicks Daily Activity (OT)  -TS  AM-PAC 6 Clicks Basic Mobility (PT)  -NW       User Key  (r) = Recorded By, (t) = Taken By, (c) = Cosigned By    Initials Name Provider Type     TS Yaritza Lopez, COTTON/L Occupational Therapy Assistant    NW Gladys Ortega, PTA Physical Therapy Assistant           Time Calculation:   Time Calculation- OT     Row Name 10/07/19 1338             Time Calculation- OT    OT Start Time  0746  -TS      OT Stop Time  0824  -TS      OT Time Calculation (min)  38 min  -TS      Total Timed Code Minutes- OT  38 minute(s)  -TS      OT Received On  10/07/19  -TS         Timed Charges    65563 - OT Self Care/Mgmt Minutes  38  -TS        User Key  (r) = Recorded By, (t) = Taken By, (c) = Cosigned By    Initials Name Provider Type    TS Yaritza Lopez, COTTON/L Occupational Therapy Assistant        Therapy Charges for Today     Code Description Service Date Service Provider Modifiers Qty    [id number] HC OT SELF CARE/MGMT/TRAIN EA 15 MIN 10/7/2019 Yaritza Lopez COTA/L GO 3               Yaritza MEANS. BISI Lopez  10/7/2019

## 2019-10-07 NOTE — PLAN OF CARE
Problem: Patient Care Overview  Goal: Plan of Care Review  Outcome: Ongoing (interventions implemented as appropriate)   10/07/19 1200   Coping/Psychosocial   Plan of Care Reviewed With patient   Plan of Care Review   Progress improving   OTHER   Outcome Summary Pt. ambulated 150'x2 with CGA/supervision with verbal cueing to widen JUSTA and to be aware of excessive hip hiking RLE to clear foot drop. Pt. performed standing balance activities with CGA/Supervison with verbal cuing needed due to decreased sequencing ability. Pt. performed seated exercises with verbal/tactile cuing needed to perform correctly. Pt. was able to count repitions correctly during seated exercises.

## 2019-10-07 NOTE — PROGRESS NOTES
Continued Stay Note   Duyen     Patient Name: Jarod Ramírez Jr.  MRN: [id number]  Today's Date: 10/7/2019    Admit Date: 9/26/2019    Discharge Plan     Row Name 10/07/19 1015       Plan    Plan Comments  [facility]. UPDATED INFO WILL BE SENT TO PT INSURANCE. PRECERT IS STILL PENDING. AWAIT INSURANCE DECISION.         Discharge Codes    No documentation.             JONO Sharif

## 2019-10-07 NOTE — PLAN OF CARE
Problem: Patient Care Overview  Goal: Plan of Care Review  Outcome: Ongoing (interventions implemented as appropriate)   10/07/19 0515   Coping/Psychosocial   Plan of Care Reviewed With patient   Plan of Care Review   Progress no change   OTHER   Outcome Summary VSS. Alert, able to follow more commands this shift. NIH-3, CIWA-2. Aphasia slight better. Still able to turn off alarms. Brother here to speak w/SW about placement today. No c/o pain. Safety maintained.       Problem: Fall Risk (Adult)  Goal: Absence of Fall  Outcome: Ongoing (interventions implemented as appropriate)      Problem: Stroke (Ischemic) (Adult)  Goal: Signs and Symptoms of Listed Potential Problems Will be Absent, Minimized or Managed (Stroke)  Outcome: Ongoing (interventions implemented as appropriate)      Problem: Skin Injury Risk (Adult)  Goal: Skin Health and Integrity  Outcome: Ongoing (interventions implemented as appropriate)      Problem: Nutrition, Imbalanced: Inadequate Oral Intake (Adult)  Goal: Prevent Further Weight Loss  Outcome: Ongoing (interventions implemented as appropriate)      Problem: VTE, DVT and PE (Adult)  Goal: Signs and Symptoms of Listed Potential Problems Will be Absent, Minimized or Managed (VTE, DVT and PE)  Outcome: Ongoing (interventions implemented as appropriate)

## 2019-10-07 NOTE — THERAPY TREATMENT NOTE
"Acute Care - Speech Language Pathology Treatment Note  [facility]     Patient Name: Jarod Ramírez Jr.  : 1960  MRN: [id number]  Today's Date: 10/7/2019         Admit Date: 2019  Swallow and cognitive therapy completed. Swallowing: Pt was observed with breakfast tray. Pt had several immediate coughs after drinking pudding thick coffee. However, no overt s/s of aspiration were noted with food intake. Pt attempted to complete swallow exercises but was unable to follow necessary directions to do so. Cognition: pt was able to answer 8/13 simple yes/no questions when provided with a visual \"yes/no\" choice board. Pt was then asked to sort items into groups with yes/no per sibling request. However, he was unable to sort items into proper categories despite the use of max cues. Pt did identify 4/8 pictures of objects without visual word prompts. He correctly identified the remaining 4/8 when given a field of 2 graphic name of the item. Pt additionally identified 3/5 numbers correctly when shown a graphic representation. Pt's brother is highly inquisitive as to when swallow/cognitive function will \"snap back.\" Pt and brother were educated on the benefits of therapy and continued effort towards rehabilitation. Will continue to follow and treat for swallow and cognitive enhancement.   Eliecer Williamson, Speech Student   Visit Dx:      ICD-10-CM ICD-9-CM   1. Dysphagia, unspecified type R13.10 787.20   2. Impaired mobility Z74.09 799.89   3. Impaired mobility and ADLs Z74.09 799.89   4. Aphasia R47.01 784.3     Patient Active Problem List   Diagnosis   • Degeneration of cervical intervertebral disc   • Closed fracture of lumbar vertebra (CMS/HCC)   • Degeneration of lumbar or lumbosacral intervertebral disc   • Smoker   • BMI 27.0-27.9,adult   • Acute cerebrovascular accident (CVA) of cerebellum (CMS/HCC)   • Alcoholism /alcohol abuse (CMS/HCC)   • CVA (cerebral vascular accident) (CMS/HCC)   • Hyperlipidemia   • " Dysphagia due to recent cerebral infarction        Therapy Treatment  Rehabilitation Treatment Summary     Row Name 10/07/19 0846 10/07/19 0749          Treatment Time/Intention    Discipline  speech language pathologist  (Pended)   -AS  occupational therapy assistant  -TS     Document Type  therapy note (daily note)  (Pended)   -AS  therapy note (daily note)  -TS     Subjective Information  no complaints  (Pended)   -AS  --     Mode of Treatment  speech-language pathology  (Pended)   -AS  --     Patient/Family Observations  brother present  (Pended)   -AS  --     Patient Effort  good  (Pended)   -AS  --     Recorded by [AS] Eliecer Williamson, Speech Therapy Student 10/07/19 0945 [TS] Yaritza Lopez COTA/L 10/07/19 0750     Row Name 10/07/19 0846             Pain Scale: FACES Pre/Post-Treatment    Pain: FACES Scale, Pretreatment  0-->no hurt  (Pended)   -AS      Pain: FACES Scale, Post-Treatment  0-->no hurt  (Pended)   -AS      Recorded by [AS] Eliecer Williamson Speech Therapy Student 10/07/19 0945      Row Name                Wound 09/27/19 0800 Right posterior finger    Wound - Properties Group Date first assessed: 09/27/19 [NW] Time first assessed: 0800 [NW] Present on Hospital Admission: Y [NW] Side: Right [NW] Orientation: posterior [NW] Location: finger [NW] Recorded by:  [NW] Dunia Multani RN 09/27/19 1624    Row Name 10/07/19 0846             Outcome Summary/Treatment Plan (SLP)    Daily Summary of Progress (SLP)  progress towards functional goals is fair  (Pended)   -AS      Barriers to Overall Progress (SLP)  aphasia  (Pended)   -AS      Plan for Continued Treatment (SLP)  continue to follow and treat  (Pended)   -AS      Anticipated Dischage Disposition  inpatient rehabilitation facility  (Pended)   -AS      Recorded by [AS] Eliecer Williamson Speech Therapy Student 10/07/19 0945        User Key  (r) = Recorded By, (t) = Taken By, (c) = Cosigned By    Initials Name Effective Dates Discipline     Yaritza Hand, COTTON/L 08/02/16 -  OT    NW Dunia Multani RN 07/12/18 -  Nurse    AS Eliecer Williamson, Speech Therapy Student 09/03/19 -  SLP          EDUCATION  The patient has been educated in the following areas:   Cognitive Impairment.    SLP Recommendation and Plan  Daily Summary of Progress (SLP): (P) progress towards functional goals is fair  Barriers to Overall Progress (SLP): (P) aphasia  Plan for Continued Treatment (SLP): (P) continue to follow and treat  Anticipated Dischage Disposition: (P) inpatient rehabilitation facility             SLP GOALS     Row Name 10/07/19 0846 10/04/19 1351          Oral Nutrition/Hydration Goal 1 (SLP)    Oral Nutrition/Hydration Goal 1, SLP  LTG: Patient will tolerate LRD without s/s of aspiration.  (Pended)   -AS  LTG: Patient will tolerate LRD without s/s of aspiration.  -MB     Time Frame (Oral Nutrition/Hydration Goal 1, SLP)  by discharge  (Pended)   -AS  by discharge  -MB     Barriers (Oral Nutrition/Hydration Goal 1, SLP)  Aphasia  (Pended)   -AS  Aphasia  -MB     Progress/Outcomes (Oral Nutrition/Hydration Goal 1, SLP)  continuing progress toward goal  (Pended)   -AS  continuing progress toward goal  -MB        Lingual Strengthening Goal 1 (SLP)    Activity (Lingual Strengthening Goal 1, SLP)  increase tongue back strength  (Pended)   -AS  increase tongue back strength  -MB     Increase Tongue Back Strength  lingual movement exercises  (Pended)   -AS  lingual movement exercises  -MB     [place]/Accuracy (Lingual Strengthening Goal 1, SLP)  independently (over 90% accuracy)  (Pended)   -AS  independently (over 90% accuracy)  -MB     Time Frame (Lingual Strengthening Goal 1, SLP)  short term goal (STG);by discharge  (Pended)   -AS  short term goal (STG);by discharge  -MB     Barriers (Lingual Strengthening Goal 1, SLP)  n/a  (Pended)   -AS  n/a  -MB     Progress/Outcomes (Lingual Strengthening Goal 1, SLP)  goal ongoing  (Pended)   -AS  goal  ongoing  -MB        Pharyngeal Strengthening Exercise Goal 1 (SLP)    Activity (Pharyngeal Strengthening Goal 1, SLP)  increase timing;increase superior movement of the hyolaryngeal complex;increase anterior movement of the hyolaryngeal complex;increase epiglottic inversion and retroflexion;increase squeeze/positive pressure generation  (Pended)   -AS  increase timing;increase superior movement of the hyolaryngeal complex;increase anterior movement of the hyolaryngeal complex;increase epiglottic inversion and retroflexion;increase squeeze/positive pressure generation  -MB     Increase Timing  gustatory stimulation (sour/cold)  (Pended)   -AS  gustatory stimulation (sour/cold)  -MB     Increase Superior Movement of the Hyolaryngeal Complex  --  -- NMES  -MB     Increase Anterior Movement of the Hyolaryngeal Complex  shaker  (Pended)   -AS  shaker  -MB     Increase Epiglottic Inversion and Retroflexion  Mendelsohn  (Pended)   -AS  Mendelsohn  -MB     Increase Squeeze/Positive Pressure Generation  hard effortful swallow  (Pended)   -AS  hard effortful swallow  -MB     Increase Tongue Base Retraction  yoni  (Pended)   -AS  yoni  -MB     [place]/Accuracy (Pharyngeal Strengthening Goal 1, SLP)  independently (over 90% accuracy)  (Pended)   -AS  independently (over 90% accuracy)  -MB     Time Frame (Pharyngeal Strengthening Goal 1, SLP)  short term goal (STG);by discharge  (Pended)   -AS  short term goal (STG);by discharge  -MB     Barriers (Pharyngeal Strengthening Goal 1, SLP)  cognitive status  (Pended)   -AS  cognitive status  -MB     Progress/Outcomes (Pharyngeal Strengthening Goal 1, SLP)  progress slower than expected  (Pended)   -AS  progress slower than expected  -MB        Words/Phrases/Sentences Goal 1 (SLP)    Improve Ability to Comprehend Words/Phrases/Sentences Through: Goal 1 (SLP)  identify objects, field of;identify pictures, field of;90%;independently (over 90% accuracy);other (comment)  (Pended)    -AS  --     Time Frame (Identify Objects and Pictures Goal 1, SLP)  short term goal (STG);by discharge  (Pended)   -AS  --     Barriers (Identify Objects and Pictures Goal 1, SLP)  aphasia  (Pended)   -AS  --     Progress (Ability to Contruct Words/Phrases/Sentences Goal 1, SLP)  70%;independently (over 90% accuracy)  (Pended)   -AS  --     Progress/Outcomes (Identify Objects and Pictures Goal 1, SLP)  continuing progress toward goal  (Pended)   -AS  --        Comprehend Questions Goal 1 (SLP)    Improve Ability to Comprehend Questions Goal 1 (SLP)  simple yes/no questions;complex yes/no questions;90%;independently (over 90% accuracy)  (Pended)   -AS  --     Time Frame (Comprehend Questions Goal 1, SLP)  short term goal (STG);by discharge  (Pended)   -AS  --     Barriers (Comprehend Questions Goal 1, SLP)  aphaisa  (Pended)   -AS  --     Progress (Ability to Comprehend Questions Goal 1, SLP)  60%;with minimal cues (75-90%)  (Pended)   -AS  --     Progress/Outcomes (Comprehend Questions Goal 1, SLP)  continuing progress toward goal  (Pended)   -AS  --        Follow Directions Goal 2 (SLP)    Improve Ability to Follow Directions Goal 1 (SLP)  1 step direction with objects;1 step direction without objects;90%;independently (over 90% accuracy)  (Pended)   -AS  --     Time Frame (Follow Directions Goal 1, SLP)  short term goal (STG);by discharge  (Pended)   -AS  --     Progress/Outcomes (Follow Directions Goal 1, SLP)  goal ongoing  (Pended)   -AS  --        Word Retrieval Skills Goal 1 (SLP)    Improve Word Retrieval Skills By Goal 1 (SLP)  completing automatic speech task, days of the week;completing automatic speech task, months;confrontational naming task;repeating words;90%;independently (over 90% accuracy)  (Pended)   -AS  --     Time Frame (Word Retrieval Goal 1, SLP)  short term goal (STG);by discharge  (Pended)   -AS  --     Progress/Outcomes (Word Retrieval Goal 1, SLP)  goal ongoing  (Pended)   -AS  --         Graphic Expression of Shapes, Letters, Numbers Goal 1 (SLP)    Improve Graphic Expression of Shapes, Letters, and Numbers Goal 1 (SLP)  copy shapes, numbers, and letters;writing dictated number and letters;90%;independently (over 90% accuracy)  (Pended)   -AS  --     Time Frame (Graphic Expression of Shapes, Letters, and Numbers Goal 1, SLP)  short term goal (STG);by discharge  (Pended)   -AS  --     Progress/Outcomes (Graphic Expression of Shapes, Letters, and Numbers Goal 1, SLP)  goal ongoing  (Pended)   -AS  --        Augmentative/Alternative Communication Objectives Goal 1 (SLP    Communication (Augmentative/Alternative Communication Goal 1, SLP)  improve ability to use low tech augmentative/alternative communication device  (Pended)   -AS  --     Improve Communication by (Augmentative/Alternative Communication Goal 1, SLP)  identify picture, field of ____;identify word, field of ____;alphabet/picture board;90%;independently (over 90% accuracy)  (Pended)   -AS  --     Time Frame (Augmentative/Alternative Communication Goal 1, SLP)  short term goal (STG);by discharge  (Pended)   -AS  --     Progress (Augmentative/Alternative Communication Goal 1, SLP)  70%;with minimal cues (75-90%)  (Pended)   -AS  --     Progress/Outcomes (Augmentative/Alternative Communication Goal 1, SLP)  good progress toward goal  (Pended)   -AS  --       User Key  (r) = Recorded By, (t) = Taken By, (c) = Cosigned By    Initials Name Provider Type    Georgi Garcia CCC-SLP Speech and Language Pathologist    AS Eliecer Williamson, Speech Therapy Student Speech Therapy Student              Time Calculation:     Time Calculation- SLP     Row Name 10/07/19 0943             Time Calculation- SLP    SLP Start Time  0846  (Pended)   -AS      SLP Stop Time  0915  (Pended)   -AS      SLP Time Calculation (min)  29 min  (Pended)   -AS      SLP Received On  10/07/19  (Pended)   -AS        User Key  (r) = Recorded By, (t) = Taken By, (c) =  Cosigned By    Initials Name Provider Type    AS Eliecer Williamson Speech Therapy Student Speech Therapy Student          Therapy Charges for Today     Code Description Service Date Service Provider Modifiers Qty    58247944633 HC ST TREATMENT SWALLOW 1 10/7/2019 Eliecer Williamson Speech Therapy Student GN 1    [id number] HC ST TREATMENT SPEECH 1 10/7/2019 Eliecer Williamson Speech Therapy Student GN 1                     Eliecer Teri Speech Therapy Student  10/7/2019   and Acute Care - Speech Language Pathology   Swallow Treatment Note [facility]     Patient Name: Jarod Ramírez Jr.  : 1960  MRN: [id number]  Today's Date: 10/7/2019  Onset of Illness/Injury or Date of Surgery: 19     Referring Physician: Dr. Alas      Admit Date: 2019    Visit Dx:      ICD-10-CM ICD-9-CM   1. Dysphagia, unspecified type R13.10 787.20   2. Impaired mobility Z74.09 799.89   3. Impaired mobility and ADLs Z74.09 799.89   4. Aphasia R47.01 784.3     Patient Active Problem List   Diagnosis   • Degeneration of cervical intervertebral disc   • Closed fracture of lumbar vertebra (CMS/HCC)   • Degeneration of lumbar or lumbosacral intervertebral disc   • Smoker   • BMI 27.0-27.9,adult   • Acute cerebrovascular accident (CVA) of cerebellum (CMS/HCC)   • Alcoholism /alcohol abuse (CMS/HCC)   • CVA (cerebral vascular accident) (CMS/HCC)   • Hyperlipidemia   • Dysphagia due to recent cerebral infarction       Therapy Treatment  Rehabilitation Treatment Summary     Row Name 10/07/19 0846 10/07/19 0749          Treatment Time/Intention    Discipline  speech language pathologist  (Pended)   -AS  occupational therapy assistant  -TS     Document Type  therapy note (daily note)  (Pended)   -AS  therapy note (daily note)  -TS     Subjective Information  no complaints  (Pended)   -AS  --     Mode of Treatment  speech-language pathology  (Pended)   -AS  --     Patient/Family Observations  brother present  (Pended)   -AS  --      Patient Effort  good  (Pended)   -AS  --     Recorded by [AS] Eliecer Williamson Speech Therapy Student 10/07/19 0945 [TS] Yaritza Lopez COTA/L 10/07/19 0750     Row Name 10/07/19 0846             Pain Scale: FACES Pre/Post-Treatment    Pain: FACES Scale, Pretreatment  0-->no hurt  (Pended)   -AS      Pain: FACES Scale, Post-Treatment  0-->no hurt  (Pended)   -AS      Recorded by [AS] Eliecer Williamson Speech Therapy Student 10/07/19 0945      Row Name                Wound 09/27/19 0800 Right posterior finger    Wound - Properties Group Date first assessed: 09/27/19 [NW] Time first assessed: 0800 [NW] Present on Hospital Admission: Y [NW] Side: Right [NW] Orientation: posterior [NW] Location: finger [NW] Recorded by:  [NW] Dunia Multani RN 09/27/19 1624    Row Name 10/07/19 0846             Outcome Summary/Treatment Plan (SLP)    Daily Summary of Progress (SLP)  progress towards functional goals is fair  (Pended)   -AS      Barriers to Overall Progress (SLP)  aphasia  (Pended)   -AS      Plan for Continued Treatment (SLP)  continue to follow and treat  (Pended)   -AS      Anticipated Dischage Disposition  inpatient rehabilitation facility  (Pended)   -AS      Recorded by [AS] Eliecer Williamson Speech Therapy Student 10/07/19 0945        User Key  (r) = Recorded By, (t) = Taken By, (c) = Cosigned By    Initials Name Effective Dates Discipline    TS Yaritza Lopez COTA/L 08/02/16 -  OT    NW Dunia Multani RN 07/12/18 -  Nurse    AS Eliecer Williamson Speech Therapy Student 09/03/19 -  SLP          Outcome Summary  Outcome Summary/Treatment Plan (SLP)  Daily Summary of Progress (SLP): (P) progress towards functional goals is fair (10/07/19 0846 : Eliecer Williamson Speech Therapy Student)  Barriers to Overall Progress (SLP): (P) aphasia (10/07/19 0846 : Eliecer Williamson Speech Therapy Student)  Plan for Continued Treatment (SLP): (P) continue to follow and treat (10/07/19 0846 : Teri  Eliecer, Speech Therapy Student)  Anticipated Dischage Disposition: (P) inpatient rehabilitation facility (10/07/19 0846 : Eliecer Williamson, Speech Therapy Student)      SLP GOALS     Row Name 10/07/19 0846 10/04/19 1351          Oral Nutrition/Hydration Goal 1 (SLP)    Oral Nutrition/Hydration Goal 1, SLP  LTG: Patient will tolerate LRD without s/s of aspiration.  (Pended)   -AS  LTG: Patient will tolerate LRD without s/s of aspiration.  -MB     Time Frame (Oral Nutrition/Hydration Goal 1, SLP)  by discharge  (Pended)   -AS  by discharge  -MB     Barriers (Oral Nutrition/Hydration Goal 1, SLP)  Aphasia  (Pended)   -AS  Aphasia  -MB     Progress/Outcomes (Oral Nutrition/Hydration Goal 1, SLP)  continuing progress toward goal  (Pended)   -AS  continuing progress toward goal  -MB        Lingual Strengthening Goal 1 (SLP)    Activity (Lingual Strengthening Goal 1, SLP)  increase tongue back strength  (Pended)   -AS  increase tongue back strength  -MB     Increase Tongue Back Strength  lingual movement exercises  (Pended)   -AS  lingual movement exercises  -MB     [place]/Accuracy (Lingual Strengthening Goal 1, SLP)  independently (over 90% accuracy)  (Pended)   -AS  independently (over 90% accuracy)  -MB     Time Frame (Lingual Strengthening Goal 1, SLP)  short term goal (STG);by discharge  (Pended)   -AS  short term goal (STG);by discharge  -MB     Barriers (Lingual Strengthening Goal 1, SLP)  n/a  (Pended)   -AS  n/a  -MB     Progress/Outcomes (Lingual Strengthening Goal 1, SLP)  goal ongoing  (Pended)   -AS  goal ongoing  -MB        Pharyngeal Strengthening Exercise Goal 1 (SLP)    Activity (Pharyngeal Strengthening Goal 1, SLP)  increase timing;increase superior movement of the hyolaryngeal complex;increase anterior movement of the hyolaryngeal complex;increase epiglottic inversion and retroflexion;increase squeeze/positive pressure generation  (Pended)   -AS  increase timing;increase superior movement of the  hyolaryngeal complex;increase anterior movement of the hyolaryngeal complex;increase epiglottic inversion and retroflexion;increase squeeze/positive pressure generation  -MB     Increase Timing  gustatory stimulation (sour/cold)  (Pended)   -AS  gustatory stimulation (sour/cold)  -MB     Increase Superior Movement of the Hyolaryngeal Complex  --  -- NMES  -MB     Increase Anterior Movement of the Hyolaryngeal Complex  shaker  (Pended)   -AS  shaker  -MB     Increase Epiglottic Inversion and Retroflexion  Mendelsohn  (Pended)   -AS  Mendelsohn  -MB     Increase Squeeze/Positive Pressure Generation  hard effortful swallow  (Pended)   -AS  hard effortful swallow  -MB     Increase Tongue Base Retraction  yoni  (Pended)   -AS  yoni  -MB     [place]/Accuracy (Pharyngeal Strengthening Goal 1, SLP)  independently (over 90% accuracy)  (Pended)   -AS  independently (over 90% accuracy)  -MB     Time Frame (Pharyngeal Strengthening Goal 1, SLP)  short term goal (STG);by discharge  (Pended)   -AS  short term goal (STG);by discharge  -MB     Barriers (Pharyngeal Strengthening Goal 1, SLP)  cognitive status  (Pended)   -AS  cognitive status  -MB     Progress/Outcomes (Pharyngeal Strengthening Goal 1, SLP)  progress slower than expected  (Pended)   -AS  progress slower than expected  -MB        Words/Phrases/Sentences Goal 1 (SLP)    Improve Ability to Comprehend Words/Phrases/Sentences Through: Goal 1 (SLP)  identify objects, field of;identify pictures, field of;90%;independently (over 90% accuracy);other (comment)  (Pended)   -AS  --     Time Frame (Identify Objects and Pictures Goal 1, SLP)  short term goal (STG);by discharge  (Pended)   -AS  --     Barriers (Identify Objects and Pictures Goal 1, SLP)  aphasia  (Pended)   -AS  --     Progress (Ability to Contruct Words/Phrases/Sentences Goal 1, SLP)  70%;independently (over 90% accuracy)  (Pended)   -AS  --     Progress/Outcomes (Identify Objects and Pictures Goal 1,  SLP)  continuing progress toward goal  (Pended)   -AS  --        Comprehend Questions Goal 1 (SLP)    Improve Ability to Comprehend Questions Goal 1 (SLP)  simple yes/no questions;complex yes/no questions;90%;independently (over 90% accuracy)  (Pended)   -AS  --     Time Frame (Comprehend Questions Goal 1, SLP)  short term goal (STG);by discharge  (Pended)   -AS  --     Barriers (Comprehend Questions Goal 1, SLP)  aphaisa  (Pended)   -AS  --     Progress (Ability to Comprehend Questions Goal 1, SLP)  60%;with minimal cues (75-90%)  (Pended)   -AS  --     Progress/Outcomes (Comprehend Questions Goal 1, SLP)  continuing progress toward goal  (Pended)   -AS  --        Follow Directions Goal 2 (SLP)    Improve Ability to Follow Directions Goal 1 (SLP)  1 step direction with objects;1 step direction without objects;90%;independently (over 90% accuracy)  (Pended)   -AS  --     Time Frame (Follow Directions Goal 1, SLP)  short term goal (STG);by discharge  (Pended)   -AS  --     Progress/Outcomes (Follow Directions Goal 1, SLP)  goal ongoing  (Pended)   -AS  --        Word Retrieval Skills Goal 1 (SLP)    Improve Word Retrieval Skills By Goal 1 (SLP)  completing automatic speech task, days of the week;completing automatic speech task, months;confrontational naming task;repeating words;90%;independently (over 90% accuracy)  (Pended)   -AS  --     Time Frame (Word Retrieval Goal 1, SLP)  short term goal (STG);by discharge  (Pended)   -AS  --     Progress/Outcomes (Word Retrieval Goal 1, SLP)  goal ongoing  (Pended)   -AS  --        Graphic Expression of Shapes, Letters, Numbers Goal 1 (SLP)    Improve Graphic Expression of Shapes, Letters, and Numbers Goal 1 (SLP)  copy shapes, numbers, and letters;writing dictated number and letters;90%;independently (over 90% accuracy)  (Pended)   -AS  --     Time Frame (Graphic Expression of Shapes, Letters, and Numbers Goal 1, SLP)  short term goal (STG);by discharge  (Pended)   -AS  --      Progress/Outcomes (Graphic Expression of Shapes, Letters, and Numbers Goal 1, SLP)  goal ongoing  (Pended)   -AS  --        Augmentative/Alternative Communication Objectives Goal 1 (SLP    Communication (Augmentative/Alternative Communication Goal 1, SLP)  improve ability to use low tech augmentative/alternative communication device  (Pended)   -AS  --     Improve Communication by (Augmentative/Alternative Communication Goal 1, SLP)  identify picture, field of ____;identify word, field of ____;alphabet/picture board;90%;independently (over 90% accuracy)  (Pended)   -AS  --     Time Frame (Augmentative/Alternative Communication Goal 1, SLP)  short term goal (STG);by discharge  (Pended)   -AS  --     Progress (Augmentative/Alternative Communication Goal 1, SLP)  70%;with minimal cues (75-90%)  (Pended)   -AS  --     Progress/Outcomes (Augmentative/Alternative Communication Goal 1, SLP)  good progress toward goal  (Pended)   -AS  --       User Key  (r) = Recorded By, (t) = Taken By, (c) = Cosigned By    Initials Name Provider Type    Georgi Garcia, CCC-SLP Speech and Language Pathologist    AS Eliecer Williamson, Speech Therapy Student Speech Therapy Student          EDUCATION  The patient has been educated in the following areas:   Dysphagia (Swallowing Impairment).    SLP Recommendation and Plan  Daily Summary of Progress (SLP): (P) progress towards functional goals is fair  Barriers to Overall Progress (SLP): (P) aphasia  Plan for Continued Treatment (SLP): (P) continue to follow and treat  Anticipated Dischage Disposition: (P) inpatient rehabilitation facility                    Time Calculation:   Time Calculation- SLP     Row Name 10/07/19 0943             Time Calculation- SLP    SLP Start Time  0846  (Pended)   -AS      SLP Stop Time  0915  (Pended)   -AS      SLP Time Calculation (min)  29 min  (Pended)   -AS      SLP Received On  10/07/19  (Pended)   -AS        User Key  (r) = Recorded By, (t) =  Taken By, (c) = Cosigned By    Initials Name Provider Type    AS Eliecer Williamson, Speech Therapy Student Speech Therapy Student          Therapy Charges for Today     Code Description Service Date Service Provider Modifiers Qty    05083936216 HC ST TREATMENT SWALLOW 1 10/7/2019 Eliecer Williamson, Speech Therapy Student GN 1    [id number] HC ST TREATMENT SPEECH 1 10/7/2019 Eliecer Williamson, Speech Therapy Student GN 1                 Eliecer Williamson Speech Therapy Student  10/7/2019

## 2019-10-07 NOTE — THERAPY TREATMENT NOTE
Acute Care - Physical Therapy Treatment Note  [facility]     Patient Name: Jarod Ramírez Jr.  : 1960  MRN: [id number]  Today's Date: 10/7/2019  Onset of Illness/Injury or Date of Surgery: 19     Referring Physician: Dr. Alas    Admit Date: 2019    Visit Dx:    ICD-10-CM ICD-9-CM   1. Dysphagia, unspecified type R13.10 787.20   2. Impaired mobility Z74.09 799.89   3. Impaired mobility and ADLs Z74.09 799.89   4. Aphasia R47.01 784.3     Patient Active Problem List   Diagnosis   • Degeneration of cervical intervertebral disc   • Closed fracture of lumbar vertebra (CMS/HCC)   • Degeneration of lumbar or lumbosacral intervertebral disc   • Smoker   • BMI 27.0-27.9,adult   • Acute cerebrovascular accident (CVA) of cerebellum (CMS/HCC)   • Alcoholism /alcohol abuse (CMS/HCC)   • CVA (cerebral vascular accident) (CMS/HCC)   • Hyperlipidemia   • Dysphagia due to recent cerebral infarction       Therapy Treatment    Rehabilitation Treatment Summary     Row Name 10/07/19 1020 10/07/19 0846 10/07/19 0749       Treatment Time/Intention    Discipline  physical therapy assistant  -ZACH PORTILLO,[facility]  speech language pathologist  -MB,AS,MB2  occupational therapy assistant  -TS    Document Type  therapy note (daily note)  -ZACH PORTILLO2  therapy note (daily note)  -MB,AS,MB2  therapy note (daily note)  -TS    Subjective Information  complains of Pt. c/o pain level 6/10 from hip to knee RLE  [facility]  no complaints  -MB,AS,MB2  no complaints  -TS2    Mode of Treatment  physical therapy  -ZACH PORTILLO2  speech-language pathology  -MB,AS,MB2  --    Patient/Family Observations  brother present  [facility]  brother present  -MB,AS,MB2  --    Patient Effort  good  -[facility]  good  -MB,AS,MB2  good  -TS2    Existing Precautions/Restrictions  fall  -[facility]  --  fall  -TS2    Treatment Considerations/Comments  --  --  aphasia. decreased safety   -TS2    Patient Response to Treatment  good  -[facility]  --  --    Recorded by  [LH,JW,LH2] Damion Valdez, PT (r) Georgia Jama PTA Student (t) Damion Valdez, PT (c) 10/07/19 1506 [MB,AS,MB2] Georgi Hernandez, CCC-SLP (r) Eliecer Williamson, Speech Therapy Student (t) Georgi Hernandez, CCC-SLP (c) 10/07/19 1454 [TS] Yaritza Lopez N, COTTON/L 10/07/19 0750  [TS2] Yaritza Lopez N, COTTON/L 10/07/19 1335    Row Name 10/07/19 0749             Cognitive Assessment/Intervention- PT/OT    Follows Commands (Cognition)  follows one step commands;75-90% accuracy;visual queue  -TS      Personal Safety Interventions  fall prevention program maintained;gait belt;nonskid shoes/slippers when out of bed  -TS      Recorded by [TS] John Yaritza N, COTTON/L 10/07/19 1335      Row Name 10/07/19 1020             Cognitive Assessment Intervention- SLP    Cognition, Comment  Pt. was able to count exercise repitiitions correctly with verbal cueing/Pt. able to follow one step commands  -LH,JW,LH2      Recorded by [LH,JW,LH2] Damion Valdez, PT (r) Georgia Jama PTA Student (t) Damion Valdez, PT (c) 10/07/19 1506      Row Name 10/07/19 1020             Verbal Expression Assessment/Intervention    Verbal Expression  severe impairment  -LH,JW,LH2      Sentence Formulation  severe impairment  -LH,JW,LH2      Verbal Expression, Comment  Aphasic  -LH,JW,LH2      Recorded by [LH,JW,LH2] Damion Valdez, PT (r) Georgia Jama PTA Student (t) Damion Valdez, PT (c) 10/07/19 1506      Row Name 10/07/19 1020             Safety Issues, Functional Mobility    Safety Issues Affecting Function (Mobility)  sequencing abilities;impulsivity  -LH,JW,LH2      Recorded by [LH,JW,LH2] Damion Valdez, PT (r) Georgia Jama PTA Student (t) Damion Valdez, PT (c) 10/07/19 1506      Row Name 10/07/19 1020             Bed Mobility Assessment/Treatment    Comment (Bed Mobility)  in chair  -KJ      Recorded by [KJ] Helen Kirby, PTA 10/07/19 1216      Row Name 10/07/19 1020 10/07/19 0749          Functional Mobility     Functional Mobility- Ind. Level  contact guard assist;supervision required;1 person  -ZACH PORTILLO,LH2  supervision required;conditional independence  -TS     Functional Mobility- Safety Issues  sequencing ability decreased  -LINDSEY,ZACH,LH2  --     Functional Mobility- Comment  --  in room, in hallway, in rehab room  -TS     Recorded by [LINDSEY,ZACH,LH2] Damion Valdez, PT (r) Georgia Jama, PTA Student (t) Damion Valedz, PT (c) 10/07/19 1506 [TS] Yaritza Lopez, COTTON/L 10/07/19 1335     Row Name 10/07/19 0749             Transfer Assessment/Treatment    Transfer Assessment/Treatment  sit-stand transfer;stand-sit transfer  -TS      Recorded by [TS] Yaritza Lopez, COTTON/L 10/07/19 1335      Row Name 10/07/19 1020 10/07/19 0749          Sit-Stand Transfer    Sit-Stand [place] (Transfers)  contact guard;1 person assist  -ZACH PORTILLO,LH2  independent  -TS     Recorded by [ZACH PORTILLO,LH2] Damion Valdez, PT (r) Georgia Jama, PTA Student (t) Damion Valdez, PT (c) 10/07/19 1506 [TS] Yaritza Lopez N, COTTON/L 10/07/19 1335     Row Name 10/07/19 1020 10/07/19 0749          Stand-Sit Transfer    Stand-Sit [place] (Transfers)  contact guard;1 person assist  -ZACH PORTILLO,LH2  independent  -TS     Recorded by [ZACH PORTILLO,LH2] Damion Vladez, PT (r) Georgia Jama, PTA Student (t) Damion Valdez, PT (c) 10/07/19 1506 [TS] Yaritza Lopez N, COTTON/L 10/07/19 1335     Row Name 10/07/19 1020             Gait/Stairs Assessment/Training    Gait/Stairs Assessment/Training  gait pattern  -LINDSEY,ZACH,LH2      [place] Level (Gait)  contact guard;verbal cues  -ZACH PORTILLO,LH2      Distance in Feet (Gait)  150'x2  -ZACH PORTILLO LH2      Pattern (Gait)  swing-through  -ZACH PORTILLO LH2      Deviations/Abnormal Patterns (Gait)  steppage  -ZACH PORTILLO LH2      Comment (Gait/Stairs)  Pt. had decreased heel strike due to RLE ankle DF  -ZACH PORTILLO,LH2      Recorded by [ZACH PORTILLO,LH2] Damion Valdez, PT (r) Georgia Jama PTA Student (t) Damion Valdez, PT (c) 10/07/19 1876      Row  Name 10/07/19 0749             ADL Assessment/Intervention    BADL Assessment/Intervention  upper body dressing  -TS      Comment, IADL Assessment/Training  Pt was taken to nutrition room to prepare his own coffee. Pt was provided instruction and visual demonstration and was able to complete task.   -TS      Additional Documentation  Comment, IADL Assessment/Training (Row)  -TS      Recorded by [TS] Yaritza Lopez COTA/L 10/07/19 1335      Row Name 10/07/19 0749             Upper Body Dressing Assessment/Training    Upper Body Dressing [place] Level  don;set up;pull-over garment  -TS      Upper Body Dressing Position  unsupported standing  -TS      Recorded by [TS] Yaritza Lopez COTA/L 10/07/19 1335      Row Name 10/07/19 1020             Therapeutic Exercise    Lower Extremity (Therapeutic Exercise)  LAQ (long arc quad), bilateral;marching while seated;marching while standing  -LINDSEY,JW,LH2      Lower Extremity Range of Motion (Therapeutic Exercise)  ankle dorsiflexion/plantar flexion, bilateral;hip internal/external rotation, bilateral;hip abduction/adduction, bilateral  -LH,JW,LH2      Exercise Type (Therapeutic Exercise)  AROM (active range of motion)  -LH,JW,LH2      Position (Therapeutic Exercise)  seated;standing  -LINDSEY,ZACH,LH2      Sets/Reps (Therapeutic Exercise)  20  -LH,JW,LH2      Comment (Therapeutic Exercise)  Limited RLE ankle DF  -LH,JW,LH2      Recorded by [LH,JW,LH2] Damion Valdez, PT (r) Georgia Jama PTA Student (t) Damion Valdez, PT (c) 10/07/19 1506      Row Name 10/07/19 1020             Gross Motor Coordination    Gross Motor Impairments  coordination  -LH,JW,LH2      Recorded by [LINDSEY,JW,LH2] Damion Valdez, PT (r) Georgia Jama PTA Student (t) Damion Valdez, PT (c) 10/07/19 1506      Row Name 10/07/19 1020             Standing Balance Activity    Activities Performed (Standing, Balance Training)  perturbations in stance;feet together in stance  -LINDSEY,JW,LH2      Support  Needed for Balance (Standing, Balance Training)  CGA;minimal external support for balance, 75% patient effort;1 person assist  -LH,JW,LH2      Recorded by [,JW,LH2] Damion Valdez, PT (r) Georgia Jama PTA Student (t) Damion Valdez, PT (c) 10/07/19 1506      Row Name 10/07/19 0749             Dynamic Balance Activity    Therapeutic Training Performed (Dynamic Balance)  other (see comments)  -TS      Support Needed for Balance (Dynamic Balance Training)  SBA;balances without upper extremity support  -TS      Comment (Dynamic Balance Training)  pt was able to work on gross motor coordination/BUE integration while completing dynamic standing balance exercise  -TS      Recorded by [TS] Yaritza Lopez, COTTON/L 10/07/19 1335      Row Name 10/07/19 1020 10/07/19 0749          Positioning and Restraints    Pre-Treatment Position  sitting in chair/recliner  -ZACH PORTILLOLH2  standing in room  -TS     Post Treatment Position  chair  -ZACH PORTILLO LH2  bed  -TS     In Bed  --  sitting EOB;call light within reach;encouraged to call for assist;side rails up x2  -TS     Recorded by [LINDSEY,ZACH,LH2] Damion Valdez, PT (r) Georgia Jama PTA Student (t) Damion Valdez, PT (c) 10/07/19 1506 [TS] Yaritza Lopez, COTTON/L 10/07/19 1335     Row Name 10/07/19 1020 10/07/19 0749          Pain Scale: Numbers Pre/Post-Treatment    Pain Scale: Numbers, Pretreatment  6/10  -LH,ZACH,LH2  0/10 - no pain  -TS     Pain Scale: Numbers, Post-Treatment  6/10  -LH,ZACH,LH2  0/10 - no pain  -TS     Recorded by [,ZACH,LH2] Damion Valdez, PT (r) Georgia Jama PTA Student (t) Damion Valdez, PT (c) 10/07/19 1506 [TS] Yaritza Lopez N, COTTON/L 10/07/19 1335     Row Name 10/07/19 0846             Pain Scale: FACES Pre/Post-Treatment    Pain: FACES Scale, Pretreatment  0-->no hurt  -MB,AS,MB2      Pain: FACES Scale, Post-Treatment  0-->no hurt  -MB,AS,MB2      Recorded by [MB,AS,MB2] Georgi Hernandez, CCC-SLP (r) Eliecer Williamson, Speech Therapy  Student (t) Georgi Hernandez CCC-SLP (c) 10/07/19 1454      Row Name                Wound 09/27/19 0800 Right posterior finger    Wound - Properties Group Date first assessed: 09/27/19 [NW] Time first assessed: 0800 [NW] Present on Hospital Admission: Y [NW] Side: Right [NW] Orientation: posterior [NW] Location: finger [NW] Recorded by:  [NW] Dunia Multani RN 09/27/19 1624    Row Name 10/07/19 0846             Outcome Summary/Treatment Plan (SLP)    Daily Summary of Progress (SLP)  progress towards functional goals is fair  -MB,AS,MB2      Barriers to Overall Progress (SLP)  aphasia  -MB,AS,MB2      Plan for Continued Treatment (SLP)  continue to follow and treat  -MB,AS,MB2      Anticipated Dischage Disposition  inpatient rehabilitation facility  -MB,AS,MB2      Recorded by [MB,AS,MB2] Georgi Hernandez CCC-SLP (r) Eliecer Williamson, Speech Therapy Student (t) Georgi Hernandez CCC-SLP (c) 10/07/19 1454        User Key  (r) = Recorded By, (t) = Taken By, (c) = Cosigned By    Initials Name Effective Dates Discipline    MB Georgi Hernandez CCC-SLP 08/02/16 -  SLP    Damion Polk, PT 08/02/16 -  PT    Helen Whitt, PTA 08/02/16 -  PT    Yaritza Hand COTA/L 08/02/16 -  OT    NW Dunia Multani RN 07/12/18 -  Nurse    AS Eliecer Williamson, Speech Therapy Student 09/03/19 -  SLP    Georgia Jean Baptiste PTA Student 09/18/19 -  PT          Wound 09/27/19 0800 Right posterior finger (Active)   Dressing Appearance open to air 10/7/2019  8:15 AM   Closure None 10/7/2019  8:15 AM   Base dry;white 10/7/2019  8:15 AM   Drainage Amount none 10/7/2019  8:15 AM       Rehab Goal Summary     Row Name 10/07/19 1300 10/07/19 0846          Toileting Goal 1 (OT)    Activity/Device (Toileting Goal 1, OT)  toileting skills, all;commode  -TS  --     [place] Level/Cues Needed (Toileting Goal 1, OT)  independent  -TS  --     Time Frame (Toileting Goal 1, OT)  long term goal (LTG);by discharge   -TS  --     Progress/Outcome (Toileting Goal 1, OT)  goal met per pt  -TS  --        Balance Goal 1 (OT)    Activity/Assistive Device (Balance Goal 1, OT)  sitting, dynamic;standing, dynamic;standing, static  -TS  --     [place] Level/Cues Needed (Balance Goal 1, OT)  standby assist  -TS  --     Time Frame (Balance Goal 1, OT)  long term goal (LTG);by discharge  -TS  --     Progress/Outcomes (Balance Goal 1, OT)  goal met  -TS  --        Swallow Goals (SLP)    Oral Nutrition/Hydration Goal Selection (SLP)  --  oral nutrition/hydration, SLP goal 1  -MB (r) AS (t) MB (c)     Lingual Strengthening Goal Selection (SLP)  --  lingual strengthening, SLP goal 1  -MB (r) AS (t) MB (c)     Pharyngeal Strengthening Exercise Goal Selection (SLP)  --  pharyngeal strengthening exercise, SLP goal 1  -MB (r) AS (t) MB (c)     Additional Documentation  --  lingual strengthening goal selection (SLP);pharyngeal strengthening exercise goal selection (SLP)  -MB (r) AS (t) MB (c)        Oral Nutrition/Hydration Goal 1 (SLP)    Oral Nutrition/Hydration Goal 1, SLP  --  LTG: Patient will tolerate LRD without s/s of aspiration.  -MB (r) AS (t) MB (c)     Time Frame (Oral Nutrition/Hydration Goal 1, SLP)  --  by discharge  -MB (r) AS (t) MB (c)     Barriers (Oral Nutrition/Hydration Goal 1, SLP)  --  Aphasia  -MB (r) AS (t) MB (c)     Progress/Outcomes (Oral Nutrition/Hydration Goal 1, SLP)  --  continuing progress toward goal  -MB (r) AS (t) MB (c)        Lingual Strengthening Goal 1 (SLP)    Activity (Lingual Strengthening Goal 1, SLP)  --  increase tongue back strength  -MB (r) AS (t) MB (c)     Increase Tongue Back Strength  --  lingual movement exercises  -MB (r) AS (t) MB (c)     [place]/Accuracy (Lingual Strengthening Goal 1, SLP)  --  independently (over 90% accuracy)  -MB (r) AS (t) MB (c)     Time Frame (Lingual Strengthening Goal 1, SLP)  --  short term goal (STG);by discharge  -MB (r) AS (t) MB (c)     Barriers (Lingual  Strengthening Goal 1, SLP)  --  n/a  -MB (r) AS (t) MB (c)     Progress/Outcomes (Lingual Strengthening Goal 1, SLP)  --  goal ongoing  -MB (r) AS (t) MB (c)        Pharyngeal Strengthening Exercise Goal 1 (SLP)    Activity (Pharyngeal Strengthening Goal 1, SLP)  --  increase timing;increase superior movement of the hyolaryngeal complex;increase anterior movement of the hyolaryngeal complex;increase epiglottic inversion and retroflexion;increase squeeze/positive pressure generation  -MB (r) AS (t) MB (c)     Increase Timing  --  gustatory stimulation (sour/cold)  -MB (r) AS (t) MB (c)     Increase Anterior Movement of the Hyolaryngeal Complex  --  shaker  -MB (r) AS (t) MB (c)     Increase Epiglottic Inversion and Retroflexion  --  [place]elsohn  -MB (r) AS (t) MB (c)     Increase Squeeze/Positive Pressure Generation  --  hard effortful swallow  -MB (r) AS (t) MB (c)     Increase Tongue Base Retraction  --  yoni  -MB (r) AS (t) MB (c)     [place]/Accuracy (Pharyngeal Strengthening Goal 1, SLP)  --  independently (over 90% accuracy)  -MB (r) AS (t) MB (c)     Time Frame (Pharyngeal Strengthening Goal 1, SLP)  --  short term goal (STG);by discharge  -MB (r) AS (t) MB (c)     Barriers (Pharyngeal Strengthening Goal 1, SLP)  --  cognitive status  -MB (r) AS (t) MB (c)     Progress/Outcomes (Pharyngeal Strengthening Goal 1, SLP)  --  progress slower than expected  -MB (r) AS (t) MB (c)        Communication Treatment Objective and Progress Goals (SLP)    Auditory Comprehension Treatment Objectives  --  Auditory Comprehension Treatment Objectives (Group)  -MB (r) AS (t) MB (c)     Verbal Expression Treatment Objectives  --  Verbal Expression Treatment Objectives (Group)  -MB (r) AS (t) MB (c)     Graphic Expression Treatment Objectives  --  Graphic Expression Treatment Objectives (Group)  -MB (r) AS (t) MB (c)     Augmentative/Alternative Communication Objectives  --  Augmentative/Alternative Communication Objectives  (Group)  -MB (r) AS (t) MB (c)        Auditory Comprehension Treatment Objectives    Words/Phrases/Sentences Selection  --  words/phrases/sentences, SLP goal 1  -MB (r) AS (t) MB (c)     Comprehend Questions Selection  --  comprehend questions, SLP goal 1  -MB (r) AS (t) MB (c)     Follow Directions Selection  --  follow directions, SLP goal 1  -MB (r) AS (t) MB (c)        Words/Phrases/Sentences Goal 1 (SLP)    Improve Ability to Comprehend Words/Phrases/Sentences Through: Goal 1 (SLP)  --  identify objects, field of;identify pictures, field of;90%;independently (over 90% accuracy);other (comment)  -MB (r) AS (t) MB (c)     Time Frame (Identify Objects and Pictures Goal 1, SLP)  --  short term goal (STG);by discharge  -MB (r) AS (t) MB (c)     Barriers (Identify Objects and Pictures Goal 1, SLP)  --  aphasia  -MB (r) AS (t) MB (c)     Progress (Ability to Contruct Words/Phrases/Sentences Goal 1, SLP)  --  70%;independently (over 90% accuracy)  -MB (r) AS (t) MB (c)     Progress/Outcomes (Identify Objects and Pictures Goal 1, SLP)  --  continuing progress toward goal  -MB (r) AS (t) MB (c)        Comprehend Questions Goal 1 (SLP)    Improve Ability to Comprehend Questions Goal 1 (SLP)  --  simple yes/no questions;complex yes/no questions;90%;independently (over 90% accuracy)  -MB (r) AS (t) MB (c)     Time Frame (Comprehend Questions Goal 1, SLP)  --  short term goal (STG);by discharge  -MB (r) AS (t) MB (c)     Barriers (Comprehend Questions Goal 1, SLP)  --  aphaisa  -MB (r) AS (t) MB (c)     Progress (Ability to Comprehend Questions Goal 1, SLP)  --  60%;with minimal cues (75-90%)  -MB (r) AS (t) MB (c)     Progress/Outcomes (Comprehend Questions Goal 1, SLP)  --  continuing progress toward goal  -MB (r) AS (t) MB (c)        Follow Directions Goal 2 (SLP)    Improve Ability to Follow Directions Goal 1 (SLP)  --  1 step direction with objects;1 step direction without objects;90%;independently (over 90% accuracy)   -MB (r) AS (t) MB (c)     Time Frame (Follow Directions Goal 1, SLP)  --  short term goal (STG);by discharge  -MB (r) AS (t) MB (c)     Progress/Outcomes (Follow Directions Goal 1, SLP)  --  goal ongoing  -MB (r) AS (t) MB (c)        Verbal Expression Treatment Objectives    Word Retrieval Skills Selection  --  word retrieval, SLP goal 1  -MB (r) AS (t) MB (c)     Phrase and Sentence Level Response Selection  --  phrase and sentence level response, SLP goal 1  -MB (r) AS (t) MB (c)        Word Retrieval Skills Goal 1 (SLP)    Improve Word Retrieval Skills By Goal 1 (SLP)  --  completing automatic speech task, days of the week;completing automatic speech task, months;confrontational naming task;repeating words;90%;independently (over 90% accuracy)  -MB (r) AS (t) MB (c)     Time Frame (Word Retrieval Goal 1, SLP)  --  short term goal (STG);by discharge  -MB (r) AS (t) MB (c)     Progress/Outcomes (Word Retrieval Goal 1, SLP)  --  goal ongoing  -MB (r) AS (t) MB (c)        Graphic Expression Treatment Objectives    Graphic Expression of Shapes, Letters and Numbers Selection  --  graphic expression of shapes, letters, and numbers, SLP goal 1  -MB (r) AS (t) MB (c)        Graphic Expression of Shapes, Letters, Numbers Goal 1 (SLP)    Improve Graphic Expression of Shapes, Letters, and Numbers Goal 1 (SLP)  --  copy shapes, numbers, and letters;writing dictated number and letters;90%;independently (over 90% accuracy)  -MB (r) AS (t) MB (c)     Time Frame (Graphic Expression of Shapes, Letters, and Numbers Goal 1, SLP)  --  short term goal (STG);by discharge  -MB (r) AS (t) MB (c)     Progress/Outcomes (Graphic Expression of Shapes, Letters, and Numbers Goal 1, SLP)  --  goal ongoing  -MB (r) AS (t) MB (c)        Augmentative/Alternative Communication Objectives    Augmentative/Alternative Communication Selection  --  augmentative/alternative communication, SLP goal 1  -MB (r) AS (t) MB (c)        Augmentative/Alternative  Communication Objectives Goal 1 (SLP    Communication (Augmentative/Alternative Communication Goal 1, SLP)  --  improve ability to use low tech augmentative/alternative communication device  -MB (r) AS (t) MB (c)     Improve Communication by (Augmentative/Alternative Communication Goal 1, SLP)  --  identify picture, field of ____;identify word, field of ____;alphabet/picture board;90%;independently (over 90% accuracy)  -MB (r) AS (t) MB (c)     Time Frame (Augmentative/Alternative Communication Goal 1, SLP)  --  short term goal (STG);by discharge  -MB (r) AS (t) MB (c)     Progress (Augmentative/Alternative Communication Goal 1, SLP)  --  70%;with minimal cues (75-90%)  -MB (r) AS (t) MB (c)     Progress/Outcomes (Augmentative/Alternative Communication Goal 1, SLP)  --  good progress toward goal  -MB (r) AS (t) MB (c)       User Key  (r) = Recorded By, (t) = Taken By, (c) = Cosigned By    Initials Name Provider Type Discipline    Georgi Garcia, CCC-SLP Speech and Language Pathologist SLP    Yaritza Hand, YURY/L Occupational Therapy Assistant OT    AS Eliecer Williamson, Speech Therapy Student Speech Therapy Student SLP          Physical Therapy Education     Title: PT OT SLP Therapies (In Progress)     Topic: Physical Therapy (Done)     Point: Mobility training (Done)     Learning Progress Summary           Patient Acceptance, E, VU by ZACH at 10/7/2019 11:57 AM    Comment:  Pt. was educated on focusing on gait pattern and widening JUSTA    Acceptance, D,TB, DU by ELISABETH at 10/4/2019  2:55 PM    Comment:  Pt. was educated on widening JUSTA with turnig during gait; Pt. educated on inportance of performing RLE ankle DF throughout the day to increase ROM needed for gait.    Acceptance, E, DU by ZACH at 10/3/2019 11:22 AM    Comment:  Pt. educated on safety awareness/impulsivity performs better with visual cueing    Acceptance, E, VU,NR by SHEILA at 10/1/2019  8:19 AM    Comment:  Educated on benefits of activity and  ongoing PT POC.   Family Acceptance, E, VU,NR by SHEILA at 10/1/2019  8:19 AM    Comment:  Educated on benefits of activity and ongoing PT POC.                   Point: Home exercise program (Done)     Learning Progress Summary           Patient Acceptance, E, VU by ZACH at 10/7/2019 11:57 AM    Comment:  Pt. was educated on focusing on gait pattern and widening JUSTA    Acceptance, D,TB, DU by ELISABETH at 10/4/2019  2:55 PM    Comment:  Pt. was educated on widening JUSTA with turnig during gait; Pt. educated on inportance of performing RLE ankle DF throughout the day to increase ROM needed for gait.    Acceptance, E, DU by ZACH at 10/3/2019 11:22 AM    Comment:  Pt. educated on safety awareness/impulsivity performs better with visual cueing                   Point: Body mechanics (Done)     Learning Progress Summary           Patient Acceptance, E, VU by ZACH at 10/7/2019 11:57 AM    Comment:  Pt. was educated on focusing on gait pattern and widening JUSTA    Acceptance, D,TB, DU by ELISABETH at 10/4/2019  2:55 PM    Comment:  Pt. was educated on widening JUSTA with turnig during gait; Pt. educated on inportance of performing RLE ankle DF throughout the day to increase ROM needed for gait.    Acceptance, E, DU by ZACH at 10/3/2019 11:22 AM    Comment:  Pt. educated on safety awareness/impulsivity performs better with visual cueing                   Point: Precautions (Done)     Learning Progress Summary           Patient Acceptance, E, VU by ZACH at 10/7/2019 11:57 AM    Comment:  Pt. was educated on focusing on gait pattern and widening JUSTA    Acceptance, D,TB, DU by ELISABETH at 10/4/2019  2:55 PM    Comment:  Pt. was educated on widening JUSTA with turnig during gait; Pt. educated on inportance of performing RLE ankle DF throughout the day to increase ROM needed for gait.    Acceptance, E, DU by ZACH at 10/3/2019 11:22 AM    Comment:  Pt. educated on safety awareness/impulsivity performs better with visual cueing                               User Key      Initials Effective Dates Name Provider Type Discipline    KJ 08/02/16 -  Helen Kirby, PTA Physical Therapy Assistant PT    AF 08/27/19 -  Arti Haile, PT Student PT Student PT    JW 09/18/19 -  Georgia Jama, JAIR Student PTA Student PT                PT Recommendation and Plan     Plan of Care Reviewed With: patient  Progress: improving  Outcome Summary: Pt. ambulated 150'x2 with CGA/supervision with verbal cueing to widen JUSTA and to be aware of excessive hip hiking RLE to clear foot drop. Pt. performed standing balance activities with CGA/Supervison with verbal cuing needed due to decreased  sequencing ability. Pt. performed seated exercises with verbal/tactile cuing needed to perform correctly. Pt. was able to count repitions correctly during seated exercises.  Outcome Measures     Row Name 10/07/19 1300 10/05/19 1100          How much help from another person do you currently need...    Turning from your back to your side while in flat bed without using bedrails?  --  4  -NW     Moving from lying on back to sitting on the side of a flat bed without bedrails?  --  4  -NW     Moving to and from a bed to a chair (including a wheelchair)?  --  4  -NW     Standing up from a chair using your arms (e.g., wheelchair, bedside chair)?  --  4  -NW     Climbing 3-5 steps with a railing?  --  4  -NW     To walk in hospital room?  --  4  -NW     AM-PAC 6 Clicks Score (PT)  --  24  -NW        How much help from another is currently needed...    Putting on and taking off regular lower body clothing?  3  -TS  --     Bathing (including washing, rinsing, and drying)  3  -TS  --     Toileting (which includes using toilet bed pan or urinal)  4  -TS  --     Putting on and taking off regular upper body clothing  4  -TS  --     Taking care of personal grooming (such as brushing teeth)  4  -TS  --     Eating meals  4  -TS  --     AM-PAC 6 Clicks Score (OT)  22  -TS  --        Functional Assessment    Outcome Measure Options   AM-PAC 6 Clicks Daily Activity (OT)  -TS  AM-PAC 6 Clicks Basic Mobility (PT)  -NW       User Key  (r) = Recorded By, (t) = Taken By, (c) = Cosigned By    Initials Name Provider Type    TS Yaritza Lopez, COTTON/L Occupational Therapy Assistant    NW Gladys Ortega, PTA Physical Therapy Assistant         Time Calculation:   PT Charges     Row Name 10/07/19 1158             Time Calculation    Start Time  1020  -LH (r) JW (t) LH (c)      Stop Time  1037  -LH (r) JW (t) LH (c)      Time Calculation (min)  17 min  -LH (r) JW (t)      PT Received On  10/07/19  -LH (r) JW (t) LH (c)      PT Goal Re-Cert Due Date  10/11/19  -LH (r) JW (t) LH (c)         Time Calculation- PT    Total Timed Code Minutes- PT  17 minute(s)  -LH (r) JW (t)  (c)        User Key  (r) = Recorded By, (t) = Taken By, (c) = Cosigned By    Initials Name Provider Type     Damion Valdez, PT Physical Therapist    [place]ia Jean Baptiste, JAIR Student PTA Student        Therapy Charges for Today     Code Description Service Date Service Provider Modifiers Qty    [id number] HC GAIT TRAINING EA 15 MIN 10/7/2019 Georgia Jama, JAIR Student GP 1          PT G-Codes  Outcome Measure Options: AM-PAC 6 Clicks Daily Activity (OT)  AM-PAC 6 Clicks Score (PT): 24  AM-PAC 6 Clicks Score (OT): 22    Georgia Jama PTA Student  10/7/2019

## 2019-10-07 NOTE — PLAN OF CARE
Problem: Patient Care Overview  Goal: Plan of Care Review  Outcome: Ongoing (interventions implemented as appropriate)   10/07/19 1143   Coping/Psychosocial   Plan of Care Reviewed With patient;sibling   Plan of Care Review   Progress improving   OTHER   Outcome Summary Pt is alert and oriented. Still has sore throat & difficulty speaking due to previous intubation. Cough productive and frequent. NIH= 2. Aphasia still present. Safety maintained. Will cont to monitor.      Goal: Individualization and Mutuality  Outcome: Ongoing (interventions implemented as appropriate)    Goal: Interprofessional Rounds/Family Conf  Outcome: Ongoing (interventions implemented as appropriate)      Problem: Nutrition, Imbalanced: Inadequate Oral Intake (Adult)  Goal: Prevent Further Weight Loss  Outcome: Ongoing (interventions implemented as appropriate)      Problem: VTE, DVT and PE (Adult)  Goal: Signs and Symptoms of Listed Potential Problems Will be Absent, Minimized or Managed (VTE, DVT and PE)  Outcome: Ongoing (interventions implemented as appropriate)

## 2019-10-08 LAB
ALBUMIN SERPL-MCNC: 3.9 G/DL (ref 3.5–5.2)
ALBUMIN/GLOB SERPL: 1 G/DL
ALP SERPL-CCNC: 74 U/L (ref 39–117)
ALT SERPL W P-5'-P-CCNC: 40 U/L (ref 1–41)
ANION GAP SERPL CALCULATED.3IONS-SCNC: 12 MMOL/L (ref 5–15)
AST SERPL-CCNC: 30 U/L (ref 1–40)
BASOPHILS # BLD AUTO: 0.11 10*3/MM3 (ref 0–0.2)
BASOPHILS NFR BLD AUTO: 0.7 % (ref 0–1.5)
BILIRUB SERPL-MCNC: 0.2 MG/DL (ref 0.2–1.2)
BUN BLD-MCNC: 14 MG/DL (ref 6–20)
BUN/CREAT SERPL: 20.9 (ref 7–25)
CALCIUM SPEC-SCNC: 9.7 MG/DL (ref 8.6–10.5)
CHLORIDE SERPL-SCNC: 93 MMOL/L (ref 98–107)
CO2 SERPL-SCNC: 32 MMOL/L (ref 22–29)
CREAT BLD-MCNC: 0.67 MG/DL (ref 0.76–1.27)
DEPRECATED RDW RBC AUTO: 46.6 FL (ref 37–54)
EOSINOPHIL # BLD AUTO: 0.31 10*3/MM3 (ref 0–0.4)
EOSINOPHIL NFR BLD AUTO: 2 % (ref 0.3–6.2)
ERYTHROCYTE [DISTWIDTH] IN BLOOD BY AUTOMATED COUNT: 13.6 % (ref 12.3–15.4)
GFR SERPL CREATININE-BSD FRML MDRD: 122 ML/MIN/1.73
GLOBULIN UR ELPH-MCNC: 4 GM/DL
GLUCOSE BLD-MCNC: 99 MG/DL (ref 65–99)
HCT VFR BLD AUTO: 42.5 % (ref 37.5–51)
HGB BLD-MCNC: 14.3 G/DL (ref 13–17.7)
IMM GRANULOCYTES # BLD AUTO: 0.09 10*3/MM3 (ref 0–0.05)
IMM GRANULOCYTES NFR BLD AUTO: 0.6 % (ref 0–0.5)
LYMPHOCYTES # BLD AUTO: 3.18 10*3/MM3 (ref 0.7–3.1)
LYMPHOCYTES NFR BLD AUTO: 21 % (ref 19.6–45.3)
MCH RBC QN AUTO: 31.6 PG (ref 26.6–33)
MCHC RBC AUTO-ENTMCNC: 33.6 G/DL (ref 31.5–35.7)
MCV RBC AUTO: 93.8 FL (ref 79–97)
MONOCYTES # BLD AUTO: 1.29 10*3/MM3 (ref 0.1–0.9)
MONOCYTES NFR BLD AUTO: 8.5 % (ref 5–12)
NEUTROPHILS # BLD AUTO: 10.17 10*3/MM3 (ref 1.7–7)
NEUTROPHILS NFR BLD AUTO: 67.2 % (ref 42.7–76)
NRBC BLD AUTO-RTO: 0 /100 WBC (ref 0–0.2)
PLATELET # BLD AUTO: 421 10*3/MM3 (ref 140–450)
PMV BLD AUTO: 9.8 FL (ref 6–12)
POTASSIUM BLD-SCNC: 4.3 MMOL/L (ref 3.5–5.2)
PROT SERPL-MCNC: 7.9 G/DL (ref 6–8.5)
RBC # BLD AUTO: 4.53 10*6/MM3 (ref 4.14–5.8)
SODIUM BLD-SCNC: 137 MMOL/L (ref 136–145)
WBC NRBC COR # BLD: 15.15 10*3/MM3 (ref 3.4–10.8)

## 2019-10-08 PROCEDURE — 94799 UNLISTED PULMONARY SVC/PX: CPT

## 2019-10-08 PROCEDURE — 94760 N-INVAS EAR/PLS OXIMETRY 1: CPT

## 2019-10-08 PROCEDURE — 97116 GAIT TRAINING THERAPY: CPT

## 2019-10-08 PROCEDURE — 92507 TX SP LANG VOICE COMM INDIV: CPT

## 2019-10-08 PROCEDURE — 85025 COMPLETE CBC W/AUTO DIFF WBC: CPT | Performed by: FAMILY MEDICINE

## 2019-10-08 PROCEDURE — 80053 COMPREHEN METABOLIC PANEL: CPT | Performed by: FAMILY MEDICINE

## 2019-10-08 RX ORDER — ACETAMINOPHEN 325 MG/1
650 TABLET ORAL EVERY 6 HOURS PRN
Status: DISCONTINUED | OUTPATIENT
Start: 2019-10-08 | End: 2019-10-10 | Stop reason: HOSPADM

## 2019-10-08 RX ORDER — PANTOPRAZOLE SODIUM 40 MG/1
40 TABLET, DELAYED RELEASE ORAL
Status: DISCONTINUED | OUTPATIENT
Start: 2019-10-09 | End: 2019-10-10 | Stop reason: HOSPADM

## 2019-10-08 RX ADMIN — SODIUM CHLORIDE, PRESERVATIVE FREE 10 ML: 5 INJECTION INTRAVENOUS at 20:06

## 2019-10-08 RX ADMIN — IPRATROPIUM BROMIDE AND ALBUTEROL SULFATE 3 ML: 2.5; .5 SOLUTION RESPIRATORY (INHALATION) at 10:59

## 2019-10-08 RX ADMIN — PANTOPRAZOLE SODIUM 40 MG: 40 INJECTION, POWDER, LYOPHILIZED, FOR SOLUTION INTRAVENOUS at 05:51

## 2019-10-08 RX ADMIN — ATORVASTATIN CALCIUM 80 MG: 40 TABLET, FILM COATED ORAL at 20:05

## 2019-10-08 RX ADMIN — IPRATROPIUM BROMIDE AND ALBUTEROL SULFATE 3 ML: 2.5; .5 SOLUTION RESPIRATORY (INHALATION) at 21:05

## 2019-10-08 RX ADMIN — ASPIRIN 81 MG: 81 TABLET, CHEWABLE ORAL at 08:25

## 2019-10-08 RX ADMIN — SODIUM CHLORIDE, PRESERVATIVE FREE 10 ML: 5 INJECTION INTRAVENOUS at 08:25

## 2019-10-08 RX ADMIN — IPRATROPIUM BROMIDE AND ALBUTEROL SULFATE 3 ML: 2.5; .5 SOLUTION RESPIRATORY (INHALATION) at 14:44

## 2019-10-08 RX ADMIN — Medication 100 MG: at 08:25

## 2019-10-08 RX ADMIN — ACETAMINOPHEN 650 MG: 325 TABLET, FILM COATED ORAL at 15:11

## 2019-10-08 RX ADMIN — IPRATROPIUM BROMIDE AND ALBUTEROL SULFATE 3 ML: 2.5; .5 SOLUTION RESPIRATORY (INHALATION) at 07:37

## 2019-10-08 RX ADMIN — NICOTINE 1 PATCH: 14 PATCH TRANSDERMAL at 08:28

## 2019-10-08 NOTE — PROGRESS NOTES
Continued Stay Note  [facility]     Patient Name: Jarod Ramírez Jr.  MRN: [id number]  Today's Date: 10/8/2019    Admit Date: 9/26/2019    Discharge Plan     Row Name 10/08/19 0907       Plan    Plan Comments  SPOKE TO PT BROTHER (PAO) AND HE STATES HE WON'T BE BACK UNTIL LATE WED OR EARLY THURSDAY. GENE STATES HE WILL BE ABLE TO PICK PT UP ON THRU.     Row Name 10/08/19 2343       Plan    Plan Comments  PT INSURANCE WILL NOT APPROVE ACUTE REHAB. PLAN IS FOR HOME WITH BROTHER (GENE). ATTEMPTED TO CALL PT BROTHER ([phone]) BUT UNABLE TO LEAVE A MESSAGE. PT BROTHER IS OUT OF TOWN UNTIL TOMORROW. PLAN WILL BE FOR OUTPT.         Discharge Codes    No documentation.             JONO Sharif

## 2019-10-08 NOTE — PROGRESS NOTES
[facility] Medicine Services  INPATIENT PROGRESS NOTE    Patient Name: Jarod Ramírez Jr.  Date of Admission: 9/26/2019  Today's Date: 10/08/19  Length of Stay: 12  Primary Care Physician: Jose Karimi MD    Subjective   Chief Complaint: Feeling better  HPI   Doing ok.  Doing well with therapy  No difficulties with speech  Awaiting brother to return so pt can go to his house        Review of Systems   Constitutional: Negative for fatigue and fever.   HENT: Negative for congestion and ear pain.    Eyes: Negative for redness and visual disturbance.   Respiratory: Negative for cough, shortness of breath and wheezing.    Cardiovascular: Negative for chest pain and palpitations.   Gastrointestinal: Negative for abdominal pain, diarrhea, nausea and vomiting.   Endocrine: Negative for cold intolerance and heat intolerance.   Genitourinary: Negative for dysuria and frequency.   Musculoskeletal: Negative for arthralgias and back pain.   Skin: Negative for rash and wound.   Neurological: Negative for dizziness and headaches.   Psychiatric/Behavioral: Negative for confusion. The patient is not nervous/anxious.         All pertinent negatives and positives are as above. All other systems have been reviewed and are negative unless otherwise stated.     Objective    Temp:  [97.9 °F (36.6 °C)-98.8 °F (37.1 °C)] 98 °F (36.7 °C)  Heart Rate:  [68-98] 73  Resp:  [16-18] 18  BP: ()/(59-77) 103/65  Physical Exam   Constitutional: He is oriented to person, place, and time. He appears well-developed and well-nourished.   HENT:   Head: Normocephalic and atraumatic.   Right Ear: External ear normal.   Left Ear: External ear normal.   Nose: Nose normal.   Mouth/Throat: Oropharynx is clear and moist.   Eyes: Conjunctivae and EOM are normal. Pupils are equal, round, and reactive to light. Right eye exhibits no discharge. Left eye exhibits no discharge. No scleral icterus.   Neck: Normal  range of motion. Neck supple. No tracheal deviation present. No thyromegaly present.   Cardiovascular: Normal rate, regular rhythm, normal heart sounds and intact distal pulses. Exam reveals no gallop and no friction rub.   No murmur heard.  Pulmonary/Chest: Effort normal and breath sounds normal. No stridor. No respiratory distress. He has no wheezes. He has no rales. He exhibits no tenderness.   Abdominal: Soft. Bowel sounds are normal. He exhibits no distension and no mass. There is no tenderness. There is no rebound and no guarding. No hernia.   Musculoskeletal: Normal range of motion. He exhibits no edema or deformity.   Lymphadenopathy:     He has no cervical adenopathy.   Neurological: He is alert and oriented to person, place, and time. He has normal reflexes. He displays normal reflexes. No cranial nerve deficit. He exhibits normal muscle tone. Coordination normal.   Skin: Skin is warm and dry. No rash noted. No erythema. No pallor.   Psychiatric: He has a normal mood and affect. His behavior is normal. Judgment and thought content normal.   Vitals reviewed.          Results Review:  I have reviewed the labs, radiology results, and diagnostic studies.    Laboratory Data:   Results from last 7 days   Lab Units 10/08/19  0430 10/07/19  0714 10/06/19  0411   WBC 10*3/mm3 15.15* 12.55* 10.54   HEMOGLOBIN g/dL 14.3 13.5 13.3   HEMATOCRIT % 42.5 39.9 38.7   PLATELETS 10*3/mm3 421 358 333        Results from last 7 days   Lab Units 10/08/19  0430 10/07/19  0714 10/06/19  0411   SODIUM mmol/L 137 137 137   POTASSIUM mmol/L 4.3 4.5 3.9   CHLORIDE mmol/L 93* 95* 97*   CO2 mmol/L 32.0* 30.0* 28.0   BUN mg/dL 14 12 9   CREATININE mg/dL 0.67* 0.64* 0.59*   CALCIUM mg/dL 9.7 9.0 9.0   BILIRUBIN mg/dL 0.2 0.2 0.2   ALK PHOS U/L 74 66 60   ALT (SGPT) U/L 40 35 31   AST (SGOT) U/L 30 31 29   GLUCOSE mg/dL 99 100* 129*       Culture Data:        Radiology Data:   Imaging Results (last 24 hours)     ** No results found for  the last 24 hours. **          I have reviewed the patient's current medications.     Assessment/Plan     Active Hospital Problems    Diagnosis   • Dysphagia due to recent cerebral infarction   • Hyperlipidemia   • Acute cerebrovascular accident (CVA) of cerebellum (CMS/HCC)   • Alcoholism /alcohol abuse (CMS/HCC)   • CVA (cerebral vascular accident) (CMS/HCC)   • Smoker   • Degeneration of lumbar or lumbosacral intervertebral disc     Continue current care                Discharge Planning: I expect the patient to be discharged to home in 1-2 days  Galo Bethea MD   10/08/19   4:43 PM

## 2019-10-08 NOTE — PLAN OF CARE
Problem: Patient Care Overview  Goal: Plan of Care Review  Outcome: Ongoing (interventions implemented as appropriate)   10/08/19 0528   Coping/Psychosocial   Plan of Care Reviewed With patient   Plan of Care Review   Progress improving   OTHER   Outcome Summary A&O. No new neuro changes. NIH 2. Up with standby assist. Resting well.     Goal: Individualization and Mutuality  Outcome: Ongoing (interventions implemented as appropriate)    Goal: Discharge Needs Assessment  Outcome: Ongoing (interventions implemented as appropriate)    Goal: Interprofessional Rounds/Family Conf  Outcome: Ongoing (interventions implemented as appropriate)      Problem: Fall Risk (Adult)  Goal: Absence of Fall  Outcome: Ongoing (interventions implemented as appropriate)      Problem: Stroke (Ischemic) (Adult)  Goal: Signs and Symptoms of Listed Potential Problems Will be Absent, Minimized or Managed (Stroke)  Outcome: Ongoing (interventions implemented as appropriate)      Problem: Skin Injury Risk (Adult)  Goal: Skin Health and Integrity  Outcome: Ongoing (interventions implemented as appropriate)      Problem: VTE, DVT and PE (Adult)  Goal: Signs and Symptoms of Listed Potential Problems Will be Absent, Minimized or Managed (VTE, DVT and PE)  Outcome: Ongoing (interventions implemented as appropriate)

## 2019-10-08 NOTE — PLAN OF CARE
"Problem: Patient Care Overview  Goal: Plan of Care Review  Outcome: Ongoing (interventions implemented as appropriate)   10/08/19 0900   Coping/Psychosocial   Plan of Care Reviewed With patient   Plan of Care Review   Progress improving   OTHER   Outcome Summary Cognitive therapy was completed. When presented with two items and told to \"point to __\" pt was unable to complete task despite max cues and explicit teaching. After he was given a field of 2-4 written word choices, he was able to verbally identify 8/8 objects correctly. Throughout the activity, pt was aware of the errors he was making and would make several attempts to self correct. Occasionally, speech therapist would give him the answer and say \"it is a ball, say ball,\" but pt was unable to repeat the given word. When asked to write a word that was spoken, pt was unable to complete the task. However, when asked to write a word letter by letter he was able to follow commands correctly. Pt requires significant time to formulate verbal responses, sometimes 1-2 minutes. When given prompts during this time pt stated \"I am thinking,\" and did not want the offered cues. Pt was able to answer simple yes/no questions about object names/functions with minimal cues.  Pt will continue to benefit from cognitive therapy, will continue to follow and treat.          "

## 2019-10-08 NOTE — PLAN OF CARE
Problem: Patient Care Overview  Goal: Plan of Care Review  Outcome: Ongoing (interventions implemented as appropriate)   10/08/19 1051   Coping/Psychosocial   Plan of Care Reviewed With patient   Plan of Care Review   Progress improving   OTHER   Outcome Summary Pt. c/o pain in RLE from hip to knee was unable to describe sensation of pain type when asked. Pt. ambulated 50'x2 with SBA/supervision x1 and had a narrow JUSTA with turns during gait. Pt. performed standing balance activities with SBA/supervision and sitting exercises 20 reps. CGAx1 was needed with standing pertubations. Recommended inpatient rehab

## 2019-10-08 NOTE — PROGRESS NOTES
Continued Stay Note   [place]     Patient Name: Jarod Ramírez Jr.  MRN: [id number]  Today's Date: 10/8/2019    Admit Date: 9/26/2019    Discharge Plan     Row Name 10/08/19 1108       Plan    Plan Comments  PT INSURANCE WILL NOT APPROVE ACUTE REHAB. PLAN IS FOR HOME WITH BROTHER (GENE). ATTEMPTED TO CALL PT BROTHER ([phone]) BUT UNABLE TO LEAVE A MESSAGE. PT BROTHER IS OUT OF TOWN UNTIL TOMORROW. PLAN WILL BE FOR OUTPT.         Discharge Codes    No documentation.             JONO Sharif

## 2019-10-08 NOTE — PLAN OF CARE
Problem: Patient Care Overview  Goal: Plan of Care Review  Outcome: Ongoing (interventions implemented as appropriate)   10/08/19 1326   Coping/Psychosocial   Plan of Care Reviewed With patient   Plan of Care Review   Progress no change   OTHER   Outcome Summary Pt A&O X4. C/o leg pain. NIH 2, speech improving. PPP. TELE. Will continue to monitor.

## 2019-10-08 NOTE — PLAN OF CARE
Problem: Patient Care Overview  Goal: Plan of Care Review  Outcome: Ongoing (interventions implemented as appropriate)   10/08/19 6475   Coping/Psychosocial   Plan of Care Reviewed With patient   OTHER   Outcome Summary RD follow up. Soft texture ground meats,pudding thick liquids. Magic cup TID. Pt reports appeite improved, uses supplments. Con to follow per protocol.        Problem: Nutrition, Imbalanced: Inadequate Oral Intake (Adult)  Goal: Identify Related Risk Factors and Signs and Symptoms  Outcome: Ongoing (interventions implemented as appropriate)    Goal: Prevent Further Weight Loss  Outcome: Ongoing (interventions implemented as appropriate)

## 2019-10-08 NOTE — THERAPY TREATMENT NOTE
"Acute Care - Speech Language Pathology Treatment Note  [facility]     Patient Name: Jarod Ramírez Jr.  : 1960  MRN: [id number]  Today's Date: 10/8/2019         Admit Date: 2019  Cognitive therapy was completed. When presented with two items and told to \"point to __\" pt was unable to complete task despite max cues and explicit teaching. After he was given a field of 2-4 written word choices, he was able to verbally identify 8/8 objects correctly. Throughout the activity, pt was aware of the errors he was making and would make several attempts to self correct. Occasionally, speech therapist would give him the answer and say \"it is a ball, say ball,\" but pt was unable to repeat the given word. When asked to write a word that was spoken, pt was unable to complete the task. However, when asked to write a word letter by letter he was able to follow commands correctly. Pt requires significant time to formulate verbal responses, sometimes 1-2 minutes. When given prompts during this time pt stated \"I am thinking,\" and did not want the offered cues. Pt was able to answer simple yes/no questions about object names/functions with minimal cues.  Pt will continue to benefit from cognitive therapy, will continue to follow and treat.   Eliecer Williamson, Speech Student  Visit Dx:      ICD-10-CM ICD-9-CM   1. Dysphagia, unspecified type R13.10 787.20   2. Impaired mobility Z74.09 799.89   3. Impaired mobility and ADLs Z74.09 799.89   4. Aphasia R47.01 784.3     Patient Active Problem List   Diagnosis   • Degeneration of cervical intervertebral disc   • Closed fracture of lumbar vertebra (CMS/HCC)   • Degeneration of lumbar or lumbosacral intervertebral disc   • Smoker   • BMI 27.0-27.9,adult   • Acute cerebrovascular accident (CVA) of cerebellum (CMS/HCC)   • Alcoholism /alcohol abuse (CMS/HCC)   • CVA (cerebral vascular accident) (CMS/HCC)   • Hyperlipidemia   • Dysphagia due to recent cerebral infarction      "   Therapy Treatment  Rehabilitation Treatment Summary     Row Name 10/08/19 0826             Treatment Time/Intention    Discipline  speech language pathologist  (Pended)   -AS      Document Type  therapy note (daily note)  (Pended)   -AS      Subjective Information  no complaints  (Pended)   -AS      Mode of Treatment  speech-language pathology  (Pended)   -AS      Patient/Family Observations  no family present  (Pended)   -AS      Patient Effort  good  (Pended)   -AS      Recorded by [AS] Eliecer Williamson Speech Therapy Student 10/08/19 0856      Row Name 10/08/19 0826             Pain Scale: FACES Pre/Post-Treatment    Pain: FACES Scale, Pretreatment  0-->no hurt  (Pended)   -AS      Pain: FACES Scale, Post-Treatment  0-->no hurt  (Pended)   -AS      Recorded by [AS] Eliecer Williamson Speech Therapy Student 10/08/19 0856      Row Name                Wound 09/27/19 0800 Right posterior finger    Wound - Properties Group Date first assessed: 09/27/19 [NW] Time first assessed: 0800 [NW] Present on Hospital Admission: Y [NW] Side: Right [NW] Orientation: posterior [NW] Location: finger [NW] Recorded by:  [NW] Dunia Multani RN 09/27/19 1624    Row Name 10/08/19 0826             Outcome Summary/Treatment Plan (SLP)    Daily Summary of Progress (SLP)  progress towards functional goals is fair  (Pended)   -AS      Barriers to Overall Progress (SLP)  aphasia  (Pended)   -AS      Plan for Continued Treatment (SLP)  continue to follow and treat  (Pended)   -AS      Anticipated Dischage Disposition  unknown  (Pended)   -AS      Recorded by [AS] Eliecer Williamson Speech Therapy Student 10/08/19 0856        User Key  (r) = Recorded By, (t) = Taken By, (c) = Cosigned By    Initials Name Effective Dates Discipline    NW Dunia Multani RN 07/12/18 -  Nurse    AS Eliecer Williamson Speech Therapy Student 09/03/19 -  SLP          EDUCATION  The patient has been educated in the following areas:   Cognitive  Impairment.    SLP Recommendation and Plan  Daily Summary of Progress (SLP): (P) progress towards functional goals is fair  Barriers to Overall Progress (SLP): (P) aphasia  Plan for Continued Treatment (SLP): (P) continue to follow and treat  Anticipated Dischage Disposition: (P) unknown             SLP GOALS     Row Name 10/08/19 0826 10/07/19 0846          Oral Nutrition/Hydration Goal 1 (SLP)    Oral Nutrition/Hydration Goal 1, SLP  LTG: Patient will tolerate LRD without s/s of aspiration.  (Pended)   -AS  LTG: Patient will tolerate LRD without s/s of aspiration.  -MB (r) AS (t) MB (c)     Time Frame (Oral Nutrition/Hydration Goal 1, SLP)  by discharge  (Pended)   -AS  by discharge  -MB (r) AS (t) MB (c)     Barriers (Oral Nutrition/Hydration Goal 1, SLP)  Aphasia  (Pended)   -AS  Aphasia  -MB (r) AS (t) MB (c)     Progress/Outcomes (Oral Nutrition/Hydration Goal 1, SLP)  continuing progress toward goal  (Pended)   -AS  continuing progress toward goal  -MB (r) AS (t) MB (c)        Lingual Strengthening Goal 1 (SLP)    Activity (Lingual Strengthening Goal 1, SLP)  increase tongue back strength  (Pended)   -AS  increase tongue back strength  -MB (r) AS (t) MB (c)     Increase Tongue Back Strength  lingual movement exercises  (Pended)   -AS  lingual movement exercises  -MB (r) AS (t) MB (c)     [place]/Accuracy (Lingual Strengthening Goal 1, SLP)  independently (over 90% accuracy)  (Pended)   -AS  independently (over 90% accuracy)  -MB (r) AS (t) MB (c)     Time Frame (Lingual Strengthening Goal 1, SLP)  short term goal (STG);by discharge  (Pended)   -AS  short term goal (STG);by discharge  -MB (r) AS (t) MB (c)     Barriers (Lingual Strengthening Goal 1, SLP)  n/a  (Pended)   -AS  n/a  -MB (r) AS (t) MB (c)     Progress/Outcomes (Lingual Strengthening Goal 1, SLP)  goal ongoing  (Pended)   -AS  goal ongoing  -MB (r) AS (t) MB (c)        Pharyngeal Strengthening Exercise Goal 1 (SLP)    Activity (Pharyngeal  Strengthening Goal 1, SLP)  increase timing;increase superior movement of the hyolaryngeal complex;increase anterior movement of the hyolaryngeal complex;increase epiglottic inversion and retroflexion;increase squeeze/positive pressure generation  (Pended)   -AS  increase timing;increase superior movement of the hyolaryngeal complex;increase anterior movement of the hyolaryngeal complex;increase epiglottic inversion and retroflexion;increase squeeze/positive pressure generation  -MB (r) AS (t) MB (c)     Increase Timing  gustatory stimulation (sour/cold)  (Pended)   -AS  gustatory stimulation (sour/cold)  -MB (r) AS (t) MB (c)     Increase Anterior Movement of the Hyolaryngeal Complex  shaker  (Pended)   -AS  shaker  -MB (r) AS (t) MB (c)     Increase Epiglottic Inversion and Retroflexion  Mendelsohn  (Pended)   -AS  Mendelsohn  -MB (r) AS (t) MB (c)     Increase Squeeze/Positive Pressure Generation  hard effortful swallow  (Pended)   -AS  hard effortful swallow  -MB (r) AS (t) MB (c)     Increase Tongue Base Retraction  yoni  (Pended)   -AS  yoni  -MB (r) AS (t) MB (c)     [place]/Accuracy (Pharyngeal Strengthening Goal 1, SLP)  independently (over 90% accuracy)  (Pended)   -AS  independently (over 90% accuracy)  -MB (r) AS (t) MB (c)     Time Frame (Pharyngeal Strengthening Goal 1, SLP)  short term goal (STG);by discharge  (Pended)   -AS  short term goal (STG);by discharge  -MB (r) AS (t) MB (c)     Barriers (Pharyngeal Strengthening Goal 1, SLP)  cognitive status  (Pended)   -AS  cognitive status  -MB (r) AS (t) MB (c)     Progress/Outcomes (Pharyngeal Strengthening Goal 1, SLP)  progress slower than expected  (Pended)   -AS  progress slower than expected  -MB (r) AS (t) MB (c)        Words/Phrases/Sentences Goal 1 (SLP)    Improve Ability to Comprehend Words/Phrases/Sentences Through: Goal 1 (SLP)  identify objects, field of;identify pictures, field of;90%;independently (over 90% accuracy);other  (comment)  (Pended)   -AS  identify objects, field of;identify pictures, field of;90%;independently (over 90% accuracy);other (comment)  -MB (r) AS (t) MB (c)     Time Frame (Identify Objects and Pictures Goal 1, SLP)  short term goal (STG);by discharge  (Pended)   -AS  short term goal (STG);by discharge  -MB (r) AS (t) MB (c)     Barriers (Identify Objects and Pictures Goal 1, SLP)  aphasia  (Pended)   -AS  aphasia  -MB (r) AS (t) MB (c)     Progress (Ability to Contruct Words/Phrases/Sentences Goal 1, SLP)  with moderate cues (50-74%);50%  (Pended)   -AS  70%;independently (over 90% accuracy)  -MB (r) AS (t) MB (c)     Progress/Outcomes (Identify Objects and Pictures Goal 1, SLP)  continuing progress toward goal  (Pended)   -AS  continuing progress toward goal  -MB (r) AS (t) MB (c)        Comprehend Questions Goal 1 (SLP)    Improve Ability to Comprehend Questions Goal 1 (SLP)  simple yes/no questions;complex yes/no questions;90%;independently (over 90% accuracy)  (Pended)   -AS  simple yes/no questions;complex yes/no questions;90%;independently (over 90% accuracy)  -MB (r) AS (t) MB (c)     Time Frame (Comprehend Questions Goal 1, SLP)  short term goal (STG);by discharge  (Pended)   -AS  short term goal (STG);by discharge  -MB (r) AS (t) MB (c)     Barriers (Comprehend Questions Goal 1, SLP)  aphaisa  (Pended)   -AS  aphaisa  -MB (r) AS (t) MB (c)     Progress (Ability to Comprehend Questions Goal 1, SLP)  60%;with minimal cues (75-90%)  (Pended)   -AS  60%;with minimal cues (75-90%)  -MB (r) AS (t) MB (c)     Progress/Outcomes (Comprehend Questions Goal 1, SLP)  continuing progress toward goal  (Pended)   -AS  continuing progress toward goal  -MB (r) AS (t) MB (c)        Follow Directions Goal 2 (SLP)    Improve Ability to Follow Directions Goal 1 (SLP)  1 step direction with objects;1 step direction without objects;90%;independently (over 90% accuracy)  (Pended)   -AS  1 step direction with objects;1 step  direction without objects;90%;independently (over 90% accuracy)  -MB (r) AS (t) MB (c)     Time Frame (Follow Directions Goal 1, SLP)  short term goal (STG);by discharge  (Pended)   -AS  short term goal (STG);by discharge  -MB (r) AS (t) MB (c)     Progress (Ability to Follow Directions Goal 1, SLP)  50%;with moderate cues (50-74%)  (Pended)   -AS  --     Progress/Outcomes (Follow Directions Goal 1, SLP)  continuing progress toward goal  (Pended)   -AS  goal ongoing  -MB (r) AS (t) MB (c)        Word Retrieval Skills Goal 1 (SLP)    Improve Word Retrieval Skills By Goal 1 (SLP)  completing automatic speech task, days of the week;completing automatic speech task, months;confrontational naming task;repeating words;90%;independently (over 90% accuracy)  (Pended)   -AS  completing automatic speech task, days of the week;completing automatic speech task, months;confrontational naming task;repeating words;90%;independently (over 90% accuracy)  -MB (r) AS (t) MB (c)     Time Frame (Word Retrieval Goal 1, SLP)  short term goal (STG);by discharge  (Pended)   -AS  short term goal (STG);by discharge  -MB (r) AS (t) MB (c)     Progress/Outcomes (Word Retrieval Goal 1, SLP)  goal ongoing  (Pended)   -AS  goal ongoing  -MB (r) AS (t) MB (c)        Graphic Expression of Shapes, Letters, Numbers Goal 1 (SLP)    Improve Graphic Expression of Shapes, Letters, and Numbers Goal 1 (SLP)  copy shapes, numbers, and letters;writing dictated number and letters;90%;independently (over 90% accuracy)  (Pended)   -AS  copy shapes, numbers, and letters;writing dictated number and letters;90%;independently (over 90% accuracy)  -MB (r) AS (t) MB (c)     Time Frame (Graphic Expression of Shapes, Letters, and Numbers Goal 1, SLP)  short term goal (STG);by discharge  (Pended)   -AS  short term goal (STG);by discharge  -MB (r) AS (t) MB (c)     Progress (Graphic Expression of Shapes, Letters, and Numbers Goal 1, SLP)  70%;with moderate cues (50-74%)   (Pended)   -AS  --     Progress/Outcomes (Graphic Expression of Shapes, Letters, and Numbers Goal 1, SLP)  continuing progress toward goal  (Pended)   -AS  goal ongoing  -MB (r) AS (t) MB (c)        Augmentative/Alternative Communication Objectives Goal 1 (SLP    Communication (Augmentative/Alternative Communication Goal 1, SLP)  improve ability to use low tech augmentative/alternative communication device  (Pended)   -AS  improve ability to use low tech augmentative/alternative communication device  -MB (r) AS (t) MB (c)     Improve Communication by (Augmentative/Alternative Communication Goal 1, SLP)  identify picture, field of ____;identify word, field of ____;alphabet/picture board;90%;independently (over 90% accuracy)  (Pended)   -AS  identify picture, field of ____;identify word, field of ____;alphabet/picture board;90%;independently (over 90% accuracy)  -MB (r) AS (t) MB (c)     Time Frame (Augmentative/Alternative Communication Goal 1, SLP)  short term goal (STG);by discharge  (Pended)   -AS  short term goal (STG);by discharge  -MB (r) AS (t) MB (c)     Progress (Augmentative/Alternative Communication Goal 1, SLP)  --  70%;with minimal cues (75-90%)  -MB (r) AS (t) MB (c)     Progress/Outcomes (Augmentative/Alternative Communication Goal 1, SLP)  goal ongoing  (Pended)   -AS  good progress toward goal  -MB (r) AS (t) MB (c)       User Key  (r) = Recorded By, (t) = Taken By, (c) = Cosigned By    Initials Name Provider Type    Georgi Garcia CCC-SLP Speech and Language Pathologist    AS Eliecer Williamson, Speech Therapy Student Speech Therapy Student              Time Calculation:     Time Calculation- SLP     Row Name 10/08/19 0854             Time Calculation- SLP    SLP Start Time  0826  (Pended)   -AS      SLP Stop Time  0857  (Pended)   -AS      SLP Time Calculation (min)  31 min  (Pended)   -AS      SLP Received On  10/08/19  (Pended)   -AS        User Key  (r) = Recorded By, (t) = Taken By, (c) =  Cosigned By    Initials Name Provider Type    AS Eliecer Williamson Speech Therapy Student Speech Therapy Student          Therapy Charges for Today     Code Description Service Date Service Provider Modifiers Qty    07892046751 HC ST TREATMENT SWALLOW 1 10/7/2019 Eliecer Williamson Speech Therapy Student GN 1    05855626044 HC ST TREATMENT SPEECH 1 10/7/2019 Eliecer Williamson, Speech Therapy Student GN 1    [id number] HC ST TREATMENT SPEECH 2 10/8/2019 Eliecer Williamson, Speech Therapy Student GN 1                     Eliecer Williamson Speech Therapy Student  10/8/2019

## 2019-10-09 LAB
ALBUMIN SERPL-MCNC: 4.1 G/DL (ref 3.5–5.2)
ALBUMIN/GLOB SERPL: 1 G/DL
ALP SERPL-CCNC: 80 U/L (ref 39–117)
ALT SERPL W P-5'-P-CCNC: 49 U/L (ref 1–41)
ANION GAP SERPL CALCULATED.3IONS-SCNC: 14 MMOL/L (ref 5–15)
AST SERPL-CCNC: 40 U/L (ref 1–40)
BASOPHILS # BLD AUTO: 0.12 10*3/MM3 (ref 0–0.2)
BASOPHILS NFR BLD AUTO: 1.1 % (ref 0–1.5)
BILIRUB SERPL-MCNC: 0.2 MG/DL (ref 0.2–1.2)
BUN BLD-MCNC: 13 MG/DL (ref 6–20)
BUN/CREAT SERPL: 22 (ref 7–25)
CALCIUM SPEC-SCNC: 9.6 MG/DL (ref 8.6–10.5)
CHLORIDE SERPL-SCNC: 91 MMOL/L (ref 98–107)
CO2 SERPL-SCNC: 30 MMOL/L (ref 22–29)
CREAT BLD-MCNC: 0.59 MG/DL (ref 0.76–1.27)
DEPRECATED RDW RBC AUTO: 46.1 FL (ref 37–54)
EOSINOPHIL # BLD AUTO: 0.31 10*3/MM3 (ref 0–0.4)
EOSINOPHIL NFR BLD AUTO: 2.8 % (ref 0.3–6.2)
ERYTHROCYTE [DISTWIDTH] IN BLOOD BY AUTOMATED COUNT: 13.5 % (ref 12.3–15.4)
GFR SERPL CREATININE-BSD FRML MDRD: 142 ML/MIN/1.73
GLOBULIN UR ELPH-MCNC: 4 GM/DL
GLUCOSE BLD-MCNC: 106 MG/DL (ref 65–99)
HCT VFR BLD AUTO: 43.8 % (ref 37.5–51)
HGB BLD-MCNC: 15.1 G/DL (ref 13–17.7)
IMM GRANULOCYTES # BLD AUTO: 0.06 10*3/MM3 (ref 0–0.05)
IMM GRANULOCYTES NFR BLD AUTO: 0.5 % (ref 0–0.5)
LYMPHOCYTES # BLD AUTO: 2.94 10*3/MM3 (ref 0.7–3.1)
LYMPHOCYTES NFR BLD AUTO: 26.1 % (ref 19.6–45.3)
MCH RBC QN AUTO: 31.9 PG (ref 26.6–33)
MCHC RBC AUTO-ENTMCNC: 34.5 G/DL (ref 31.5–35.7)
MCV RBC AUTO: 92.4 FL (ref 79–97)
MONOCYTES # BLD AUTO: 0.96 10*3/MM3 (ref 0.1–0.9)
MONOCYTES NFR BLD AUTO: 8.5 % (ref 5–12)
NEUTROPHILS # BLD AUTO: 6.88 10*3/MM3 (ref 1.7–7)
NEUTROPHILS NFR BLD AUTO: 61 % (ref 42.7–76)
NRBC BLD AUTO-RTO: 0 /100 WBC (ref 0–0.2)
PLATELET # BLD AUTO: 479 10*3/MM3 (ref 140–450)
PMV BLD AUTO: 9.8 FL (ref 6–12)
POTASSIUM BLD-SCNC: 4.5 MMOL/L (ref 3.5–5.2)
PROT SERPL-MCNC: 8.1 G/DL (ref 6–8.5)
RBC # BLD AUTO: 4.74 10*6/MM3 (ref 4.14–5.8)
SODIUM BLD-SCNC: 135 MMOL/L (ref 136–145)
WBC NRBC COR # BLD: 11.27 10*3/MM3 (ref 3.4–10.8)

## 2019-10-09 PROCEDURE — 94799 UNLISTED PULMONARY SVC/PX: CPT

## 2019-10-09 PROCEDURE — 85025 COMPLETE CBC W/AUTO DIFF WBC: CPT | Performed by: FAMILY MEDICINE

## 2019-10-09 PROCEDURE — 80053 COMPREHEN METABOLIC PANEL: CPT | Performed by: FAMILY MEDICINE

## 2019-10-09 PROCEDURE — 94760 N-INVAS EAR/PLS OXIMETRY 1: CPT

## 2019-10-09 RX ADMIN — ACETAMINOPHEN 650 MG: 325 TABLET, FILM COATED ORAL at 11:45

## 2019-10-09 RX ADMIN — ASPIRIN 81 MG: 81 TABLET, CHEWABLE ORAL at 08:29

## 2019-10-09 RX ADMIN — IPRATROPIUM BROMIDE AND ALBUTEROL SULFATE 3 ML: 2.5; .5 SOLUTION RESPIRATORY (INHALATION) at 06:52

## 2019-10-09 RX ADMIN — ATORVASTATIN CALCIUM 80 MG: 40 TABLET, FILM COATED ORAL at 20:18

## 2019-10-09 RX ADMIN — Medication 100 MG: at 08:30

## 2019-10-09 RX ADMIN — IPRATROPIUM BROMIDE AND ALBUTEROL SULFATE 3 ML: 2.5; .5 SOLUTION RESPIRATORY (INHALATION) at 20:28

## 2019-10-09 RX ADMIN — IPRATROPIUM BROMIDE AND ALBUTEROL SULFATE 3 ML: 2.5; .5 SOLUTION RESPIRATORY (INHALATION) at 10:23

## 2019-10-09 RX ADMIN — SODIUM CHLORIDE, PRESERVATIVE FREE 10 ML: 5 INJECTION INTRAVENOUS at 20:18

## 2019-10-09 RX ADMIN — SODIUM CHLORIDE, PRESERVATIVE FREE 10 ML: 5 INJECTION INTRAVENOUS at 08:29

## 2019-10-09 RX ADMIN — IPRATROPIUM BROMIDE AND ALBUTEROL SULFATE 3 ML: 2.5; .5 SOLUTION RESPIRATORY (INHALATION) at 14:16

## 2019-10-09 RX ADMIN — NICOTINE 1 PATCH: 14 PATCH TRANSDERMAL at 08:33

## 2019-10-09 NOTE — PLAN OF CARE
"Problem: Patient Care Overview  Goal: Plan of Care Review  Outcome: Ongoing (interventions implemented as appropriate)   10/09/19 5300   Coping/Psychosocial   Plan of Care Reviewed With patient   OTHER   Outcome Summary Cognitive therapy was attempted, Pt refused services. Pt had his blankets over his face and said \"I do not want to today.\" Therapy to be attempted again tomorrow.          "

## 2019-10-09 NOTE — PROGRESS NOTES
[facility]  INPATIENT PROGRESS NOTE    Patient Name: Jarod Ramírez Jr.  Date of Admission: 9/26/2019  Today's Date: 10/09/19  Length of Stay: 13  Primary Care Physician: Jose Kariim MD    Subjective   Chief Complaint: no complaints  HPI   Doing well.  No speech difficulties  No events overnight        Review of Systems   Constitutional: Negative for fatigue and fever.   HENT: Negative for congestion and ear pain.    Eyes: Negative for redness and visual disturbance.   Respiratory: Negative for cough, shortness of breath and wheezing.    Cardiovascular: Negative for chest pain and palpitations.   Gastrointestinal: Negative for abdominal pain, diarrhea, nausea and vomiting.   Endocrine: Negative for cold intolerance and heat intolerance.   Genitourinary: Negative for dysuria and frequency.   Musculoskeletal: Negative for arthralgias and back pain.   Skin: Negative for rash and wound.   Neurological: Negative for dizziness and headaches.   Psychiatric/Behavioral: Negative for confusion. The patient is not nervous/anxious.         All pertinent negatives and positives are as above. All other systems have been reviewed and are negative unless otherwise stated.     Objective    Temp:  [98 °F (36.7 °C)-98.9 °F (37.2 °C)] 98.9 °F (37.2 °C)  Heart Rate:  [66-79] 75  Resp:  [14-18] 14  BP: (103-133)/(57-84) 113/83  Physical Exam   Constitutional: He is oriented to person, place, and time. He appears well-developed and well-nourished.   HENT:   Head: Normocephalic and atraumatic.   Right Ear: External ear normal.   Left Ear: External ear normal.   Nose: Nose normal.   Mouth/Throat: Oropharynx is clear and moist.   Eyes: Conjunctivae and EOM are normal. Pupils are equal, round, and reactive to light. Right eye exhibits no discharge. Left eye exhibits no discharge. No scleral icterus.   Neck: Normal range of motion. Neck supple. No tracheal deviation present. No  thyromegaly present.   Cardiovascular: Normal rate, regular rhythm, normal heart sounds and intact distal pulses. Exam reveals no gallop and no friction rub.   No murmur heard.  Pulmonary/Chest: Effort normal and breath sounds normal. No stridor. No respiratory distress. He has no wheezes. He has no rales. He exhibits no tenderness.   Abdominal: Soft. Bowel sounds are normal. He exhibits no distension and no mass. There is no tenderness. There is no rebound and no guarding. No hernia.   Musculoskeletal: Normal range of motion. He exhibits no edema or deformity.   Lymphadenopathy:     He has no cervical adenopathy.   Neurological: He is alert and oriented to person, place, and time. He has normal reflexes. He displays normal reflexes. No cranial nerve deficit. He exhibits normal muscle tone. Coordination normal.   Skin: Skin is warm and dry. No rash noted. No erythema. No pallor.   Psychiatric: He has a normal mood and affect. His behavior is normal. Judgment and thought content normal.   Vitals reviewed.          Results Review:  I have reviewed the labs, radiology results, and diagnostic studies.    Laboratory Data:   Results from last 7 days   Lab Units 10/09/19  0408 10/08/19  0430 10/07/19  0714   WBC 10*3/mm3 11.27* 15.15* 12.55*   HEMOGLOBIN g/dL 15.1 14.3 13.5   HEMATOCRIT % 43.8 42.5 39.9   PLATELETS 10*3/mm3 479* 421 358        Results from last 7 days   Lab Units 10/09/19  0408 10/08/19  0430 10/07/19  0714   SODIUM mmol/L 135* 137 137   POTASSIUM mmol/L 4.5 4.3 4.5   CHLORIDE mmol/L 91* 93* 95*   CO2 mmol/L 30.0* 32.0* 30.0*   BUN mg/dL 13 14 12   CREATININE mg/dL 0.59* 0.67* 0.64*   CALCIUM mg/dL 9.6 9.7 9.0   BILIRUBIN mg/dL 0.2 0.2 0.2   ALK PHOS U/L 80 74 66   ALT (SGPT) U/L 49* 40 35   AST (SGOT) U/L 40 30 31   GLUCOSE mg/dL 106* 99 100*       Culture Data:        Radiology Data:   Imaging Results (last 24 hours)     ** No results found for the last 24 hours. **          I have reviewed the patient's  current medications.     Assessment/Plan     Active Hospital Problems    Diagnosis   • Dysphagia due to recent cerebral infarction   • Hyperlipidemia   • Acute cerebrovascular accident (CVA) of cerebellum (CMS/HCC)   • Alcoholism /alcohol abuse (CMS/HCC)   • CVA (cerebral vascular accident) (CMS/HCC)   • Smoker   • Degeneration of lumbar or lumbosacral intervertebral disc     D/c home in AM when brother back              Discharge Planning: I expect the patient to be discharged to home with brother in AM    Galo Bethea MD   10/09/19   3:44 PM

## 2019-10-09 NOTE — PLAN OF CARE
Problem: Patient Care Overview  Goal: Plan of Care Review  Outcome: Ongoing (interventions implemented as appropriate)   10/09/19 0326   Coping/Psychosocial   Plan of Care Reviewed With patient   Plan of Care Review   Progress no change   OTHER   Outcome Summary A&O. Up ad angela and ambulating well. Possible d/c home with home health 10/9. Son called and said that he would like for the pt to stay another night. Refuses  and SCDs.        Problem: Nutrition, Imbalanced: Inadequate Oral Intake (Adult)  Goal: Identify Related Risk Factors and Signs and Symptoms  Outcome: Ongoing (interventions implemented as appropriate)    Goal: Prevent Further Weight Loss  Outcome: Ongoing (interventions implemented as appropriate)      Problem: VTE, DVT and PE (Adult)  Goal: Signs and Symptoms of Listed Potential Problems Will be Absent, Minimized or Managed (VTE, DVT and PE)  Outcome: Ongoing (interventions implemented as appropriate)

## 2019-10-09 NOTE — PAYOR COMM NOTE
"10/8/19 CLINICAL UPDATE    [id number]   [id number]  Jarod Ramírez Jr. (59 y.o. Male)     Date of Birth Social Security Number Address Home Phone MRN    1960  [address] [place] [address] [phone] [id number]    Latter-day Marital Status          Other Single       Admission Date Admission Type Admitting Provider Attending Provider Department, Room/Bed    9/26/19 Urgent Galo Bethea MD Moore, Jonathan Scott, MD [facility] 3A, 355/1    Discharge Date Discharge Disposition Discharge Destination                       Attending Provider:  Galo Bethea MD    Allergies:  No Known Allergies    Isolation:  None   Infection:  None   Code Status:  CPR    Ht:  162.6 cm (64\")   Wt:  78 kg (172 lb)    Admission Cmt:  None   Principal Problem:  None                Active Insurance as of 9/26/2019     Primary Coverage     Payor Plan Insurance Group Employer/Plan Group    [employer] [place] [employer] [place]      Payor Plan Address Payor Plan Phone Number Payor Plan Fax Number Effective Dates    [address] [address] [phone]  6/22/2017 - None Entered    [facility] [id number]       Subscriber Name Subscriber Birth Date Member ID       JAROD RAMÍREZ JR. 1960 [id number]                 Emergency Contacts      (Rel.) Home Phone Work Phone Mobile Phone    Lázaro Ramírez (Brother) [phone] -- --            Vital Signs (last day)     Date/Time   Temp   Temp src   Pulse   Resp   BP   Patient Position   SpO2    10/09/19 0658   --   --   71   16   --   --   98    10/09/19 0652   --   --   69   17   --   --   98    10/09/19 0406   98.1 (36.7)   Oral   72   18   124/84   Sitting   100    10/09/19 0049   98.5 (36.9)   Oral   79   18   133/77   Lying   100    10/08/19 2110   --   --   73   16   --   --   99    10/08/19 2105   --   --   76   16   --   --   100    10/08/19 1928   98 (36.7)   Oral   66   18   104/57   Lying   100    10/08/19 1623   98 (36.7)   Oral   73   18 "   103/65   Sitting   100    10/08/19 1449   --   --   77   16   --   --   --    10/08/19 1444   --   --   83   16   --   --   96    10/08/19 1108   97.9 (36.6)   Oral   70   16   111/59   Lying   96    10/08/19 1105   --   --   80   16   --   Lying   98    10/08/19 1100   --   --   78   16   --   Lying   97    10/08/19 1059   --   --   78   16   --   Sitting   97    10/08/19 0744   --   --   72   16   --   --   --    10/08/19 0737   --   --   72   16   --   --   97    10/08/19 0452   98.5 (36.9)   Oral   69   16   128/77   Lying   98    10/08/19 0029   98.7 (37.1)   Oral   98   16   103/61   Lying   95              Oxygen Therapy (last day)     Date/Time   SpO2   Device (Oxygen Therapy)   Flow (L/min)   Oxygen Concentration (%)   ETCO2 (mmHg)    10/09/19 0658   98   --   --   --   --    10/09/19 0652   98   room air   --   --   --    10/09/19 0406   100   room air   --   --   --    10/09/19 0049   100   room air   --   --   --    10/08/19 2110   99   room air   --   --   --    10/08/19 2105   100   room air   --   --   --    10/08/19 2050   --   room air   --   --   --    10/08/19 1928   100   room air   --   --   --    10/08/19 1623   100   room air   --   --   --    10/08/19 1444   96   room air   --   --   --    10/08/19 1108   96   room air   --   --   --    10/08/19 1105   98   room air   --   --   --    10/08/19 1100   97   room air   --   --   --    10/08/19 1059   97   room air   --   --   --    10/08/19 0825   --   room air   --   --   --    10/08/19 0737   97   room air   --   --   --    10/08/19 0452   98   room air   --   --   --    10/08/19 0029   95   room air   --   --   --              Intake & Output (last day)       10/08 0701 - 10/09 0700 10/09 0701 - 10/10 0700    P.O. 240     Total Intake(mL/kg) 240 (3.1)     Net +240           Urine Unmeasured Occurrence 3 x         Lines, Drains & Airways    Active LDAs     Name:   Placement date:   Placement time:   Site:   Days:    Peripheral IV 10/02/19  "1350 Anterior;Left Forearm   10/02/19    1350    Forearm   6                Hospital Medications (active)       Dose Frequency Start End    acetaminophen (TYLENOL) tablet 650 mg 650 mg Every 6 Hours PRN 10/8/2019     Sig - Route: Take 2 tablets by mouth Every 6 (Six) Hours As Needed for Mild Pain . - Oral    albuterol (PROVENTIL) nebulizer solution 0.083% 2.5 mg/3mL 2.5 mg Once As Needed 9/27/2019     Sig - Route: Take 2.5 mg by nebulization 1 (One) Time As Needed for Shortness of Air (Sputum Induction). - Nebulization    Cosign for Ordering: Accepted by Galo Bethea MD on 9/28/2019  8:22 AM    aspirin chewable tablet 81 mg 81 mg Daily 9/27/2019     Sig - Route: Chew 1 tablet Daily. - Oral    Linked Group 1:  \"Or\" Linked Group Details        aspirin suppository 300 mg 300 mg Daily 9/27/2019     Sig - Route: Insert 1 suppository into the rectum Daily. - Rectal    Linked Group 1:  \"Or\" Linked Group Details        atorvastatin (LIPITOR) tablet 80 mg 80 mg Nightly 9/26/2019     Sig - Route: Take 2 tablets by mouth Every Night. - Oral    hypromellose (ISOPTO TEARS) 0.5 % ophthalmic solution 2 drop 2 drop 3 Times Daily PRN 9/30/2019     Sig - Route: Administer 2 drops to both eyes 3 (Three) Times a Day As Needed for Dry Eyes. - Both Eyes    ipratropium-albuterol (DUO-NEB) nebulizer solution 3 mL 3 mL 4 Times Daily - RT 9/27/2019     Sig - Route: Take 3 mL by nebulization 4 (Four) Times a Day. - Nebulization    labetalol (NORMODYNE,TRANDATE) injection 20 mg 20 mg Every 4 Hours PRN 9/27/2019     Sig - Route: Infuse 4 mL into a venous catheter Every 4 (Four) Hours As Needed for High Blood Pressure. - Intravenous    nicotine (NICODERM CQ) 14 MG/24HR patch 1 patch 1 patch Every 24 Hours Scheduled 10/5/2019     Sig - Route: Place 1 patch on the skin as directed by provider Daily. - Transdermal    pantoprazole (PROTONIX) EC tablet 40 mg 40 mg Every Early Morning 10/9/2019     Sig - Route: Take 1 tablet by mouth Every " Morning. - Oral    sodium chloride 0.9 % flush 10 mL 10 mL Every 12 Hours Scheduled 9/26/2019     Sig - Route: Infuse 10 mL into a venous catheter Every 12 (Twelve) Hours. - Intravenous    sodium chloride 0.9 % flush 10 mL 10 mL As Needed 9/26/2019     Sig - Route: Infuse 10 mL into a venous catheter As Needed for Line Care. - Intravenous    thiamine (VITAMIN B-1) tablet 100 mg 100 mg Daily 10/4/2019     Sig - Route: Take 1 tablet by mouth Daily. - Oral    pantoprazole (PROTONIX) injection 40 mg (Discontinued) 40 mg Every Early Morning 10/3/2019 10/8/2019    Sig - Route: Infuse 10 mL into a venous catheter Every Morning. - Intravenous    Reason for Discontinue: *Re-Entry          Blood Administration Record (From admission, onward)    None          Lab Results (last 24 hours)     Procedure Component Value Units Date/Time    Comprehensive Metabolic Panel [[id number]]  (Abnormal) Collected:  10/09/19 0408    Specimen:  Blood Updated:  10/09/19 0531     Glucose 106 mg/dL      BUN 13 mg/dL      Creatinine 0.59 mg/dL      Sodium 135 mmol/L      Potassium 4.5 mmol/L      Chloride 91 mmol/L      CO2 30.0 mmol/L      Calcium 9.6 mg/dL      Total Protein 8.1 g/dL      Albumin 4.10 g/dL      ALT (SGPT) 49 U/L      AST (SGOT) 40 U/L      Alkaline Phosphatase 80 U/L      Total Bilirubin 0.2 mg/dL      eGFR Non African Amer 142 mL/min/1.73      Globulin 4.0 gm/dL      A/G Ratio 1.0 g/dL      BUN/Creatinine Ratio 22.0     Anion Gap 14.0 mmol/L     Narrative:       GFR Normal >60  Chronic Kidney Disease <60  Kidney Failure <15    CBC & Differential [[id number]] Collected:  10/09/19 0408    Specimen:  Blood Updated:  10/09/19 0511    Narrative:       The following orders were created for panel order CBC & Differential.  Procedure                               Abnormality         Status                     ---------                               -----------         ------                     CBC Auto Differential[[id number]]         Abnormal            Final result                 Please view results for these tests on the individual orders.    CBC Auto Differential [[id number]]  (Abnormal) Collected:  10/09/19 0408    Specimen:  Blood Updated:  10/09/19 0511     WBC 11.27 10*3/mm3      RBC 4.74 10*6/mm3      Hemoglobin 15.1 g/dL      Hematocrit 43.8 %      MCV 92.4 fL      MCH 31.9 pg      MCHC 34.5 g/dL      RDW 13.5 %      RDW-SD 46.1 fl      MPV 9.8 fL      Platelets 479 10*3/mm3      Neutrophil % 61.0 %      Lymphocyte % 26.1 %      Monocyte % 8.5 %      Eosinophil % 2.8 %      Basophil % 1.1 %      Immature Grans % 0.5 %      Neutrophils, Absolute 6.88 10*3/mm3      Lymphocytes, Absolute 2.94 10*3/mm3      Monocytes, Absolute 0.96 10*3/mm3      Eosinophils, Absolute 0.31 10*3/mm3      Basophils, Absolute 0.12 10*3/mm3      Immature Grans, Absolute 0.06 10*3/mm3      nRBC 0.0 /100 WBC         Imaging Results (last 24 hours)     ** No results found for the last 24 hours. **        Orders (last 24 hrs)     Start     Ordered    10/09/19 0600  pantoprazole (PROTONIX) EC tablet 40 mg  Every Early Morning      10/08/19 0900    10/09/19 0600  CBC Auto Differential  PROCEDURE ONCE      10/09/19 0001    10/08/19 1143  acetaminophen (TYLENOL) tablet 650 mg  Every 6 Hours PRN      10/08/19 1143    10/06/19 2130  Oscillating Positive Expiratory Pressure (OPEP)  2 Times Daily - RT     Comments:  BID with neb tx and PRN    10/06/19 1150    10/05/19 0900  nicotine (NICODERM CQ) 14 MG/24HR patch 1 patch  Every 24 Hours Scheduled      10/04/19 1441    10/04/19 2200  Dietary Nutrition Supplements Magic Cup  3 Times Daily     Comments:  Alternate flavors    10/04/19 1635    10/04/19 0900  thiamine (VITAMIN B-1) tablet 100 mg  Daily      10/03/19 1543    10/03/19 0600  pantoprazole (PROTONIX) injection 40 mg  Every Early Morning,   Status:  Discontinued      10/02/19 1311    10/01/19 0600  Comprehensive Metabolic Panel  Daily      09/30/19 0818    10/01/19  0600  CBC & Differential  Daily      09/30/19 0818    09/30/19 1922  hypromellose (ISOPTO TEARS) 0.5 % ophthalmic solution 2 drop  3 Times Daily PRN      09/30/19 1923    09/27/19 2028  albuterol (PROVENTIL) nebulizer solution 0.083% 2.5 mg/3mL  Once As Needed      09/27/19 2028 09/27/19 1618  labetalol (NORMODYNE,TRANDATE) injection 20 mg  Every 4 Hours PRN      09/27/19 1618    09/27/19 1500  ipratropium-albuterol (DUO-NEB) nebulizer solution 3 mL  4 Times Daily - RT      09/27/19 1332    09/27/19 0900  aspirin chewable tablet 81 mg  Daily      09/26/19 2213    09/27/19 0900  aspirin suppository 300 mg  Daily      09/26/19 2213    09/26/19 2300  sodium chloride 0.9 % flush 10 mL  Every 12 Hours Scheduled      09/26/19 2213 09/26/19 2300  atorvastatin (LIPITOR) tablet 80 mg  Nightly      09/26/19 2213 09/26/19 2208  sodium chloride 0.9 % flush 10 mL  As Needed      09/26/19 2213    Unscheduled  Order CT Head Without Contrast for Neurological Decline  As Needed      09/26/19 2213    Unscheduled  Sputum Induction if Necessary  As Needed      09/27/19 2028    --  SCANNED - TELEMETRY        09/26/19 0000    --  SCANNED - TELEMETRY        09/26/19 0000          Ventilator/Non-Invasive Ventilation Settings (From admission, onward)    Start     Ordered    09/28/19 0006  Ventilator - AC/VC; 10; 90%; 5; 620  Continuous,   Status:  Canceled     Comments:  RT to manage   Question Answer Comment   Vent Mode AC/VC    Breath rate 10    FiO2 titrate for Sp02% =/> 90%    PEEP 5    Tidal Volume 620        09/28/19 0006    09/27/19 1941  Ventilator - AC/VC; 90%  Continuous,   Status:  Canceled     Comments:  RT to manage   Question Answer Comment   Vent Mode AC/VC    FiO2 titrate for Sp02% =/> 90%        09/27/19 1944             Physician Progress Notes (last 24 hours) (Notes from 10/08/19 0729 through 10/09/19 0729)      Galo Bethea MD at 10/08/19 3790              [facility]  Medicine Services  INPATIENT PROGRESS NOTE    Patient Name: Jarod Ramírez Jr.  Date of Admission: 9/26/2019  Today's Date: 10/08/19  Length of Stay: 12  Primary Care Physician: Jose Karimi MD    Subjective   Chief Complaint: Feeling better  HPI   Doing ok.  Doing well with therapy  No difficulties with speech  Awaiting brother to return so pt can go to his house        Review of Systems   Constitutional: Negative for fatigue and fever.   HENT: Negative for congestion and ear pain.    Eyes: Negative for redness and visual disturbance.   Respiratory: Negative for cough, shortness of breath and wheezing.    Cardiovascular: Negative for chest pain and palpitations.   Gastrointestinal: Negative for abdominal pain, diarrhea, nausea and vomiting.   Endocrine: Negative for cold intolerance and heat intolerance.   Genitourinary: Negative for dysuria and frequency.   Musculoskeletal: Negative for arthralgias and back pain.   Skin: Negative for rash and wound.   Neurological: Negative for dizziness and headaches.   Psychiatric/Behavioral: Negative for confusion. The patient is not nervous/anxious.         All pertinent negatives and positives are as above. All other systems have been reviewed and are negative unless otherwise stated.     Objective    Temp:  [97.9 °F (36.6 °C)-98.8 °F (37.1 °C)] 98 °F (36.7 °C)  Heart Rate:  [68-98] 73  Resp:  [16-18] 18  BP: ()/(59-77) 103/65  Physical Exam   Constitutional: He is oriented to person, place, and time. He appears well-developed and well-nourished.   HENT:   Head: Normocephalic and atraumatic.   Right Ear: External ear normal.   Left Ear: External ear normal.   Nose: Nose normal.   Mouth/Throat: Oropharynx is clear and moist.   Eyes: Conjunctivae and EOM are normal. Pupils are equal, round, and reactive to light. Right eye exhibits no discharge. Left eye exhibits no discharge. No scleral icterus.   Neck: Normal range of motion. Neck supple. No tracheal  deviation present. No thyromegaly present.   Cardiovascular: Normal rate, regular rhythm, normal heart sounds and intact distal pulses. Exam reveals no gallop and no friction rub.   No murmur heard.  Pulmonary/Chest: Effort normal and breath sounds normal. No stridor. No respiratory distress. He has no wheezes. He has no rales. He exhibits no tenderness.   Abdominal: Soft. Bowel sounds are normal. He exhibits no distension and no mass. There is no tenderness. There is no rebound and no guarding. No hernia.   Musculoskeletal: Normal range of motion. He exhibits no edema or deformity.   Lymphadenopathy:     He has no cervical adenopathy.   Neurological: He is alert and oriented to person, place, and time. He has normal reflexes. He displays normal reflexes. No cranial nerve deficit. He exhibits normal muscle tone. Coordination normal.   Skin: Skin is warm and dry. No rash noted. No erythema. No pallor.   Psychiatric: He has a normal mood and affect. His behavior is normal. Judgment and thought content normal.   Vitals reviewed.          Results Review:  I have reviewed the labs, radiology results, and diagnostic studies.    Laboratory Data:   Results from last 7 days   Lab Units 10/08/19  0430 10/07/19  0714 10/06/19  0411   WBC 10*3/mm3 15.15* 12.55* 10.54   HEMOGLOBIN g/dL 14.3 13.5 13.3   HEMATOCRIT % 42.5 39.9 38.7   PLATELETS 10*3/mm3 421 358 333        Results from last 7 days   Lab Units 10/08/19  0430 10/07/19  0714 10/06/19  0411   SODIUM mmol/L 137 137 137   POTASSIUM mmol/L 4.3 4.5 3.9   CHLORIDE mmol/L 93* 95* 97*   CO2 mmol/L 32.0* 30.0* 28.0   BUN mg/dL 14 12 9   CREATININE mg/dL 0.67* 0.64* 0.59*   CALCIUM mg/dL 9.7 9.0 9.0   BILIRUBIN mg/dL 0.2 0.2 0.2   ALK PHOS U/L 74 66 60   ALT (SGPT) U/L 40 35 31   AST (SGOT) U/L 30 31 29   GLUCOSE mg/dL 99 100* 129*       Culture Data:        Radiology Data:   Imaging Results (last 24 hours)     ** No results found for the last 24 hours. **          I have  reviewed the patient's current medications.     Assessment/Plan     Active Hospital Problems    Diagnosis   • Dysphagia due to recent cerebral infarction   • Hyperlipidemia   • Acute cerebrovascular accident (CVA) of cerebellum (CMS/HCC)   • Alcoholism /alcohol abuse (CMS/HCC)   • CVA (cerebral vascular accident) (CMS/HCC)   • Smoker   • Degeneration of lumbar or lumbosacral intervertebral disc     Continue current care                Discharge Planning: I expect the patient to be discharged to home in 1-2 days  Galo Bethea MD   10/08/19   4:43 PM      Electronically signed by Galo Bethea MD at 10/08/19 1646       Consult Notes (last 24 hours) (Notes from 10/08/19 0729 through 10/09/19 0729)     No notes of this type exist for this encounter.        Dayna Hewitt   Registered Dietitian   Nutrition   Plan of Care   Signed   Date of Service:  10/08/19 1435   Creation Time:  10/08/19 1435            Signed           Problem: Patient Care Overview  Goal: Plan of Care Review  Outcome: Ongoing (interventions implemented as appropriate)    10/08/19 1427   Coping/Psychosocial   Plan of Care Reviewed With patient   OTHER   Outcome Summary RD follow up. Soft texture ground meats,pudding thick liquids. Magic cup TID. Pt reports appeite improved, uses supplments. Con to follow per protocol.          Problem: Nutrition, Imbalanced: Inadequate Oral Intake (Adult)  Goal: Identify Related Risk Factors and Signs and Symptoms  Outcome: Ongoing (interventions implemented as appropriate)     Goal: Prevent Further Weight Loss  Outcome: Ongoing (interventions implemented as appropriate)

## 2019-10-09 NOTE — PLAN OF CARE
Problem: Patient Care Overview  Goal: Plan of Care Review  Outcome: Ongoing (interventions implemented as appropriate)   10/09/19 4281   Coping/Psychosocial   Plan of Care Reviewed With patient   Plan of Care Review   Progress no change   OTHER   Outcome Summary Pt A&O X4. C/o pain, PRN meds given. NIH 0. Speech continues to improve. Brother called this AM and wants pt to be discharged tg since he's trying to get safety equipment to accomodate patient. Will continue to monitor.

## 2019-10-09 NOTE — THERAPY DISCHARGE NOTE
Acute Care - Occupational Therapy Discharge Summary  [facility]     Patient Name: Jarod Ramírez Jr.  : 1960  MRN: [id number]    Today's Date: 10/9/2019  Onset of Illness/Injury or Date of Surgery: 19    Date of Referral to OT: 19  Referring Physician: Dr. Alas      Admit Date: 2019        OT Recommendation and Plan    Visit Dx:    ICD-10-CM ICD-9-CM   1. Dysphagia, unspecified type R13.10 787.20   2. Impaired mobility Z74.09 799.89   3. Impaired mobility and ADLs Z74.09 799.89   4. Aphasia R47.01 784.3               Rehab Goal Summary     Row Name 10/09/19 1100             Dressing Goal 1 (OT)    Activity/Assistive Device (Dressing Goal 1, OT)  dressing skills, all  -TS      Yellow Medicine/Cues Needed (Dressing Goal 1, OT)  independent  -TS      Time Frame (Dressing Goal 1, OT)  long term goal (LTG);by discharge  -TS      Progress/Outcome (Dressing Goal 1, OT)  goal met pt has been dressing self in room without assist of staff  -TS         Toileting Goal 1 (OT)    Activity/Device (Toileting Goal 1, OT)  toileting skills, all;commode  -TS      Yellow Medicine Level/Cues Needed (Toileting Goal 1, OT)  independent  -TS      Time Frame (Toileting Goal 1, OT)  long term goal (LTG);by discharge  -TS      Progress/Outcome (Toileting Goal 1, OT)  goal met  -TS         Balance Goal 1 (OT)    Activity/Assistive Device (Balance Goal 1, OT)  sitting, dynamic;standing, dynamic;standing, static  -TS      Yellow Medicine Level/Cues Needed (Balance Goal 1, OT)  standby assist  -TS      Time Frame (Balance Goal 1, OT)  long term goal (LTG);by discharge  -TS      Progress/Outcomes (Balance Goal 1, OT)  goal met  -TS        User Key  (r) = Recorded By, (t) = Taken By, (c) = Cosigned By    Initials Name Provider Type Discipline    TS Yaritza Lopze, COTTON/L Occupational Therapy Assistant OT          Outcome Measures     Row Name 10/07/19 1300             How much help from another is currently needed...     Putting on and taking off regular lower body clothing?  3  -TS      Bathing (including washing, rinsing, and drying)  3  -TS      Toileting (which includes using toilet bed pan or urinal)  4  -TS      Putting on and taking off regular upper body clothing  4  -TS      Taking care of personal grooming (such as brushing teeth)  4  -TS      Eating meals  4  -TS      AM-PAC 6 Clicks Score (OT)  22  -TS         Functional Assessment    Outcome Measure Options  AM-PAC 6 Clicks Daily Activity (OT)  -TS        User Key  (r) = Recorded By, (t) = Taken By, (c) = Cosigned By    Initials Name Provider Type    Yaritza Hand COTA/L Occupational Therapy Assistant          Therapy Suggested Charges     Code   Minutes Charges    08401 (CPT®) Hc Ot Neuromusc Re Education Ea 15 Min      08889 (CPT®) Hc Ot Ther Proc Ea 15 Min      55039 (CPT®) Hc Ot Therapeutic Act Ea 15 Min      96177 (CPT®) Hc Ot Manual Therapy Ea 15 Min      42734 (CPT®) Hc Ot Iontophoresis Ea 15 Min      93204 (CPT®) Hc Ot Elec Stim Ea-Per 15 Min      04566 (CPT®) Hc Ot Ultrasound Ea 15 Min      16391 (CPT®) Hc Ot Self Care/Mgmt/Train Ea 15 Min 38 3    Total  38 3              OT Discharge Summary  Reason for Discharge: At baseline function  Outcomes Achieved: Refer to plan of care for updates on goals achieved  Discharge Destination: Home with assist, Home with outpatient services      BISI Peres  10/9/2019

## 2019-10-10 VITALS
WEIGHT: 172 LBS | SYSTOLIC BLOOD PRESSURE: 105 MMHG | RESPIRATION RATE: 15 BRPM | TEMPERATURE: 98.6 F | HEIGHT: 64 IN | HEART RATE: 70 BPM | DIASTOLIC BLOOD PRESSURE: 69 MMHG | OXYGEN SATURATION: 98 % | BODY MASS INDEX: 29.37 KG/M2

## 2019-10-10 LAB
ALBUMIN SERPL-MCNC: 3.9 G/DL (ref 3.5–5.2)
ALBUMIN/GLOB SERPL: 1 G/DL
ALP SERPL-CCNC: 83 U/L (ref 39–117)
ALT SERPL W P-5'-P-CCNC: 54 U/L (ref 1–41)
ANION GAP SERPL CALCULATED.3IONS-SCNC: 15 MMOL/L (ref 5–15)
AST SERPL-CCNC: 41 U/L (ref 1–40)
BASOPHILS # BLD AUTO: 0.13 10*3/MM3 (ref 0–0.2)
BASOPHILS NFR BLD AUTO: 1.2 % (ref 0–1.5)
BILIRUB SERPL-MCNC: 0.2 MG/DL (ref 0.2–1.2)
BUN BLD-MCNC: 13 MG/DL (ref 6–20)
BUN/CREAT SERPL: 20.6 (ref 7–25)
CALCIUM SPEC-SCNC: 9.4 MG/DL (ref 8.6–10.5)
CHLORIDE SERPL-SCNC: 92 MMOL/L (ref 98–107)
CO2 SERPL-SCNC: 27 MMOL/L (ref 22–29)
CREAT BLD-MCNC: 0.63 MG/DL (ref 0.76–1.27)
DEPRECATED RDW RBC AUTO: 46.2 FL (ref 37–54)
EOSINOPHIL # BLD AUTO: 0.4 10*3/MM3 (ref 0–0.4)
EOSINOPHIL NFR BLD AUTO: 3.8 % (ref 0.3–6.2)
ERYTHROCYTE [DISTWIDTH] IN BLOOD BY AUTOMATED COUNT: 13.4 % (ref 12.3–15.4)
GFR SERPL CREATININE-BSD FRML MDRD: 131 ML/MIN/1.73
GLOBULIN UR ELPH-MCNC: 3.9 GM/DL
GLUCOSE BLD-MCNC: 92 MG/DL (ref 65–99)
HCT VFR BLD AUTO: 43.5 % (ref 37.5–51)
HGB BLD-MCNC: 14.7 G/DL (ref 13–17.7)
IMM GRANULOCYTES # BLD AUTO: 0.09 10*3/MM3 (ref 0–0.05)
IMM GRANULOCYTES NFR BLD AUTO: 0.8 % (ref 0–0.5)
LYMPHOCYTES # BLD AUTO: 3.02 10*3/MM3 (ref 0.7–3.1)
LYMPHOCYTES NFR BLD AUTO: 28.3 % (ref 19.6–45.3)
MCH RBC QN AUTO: 31.4 PG (ref 26.6–33)
MCHC RBC AUTO-ENTMCNC: 33.8 G/DL (ref 31.5–35.7)
MCV RBC AUTO: 92.9 FL (ref 79–97)
MONOCYTES # BLD AUTO: 0.97 10*3/MM3 (ref 0.1–0.9)
MONOCYTES NFR BLD AUTO: 9.1 % (ref 5–12)
NEUTROPHILS # BLD AUTO: 6.05 10*3/MM3 (ref 1.7–7)
NEUTROPHILS NFR BLD AUTO: 56.8 % (ref 42.7–76)
NRBC BLD AUTO-RTO: 0 /100 WBC (ref 0–0.2)
PLATELET # BLD AUTO: 504 10*3/MM3 (ref 140–450)
PMV BLD AUTO: 9.6 FL (ref 6–12)
POTASSIUM BLD-SCNC: 4 MMOL/L (ref 3.5–5.2)
PROT SERPL-MCNC: 7.8 G/DL (ref 6–8.5)
RBC # BLD AUTO: 4.68 10*6/MM3 (ref 4.14–5.8)
SODIUM BLD-SCNC: 134 MMOL/L (ref 136–145)
WBC NRBC COR # BLD: 10.66 10*3/MM3 (ref 3.4–10.8)

## 2019-10-10 PROCEDURE — 92526 ORAL FUNCTION THERAPY: CPT | Performed by: SPEECH-LANGUAGE PATHOLOGIST

## 2019-10-10 PROCEDURE — 80053 COMPREHEN METABOLIC PANEL: CPT | Performed by: FAMILY MEDICINE

## 2019-10-10 PROCEDURE — 94760 N-INVAS EAR/PLS OXIMETRY 1: CPT

## 2019-10-10 PROCEDURE — 85025 COMPLETE CBC W/AUTO DIFF WBC: CPT | Performed by: FAMILY MEDICINE

## 2019-10-10 PROCEDURE — 94799 UNLISTED PULMONARY SVC/PX: CPT

## 2019-10-10 RX ORDER — ASPIRIN 81 MG/1
81 TABLET, CHEWABLE ORAL DAILY
Qty: 30 TABLET | Refills: 0 | Status: SHIPPED | OUTPATIENT
Start: 2019-10-11

## 2019-10-10 RX ORDER — PANTOPRAZOLE SODIUM 40 MG/1
40 TABLET, DELAYED RELEASE ORAL DAILY
Qty: 30 TABLET | Refills: 0 | Status: SHIPPED | OUTPATIENT
Start: 2019-10-10

## 2019-10-10 RX ORDER — ATORVASTATIN CALCIUM 80 MG/1
80 TABLET, FILM COATED ORAL NIGHTLY
Qty: 30 TABLET | Refills: 0 | Status: SHIPPED | OUTPATIENT
Start: 2019-10-10

## 2019-10-10 RX ADMIN — IPRATROPIUM BROMIDE AND ALBUTEROL SULFATE 3 ML: 2.5; .5 SOLUTION RESPIRATORY (INHALATION) at 10:30

## 2019-10-10 RX ADMIN — IPRATROPIUM BROMIDE AND ALBUTEROL SULFATE 3 ML: 2.5; .5 SOLUTION RESPIRATORY (INHALATION) at 06:33

## 2019-10-10 RX ADMIN — PANTOPRAZOLE SODIUM 40 MG: 40 TABLET, DELAYED RELEASE ORAL at 05:23

## 2019-10-10 RX ADMIN — Medication 100 MG: at 08:23

## 2019-10-10 RX ADMIN — ASPIRIN 81 MG: 81 TABLET, CHEWABLE ORAL at 08:23

## 2019-10-10 RX ADMIN — NICOTINE 1 PATCH: 14 PATCH TRANSDERMAL at 08:24

## 2019-10-10 RX ADMIN — SODIUM CHLORIDE, PRESERVATIVE FREE 10 ML: 5 INJECTION INTRAVENOUS at 08:24

## 2019-10-10 NOTE — DISCHARGE SUMMARY
"    [facility] Medicine Services  DISCHARGE SUMMARY       Date of Admission: 9/26/2019  Date of Discharge:  10/10/2019  Primary Care Physician: Jose Karimi MD    Presenting Problem/History of Present Illness:  CVA (cerebral vascular accident) (CMS/Formerly [facility]) [I63.9]     Final Discharge Diagnoses:  Active Hospital Problems    Diagnosis   • Dysphagia due to recent cerebral infarction   • Hyperlipidemia   • Acute cerebrovascular accident (CVA) of cerebellum (CMS/Formerly [facility])   • Alcoholism /alcohol abuse (CMS/Formerly [facility])   • CVA (cerebral vascular accident) (CMS/Formerly [facility])   • Smoker   • Degeneration of lumbar or lumbosacral intervertebral disc       Consults: Neurology, ENT, Pulmonology    Procedures Performed: NA    Pertinent Test Results:   MRI Brain    1.  Acute infarct in the left MCA and watershed territory. No  hemorrhagic conversion.  2.  Abnormal left ICA flow void, consistent with a more proximal flow  limiting stenosis or occlusion in the left ICA.    CTA Head and Neck:    1. 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     2. Approximately 30% stenosis in the right carotid bulb.  3. Mild to moderate stenosis at the ostium of the right vertebral artery  and mild stenosis at the ostium of the left vertebral artery.      Chief Complaint on Day of Discharge: \"I want to go home.\"    History of Present Illness on Day of Discharge: Doing well, no events overnight, no speech difficulty    Hospital Course:  The patient is a 59 y.o. male who presented to [facility] with a stroke.  He was in alcohol withdrawal and required intubation and sedation to prevent DT's.  Pulmonology was consulted and helped manage his ventilator.      MRI did show a stroke.  He was treated with aspirin and a statin.      After extubation, he had swallowing difficulties.  ENT was consulted and performed laryngoscopy that showed some pressure ulcers that were felt to be due to the ET tube.  He was started on a " "PPI.    He has continued to improve.  He wants to go home.  He is felt appropriate for discharge.    He is comfortable with being discharged and with the discharge plan.  He was given the chance to ask questions and all questions were answered to his satisfaction.        Condition on Discharge:  stable    Physical Exam on Discharge:  /69 (BP Location: Left arm, Patient Position: Sitting)   Pulse 70   Temp 98.6 °F (37 °C) (Oral)   Resp 15   Ht 162.6 cm (64\")   Wt 78 kg (172 lb)   SpO2 98%   BMI 29.52 kg/m²   Physical Exam   Constitutional: He is oriented to person, place, and time. He appears well-developed and well-nourished.   HENT:   Head: Normocephalic and atraumatic.   Right Ear: External ear normal.   Left Ear: External ear normal.   Nose: Nose normal.   Mouth/Throat: Oropharynx is clear and moist.   Eyes: Conjunctivae and EOM are normal. Pupils are equal, round, and reactive to light. Right eye exhibits no discharge. Left eye exhibits no discharge. No scleral icterus.   Neck: Normal range of motion. Neck supple. No tracheal deviation present. No thyromegaly present.   Cardiovascular: Normal rate, regular rhythm, normal heart sounds and intact distal pulses. Exam reveals no gallop and no friction rub.   No murmur heard.  Pulmonary/Chest: Effort normal and breath sounds normal. No stridor. No respiratory distress. He has no wheezes. He has no rales. He exhibits no tenderness.   Abdominal: Soft. Bowel sounds are normal. He exhibits no distension and no mass. There is no tenderness. There is no rebound and no guarding. No hernia.   Musculoskeletal: Normal range of motion. He exhibits no edema or deformity.   Lymphadenopathy:     He has no cervical adenopathy.   Neurological: He is alert and oriented to person, place, and time. He has normal reflexes. He displays normal reflexes. No cranial nerve deficit. He exhibits normal muscle tone. Coordination normal.   Skin: Skin is warm and dry. No rash noted. " No erythema. No pallor.   Psychiatric: He has a normal mood and affect. His behavior is normal. Judgment and thought content normal.   Vitals reviewed.        Discharge Disposition:  Home or Self Care    Discharge Medications:     Discharge Medications      New Medications      Instructions Start Date   aspirin 81 MG chewable tablet  Notes to patient:  Next dose due in am 10-11   81 mg, Oral, Daily   Start Date:  10/11/2019     atorvastatin 80 MG tablet  Commonly known as:  LIPITOR  Notes to patient:  Next dose due tonight 10-10   80 mg, Oral, Nightly      pantoprazole 40 MG EC tablet  Commonly known as:  PROTONIX  Notes to patient:  Next dose due in am 10-11   40 mg, Oral, Daily             Discharge Diet: regular    Activity at Discharge: as tolerated    Discharge Care Plan/Instructions: Go to ER for     Follow-up Appointments:   Future Appointments   Date Time Provider Department Center   11/13/2019 11:15 AM Jose Johnson MD [facility] None   11/13/2019  3:00 PM Jeannie Delgado APRN [facility] None       Test Results Pending at Discharge: VIANEY Bethea MD  10/10/19  2:40 PM    Time: 45 minutes

## 2019-10-10 NOTE — PLAN OF CARE
Problem: Patient Care Overview  Goal: Plan of Care Review  Outcome: Ongoing (interventions implemented as appropriate)   10/10/19 2825   Coping/Psychosocial   Plan of Care Reviewed With patient   Plan of Care Review   Progress improving   OTHER   Outcome Summary Patient very aggitated today and cursing. Wants to go home. Discussed why on thick as he was frustrated with this. Did not have any overt cough with small trials of water. Ok to have water between meals. Patient aphasic and has decreased comprehension and insight into deficits but is able to respond to some conversational questions. Patient happier with trials of water. SLP will continue to follow.

## 2019-10-10 NOTE — THERAPY TREATMENT NOTE
Acute Care - Speech Language Pathology   Swallow Treatment Note [facility]     Patient Name: Jarod Ramírez Jr.  : 1960  MRN: [id number]  Today's Date: 10/10/2019  Onset of Illness/Injury or Date of Surgery: 19     Referring Physician: Dr. Alas      Admit Date: 2019    Patient very aggitated today and cursing.  Wants to go home.  Discussed why on thick as he was frustrated with this.  Did not have any overt cough with small trials of water.  Ok to have water between meals.  Patient aphasic and has decreased comprehension and insight into deficits but is able to respond to some conversational questions.  Patient happier with trials of water.  SLP will continue to follow.  Coreen Ledbetter MS CCC-SLP 10/10/2019 1:58 PM     ADD:  Found out later in day that patient was being discharged.  Gave him information on thickener and starter kit for thickened liquids.  Spoke with RN who and patient to receive OP ST services.    Coreen Ledbetter MS CCC-SLP 10/10/2019 3:43 PM      Visit Dx:      ICD-10-CM ICD-9-CM   1. Dysphagia, unspecified type R13.10 787.20   2. Impaired mobility Z74.09 799.89   3. Impaired mobility and ADLs Z74.09 799.89   4. Aphasia R47.01 784.3     Patient Active Problem List   Diagnosis   • Degeneration of cervical intervertebral disc   • Closed fracture of lumbar vertebra (CMS/HCC)   • Degeneration of lumbar or lumbosacral intervertebral disc   • Smoker   • BMI 27.0-27.9,adult   • Acute cerebrovascular accident (CVA) of cerebellum (CMS/HCC)   • Alcoholism /alcohol abuse (CMS/HCC)   • CVA (cerebral vascular accident) (CMS/HCC)   • Hyperlipidemia   • Dysphagia due to recent cerebral infarction       Therapy Treatment  Rehabilitation Treatment Summary     Row Name 10/10/19 1000 10/10/19 0954          Treatment Time/Intention    Discipline  speech language pathologist  -KW  physical therapy assistant  -MF     Document Type  therapy note (daily note)  -KW  --     Subjective Information   no complaints  -KW  --     Mode of Treatment  speech-language pathology  -KW  --     Patient/Family Observations  no family present  -KW  --     Care Plan Review  care plan/treatment goals reviewed  -KW  --     Patient Effort  fair  -KW  --     Comment  --  Pt. declined PT, stating that he better be getting out of here today. He states he has nothing wrong.   -MF2     Reason Treatment Not Performed  --  patient/family declined treatment  -MF2     Recorded by [KW] Coreen Ledbetter MS CCC-SLP 10/10/19 1353 [MF] Adrianna Bar, PTA 10/10/19 0954  [MF2] Adrianna Bar, PTA 10/10/19 0957     Row Name 10/10/19 1000             Pain Scale: FACES Pre/Post-Treatment    Pain: FACES Scale, Pretreatment  0-->no hurt  -KW      Pain: FACES Scale, Post-Treatment  0-->no hurt  -KW      Recorded by [KW] Coreen Ledbetter MS CCC-SLP 10/10/19 1353      Row Name                Wound 09/27/19 0800 Right posterior finger    Wound - Properties Group Date first assessed: 09/27/19 [NW] Time first assessed: 0800 [NW] Present on Hospital Admission: Y [NW] Side: Right [NW] Orientation: posterior [NW] Location: finger [NW] Recorded by:  [NW] Dunia Multani RN 09/27/19 1626    Row Name 10/10/19 1000             Outcome Summary/Treatment Plan (SLP)    Daily Summary of Progress (SLP)  progress toward functional goals is good  -KW      Barriers to Overall Progress (SLP)  aphasia  -KW      Plan for Continued Treatment (SLP)  continue to follow and treat  -KW      Anticipated Dischage Disposition  unknown  -KW      Recorded by [KW] Coreen Ledbetter MS CCC-SLP 10/10/19 0800        User Key  (r) = Recorded By, (t) = Taken By, (c) = Cosigned By    Initials Name Effective Dates Discipline    Coreen Mccall MS CCC-SLP 08/02/16 -  SLP    Adrianna Wong, PTA 08/02/16 -  PT    Dunia Chairez RN 07/12/18 -  Nurse          Outcome Summary  Outcome Summary/Treatment Plan (SLP)  Daily Summary of Progress (SLP): progress  toward functional goals is good (10/10/19 1000 : Coreen Ledbetter, MS CCC-SLP)  Barriers to Overall Progress (SLP): aphasia (10/10/19 1000 : Coreen Ledbetter, MS CCC-SLP)  Plan for Continued Treatment (SLP): continue to follow and treat (10/10/19 1000 : Coreen Ledbetter, MS CCC-SLP)  Anticipated Dischage Disposition: unknown (10/10/19 1000 : Coreen Ledbetter, MS [facility]-SLP)      SLP GOALS     Row Name 10/10/19 1000 10/08/19 0826          Oral Nutrition/Hydration Goal 1 (SLP)    Oral Nutrition/Hydration Goal 1, SLP  LTG: Patient will tolerate LRD without s/s of aspiration.  -KW  LTG: Patient will tolerate LRD without s/s of aspiration.  -MB (r) AS (t) MB (c)     Time Frame (Oral Nutrition/Hydration Goal 1, SLP)  by discharge  -KW  by discharge  -MB (r) AS (t) MB (c)     Barriers (Oral Nutrition/Hydration Goal 1, SLP)  Aphasia  -KW  Aphasia  -MB (r) AS (t) MB (c)     Progress/Outcomes (Oral Nutrition/Hydration Goal 1, SLP)  continuing progress toward goal  -KW  continuing progress toward goal  -MB (r) AS (t) MB (c)        Lingual Strengthening Goal 1 (SLP)    Activity (Lingual Strengthening Goal 1, SLP)  increase tongue back strength  -KW  increase tongue back strength  -MB (r) AS (t) MB (c)     Increase Tongue Back Strength  lingual movement exercises  -KW  lingual movement exercises  -MB (r) AS (t) MB (c)     [place]/Accuracy (Lingual Strengthening Goal 1, SLP)  independently (over 90% accuracy)  -KW  independently (over 90% accuracy)  -MB (r) AS (t) MB (c)     Time Frame (Lingual Strengthening Goal 1, SLP)  short term goal (STG);by discharge  -KW  short term goal (STG);by discharge  -MB (r) AS (t) MB (c)     Barriers (Lingual Strengthening Goal 1, SLP)  n/a  -KW  n/a  -MB (r) AS (t) MB (c)     Progress/Outcomes (Lingual Strengthening Goal 1, SLP)  goal ongoing  -KW  goal ongoing  -MB (r) AS (t) MB (c)        Pharyngeal Strengthening Exercise Goal 1 (SLP)    Activity (Pharyngeal Strengthening Goal 1, SLP)  increase  timing;increase superior movement of the hyolaryngeal complex;increase anterior movement of the hyolaryngeal complex;increase epiglottic inversion and retroflexion;increase squeeze/positive pressure generation  -KW  increase timing;increase superior movement of the hyolaryngeal complex;increase anterior movement of the hyolaryngeal complex;increase epiglottic inversion and retroflexion;increase squeeze/positive pressure generation  -MB (r) AS (t) MB (c)     Increase Timing  gustatory stimulation (sour/cold)  -KW  gustatory stimulation (sour/cold)  -MB (r) AS (t) MB (c)     Increase Anterior Movement of the Hyolaryngeal Complex  shaker  -KW  shaker  -MB (r) AS (t) MB (c)     Increase Epiglottic Inversion and Retroflexion  Mendelsohn  -KW  Mendelsohn  -MB (r) AS (t) MB (c)     Increase Squeeze/Positive Pressure Generation  hard effortful swallow  -KW  hard effortful swallow  -MB (r) AS (t) MB (c)     Increase Tongue Base Retraction  yoni  -KW  yoni  -MB (r) AS (t) MB (c)     [place]/Accuracy (Pharyngeal Strengthening Goal 1, SLP)  independently (over 90% accuracy)  -KW  independently (over 90% accuracy)  -MB (r) AS (t) MB (c)     Time Frame (Pharyngeal Strengthening Goal 1, SLP)  short term goal (STG);by discharge  -KW  short term goal (STG);by discharge  -MB (r) AS (t) MB (c)     Barriers (Pharyngeal Strengthening Goal 1, SLP)  cognitive status  -KW  cognitive status  -MB (r) AS (t) MB (c)     Progress/Outcomes (Pharyngeal Strengthening Goal 1, SLP)  progress slower than expected  -KW  progress slower than expected  -MB (r) AS (t) MB (c)        Words/Phrases/Sentences Goal 1 (SLP)    Improve Ability to Comprehend Words/Phrases/Sentences Through: Goal 1 (SLP)  --  identify objects, field of;identify pictures, field of;90%;independently (over 90% accuracy);other (comment)  -MB (r) AS (t) MB (c)     Time Frame (Identify Objects and Pictures Goal 1, SLP)  --  short term goal (STG);by discharge  -MB (r) AS (t) MB  (c)     Barriers (Identify Objects and Pictures Goal 1, SLP)  --  aphasia  -MB (r) AS (t) MB (c)     Progress (Ability to Contruct Words/Phrases/Sentences Goal 1, SLP)  --  with moderate cues (50-74%);50%  -MB (r) AS (t) MB (c)     Progress/Outcomes (Identify Objects and Pictures Goal 1, SLP)  --  continuing progress toward goal  -MB (r) AS (t) MB (c)        Comprehend Questions Goal 1 (SLP)    Improve Ability to Comprehend Questions Goal 1 (SLP)  --  simple yes/no questions;complex yes/no questions;90%;independently (over 90% accuracy)  -MB (r) AS (t) MB (c)     Time Frame (Comprehend Questions Goal 1, SLP)  --  short term goal (STG);by discharge  -MB (r) AS (t) MB (c)     Barriers (Comprehend Questions Goal 1, SLP)  --  aphaisa  -MB (r) AS (t) MB (c)     Progress (Ability to Comprehend Questions Goal 1, SLP)  --  60%;with minimal cues (75-90%)  -MB (r) AS (t) MB (c)     Progress/Outcomes (Comprehend Questions Goal 1, SLP)  --  continuing progress toward goal  -MB (r) AS (t) MB (c)        Follow Directions Goal 2 (SLP)    Improve Ability to Follow Directions Goal 1 (SLP)  --  1 step direction with objects;1 step direction without objects;90%;independently (over 90% accuracy)  -MB (r) AS (t) MB (c)     Time Frame (Follow Directions Goal 1, SLP)  --  short term goal (STG);by discharge  -MB (r) AS (t) MB (c)     Progress (Ability to Follow Directions Goal 1, SLP)  --  50%;with moderate cues (50-74%)  -MB (r) AS (t) MB (c)     Progress/Outcomes (Follow Directions Goal 1, SLP)  --  continuing progress toward goal  -MB (r) AS (t) MB (c)        Word Retrieval Skills Goal 1 (SLP)    Improve Word Retrieval Skills By Goal 1 (SLP)  --  completing automatic speech task, days of the week;completing automatic speech task, months;confrontational naming task;repeating words;90%;independently (over 90% accuracy)  -MB (r) AS (t) MB (c)     Time Frame (Word Retrieval Goal 1, SLP)  --  short term goal (STG);by discharge  -MB (r) AS  (t) MB (c)     Progress/Outcomes (Word Retrieval Goal 1, SLP)  --  goal ongoing  -MB (r) AS (t) MB (c)        Graphic Expression of Shapes, Letters, Numbers Goal 1 (SLP)    Improve Graphic Expression of Shapes, Letters, and Numbers Goal 1 (SLP)  --  copy shapes, numbers, and letters;writing dictated number and letters;90%;independently (over 90% accuracy)  -MB (r) AS (t) MB (c)     Time Frame (Graphic Expression of Shapes, Letters, and Numbers Goal 1, SLP)  --  short term goal (STG);by discharge  -MB (r) AS (t) MB (c)     Progress (Graphic Expression of Shapes, Letters, and Numbers Goal 1, SLP)  --  70%;with moderate cues (50-74%)  -MB (r) AS (t) MB (c)     Progress/Outcomes (Graphic Expression of Shapes, Letters, and Numbers Goal 1, SLP)  --  continuing progress toward goal  -MB (r) AS (t) MB (c)        Augmentative/Alternative Communication Objectives Goal 1 (SLP    Communication (Augmentative/Alternative Communication Goal 1, SLP)  --  improve ability to use low tech augmentative/alternative communication device  -MB (r) AS (t) MB (c)     Improve Communication by (Augmentative/Alternative Communication Goal 1, SLP)  --  identify picture, field of ____;identify word, field of ____;alphabet/picture board;90%;independently (over 90% accuracy)  -MB (r) AS (t) MB (c)     Time Frame (Augmentative/Alternative Communication Goal 1, SLP)  --  short term goal (STG);by discharge  -MB (r) AS (t) MB (c)     Progress/Outcomes (Augmentative/Alternative Communication Goal 1, SLP)  --  goal ongoing  -MB (r) AS (t) MB (c)       User Key  (r) = Recorded By, (t) = Taken By, (c) = Cosigned By    Initials Name Provider Type    Georgi Garcia, CCC-SLP Speech and Language Pathologist    Coreen Mccall, MS CCC-SLP Speech and Language Pathologist    Eliecer Sauceda, Speech Therapy Student Speech Therapy Student          EDUCATION  The patient has been educated in the following areas:   Dysphagia (Swallowing  Impairment).    SLP Recommendation and Plan  Daily Summary of Progress (SLP): progress toward functional goals is good  Barriers to Overall Progress (SLP): aphasia  Plan for Continued Treatment (SLP): continue to follow and treat  Anticipated Dischage Disposition: unknown                    Time Calculation:   Time Calculation- SLP     Row Name 10/10/19 1357             Time Calculation- SLP    SLP Start Time  1000  -KW      SLP Stop Time  1015  -KW      SLP Time Calculation (min)  15 min  -KW      SLP Received On  10/10/19  -KW        User Key  (r) = Recorded By, (t) = Taken By, (c) = Cosigned By    Initials Name Provider Type    Coreen Mccall MS CCC-SLP Speech and Language Pathologist          Therapy Charges for Today     Code Description Service Date Service Provider Modifiers Qty    [id number] HC ST TREATMENT SWALLOW 1 10/10/2019 Coreen Ledbetter MS CCC-SLP GN 1                 Coreen Ledbetter MS CCC-SLP  10/10/2019

## 2019-10-10 NOTE — PROGRESS NOTES
Continued Stay Note   [place]     Patient Name: Jarod Ramírez Jr.  MRN: [id number]  Today's Date: 10/10/2019    Admit Date: 9/26/2019    Discharge Plan     Row Name 10/10/19 1408       Plan    Final Discharge Disposition Code  01 - home or self-care    Final Note  PT IS BEING DCD HOME TODAY. SPOKE TO PT BROTHER (PAO) OVER PHONE AND HE IS AT HOME AND IS READY FOR PT. CALLED [facility] TO ARRANGE TRANSPORTATION ([phone]). [facility] [place] [facility] SCHEDULED TO PICK PT UP AROUND 3PM AND WILL CALL FLOOR NUMBER  UPON ARRIVAL. CONFIRMATION NUMBER IS [id number]        Discharge Codes    No documentation.             JONO Sharif

## 2019-10-10 NOTE — PLAN OF CARE
"Problem: Patient Care Overview  Goal: Individualization and Mutuality  Outcome: Ongoing (interventions implemented as appropriate)    Goal: Discharge Needs Assessment  Outcome: Ongoing (interventions implemented as appropriate)    Goal: Interprofessional Rounds/Family Conf  Outcome: Ongoing (interventions implemented as appropriate)   10/10/19 1517   Interdisciplinary Rounds/Family Conf   Summary Pt A&Ox4. VSS. No c/o pain this shift. Red stroke folder and information given. Pt unable to follow commands this morning during part of the NIH assessment. When asked to touch his nose he would just touch my finger and was becoming increasingly agitated. The nurse aid notified the nurse that the patient wanted to take a shower and after getting him prepared to do so, he came out of the bathroom and stated that he \"already took his shower.\" Dr. Bethea notified of this confusion. CIWA: 0 NIH: 2 Safety maintained this shift, will continue to monitor. DC plans in progress to home with his brother.        Problem: Fall Risk (Adult)  Goal: Absence of Fall  Outcome: Ongoing (interventions implemented as appropriate)      Problem: Stroke (Ischemic) (Adult)  Goal: Signs and Symptoms of Listed Potential Problems Will be Absent, Minimized or Managed (Stroke)  Outcome: Ongoing (interventions implemented as appropriate)      Problem: Skin Injury Risk (Adult)  Goal: Skin Health and Integrity  Outcome: Ongoing (interventions implemented as appropriate)      Problem: Nutrition, Imbalanced: Inadequate Oral Intake (Adult)  Goal: Identify Related Risk Factors and Signs and Symptoms  Outcome: Ongoing (interventions implemented as appropriate)    Goal: Prevent Further Weight Loss  Outcome: Ongoing (interventions implemented as appropriate)        "

## 2019-10-10 NOTE — PLAN OF CARE
Problem: Patient Care Overview  Goal: Plan of Care Review  Outcome: Ongoing (interventions implemented as appropriate)   10/10/19 0259   Coping/Psychosocial   Plan of Care Reviewed With patient   Plan of Care Review   Progress improving   OTHER   Outcome Summary A&Ox4. No c/o pain. NIH and CIWA 0. VSS. Pt ambulating well on own. Plan to d/c 10/10 with brother with home health.        Problem: VTE, DVT and PE (Adult)  Goal: Signs and Symptoms of Listed Potential Problems Will be Absent, Minimized or Managed (VTE, DVT and PE)  Outcome: Outcome(s) achieved Date Met: 10/10/19

## 2019-10-11 ENCOUNTER — READMISSION MANAGEMENT (OUTPATIENT)
Dept: CALL CENTER | Facility: HOSPITAL | Age: 59
End: 2019-10-11

## 2019-10-11 NOTE — THERAPY DISCHARGE NOTE
Acute Care - Speech Language Pathology Discharge Summary  [facility]       Patient Name: Jarod Ramírez Jr.  : 1960  MRN: [id number]    Today's Date: 10/11/2019  Onset of Illness/Injury or Date of Surgery: 19       Referring Physician: Dr. Alas        Admit Date: 2019      SLP Recommendation and Plan  Mechanical soft solids and pudding thick liquids    Visit Dx:    ICD-10-CM ICD-9-CM   1. Dysphagia, unspecified type R13.10 787.20   2. Impaired mobility Z74.09 799.89   3. Impaired mobility and ADLs Z74.09 799.89   4. Aphasia R47.01 784.3               SLP GOALS     Row Name 10/11/19 1400 10/10/19 1000          Oral Nutrition/Hydration Goal 1 (SLP)    Oral Nutrition/Hydration Goal 1, SLP  LTG: Patient will tolerate LRD without s/s of aspiration.  -MB  LTG: Patient will tolerate LRD without s/s of aspiration.  -KW     Time Frame (Oral Nutrition/Hydration Goal 1, SLP)  by discharge  -MB  by discharge  -KW     Barriers (Oral Nutrition/Hydration Goal 1, SLP)  Aphasia  -MB  Aphasia  -KW     Progress/Outcomes (Oral Nutrition/Hydration Goal 1, SLP)  goal partially met  -MB  continuing progress toward goal  -KW        Lingual Strengthening Goal 1 (SLP)    Activity (Lingual Strengthening Goal 1, SLP)  increase tongue back strength  -MB  increase tongue back strength  -KW     Increase Tongue Back Strength  lingual movement exercises  -MB  lingual movement exercises  -KW     [place]/Accuracy (Lingual Strengthening Goal 1, SLP)  independently (over 90% accuracy)  -MB  independently (over 90% accuracy)  -KW     Time Frame (Lingual Strengthening Goal 1, SLP)  short term goal (STG);by discharge  -MB  short term goal (STG);by discharge  -KW     Barriers (Lingual Strengthening Goal 1, SLP)  n/a  -MB  n/a  -KW     Progress/Outcomes (Lingual Strengthening Goal 1, SLP)  goal not met  -MB  goal ongoing  -KW        Pharyngeal Strengthening Exercise Goal 1 (SLP)    Activity (Pharyngeal Strengthening Goal 1, SLP)   increase timing;increase superior movement of the hyolaryngeal complex;increase anterior movement of the hyolaryngeal complex;increase epiglottic inversion and retroflexion;increase squeeze/positive pressure generation  -MB  increase timing;increase superior movement of the hyolaryngeal complex;increase anterior movement of the hyolaryngeal complex;increase epiglottic inversion and retroflexion;increase squeeze/positive pressure generation  -KW     Increase Timing  gustatory stimulation (sour/cold)  -MB  gustatory stimulation (sour/cold)  -KW     Increase Superior Movement of the Hyolaryngeal Complex  -- NMES  -MB  --     Increase Anterior Movement of the Hyolaryngeal Complex  shaker  -MB  shaker  -KW     Increase Epiglottic Inversion and Retroflexion  Mendel[place]hn  -MB  Mendelsohn  -KW     Increase Squeeze/Positive Pressure Generation  hard effortful swallow  -MB  hard effortful swallow  -KW     Increase Tongue Base Retraction  yoni  -MB  yoni  -KW     [place]/Accuracy (Pharyngeal Strengthening Goal 1, SLP)  independently (over 90% accuracy)  -MB  independently (over 90% accuracy)  -KW     Time Frame (Pharyngeal Strengthening Goal 1, SLP)  short term goal (STG);by discharge  -MB  short term goal (STG);by discharge  -KW     Barriers (Pharyngeal Strengthening Goal 1, SLP)  cognitive status  -MB  cognitive status  -KW     Progress/Outcomes (Pharyngeal Strengthening Goal 1, SLP)  goal partially met  -MB  progress slower than expected  -KW        Words/Phrases/Sentences Goal 1 (SLP)    Improve Ability to Comprehend Words/Phrases/Sentences Through: Goal 1 (SLP)  identify objects, field of;identify pictures, field of;90%;independently (over 90% accuracy);other (comment)  -MB  --     Time Frame (Identify Objects and Pictures Goal 1, SLP)  short term goal (STG);by discharge  -MB  --     Progress/Outcomes (Identify Objects and Pictures Goal 1, SLP)  goal partially met  -MB  --        Comprehend Questions Goal 1 (SLP)     Improve Ability to Comprehend Questions Goal 1 (SLP)  simple yes/no questions;complex yes/no questions;90%;independently (over 90% accuracy)  -MB  --     Time Frame (Comprehend Questions Goal 1, SLP)  short term goal (STG);by discharge  -MB  --     Progress/Outcomes (Comprehend Questions Goal 1, SLP)  goal partially met  -MB  --        Follow Directions Goal 2 (SLP)    Improve Ability to Follow Directions Goal 1 (SLP)  1 step direction with objects;1 step direction without objects;90%;independently (over 90% accuracy)  -MB  --     Time Frame (Follow Directions Goal 1, SLP)  short term goal (STG);by discharge  -MB  --     Progress/Outcomes (Follow Directions Goal 1, SLP)  goal partially met  -MB  --        Word Retrieval Skills Goal 1 (SLP)    Improve Word Retrieval Skills By Goal 1 (SLP)  completing automatic speech task, days of the week;completing automatic speech task, months;confrontational naming task;repeating words;90%;independently (over 90% accuracy)  -MB  --     Time Frame (Word Retrieval Goal 1, SLP)  short term goal (STG);by discharge  -MB  --     Progress/Outcomes (Word Retrieval Goal 1, SLP)  goal partially met  -MB  --        Graphic Expression of Shapes, Letters, Numbers Goal 1 (SLP)    Improve Graphic Expression of Shapes, Letters, and Numbers Goal 1 (SLP)  copy shapes, numbers, and letters;writing dictated number and letters;90%;independently (over 90% accuracy)  -MB  --     Time Frame (Graphic Expression of Shapes, Letters, and Numbers Goal 1, SLP)  short term goal (STG);by discharge  -MB  --     Progress/Outcomes (Graphic Expression of Shapes, Letters, and Numbers Goal 1, SLP)  goal partially met  -MB  --        Augmentative/Alternative Communication Objectives Goal 1 (SLP    Communication (Augmentative/Alternative Communication Goal 1, SLP)  improve ability to use low tech augmentative/alternative communication device  -MB  --     Improve Communication by (Augmentative/Alternative  Communication Goal 1, SLP)  identify picture, field of ____;identify word, field of ____;alphabet/picture board;90%;independently (over 90% accuracy)  -MB  --     Progress/Outcomes (Augmentative/Alternative Communication Goal 1, SLP)  goal partially met  -MB  --       User Key  (r) = Recorded By, (t) = Taken By, (c) = Cosigned By    Initials Name Provider Type    Georgi Garcia, CCC-SLP Speech and Language Pathologist    Coreen Mccall, MS CCC-SLP Speech and Language Pathologist                  SLP Discharge Summary  Anticipated Dischage Disposition: unknown  Reason for Discharge: discharge from this facility  Progress Toward Achieving Short/long Term Goals: goals partially met within established timelines  Discharge Destination: home      Georgi Hernandez CCC-SLP  10/11/2019

## 2019-10-11 NOTE — OUTREACH NOTE
Prep Survey      Responses   Facility patient discharged from?  [facility]   Is patient eligible?  Yes   Discharge diagnosis  Dysphagia d/t recent cerebral infarction, HLD, acute CVA of cerebellum, Alcoholism/alcohol abuse, CVA, smoker, Degenreation of lumbar and lumbosacral intervertevral disc   Does the patient have one of the following disease processes/diagnoses(primary or secondary)?  Stroke (TIA)   Does the patient have Home health ordered?  No   Is there a DME ordered?  No   Comments regarding appointments  See AVS   Prep survey completed?  Yes          Lucila Grayson RN

## 2019-10-12 NOTE — THERAPY DISCHARGE NOTE
Acute Care - Physical Therapy Discharge Summary  [facility]       Patient Name: Jarod Ramírez Jr.  : 1960  MRN: [id number]    Today's Date: 10/12/2019  Onset of Illness/Injury or Date of Surgery: 19       Referring Physician: Dr. Alas      Admit Date: 2019      PT Recommendation and Plan    Visit Dx:    ICD-10-CM ICD-9-CM   1. Dysphagia, unspecified type R13.10 787.20   2. Impaired mobility Z74.09 799.89   3. Impaired mobility and ADLs Z74.09 799.89   4. Aphasia R47.01 784.3               Rehab Goal Summary     Row Name 10/12/19 0807             Bed Mobility Goal 1 (PT)    Activity/Assistive Device (Bed Mobility Goal 1, PT)  sit to supine/supine to sit  -AB      [place] Level/Cues Needed (Bed Mobility Goal 1, PT)  supervision required  -AB      Time Frame (Bed Mobility Goal 1, PT)  long term goal (LTG);10 days  -AB      Progress/Outcomes (Bed Mobility Goal 1, PT)  goal not met  -AB         Transfer Goal 1 (PT)    Activity/Assistive Device (Transfer Goal 1, PT)  sit-to-stand/stand-to-sit;bed-to-chair/chair-to-bed;toilet  -AB      [place] Level/Cues Needed (Transfer Goal 1, PT)  contact guard assist;supervision required  -AB      Time Frame (Transfer Goal 1, PT)  long term goal (LTG);10 days  -AB      Progress/Outcome (Transfer Goal 1, PT)  goal met  -AB         Gait Training Goal 1 (PT)    Activity/Assistive Device (Gait Training Goal 1, PT)  gait (walking locomotion);assistive device use  -AB      [place] Level (Gait Training Goal 1, PT)  contact guard assist;supervision required  -AB      Distance (Gait Goal 1, PT)  100  -AB      Time Frame (Gait Training Goal 1, PT)  long term goal (LTG);10 days  -AB      Barriers (Gait Training Goal 1, PT)  R side visual neglect  -AB      Progress/Outcome (Gait Training Goal 1, PT)  goal not met  -AB         ROM Goal 1 (PT)    ROM Goal 1 (PT)  R DF- neutral  -AB      Time Frame (ROM Goal 1, PT)  long term goal (LTG)  -AB       Progress/Outcome (ROM Goal 1, PT)  goal not met  -AB        User Key  (r) = Recorded By, (t) = Taken By, (c) = Cosigned By    Initials Name Provider Type Discipline    AB Kelsie Haynes, JAIR Physical Therapy Assistant PT                     Kelsie Haynes, JAIR   10/12/2019

## 2019-10-12 NOTE — THERAPY DISCHARGE NOTE
Acute Care - Physical Therapy Discharge Summary  [facility]       Patient Name: Jarod Ramírez Jr.  : 1960  MRN: [id number]    Today's Date: 10/12/2019  Onset of Illness/Injury or Date of Surgery: 19       Referring Physician: Dr. Alas      Admit Date: 2019      PT Recommendation and Plan    Visit Dx:    ICD-10-CM ICD-9-CM   1. Dysphagia, unspecified type R13.10 787.20   2. Impaired mobility Z74.09 799.89   3. Impaired mobility and ADLs Z74.09 799.89   4. Aphasia R47.01 784.3               Rehab Goal Summary     Row Name 10/12/19 0807             Bed Mobility Goal 1 (PT)    Activity/Assistive Device (Bed Mobility Goal 1, PT)  sit to supine/supine to sit  -AB      [place] Level/Cues Needed (Bed Mobility Goal 1, PT)  supervision required  -AB      Time Frame (Bed Mobility Goal 1, PT)  long term goal (LTG);10 days  -AB      Progress/Outcomes (Bed Mobility Goal 1, PT)  goal not met  -AB         Transfer Goal 1 (PT)    Activity/Assistive Device (Transfer Goal 1, PT)  sit-to-stand/stand-to-sit;bed-to-chair/chair-to-bed;toilet  -AB      [place] Level/Cues Needed (Transfer Goal 1, PT)  contact guard assist;supervision required  -AB      Time Frame (Transfer Goal 1, PT)  long term goal (LTG);10 days  -AB      Progress/Outcome (Transfer Goal 1, PT)  goal met  -AB         Gait Training Goal 1 (PT)    Activity/Assistive Device (Gait Training Goal 1, PT)  gait (walking locomotion);assistive device use  -AB      [place] Level (Gait Training Goal 1, PT)  contact guard assist;supervision required  -AB      Distance (Gait Goal 1, PT)  100  -AB      Time Frame (Gait Training Goal 1, PT)  long term goal (LTG);10 days  -AB      Barriers (Gait Training Goal 1, PT)  R side visual neglect  -AB      Progress/Outcome (Gait Training Goal 1, PT)  goal not met  -AB         ROM Goal 1 (PT)    ROM Goal 1 (PT)  R DF- neutral  -AB      Time Frame (ROM Goal 1, PT)  long term goal (LTG)  -AB       Progress/Outcome (ROM Goal 1, PT)  goal not met  -AB        User Key  (r) = Recorded By, (t) = Taken By, (c) = Cosigned By    Initials Name Provider Type Discipline    AB Kelsie Haynes, JAIR Physical Therapy Assistant PT                     Kelsie Haynes, JAIR   10/12/2019

## 2019-10-14 ENCOUNTER — NURSE TRIAGE (OUTPATIENT)
Dept: CALL CENTER | Facility: HOSPITAL | Age: 59
End: 2019-10-14

## 2019-10-14 ENCOUNTER — READMISSION MANAGEMENT (OUTPATIENT)
Dept: CALL CENTER | Facility: HOSPITAL | Age: 59
End: 2019-10-14

## 2019-10-14 NOTE — OUTREACH NOTE
Stroke Week 1 Survey      Responses   Facility patient discharged from?  [place]   Does the patient have one of the following disease processes/diagnoses(primary or secondary)?  Stroke (TIA)   Is there a successful TCM telephone encounter documented?  No   Week 1 attempt successful?  No   Unsuccessful attempts  Attempt 1          Elmer Pratt RN

## 2019-10-14 NOTE — OUTREACH NOTE
Case Management Call Center Follow-up      Responses   [facility] Call Center Tracking Reason?  Transportation   Has the Call Center Case Management Follow-up issue been resolved?  Yes   Follow-up Comments  Spoke with patient's brother Lázaro. Patient does have appointment for outpatient speech therapy on October 22nd. Instructed brother to give them copy of order at that appointment. Also provided number for [place] so that he can arrange transportation for the patient prior to his appointment.           Merced Hewitt RN    10/14/2019, 4:31 PM

## 2019-10-14 NOTE — TELEPHONE ENCOUNTER
"Brother is calling concerning patient , wanting an order faxed to [facility] , for speech, brother has the order, suggested to go  The hospital with the order. Informed the brother several times would email case management for him , took phone number. Informed did not know, if would be contacted today but would send the message.   Became angry and will be calling Ms. Allison. Brother is also wanting transportation for the patient, will be emaling case managment    Reason for Disposition  • Requesting regular office appointment    Additional Information  • Negative: [1] Caller is not with the adult (patient) AND [2] reporting urgent symptoms  • Negative: Lab result questions  • Negative: Medication questions  • Negative: Caller cannot be reached by phone  • Negative: Caller has already spoken to PCP or another triager  • Negative: RN needs further essential information from caller in order to complete triage    Answer Assessment - Initial Assessment Questions  1. REASON FOR CALL or QUESTION: \"What is your reason for calling today?\" or \"How can I best help you?\" or \"What question do you have that I can help answer?\"      Wanting an order faxed for speech therapy, has the order    Protocols used: INFORMATION ONLY CALL-ADULT-      "

## 2019-10-18 ENCOUNTER — READMISSION MANAGEMENT (OUTPATIENT)
Dept: CALL CENTER | Facility: HOSPITAL | Age: 59
End: 2019-10-18

## 2019-10-18 NOTE — OUTREACH NOTE
Stroke Week 1 Survey      Responses   Facility patient discharged from?  [place]   Does the patient have one of the following disease processes/diagnoses(primary or secondary)?  Stroke (TIA)   Is there a successful TCM telephone encounter documented?  No   Week 1 attempt successful?  Yes   Call start time  1051   Call end time  1107   Discharge diagnosis  Dysphagia d/t recent cerebral infarction, HLD, acute CVA of cerebellum, Alcoholism/alcohol abuse, CVA, smoker, Degenreation of lumbar and lumbosacral intervertevral disc   Is patient permission given to speak with other caregiver?  Yes   List who call center can speak with  Gene, brother   Person spoke with today (if not patient) and relationship  Gene, brother   Meds reviewed with patient/caregiver?  Yes   Is the patient having any side effects they believe may be caused by any medication additions or changes?  No   Does the patient have all medications ordered at discharge?  Yes   Is the patient taking all medications as directed (includes completed medication regime)?  Yes   Does the patient have a primary care provider?   Yes   Does the patient have an appointment with their PCP within 7 days of discharge?  Yes   Comments regarding PCP  Jose Karimi MD.    Has the patient kept scheduled appointments due by today?  Yes   Has home health visited the patient within 72 hours of discharge?  N/A   Psychosocial issues?  No   Does the patient require any assistance with activities of daily living such as eating, bathing, dressing, walking, etc.?  No   Does the patient have any residual symptoms from stroke/TIA?  Yes   Residual symptoms comments  Speech difficulty   Does the patient understand the diet ordered at discharge?  Yes   Comments  Patient scheduled to start outpt speech therapy.    Did the patient receive a copy of their discharge instructions?  Yes   Nursing interventions  Reviewed instructions with patient   What is the patient's perception of their  health status since discharge?  Improving   Nursing interventions  Nurse provided patient education   Is the patient able to teach back FAST for Stroke?  Yes   Is the patient/caregiver able to teach back the risk factors for a stroke?  High blood pressure-goal below 120/80, Smoking, High Cholesterol, Excessive alcohol intake   Is the patient/caregiver able to teach back signs and symptoms related to disease process for when to call PCP?  Yes   Is the patient/caregiver able to teach back signs and symptoms related to disease process for when to call 911?  Yes   If the patient is a current smoker, are they able to teach back resources for cessation?  Smoking cessation medications   Is the patient/caregiver able to teach back the hierarchy of who to call/visit for symptoms/problems? PCP, Specialist, Home health nurse, Urgent Care, ED, 911  Yes   Week 1 call completed?  Yes          Lucila Peace RN

## 2019-10-25 ENCOUNTER — READMISSION MANAGEMENT (OUTPATIENT)
Dept: CALL CENTER | Facility: HOSPITAL | Age: 59
End: 2019-10-25

## 2019-10-25 NOTE — OUTREACH NOTE
Stroke Week 2 Survey      Responses   Facility patient discharged from?  [facility]   Does the patient have one of the following disease processes/diagnoses(primary or secondary)?  Stroke (TIA)   Week 2 attempt successful?  Yes   Call start time  1544   Call end time  1558   Discharge diagnosis  Dysphagia d/t recent cerebral infarction, HLD, acute CVA of cerebellum, Alcoholism/alcohol abuse, CVA, smoker, Degenreation of lumbar and lumbosacral intervertevral disc   Is patient permission given to speak with other caregiver?  Yes   List who call center can speak with  Brother- Gene   Person spoke with today (if not patient) and relationship  Brother- Gene/ Patient   Has the patient kept scheduled appointments due by today?  Yes   Does the patient require any assistance with activities of daily living such as eating, bathing, dressing, walking, etc.?  Yes   Does the patient have any residual symptoms from stroke/TIA?  Yes   Does the patient understand the diet ordered at discharge?  Yes   What is the patient's perception of their health status since discharge?  Improving   Nursing interventions  Nurse provided patient education   Additional teach back comments  Per brother, pt is doing ok, they are still waiting for an outpatient referral for speech therapy from his PCP.   Week 2 call completed?  Yes          Kimberly Dela Cruz RN

## 2019-11-01 ENCOUNTER — READMISSION MANAGEMENT (OUTPATIENT)
Dept: CALL CENTER | Facility: HOSPITAL | Age: 59
End: 2019-11-01

## 2019-11-01 NOTE — OUTREACH NOTE
Stroke Week 3 Survey      Responses   Facility patient discharged from?  [facility]   Does the patient have one of the following disease processes/diagnoses(primary or secondary)?  Stroke (TIA)   Week 3 attempt successful?  Yes   Call start time  1157   Call end time  1213   Discharge diagnosis  Dysphagia d/t recent cerebral infarction, HLD, acute CVA of cerebellum, Alcoholism/alcohol abuse, CVA, smoker, Degenreation of lumbar and lumbosacral intervertevral disc   Meds reviewed with patient/caregiver?  Yes   Is the patient having any side effects they believe may be caused by any medication additions or changes?  No   Does the patient have all medications ordered at discharge?  Yes   Is the patient taking all medications as directed (includes completed medication regime)?  Yes   Does the patient have a primary care provider?   Yes   Does the patient have an appointment with their PCP within 7 days of discharge?  Yes   Comments regarding PCP  Jose Karimi MD.    Has the patient kept scheduled appointments due by today?  Yes   Does the patient require any assistance with activities of daily living such as eating, bathing, dressing, walking, etc.?  Yes   Does the patient have any residual symptoms from stroke/TIA?  Yes   Does the patient understand the diet ordered at discharge?  Yes   Did the patient receive a copy of their discharge instructions?  Yes   Nursing interventions  Reviewed instructions with patient   What is the patient's perception of their health status since discharge?  Same   Nursing interventions  Nurse provided patient education   Is the patient able to teach back FAST for Stroke?  Yes   Is the patient/caregiver able to teach back the risk factors for a stroke?  High blood pressure-goal below 120/80, Smoking, High Cholesterol, Excessive alcohol intake   Is the patient/caregiver able to teach back signs and symptoms related to disease process for when to call PCP?  Yes   Is the patient/caregiver  able to teach back signs and symptoms related to disease process for when to call 911?  Yes   Is the patient/caregiver able to teach back the hierarchy of who to call/visit for symptoms/problems? PCP, Specialist, Home health nurse, Urgent Care, ED, 911  Yes   Additional teach back comments  Per brother, pt is doing ok   Week 3 call completed?  Yes          Kathryn Herrera, RN

## 2019-11-08 ENCOUNTER — READMISSION MANAGEMENT (OUTPATIENT)
Dept: CALL CENTER | Facility: HOSPITAL | Age: 59
End: 2019-11-08

## 2019-11-08 NOTE — OUTREACH NOTE
Stroke Week 4 Survey      Responses   Facility patient discharged from?  [place]   Does the patient have one of the following disease processes/diagnoses(primary or secondary)?  Stroke (TIA)   Week 4 attempt successful?  No          Elmer Pratt RN

## 2019-11-13 ENCOUNTER — OFFICE VISIT (OUTPATIENT)
Dept: NEUROLOGY | Facility: CLINIC | Age: 59
End: 2019-11-13

## 2019-11-13 VITALS
DIASTOLIC BLOOD PRESSURE: 84 MMHG | WEIGHT: 176 LBS | HEART RATE: 91 BPM | OXYGEN SATURATION: 98 % | BODY MASS INDEX: 30.05 KG/M2 | HEIGHT: 64 IN | SYSTOLIC BLOOD PRESSURE: 138 MMHG

## 2019-11-13 DIAGNOSIS — G89.29 CHRONIC MIDLINE LOW BACK PAIN WITHOUT SCIATICA: ICD-10-CM

## 2019-11-13 DIAGNOSIS — M54.50 CHRONIC MIDLINE LOW BACK PAIN WITHOUT SCIATICA: ICD-10-CM

## 2019-11-13 DIAGNOSIS — Z72.0 TOBACCO ABUSE: ICD-10-CM

## 2019-11-13 DIAGNOSIS — E78.2 MIXED HYPERLIPIDEMIA: ICD-10-CM

## 2019-11-13 DIAGNOSIS — IMO0001 ALCOHOLISM /ALCOHOL ABUSE: ICD-10-CM

## 2019-11-13 DIAGNOSIS — I63.312 CEREBROVASCULAR ACCIDENT (CVA) DUE TO THROMBOSIS OF LEFT MIDDLE CEREBRAL ARTERY (HCC): Primary | ICD-10-CM

## 2019-11-13 DIAGNOSIS — F32.89 OTHER DEPRESSION: ICD-10-CM

## 2019-11-13 PROCEDURE — 99214 OFFICE O/P EST MOD 30 MIN: CPT | Performed by: PHYSICIAN ASSISTANT

## 2019-11-13 PROCEDURE — 99406 BEHAV CHNG SMOKING 3-10 MIN: CPT | Performed by: PHYSICIAN ASSISTANT

## 2019-11-13 RX ORDER — DULOXETIN HYDROCHLORIDE 30 MG/1
30 CAPSULE, DELAYED RELEASE ORAL 2 TIMES DAILY
Qty: 60 CAPSULE | Refills: 2 | Status: SHIPPED | OUTPATIENT
Start: 2019-11-13 | End: 2020-03-05 | Stop reason: SDUPTHER

## 2019-11-13 RX ORDER — LISINOPRIL 10 MG/1
1 TABLET ORAL DAILY
COMMUNITY
Start: 2019-11-07

## 2019-11-13 RX ORDER — NAPROXEN 250 MG/1
TABLET ORAL
COMMUNITY
End: 2019-11-13 | Stop reason: ALTCHOICE

## 2019-11-13 RX ORDER — MULTIVITAMIN WITH IRON
1 TABLET ORAL DAILY
Qty: 30 TABLET | Refills: 11 | Status: SHIPPED | OUTPATIENT
Start: 2019-11-13 | End: 2020-11-12

## 2019-11-13 NOTE — PROGRESS NOTES
"  Neurology Progress Note      Chief Complaint:    Left MCA CVA  Ethanol abuse  Tobacco addiction  Hypertension, essential  Dyslipidemia, mixed    Subjective     Subjective:  This is a pleasant 59-year-old male who [facility] for primarily symptoms of confusion and what was deemed to be mixed expressive and receptive aphasia.  Patient also had been undergoing separation from his spouse at that time and had been drinking about a half a gallon of vodka daily.  He is a known \"functional alcoholic\" and was having some withdrawal symptoms from this simultaneously.  The patient was initially combative, however, neuroimaging revealed a very large left MCA CVA the posterior distribution.  The patient was noted to have total occlusion of the entire cervical distribution of the left internal carotid artery.  This was confirmed on CTA as well as some moderate ostial stenosis at the takeoff of the vertebral artery on the right.  There is also ostial stenosis of mild degree on the right vertebral.    Since discharge, the patient has been maintained on aspirin 81 mg daily and atorvastatin 80 mg daily.  The patient has not drink any alcohol.  He has since moved in with his brother.  His brother is helping manage his affairs, therapies and medications.  The patient is participating in outpatient physical, occupational and speech therapy.  They state that he is about to be discharged from physical therapy, but continuing in speech.  He continues to have some difficulties with word substitution and verbal apraxia.  Continues to demonstrate, as detailed below, mixed expressive and receptive aphasia.  Patient has had some mild dysphasia, however, this has improved.    The patient has resumed smoking and is now smoking as much as 1 to 2 packs of cigarettes daily.    The patient also has difficulties with chronic pain and has been taking Aleve intermittently but most times on a daily or twice " daily basis.  Additionally, he admits to having a depressed mood with some feelings of frustration mostly with his speech, however, also dealing with his ex-wife and the social dynamic of moving in with his brother and needing to rely upon him.  He denies any suicidal homicidal ideation.  He has not had difficulties with depression, per se, in the past.  He has never taken medication for this.      Past Medical History:   Diagnosis Date   • Hypertension      Past Surgical History:   Procedure Laterality Date   • CATARACT EXTRACTION Left    • WRIST FRACTURE SURGERY Left      Family History   Problem Relation Age of Onset   • No Known Problems Mother    • Diabetes Father    • Cancer Sister    • No Known Problems Brother      Social History     Tobacco Use   • Smoking status: Current Every Day Smoker     Types: Cigarettes   • Smokeless tobacco: Never Used   • Tobacco comment: 1.5 packs daily   Substance Use Topics   • Alcohol use: Yes   • Drug use: No       Medications:  Current Outpatient Medications   Medication Sig Dispense Refill   • aspirin 81 MG chewable tablet Chew 1 tablet Daily. 30 tablet 0   • atorvastatin (LIPITOR) 80 MG tablet Take 1 tablet by mouth Every Night. 30 tablet 0   • lisinopril (PRINIVIL,ZESTRIL) 10 MG tablet Take 1 tablet by mouth Daily.     • naproxen (NAPROSYN) 250 MG tablet naproxen 250 mg tablet   Take 1 tablet every day by oral route.     • pantoprazole (PROTONIX) 40 MG EC tablet Take 1 tablet by mouth Daily. 30 tablet 0     No current facility-administered medications for this visit.        Allergies:    Patient has no known allergies.    Review of Systems:   -A 14 point review of systems is completed and is negative.      Objective      Vital Signs  Heart Rate:  [91] 91  BP: (138)/(84) 138/84    Physical Exam:    General Exam:  Head:  Normocephalic, atraumatic.  HEENT: PERRLA.  Full EOM.  Neck:  No lymphadenopathy, thyromegaly or bruit.  Cardiac:  Regular rate and rhythm.  Normal S1, S2.   No murmur, rub or gallop.  Lungs:  Clear to auscultation bilaterally.  No wheeze, rales or rhonchi.  Abdomen:  Non-tender, Non-distended.  Bowel sounds normoactive.  Extremities: Full peripheral pulses.  No clubbing, cyanosis or edema.  Skin: No ulceration, breakdown or rash.      Neurologic Exam:  Mental Status:    -Awake. Alert. Oriented to person, place & time.  -Word finding difficulties and word substitution and some verbal apraxia.  -Mixed receptive and expressive aphasia.  -No dysarthria.  Speech is somewhat impulsive and pressured.  -Follows simple commands.     CN II:  Full visual fields with confrontation.  Pupils equally reactive to light.  CN III, IV, VI:  Extraocular muscles function intact with no nystagmus.  CN V:  Facial sensory is symmetric.  CN VII:  Facial motor symmetric.  CN VIII:  Gross hearing intact bilaterally.  CN IX/X:  Palate elevates symmetrically.  CN XI:  Shoulder shrug symmetric.  CN XII:  Tongue is midline on protrusion.     Motor: (strength out of 5:  1= minimal movement, 2 = movement in plane of gravity, 3 = movement against gravity, 4 = movement against some resistance, 5 = full strength)     -5/5 in bilateral biceps, triceps, brachioradialis, wrist extensors and intrinsic muscles of the hand.    -5/5 in bilateral hip flexors, quadriceps, hamstrings, gastrocsoleus complex, anterior tibialis and extensor hallucis longus.       Deep Tendon Reflexes:  -Right              Bicep: 2+         Triceps: 2+      Brachioradialis: 2+              Patella: 2+       Ankle: 2+         Babinski:  negative  -Left              Bicep: 2+         Triceps: 2+      Brachioradialis: 2+              Patella: 2+       Ankle: 2+         Babinski:  negative     Sensory:  -Intact to light touch, pinprick BUE (C5-T1) and BLE (L2-S1).     Coordination:  -Has difficulty completing finger-to-nose the bilateral upper extremities.  This is mostly from receptive a fascia, however, once this is demonstrated to him  "physically and he is told \"to repeat that,\" he can do that with this instruction, but not by telling him to perform finger-to-nose.  -Heel to shin intact BLEs  -No ataxia     Gait  -No signs of ataxia  -ambulates unassisted.  Somewhat impulsive.       Results Review:    I reviewed the patient's new clinical results and findings.      No components found for: A1C  Lab Results   Component Value Date    HDL 51 09/27/2019     (H) 09/27/2019     No components found for: B12  Lab Results   Component Value Date    TSH 1.760 09/27/2019       Assessment/Plan     Impression:  1.  Left MCA CVA, 9/27/2019  2.  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  History of ethanol abuse  4.  Complete occlusion left internal carotid artery  5.  30% stenosis right carotid bulb and moderate stenosis ostium of right vertebral artery  6.  Possible obstructive sleep apnea  7.  Tobacco addiction  8.  Chronic back pain and arthritis pain    Plan:  1.  Continue aspirin 81 mg daily for secondary stroke prophylaxis  2.  Continue Lipitor 80 mg daily for secondary stroke prophylaxis.  Target LDL less than 70  3.  Continue thiamine supplementation and avoidance of alcohol.  I did send in a prescription for a B complex multivitamin.  4.  We spent greater than 10 minutes in direct face-to-face contact regarding smoking cessation counseling, available resources.  He is going to discuss smoking cessation with his primary care provider.  We discussed the imminent risk of re-repeated CVA with continued tobacco addiction.  His brother is also going to assist him with this.  5.  Strict avoidance of alcohol.  Fortunately, patient has not resumed alcohol use.  6.  Continue outpatient speech therapy.  7.  Annual carotid ultrasound surveillance recommended.  8.  Target systolic pressure less than 140 and diastolic less than 90.  9.  I have asked him to stop use of over-the-counter turmeric and Naprosyn and, instead, I prescribed " duloxetine 30 mg titrating up to twice daily initially and this can be titrated further.  I am prescribing this for multiple indications including osteoarthritis and chronic back pain, depression and serotonin component medications such as SSRIs sometimes can also expedite recovery following stroke.  I believe controlling depressive symptoms may help mitigate any risk of turning back to alcohol dependence as well as helping facilitate better participation and productivity with his various therapy disciplines.    Would like to see the patient back in 3 months to evaluate his clinical course.  We should continue to surveilled his lipid panel.  As stated above, I recommend annual surveillance with noninvasive carotid ultrasound.  I also would like to assist in any way possible to get him to quit smoking.    The patient voices understanding and agreement with plan of care and will call for any concerns or questions in the interim.  We reviewed pertinent diagnostic studies and the potential risks and benefits of the prescribed regimen of treatment.    We discussed compliance of the prescribed treatment regimen and instructions on medication, therapy, physical activity, etc. and potential for improvement and impact these have on their healing/recovery and risk reduction in the future.    I spent greater than 45 minutes in direct face to face contact with the patient with greater than 50% of the time being spent in education and counseling.          Sascha Lopes PA-C  11/13/19  3:17 PM

## 2019-11-15 ENCOUNTER — TELEPHONE (OUTPATIENT)
Dept: NEUROLOGY | Facility: CLINIC | Age: 59
End: 2019-11-15

## 2019-11-15 NOTE — TELEPHONE ENCOUNTER
----- Message from Sascha Lopes PA-C sent at 11/15/2019  1:30 PM CST -----  I wanted primary care to do this.  ----- Message -----  From: Megan Jones LPN  Sent: 11/15/2019  10:12 AM  To: FRENCH Peng said you were going to fill Lipitor for him, he has been out for 4 days.

## 2020-03-05 ENCOUNTER — OFFICE VISIT (OUTPATIENT)
Dept: NEUROLOGY | Facility: CLINIC | Age: 60
End: 2020-03-05

## 2020-03-05 VITALS
WEIGHT: 164 LBS | DIASTOLIC BLOOD PRESSURE: 72 MMHG | HEIGHT: 64 IN | HEART RATE: 81 BPM | SYSTOLIC BLOOD PRESSURE: 130 MMHG | OXYGEN SATURATION: 98 % | BODY MASS INDEX: 28 KG/M2

## 2020-03-05 DIAGNOSIS — F32.89 OTHER DEPRESSION: ICD-10-CM

## 2020-03-05 DIAGNOSIS — G89.29 CHRONIC MIDLINE LOW BACK PAIN WITHOUT SCIATICA: ICD-10-CM

## 2020-03-05 DIAGNOSIS — E78.2 MIXED HYPERLIPIDEMIA: ICD-10-CM

## 2020-03-05 DIAGNOSIS — H53.2 DIPLOPIA: ICD-10-CM

## 2020-03-05 DIAGNOSIS — IMO0001 ALCOHOLISM /ALCOHOL ABUSE: ICD-10-CM

## 2020-03-05 DIAGNOSIS — I63.312 CEREBROVASCULAR ACCIDENT (CVA) DUE TO THROMBOSIS OF LEFT MIDDLE CEREBRAL ARTERY (HCC): Primary | ICD-10-CM

## 2020-03-05 DIAGNOSIS — M54.50 CHRONIC MIDLINE LOW BACK PAIN WITHOUT SCIATICA: ICD-10-CM

## 2020-03-05 PROCEDURE — 99214 OFFICE O/P EST MOD 30 MIN: CPT | Performed by: PHYSICIAN ASSISTANT

## 2020-03-05 RX ORDER — DULOXETIN HYDROCHLORIDE 60 MG/1
60 CAPSULE, DELAYED RELEASE ORAL 2 TIMES DAILY
Qty: 60 CAPSULE | Refills: 0 | Status: SHIPPED | OUTPATIENT
Start: 2020-03-05 | End: 2020-04-09 | Stop reason: SDUPTHER

## 2020-03-05 NOTE — PROGRESS NOTES
"  Neurology Progress Note      Chief Complaint:    Left MCA CVA  Ethanol abuse  Tobacco addiction  Hypertension, essential  Dyslipidemia, mixed    Subjective     Subjective:  This is a pleasant 59-year-old male who was seen in consultation at [facility] on 9/27/2019 for primarily symptoms of confusion and what was deemed to be mixed expressive and receptive aphasia.  Patient also had been undergoing separation from his spouse at that time and had been drinking about a half a gallon of vodka daily.  He is a known \"functional alcoholic\" and was having some withdrawal symptoms from this simultaneously.  The patient was initially combative, however, neuroimaging revealed a very large left MCA CVA the posterior distribution.  The patient was noted to have total occlusion of the entire cervical distribution of the left internal carotid artery.  This was confirmed on CTA as well as some moderate ostial stenosis at the takeoff of the vertebral artery on the right.  There is also ostial stenosis of mild degree on the right vertebral.    Since last being seen, he has done very well.  He continues in speech therapy once per week.  He does have some mild lateral gaze diplopia to the left and upward gaze.  Otherwise, he is inquiring about ability to return to driving.  His speech has improved remarkably.  He still has some mild word substitution, but makes corrections in this very readily and is speaking much more articulately with no real significant dysarthria and very mild expressive aphasia.  He is able to read to me and has preserved reading comprehension now.  This is markedly improved from the previous encounter.    He continues with duloxetine, but only at 30 mg once a day.  He states insurance would only fill this is once a day for him.  Thus, it has not yet improved his back pain.        Past Medical History:   Diagnosis Date   • Hypertension      Past Surgical History:   Procedure Laterality Date   • CATARACT " EXTRACTION Left    • WRIST FRACTURE SURGERY Left      Family History   Problem Relation Age of Onset   • No Known Problems Mother    • Diabetes Father    • Cancer Sister    • No Known Problems Brother      Social History     Tobacco Use   • Smoking status: Former Smoker     Types: Cigarettes     Last attempt to quit: 2019     Years since quittin.2   • Smokeless tobacco: Never Used   • Tobacco comment: 1.5 packs daily   Substance Use Topics   • Alcohol use: Yes   • Drug use: No       Medications:  Current Outpatient Medications   Medication Sig Dispense Refill   • aspirin 81 MG chewable tablet Chew 1 tablet Daily. 30 tablet 0   • atorvastatin (LIPITOR) 80 MG tablet Take 1 tablet by mouth Every Night. 30 tablet 0   • B Complex-C (B-COMPLEX WITH VITAMIN C) tablet Take 1 tablet by mouth Daily. 30 tablet 11   • DULoxetine (CYMBALTA) 30 MG capsule Take 1 capsule by mouth 2 (Two) Times a Day. 60 capsule 2   • lisinopril (PRINIVIL,ZESTRIL) 10 MG tablet Take 1 tablet by mouth Daily.     • pantoprazole (PROTONIX) 40 MG EC tablet Take 1 tablet by mouth Daily. 30 tablet 0     No current facility-administered medications for this visit.        Allergies:    Patient has no known allergies.    Review of Systems:   -A 14 point review of systems is completed and is negative.      Objective      Vital Signs  Heart Rate:  [81] 81  BP: (130)/(72) 130/72    Physical Exam:    General Exam:  Head:  Normocephalic, atraumatic.  HEENT: PERRLA.  Full EOM.  Neck:  No lymphadenopathy, thyromegaly or bruit.  Cardiac:  Regular rate and rhythm.  Normal S1, S2.  No murmur, rub or gallop.  Lungs:  Clear to auscultation bilaterally.  No wheeze, rales or rhonchi.  Abdomen:  Non-tender, Non-distended.  Bowel sounds normoactive.  Extremities: Full peripheral pulses.  No clubbing, cyanosis or edema.  Skin: No ulceration, breakdown or rash.      Neurologic Exam:  Mental Status:    -Awake. Alert. Oriented to person, place & time.  -No word finding  difficulties.  -No aphasia.  -No dysarthria.  -Follows simple commands.     CN II:  Full visual fields with confrontation.  Pupils equally reactive to light.  CN III, IV, VI:  Extraocular muscles function intact with no nystagmus.  CN V:  Facial sensory is symmetric.  CN VII:  Facial motor symmetric.  CN VIII:  Gross hearing intact bilaterally.  CN IX/X:  Palate elevates symmetrically.  CN XI:  Shoulder shrug symmetric.  CN XII:  Tongue is midline on protrusion.     Motor: (strength out of 5:  1= minimal movement, 2 = movement in plane of gravity, 3 = movement against gravity, 4 = movement against some resistance, 5 = full strength)     -5/5 in bilateral biceps, triceps, brachioradialis, wrist extensors and intrinsic muscles of the hand.    -5/5 in bilateral hip flexors, quadriceps, hamstrings, gastrocsoleus complex, anterior tibialis and extensor hallucis longus.       Deep Tendon Reflexes:  -Right              Biceps: 2+         Triceps: 2+      Brachioradialis: 2+              Patella: 2+       Ankle: 2+           -Left              Biceps: 2+         Triceps: 2+      Brachioradialis: 2+              Patella: 2+       Ankle: 2+             Tone (Modified Ottoniel Scale):  No appreciable increase in tone or rigidity noted.     Sensory:  -Intact to light touch, pinprick BUE (C5-T1) and BLE (L2-S1).     Coordination:  -Finger to nose intact BUEs  -Heel to shin intact BLEs  -No ataxia     Gait  -No signs of ataxia  -ambulates unassisted       Results Review:    I reviewed the patient's new clinical results and findings.      No components found for: A1C  Lab Results   Component Value Date    HDL 51 09/27/2019     (H) 09/27/2019     No components found for: B12  Lab Results   Component Value Date    TSH 1.760 09/27/2019       Assessment/Plan     Impression:  1.  Left MCA CVA, 9/27/2019  2.  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  History of ethanol abuse  4.  Complete occlusion left  internal carotid artery  5.  30% stenosis right carotid bulb and moderate stenosis ostium of right vertebral artery  6.  Possible obstructive sleep apnea  7.  Lateral diplopia left visual field with probable visual field defect   8.  Chronic back pain and arthritis pain      Plan:  1.  Continue aspirin 81 mg daily for secondary stroke prevention.  2.  Continue atorvastatin 80 mg daily for stroke prevention and plaque stabilization with target LDL less than 70.  3.  Continue speech therapy until discharge.  4.  Increase duloxetine to 60 mg in the morning and then after 2 weeks increase to 6 mg twice daily, thereafter, as tolerated/able.  This is to treat both back pain, recovery following stroke and mild major depressive disorder..  5.  Patient has quit smoking since November in the last encounter.  I commended patient on the excellent work with this.  He has abstained from alcohol entirely since I last saw him as well.  6.  Referral to ophthalmology for visual field testing and to quantify his visual field defect.  I do not think there is going to be anything to do for the diplopia as far left lateral gaze such as a prism.  It is fairly negligible, will likely remain present indefinitely, however, he will likely become less aware of it over time.    The patient will follow-up with me in 3 months to evaluate his initial tolerance and titrating up the Cymbalta further.  If he is stable at that point, I will begin to see him less frequently as a secondary stroke prevention measures are well-controlled at this time.  He will need lipid surveillance with primary care.  We reviewed pertinent diagnostic studies and the potential risks and benefits of the prescribed regimen of treatment.    We discussed compliance of the prescribed treatment regimen and instructions on medication, therapy, physical activity, etc. and potential for improvement and impact these have on their healing/recovery and risk reduction in the  future.    I spent greater than 25 minutes in direct face to face contact with the patient with greater than 50% of the time being spent in education and counseling.          Sascha Lopes PA-C  03/05/20  11:28 AM

## 2020-04-09 DIAGNOSIS — M54.50 CHRONIC MIDLINE LOW BACK PAIN WITHOUT SCIATICA: ICD-10-CM

## 2020-04-09 DIAGNOSIS — G89.29 CHRONIC MIDLINE LOW BACK PAIN WITHOUT SCIATICA: ICD-10-CM

## 2020-04-09 DIAGNOSIS — IMO0001 ALCOHOLISM /ALCOHOL ABUSE: ICD-10-CM

## 2020-04-09 DIAGNOSIS — I63.312 CEREBROVASCULAR ACCIDENT (CVA) DUE TO THROMBOSIS OF LEFT MIDDLE CEREBRAL ARTERY (HCC): ICD-10-CM

## 2020-04-09 RX ORDER — DULOXETIN HYDROCHLORIDE 60 MG/1
60 CAPSULE, DELAYED RELEASE ORAL 2 TIMES DAILY
Qty: 60 CAPSULE | Refills: 5 | Status: SHIPPED | OUTPATIENT
Start: 2020-04-09

## 2020-08-05 ENCOUNTER — TELEPHONE (OUTPATIENT)
Dept: NEUROLOGY | Facility: CLINIC | Age: 60
End: 2020-08-05

## 2020-08-05 NOTE — TELEPHONE ENCOUNTER
PT IS CURRENTLY IN [place] , HE STATES WHEN GOING TO  HIS PRESCRIPTION THEY STATED IN [place] HE CAN NOT USE HIS MEDICAL INSURANCE FROM [place] TO GET MEDICATION.    HE NEEDS TO KNOW WHAT HE CAN DO.    PLEASE ADVISE.     CURRENTLY OUT OF MEDICATION.

## 2020-08-05 NOTE — TELEPHONE ENCOUNTER
Jarod said he will be in [place] until the 18th and and his mother are dealing with it.  Explained that there is nothing we can do.

## 2020-10-22 ENCOUNTER — OFFICE VISIT (OUTPATIENT)
Dept: NEUROLOGY | Facility: CLINIC | Age: 60
End: 2020-10-22

## 2020-10-22 VITALS
HEART RATE: 84 BPM | WEIGHT: 168 LBS | OXYGEN SATURATION: 99 % | HEIGHT: 64 IN | DIASTOLIC BLOOD PRESSURE: 72 MMHG | BODY MASS INDEX: 28.68 KG/M2 | SYSTOLIC BLOOD PRESSURE: 118 MMHG

## 2020-10-22 DIAGNOSIS — I63.312 CEREBROVASCULAR ACCIDENT (CVA) DUE TO THROMBOSIS OF LEFT MIDDLE CEREBRAL ARTERY (HCC): ICD-10-CM

## 2020-10-22 DIAGNOSIS — IMO0001 ALCOHOLISM /ALCOHOL ABUSE: ICD-10-CM

## 2020-10-22 DIAGNOSIS — Z72.0 TOBACCO ABUSE: Primary | ICD-10-CM

## 2020-10-22 DIAGNOSIS — E78.2 MIXED HYPERLIPIDEMIA: ICD-10-CM

## 2020-10-22 PROCEDURE — 99214 OFFICE O/P EST MOD 30 MIN: CPT | Performed by: PHYSICIAN ASSISTANT

## 2020-10-22 RX ORDER — NICOTINE 21 MG/24HR
1 PATCH, TRANSDERMAL 24 HOURS TRANSDERMAL EVERY 24 HOURS
Qty: 30 PATCH | Refills: 0 | Status: SHIPPED | OUTPATIENT
Start: 2020-10-22

## 2020-10-22 NOTE — PROGRESS NOTES
Neurology Progress Note      Chief Complaint:    Left MCA CVA  CN VI palsy  Tobacco addiction  Hypertension, essential  Dyslipidemia, mixed    Subjective     Subjective:  Patient returns clinic today for follow-up evaluation of stroke sustained on 2019.  Patient has completed all of his outpatient PT, OT and ST.  His expressive aphasia has improved remarkably well and he demonstrates little in the way of receptive aphasia.  Patient has quit smoking, but is using smokeless tobacco pouches 3-4 times daily.  He did see Dr. Gabriel Jesus MD, and ophthalmology at [facility] who discussed cataracts and 6th cranial nerve palsy with him.  He declines to pursue prisms.  He states that his vision does not bother him and ophthalmology told him that he was cleared to drive, by the patient's report.  All in all, the patient is doing well without any new complaints.  He finally was approved for his disability which is also been helpful.  He continues with the Cymbalta, aspirin and atorvastatin.  He is compliant with all of his medications.      Past Medical History:   Diagnosis Date   • Hypertension      Past Surgical History:   Procedure Laterality Date   • CATARACT EXTRACTION Left    • WRIST FRACTURE SURGERY Left      Family History   Problem Relation Age of Onset   • No Known Problems Mother    • Diabetes Father    • Cancer Sister    • No Known Problems Brother      Social History     Tobacco Use   • Smoking status: Former Smoker     Types: Cigarettes     Quit date: 2019     Years since quittin.8   • Smokeless tobacco: Never Used   • Tobacco comment: 1.5 packs daily   Substance Use Topics   • Alcohol use: Yes   • Drug use: No       Medications:  Current Outpatient Medications   Medication Sig Dispense Refill   • aspirin 81 MG chewable tablet Chew 1 tablet Daily. 30 tablet 0   • atorvastatin (LIPITOR) 80 MG tablet Take 1 tablet by mouth Every Night. 30 tablet 0   • B Complex-C (B-COMPLEX  WITH VITAMIN C) tablet Take 1 tablet by mouth Daily. 30 tablet 11   • DULoxetine (CYMBALTA) 60 MG capsule Take 1 capsule by mouth 2 (Two) Times a Day. 60 capsule 5   • lisinopril (PRINIVIL,ZESTRIL) 10 MG tablet Take 1 tablet by mouth Daily.     • pantoprazole (PROTONIX) 40 MG EC tablet Take 1 tablet by mouth Daily. 30 tablet 0     No current facility-administered medications for this visit.        Allergies:    Patient has no known allergies.    Review of Systems:   -A 14 point review of systems is completed and is negative.      Objective      Vital Signs       Physical Exam:    General Exam:  Head:  Normocephalic, atraumatic.  HEENT: PERRLA.  Full EOM.  Neck:  No lymphadenopathy, thyromegaly or bruit.  Cardiac:  Regular rate and rhythm.  Normal S1, S2.  No murmur, rub or gallop.  Lungs:  Clear to auscultation bilaterally.  No wheeze, rales or rhonchi.  Abdomen:  Non-tender, Non-distended.  Bowel sounds normoactive.  Extremities: Full peripheral pulses.  No clubbing, cyanosis or edema.  Skin: No ulceration, breakdown or rash.      Neurologic Exam:  Mental Status:    -Awake. Alert. Oriented to person, place & time.  -No word finding difficulties.  -No aphasia.  -No dysarthria.  -Follows simple commands.     CN II:  Full visual fields with confrontation.  Pupils equally reactive to light.  CN III, IV, VI:  Extraocular muscles function intact with no nystagmus.  CN V:  Facial sensory is symmetric.  CN VII:  Facial motor symmetric.  CN VIII:  Gross hearing intact bilaterally.  CN IX/X:  Palate elevates symmetrically.  CN XI:  Shoulder shrug symmetric.  CN XII:  Tongue is midline on protrusion.     Motor: (strength out of 5:  1= minimal movement, 2 = movement in plane of gravity, 3 = movement against gravity, 4 = movement against some resistance, 5 = full strength)     -5/5 in bilateral biceps, triceps, brachioradialis, wrist extensors and intrinsic muscles of the hand.    -5/5 in bilateral hip flexors, quadriceps,  hamstrings, gastrocsoleus complex, anterior tibialis and extensor hallucis longus.       Deep Tendon Reflexes:  -Right              Biceps: 2+         Triceps: 2+      Brachioradialis: 2+              Patella: 2+       Ankle: 2+           -Left              Biceps: 2+         Triceps: 2+      Brachioradialis: 2+              Patella: 2+       Ankle: 2+             Tone (Modified Ottoniel Scale):  No appreciable increase in tone or rigidity noted.     Sensory:  -Intact to light touch, pinprick BUE (C5-T1) and BLE (L2-S1).     Coordination:  -Finger to nose intact BUEs  -Heel to shin intact BLEs  -No ataxia     Gait  -No signs of ataxia  -ambulates unassisted       Results Review:    I reviewed the patient's new clinical results and findings.      No components found for: A1C  Lab Results   Component Value Date    HDL 51 09/27/2019     (H) 09/27/2019     No components found for: B12  Lab Results   Component Value Date    TSH 1.760 09/27/2019       Assessment/Plan     Impression:  1.  Left MCA CVA, 9/27/2019  2.  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  CN VI palsy  4.  Complete occlusion left internal carotid artery  5.  30% stenosis right carotid bulb and moderate stenosis ostium of right vertebral artery  6.  Possible obstructive sleep apnea  7.    8.  Chronic back pain and arthritis pain      Plan:  1.  Continue aspirin 81 mg daily for secondary stroke prevention.  2.  Continue atorvastatin 80 mg daily for stroke prevention and plaque stabilization with target LDL less than 70.  3.  Continue with target blood pressure less than 140/90.  It was excellent today.  4.  Recommend a lipid panel and CMP with PCP in the next month.  5.  I provided prescription for nicotine 21 mg patches for him to stop using his smokeless tobacco and then he will follow-up with his PCP, thereafter, for further management.    From a neurology standpoint, the patient has been stable now for over a year.  I  believe that we can see him on an as-needed basis.  I am certainly happy to reevaluate at any point in future, however, the patient is doing well without any new complaint.  The patient voices understanding and agreement with plan of care as does his brother who is his primary caregiver accompanying him to the office today.  We spent greater than 25 minutes of the 30-minute counter in direct face-to-face education & counseling regarding aforementioned measures.      Sascha Lopes PA-C  10/22/20  15:56 CDT

## 2020-11-30 ENCOUNTER — TELEPHONE (OUTPATIENT)
Dept: NEUROLOGY | Facility: CLINIC | Age: 60
End: 2020-11-30

## 2020-12-28 ENCOUNTER — TELEPHONE (OUTPATIENT)
Dept: NEUROLOGY | Facility: CLINIC | Age: 60
End: 2020-12-28

## 2020-12-28 NOTE — TELEPHONE ENCOUNTER
PT IS CALLING TO REQUEST DOCUMENTATION STATING THAT PT'S DAUGHTER, TRITSEN SANCHEZ, IS THE PRIMARY CAREGIVER FOR PT. PT STATES THAT IT IS FOR RESIDENCE PURPOSES.  PT HAS MOVED TO [place], [place] WITH DAUGHTER.    PT CAN BE REACHED AT [phone].\    PLEASE ADVISE.

## 2020-12-29 NOTE — TELEPHONE ENCOUNTER
Jarod notified Sascha is out of the office this week.  He said he never received the letter Sascha sent.  Suggested he create a [employer] account.  He is to contact us if he needs another letter.  He requested that I speak to Lizbeth (daughter).  Information given to go to [facility] website and creat a [employer] account.

## 2025-06-18 ENCOUNTER — OFFICE VISIT (OUTPATIENT)
Dept: URBAN - METROPOLITAN AREA CLINIC 48 | Facility: CLINIC | Age: 65
End: 2025-06-18
Payer: COMMERCIAL

## 2025-06-18 DIAGNOSIS — Z96.1 PRESENCE OF PSEUDOPHAKIA: ICD-10-CM

## 2025-06-18 DIAGNOSIS — H25.11 AGE-RELATED NUCLEAR CATARACT, RIGHT EYE: ICD-10-CM

## 2025-06-18 DIAGNOSIS — H35.341 MACULAR CYST, HOLE, OR PSEUDOHOLE, RIGHT EYE: Primary | ICD-10-CM

## 2025-06-18 PROCEDURE — 92134 CPTRZ OPH DX IMG PST SGM RTA: CPT | Performed by: OPTOMETRIST

## 2025-06-18 PROCEDURE — 99204 OFFICE O/P NEW MOD 45 MIN: CPT | Performed by: OPTOMETRIST

## 2025-06-18 ASSESSMENT — INTRAOCULAR PRESSURE
OD: 19
OS: 16